# Patient Record
Sex: FEMALE | Race: WHITE | NOT HISPANIC OR LATINO
[De-identification: names, ages, dates, MRNs, and addresses within clinical notes are randomized per-mention and may not be internally consistent; named-entity substitution may affect disease eponyms.]

---

## 2017-02-28 ENCOUNTER — MEDICATION RENEWAL (OUTPATIENT)
Age: 57
End: 2017-02-28

## 2017-03-02 ENCOUNTER — APPOINTMENT (OUTPATIENT)
Dept: PULMONOLOGY | Facility: CLINIC | Age: 57
End: 2017-03-02

## 2017-03-02 VITALS
OXYGEN SATURATION: 99 % | BODY MASS INDEX: 29.49 KG/M2 | WEIGHT: 177 LBS | HEIGHT: 65 IN | DIASTOLIC BLOOD PRESSURE: 80 MMHG | HEART RATE: 70 BPM | SYSTOLIC BLOOD PRESSURE: 130 MMHG | RESPIRATION RATE: 17 BRPM

## 2017-03-02 DIAGNOSIS — M35.00 SICCA SYNDROME, UNSPECIFIED: ICD-10-CM

## 2017-03-02 DIAGNOSIS — R93.8 ABNORMAL FINDINGS ON DIAGNOSTIC IMAGING OF OTHER SPECIFIED BODY STRUCTURES: ICD-10-CM

## 2017-03-02 DIAGNOSIS — Z01.811 ENCOUNTER FOR PREPROCEDURAL RESPIRATORY EXAMINATION: ICD-10-CM

## 2017-03-02 DIAGNOSIS — R06.83 SNORING: ICD-10-CM

## 2017-03-02 DIAGNOSIS — J43.2 CENTRILOBULAR EMPHYSEMA: ICD-10-CM

## 2017-03-10 ENCOUNTER — OTHER (OUTPATIENT)
Age: 57
End: 2017-03-10

## 2017-03-12 ENCOUNTER — FORM ENCOUNTER (OUTPATIENT)
Age: 57
End: 2017-03-12

## 2017-03-13 ENCOUNTER — APPOINTMENT (OUTPATIENT)
Dept: MRI IMAGING | Facility: CLINIC | Age: 57
End: 2017-03-13

## 2017-03-13 ENCOUNTER — APPOINTMENT (OUTPATIENT)
Dept: CT IMAGING | Facility: CLINIC | Age: 57
End: 2017-03-13
Payer: COMMERCIAL

## 2017-03-13 ENCOUNTER — OUTPATIENT (OUTPATIENT)
Dept: OUTPATIENT SERVICES | Facility: HOSPITAL | Age: 57
LOS: 1 days | End: 2017-03-13
Payer: MEDICARE

## 2017-03-13 DIAGNOSIS — R93.8 ABNORMAL FINDINGS ON DIAGNOSTIC IMAGING OF OTHER SPECIFIED BODY STRUCTURES: ICD-10-CM

## 2017-03-13 PROCEDURE — 70551 MRI BRAIN STEM W/O DYE: CPT

## 2017-03-13 PROCEDURE — 70544 MR ANGIOGRAPHY HEAD W/O DYE: CPT | Mod: 26

## 2017-03-13 PROCEDURE — 71250 CT THORAX DX C-: CPT | Mod: 26

## 2017-03-13 PROCEDURE — 71250 CT THORAX DX C-: CPT

## 2017-03-13 PROCEDURE — 70544 MR ANGIOGRAPHY HEAD W/O DYE: CPT

## 2017-03-13 PROCEDURE — 70551 MRI BRAIN STEM W/O DYE: CPT | Mod: 26,59

## 2017-03-29 ENCOUNTER — APPOINTMENT (OUTPATIENT)
Dept: NEPHROLOGY | Facility: CLINIC | Age: 57
End: 2017-03-29

## 2017-03-29 VITALS
HEART RATE: 70 BPM | OXYGEN SATURATION: 99 % | BODY MASS INDEX: 29.94 KG/M2 | HEIGHT: 65 IN | DIASTOLIC BLOOD PRESSURE: 78 MMHG | SYSTOLIC BLOOD PRESSURE: 159 MMHG | WEIGHT: 179.67 LBS

## 2017-03-29 DIAGNOSIS — E83.39 OTHER DISORDERS OF PHOSPHORUS METABOLISM: ICD-10-CM

## 2017-03-30 LAB
ALBUMIN SERPL ELPH-MCNC: 4.4 G/DL
ANION GAP SERPL CALC-SCNC: 19 MMOL/L
BASOPHILS # BLD AUTO: 0.03 K/UL
BASOPHILS NFR BLD AUTO: 0.5 %
BUN SERPL-MCNC: 61 MG/DL
CALCIUM SERPL-MCNC: 8.6 MG/DL
CALCIUM SERPL-MCNC: 8.6 MG/DL
CHLORIDE SERPL-SCNC: 102 MMOL/L
CO2 SERPL-SCNC: 17 MMOL/L
CREAT SERPL-MCNC: 4.18 MG/DL
EOSINOPHIL # BLD AUTO: 0.14 K/UL
EOSINOPHIL NFR BLD AUTO: 2.1 %
GLUCOSE SERPL-MCNC: 93 MG/DL
HCT VFR BLD CALC: 35 %
HGB BLD-MCNC: 11.3 G/DL
IMM GRANULOCYTES NFR BLD AUTO: 0.2 %
LYMPHOCYTES # BLD AUTO: 1.23 K/UL
LYMPHOCYTES NFR BLD AUTO: 18.7 %
MAN DIFF?: NORMAL
MCHC RBC-ENTMCNC: 30.2 PG
MCHC RBC-ENTMCNC: 32.3 GM/DL
MCV RBC AUTO: 93.6 FL
MONOCYTES # BLD AUTO: 0.42 K/UL
MONOCYTES NFR BLD AUTO: 6.4 %
NEUTROPHILS # BLD AUTO: 4.75 K/UL
NEUTROPHILS NFR BLD AUTO: 72.1 %
PARATHYROID HORMONE INTACT: 289 PG/ML
PHOSPHATE SERPL-MCNC: 4.2 MG/DL
PLATELET # BLD AUTO: 187 K/UL
POTASSIUM SERPL-SCNC: 4.2 MMOL/L
RBC # BLD: 3.74 M/UL
RBC # FLD: 13.4 %
SODIUM SERPL-SCNC: 138 MMOL/L
WBC # FLD AUTO: 6.58 K/UL

## 2017-05-03 ENCOUNTER — APPOINTMENT (OUTPATIENT)
Dept: NEPHROLOGY | Facility: CLINIC | Age: 57
End: 2017-05-03

## 2017-05-03 VITALS
WEIGHT: 180.78 LBS | OXYGEN SATURATION: 99 % | HEART RATE: 59 BPM | DIASTOLIC BLOOD PRESSURE: 84 MMHG | BODY MASS INDEX: 30.12 KG/M2 | HEIGHT: 65 IN | SYSTOLIC BLOOD PRESSURE: 140 MMHG

## 2017-05-09 LAB
ALBUMIN SERPL ELPH-MCNC: 4.6 G/DL
ANION GAP SERPL CALC-SCNC: 21 MMOL/L
BASOPHILS # BLD AUTO: 0.04 K/UL
BASOPHILS NFR BLD AUTO: 0.8 %
BUN SERPL-MCNC: 69 MG/DL
CALCIUM SERPL-MCNC: 9.1 MG/DL
CHLORIDE SERPL-SCNC: 100 MMOL/L
CO2 SERPL-SCNC: 17 MMOL/L
CREAT SERPL-MCNC: 4.89 MG/DL
EOSINOPHIL # BLD AUTO: 0.14 K/UL
EOSINOPHIL NFR BLD AUTO: 2.8 %
GLUCOSE SERPL-MCNC: 90 MG/DL
HCT VFR BLD CALC: 33.7 %
HGB BLD-MCNC: 11.2 G/DL
IMM GRANULOCYTES NFR BLD AUTO: 0.2 %
LYMPHOCYTES # BLD AUTO: 1.31 K/UL
LYMPHOCYTES NFR BLD AUTO: 26.4 %
MAN DIFF?: NORMAL
MCHC RBC-ENTMCNC: 31.5 PG
MCHC RBC-ENTMCNC: 33.2 GM/DL
MCV RBC AUTO: 94.7 FL
MONOCYTES # BLD AUTO: 0.35 K/UL
MONOCYTES NFR BLD AUTO: 7 %
NEUTROPHILS # BLD AUTO: 3.12 K/UL
NEUTROPHILS NFR BLD AUTO: 62.8 %
PHOSPHATE SERPL-MCNC: 5.1 MG/DL
PLATELET # BLD AUTO: 166 K/UL
POTASSIUM SERPL-SCNC: 4.5 MMOL/L
RBC # BLD: 3.56 M/UL
RBC # FLD: 12.7 %
SODIUM SERPL-SCNC: 138 MMOL/L
WBC # FLD AUTO: 4.97 K/UL

## 2017-05-21 ENCOUNTER — MEDICATION RENEWAL (OUTPATIENT)
Age: 57
End: 2017-05-21

## 2017-05-30 ENCOUNTER — APPOINTMENT (OUTPATIENT)
Dept: VASCULAR SURGERY | Facility: CLINIC | Age: 57
End: 2017-05-30

## 2017-05-30 VITALS
SYSTOLIC BLOOD PRESSURE: 168 MMHG | WEIGHT: 180 LBS | HEART RATE: 60 BPM | HEIGHT: 65 IN | BODY MASS INDEX: 29.99 KG/M2 | TEMPERATURE: 97.9 F | DIASTOLIC BLOOD PRESSURE: 89 MMHG

## 2017-06-28 ENCOUNTER — APPOINTMENT (OUTPATIENT)
Dept: PULMONOLOGY | Facility: CLINIC | Age: 57
End: 2017-06-28

## 2017-07-19 ENCOUNTER — APPOINTMENT (OUTPATIENT)
Dept: NEPHROLOGY | Facility: CLINIC | Age: 57
End: 2017-07-19

## 2017-07-19 VITALS
DIASTOLIC BLOOD PRESSURE: 78 MMHG | SYSTOLIC BLOOD PRESSURE: 157 MMHG | HEIGHT: 65 IN | HEART RATE: 63 BPM | OXYGEN SATURATION: 100 %

## 2017-07-19 DIAGNOSIS — J01.10 ACUTE FRONTAL SINUSITIS, UNSPECIFIED: ICD-10-CM

## 2017-07-21 LAB
ALBUMIN SERPL ELPH-MCNC: 4.5 G/DL
ALP BLD-CCNC: 64 U/L
ALT SERPL-CCNC: 13 U/L
ANION GAP SERPL CALC-SCNC: 18 MMOL/L
AST SERPL-CCNC: 16 U/L
BASOPHILS # BLD AUTO: 0.04 K/UL
BASOPHILS NFR BLD AUTO: 0.7 %
BILIRUB SERPL-MCNC: 0.4 MG/DL
BUN SERPL-MCNC: 72 MG/DL
CALCIUM SERPL-MCNC: 9 MG/DL
CALCIUM SERPL-MCNC: 9 MG/DL
CHLORIDE SERPL-SCNC: 103 MMOL/L
CO2 SERPL-SCNC: 20 MMOL/L
CREAT SERPL-MCNC: 5.74 MG/DL
EOSINOPHIL # BLD AUTO: 0.41 K/UL
EOSINOPHIL NFR BLD AUTO: 7 %
GLUCOSE SERPL-MCNC: 93 MG/DL
HCT VFR BLD CALC: 34.1 %
HGB BLD-MCNC: 11.2 G/DL
IMM GRANULOCYTES NFR BLD AUTO: 0.2 %
LYMPHOCYTES # BLD AUTO: 1.12 K/UL
LYMPHOCYTES NFR BLD AUTO: 19.2 %
MAN DIFF?: NORMAL
MCHC RBC-ENTMCNC: 30.4 PG
MCHC RBC-ENTMCNC: 32.8 GM/DL
MCV RBC AUTO: 92.7 FL
MONOCYTES # BLD AUTO: 0.34 K/UL
MONOCYTES NFR BLD AUTO: 5.8 %
NEUTROPHILS # BLD AUTO: 3.91 K/UL
NEUTROPHILS NFR BLD AUTO: 67.1 %
PARATHYROID HORMONE INTACT: 311 PG/ML
PHOSPHATE SERPL-MCNC: 4.8 MG/DL
PLATELET # BLD AUTO: 191 K/UL
POTASSIUM SERPL-SCNC: 4.8 MMOL/L
PROT SERPL-MCNC: 7.5 G/DL
RBC # BLD: 3.68 M/UL
RBC # FLD: 13 %
SODIUM SERPL-SCNC: 141 MMOL/L
WBC # FLD AUTO: 5.83 K/UL

## 2017-07-24 PROBLEM — J01.10 ACUTE NON-RECURRENT FRONTAL SINUSITIS: Status: ACTIVE | Noted: 2017-07-24

## 2017-08-16 ENCOUNTER — APPOINTMENT (OUTPATIENT)
Dept: SURGERY | Facility: CLINIC | Age: 57
End: 2017-08-16
Payer: COMMERCIAL

## 2017-08-16 VITALS
DIASTOLIC BLOOD PRESSURE: 89 MMHG | HEIGHT: 65 IN | BODY MASS INDEX: 29.99 KG/M2 | TEMPERATURE: 97.5 F | SYSTOLIC BLOOD PRESSURE: 160 MMHG | WEIGHT: 180 LBS | HEART RATE: 60 BPM

## 2017-08-16 PROCEDURE — 99243 OFF/OP CNSLTJ NEW/EST LOW 30: CPT

## 2017-08-28 ENCOUNTER — RX RENEWAL (OUTPATIENT)
Age: 57
End: 2017-08-28

## 2017-09-18 ENCOUNTER — APPOINTMENT (OUTPATIENT)
Dept: OBGYN | Facility: CLINIC | Age: 57
End: 2017-09-18
Payer: COMMERCIAL

## 2017-09-18 VITALS
WEIGHT: 186 LBS | SYSTOLIC BLOOD PRESSURE: 148 MMHG | BODY MASS INDEX: 30.99 KG/M2 | DIASTOLIC BLOOD PRESSURE: 83 MMHG | HEIGHT: 65 IN

## 2017-09-18 PROCEDURE — 99396 PREV VISIT EST AGE 40-64: CPT

## 2017-09-20 ENCOUNTER — APPOINTMENT (OUTPATIENT)
Dept: NEPHROLOGY | Facility: CLINIC | Age: 57
End: 2017-09-20
Payer: COMMERCIAL

## 2017-09-20 VITALS
BODY MASS INDEX: 31.16 KG/M2 | HEART RATE: 54 BPM | OXYGEN SATURATION: 99 % | HEIGHT: 65 IN | DIASTOLIC BLOOD PRESSURE: 75 MMHG | WEIGHT: 187 LBS | SYSTOLIC BLOOD PRESSURE: 143 MMHG

## 2017-09-20 DIAGNOSIS — E87.2 ACIDOSIS: ICD-10-CM

## 2017-09-20 LAB
CYTOLOGY CVX/VAG DOC THIN PREP: NORMAL
HPV HIGH+LOW RISK DNA PNL CVX: NEGATIVE

## 2017-09-20 PROCEDURE — 99214 OFFICE O/P EST MOD 30 MIN: CPT

## 2017-09-22 LAB
25(OH)D3 SERPL-MCNC: 50.5 NG/ML
ALBUMIN SERPL ELPH-MCNC: 4.5 G/DL
ANION GAP SERPL CALC-SCNC: 19 MMOL/L
APPEARANCE: CLEAR
BACTERIA: NEGATIVE
BASOPHILS # BLD AUTO: 0.05 K/UL
BASOPHILS NFR BLD AUTO: 0.9 %
BILIRUBIN URINE: NEGATIVE
BLOOD URINE: ABNORMAL
BUN SERPL-MCNC: 77 MG/DL
CALCIUM SERPL-MCNC: 9.7 MG/DL
CALCIUM SERPL-MCNC: 9.7 MG/DL
CHLORIDE SERPL-SCNC: 101 MMOL/L
CHOLEST SERPL-MCNC: 162 MG/DL
CHOLEST/HDLC SERPL: 3.3 RATIO
CK SERPL-CCNC: 142 U/L
CO2 SERPL-SCNC: 21 MMOL/L
COLOR: YELLOW
CREAT SERPL-MCNC: 6.12 MG/DL
CREAT SPEC-SCNC: 29 MG/DL
CREAT/PROT UR: 0.8 RATIO
EOSINOPHIL # BLD AUTO: 0.32 K/UL
EOSINOPHIL NFR BLD AUTO: 5.7 %
GLUCOSE QUALITATIVE U: NORMAL MG/DL
GLUCOSE SERPL-MCNC: 93 MG/DL
HCT VFR BLD CALC: 35.9 %
HDLC SERPL-MCNC: 49 MG/DL
HGB BLD-MCNC: 11.6 G/DL
IMM GRANULOCYTES NFR BLD AUTO: 0.2 %
KETONES URINE: NEGATIVE
LDLC SERPL CALC-MCNC: 96 MG/DL
LEUKOCYTE ESTERASE URINE: NEGATIVE
LYMPHOCYTES # BLD AUTO: 1.49 K/UL
LYMPHOCYTES NFR BLD AUTO: 26.7 %
MAN DIFF?: NORMAL
MCHC RBC-ENTMCNC: 30.9 PG
MCHC RBC-ENTMCNC: 32.3 GM/DL
MCV RBC AUTO: 95.7 FL
MICROSCOPIC-UA: NORMAL
MONOCYTES # BLD AUTO: 0.3 K/UL
MONOCYTES NFR BLD AUTO: 5.4 %
NEUTROPHILS # BLD AUTO: 3.41 K/UL
NEUTROPHILS NFR BLD AUTO: 61.1 %
NITRITE URINE: NEGATIVE
PARATHYROID HORMONE INTACT: 227 PG/ML
PH URINE: 7
PHOSPHATE SERPL-MCNC: 4.7 MG/DL
PLATELET # BLD AUTO: 179 K/UL
POTASSIUM SERPL-SCNC: 4.9 MMOL/L
PROT UR-MCNC: 22 MG/DL
PROTEIN URINE: 30 MG/DL
RBC # BLD: 3.75 M/UL
RBC # FLD: 13.1 %
RED BLOOD CELLS URINE: 0 /HPF
SODIUM SERPL-SCNC: 141 MMOL/L
SPECIFIC GRAVITY URINE: 1.01
SQUAMOUS EPITHELIAL CELLS: 0 /HPF
TRIGL SERPL-MCNC: 87 MG/DL
UROBILINOGEN URINE: NORMAL MG/DL
WBC # FLD AUTO: 5.58 K/UL
WHITE BLOOD CELLS URINE: 0 /HPF

## 2017-09-25 ENCOUNTER — FORM ENCOUNTER (OUTPATIENT)
Age: 57
End: 2017-09-25

## 2017-09-26 ENCOUNTER — OUTPATIENT (OUTPATIENT)
Dept: OUTPATIENT SERVICES | Facility: HOSPITAL | Age: 57
LOS: 1 days | End: 2017-09-26
Payer: COMMERCIAL

## 2017-09-26 ENCOUNTER — APPOINTMENT (OUTPATIENT)
Dept: MAMMOGRAPHY | Facility: CLINIC | Age: 57
End: 2017-09-26
Payer: COMMERCIAL

## 2017-09-26 DIAGNOSIS — Z00.8 ENCOUNTER FOR OTHER GENERAL EXAMINATION: ICD-10-CM

## 2017-09-26 PROCEDURE — 77063 BREAST TOMOSYNTHESIS BI: CPT

## 2017-09-26 PROCEDURE — G0202: CPT | Mod: 26

## 2017-09-26 PROCEDURE — 77067 SCR MAMMO BI INCL CAD: CPT

## 2017-09-26 PROCEDURE — 77063 BREAST TOMOSYNTHESIS BI: CPT | Mod: 26

## 2017-10-05 ENCOUNTER — APPOINTMENT (OUTPATIENT)
Dept: TRANSPLANT | Facility: CLINIC | Age: 57
End: 2017-10-05
Payer: COMMERCIAL

## 2017-10-05 DIAGNOSIS — Q61.3 POLYCYSTIC KIDNEY, UNSPECIFIED: ICD-10-CM

## 2017-10-05 PROCEDURE — 99211 OFF/OP EST MAY X REQ PHY/QHP: CPT

## 2017-10-17 ENCOUNTER — APPOINTMENT (OUTPATIENT)
Dept: NEPHROLOGY | Facility: CLINIC | Age: 57
End: 2017-10-17
Payer: COMMERCIAL

## 2017-10-17 VITALS
HEIGHT: 65 IN | OXYGEN SATURATION: 99 % | WEIGHT: 186.95 LBS | DIASTOLIC BLOOD PRESSURE: 76 MMHG | SYSTOLIC BLOOD PRESSURE: 138 MMHG | BODY MASS INDEX: 31.15 KG/M2 | HEART RATE: 56 BPM

## 2017-10-17 PROCEDURE — 99214 OFFICE O/P EST MOD 30 MIN: CPT

## 2017-10-18 LAB
ALBUMIN SERPL ELPH-MCNC: 4.6 G/DL
ANION GAP SERPL CALC-SCNC: 16 MMOL/L
BASOPHILS # BLD AUTO: 0.04 K/UL
BASOPHILS NFR BLD AUTO: 0.7 %
BUN SERPL-MCNC: 80 MG/DL
CALCIUM SERPL-MCNC: 9.8 MG/DL
CALCIUM SERPL-MCNC: 9.8 MG/DL
CHLORIDE SERPL-SCNC: 103 MMOL/L
CO2 SERPL-SCNC: 21 MMOL/L
CREAT SERPL-MCNC: 6.35 MG/DL
EOSINOPHIL # BLD AUTO: 0.23 K/UL
EOSINOPHIL NFR BLD AUTO: 4.2 %
GLUCOSE SERPL-MCNC: 93 MG/DL
HCT VFR BLD CALC: 34.5 %
HGB BLD-MCNC: 11.6 G/DL
IMM GRANULOCYTES NFR BLD AUTO: 0.2 %
LYMPHOCYTES # BLD AUTO: 1.4 K/UL
LYMPHOCYTES NFR BLD AUTO: 25.5 %
MAN DIFF?: NORMAL
MCHC RBC-ENTMCNC: 31.5 PG
MCHC RBC-ENTMCNC: 33.6 GM/DL
MCV RBC AUTO: 93.8 FL
MONOCYTES # BLD AUTO: 0.4 K/UL
MONOCYTES NFR BLD AUTO: 7.3 %
NEUTROPHILS # BLD AUTO: 3.4 K/UL
NEUTROPHILS NFR BLD AUTO: 62.1 %
PARATHYROID HORMONE INTACT: 144 PG/ML
PHOSPHATE SERPL-MCNC: 5.4 MG/DL
PLATELET # BLD AUTO: 186 K/UL
POTASSIUM SERPL-SCNC: 5.2 MMOL/L
RBC # BLD: 3.68 M/UL
RBC # FLD: 12.4 %
SODIUM SERPL-SCNC: 140 MMOL/L
WBC # FLD AUTO: 5.48 K/UL

## 2017-10-23 ENCOUNTER — RX RENEWAL (OUTPATIENT)
Age: 57
End: 2017-10-23

## 2017-10-29 ENCOUNTER — MOBILE ON CALL (OUTPATIENT)
Age: 57
End: 2017-10-29

## 2017-10-31 ENCOUNTER — APPOINTMENT (OUTPATIENT)
Dept: TRANSPLANT | Facility: CLINIC | Age: 57
End: 2017-10-31
Payer: COMMERCIAL

## 2017-10-31 ENCOUNTER — OUTPATIENT (OUTPATIENT)
Dept: OUTPATIENT SERVICES | Facility: HOSPITAL | Age: 57
LOS: 1 days | End: 2017-10-31
Payer: COMMERCIAL

## 2017-10-31 VITALS
BODY MASS INDEX: 31.45 KG/M2 | TEMPERATURE: 97.7 F | WEIGHT: 189 LBS | DIASTOLIC BLOOD PRESSURE: 84 MMHG | OXYGEN SATURATION: 96 % | HEART RATE: 63 BPM | SYSTOLIC BLOOD PRESSURE: 148 MMHG

## 2017-10-31 VITALS
DIASTOLIC BLOOD PRESSURE: 84 MMHG | HEART RATE: 73 BPM | SYSTOLIC BLOOD PRESSURE: 136 MMHG | OXYGEN SATURATION: 98 % | WEIGHT: 186.95 LBS | TEMPERATURE: 98 F | HEIGHT: 65 IN | RESPIRATION RATE: 18 BRPM

## 2017-10-31 DIAGNOSIS — Z98.891 HISTORY OF UTERINE SCAR FROM PREVIOUS SURGERY: Chronic | ICD-10-CM

## 2017-10-31 DIAGNOSIS — N18.6 END STAGE RENAL DISEASE: ICD-10-CM

## 2017-10-31 LAB
BLD GP AB SCN SERPL QL: NEGATIVE — SIGNIFICANT CHANGE UP
RH IG SCN BLD-IMP: POSITIVE — SIGNIFICANT CHANGE UP

## 2017-10-31 PROCEDURE — 86900 BLOOD TYPING SEROLOGIC ABO: CPT

## 2017-10-31 PROCEDURE — G0463: CPT

## 2017-10-31 PROCEDURE — 99215 OFFICE O/P EST HI 40 MIN: CPT

## 2017-10-31 PROCEDURE — 86850 RBC ANTIBODY SCREEN: CPT

## 2017-10-31 PROCEDURE — 86901 BLOOD TYPING SEROLOGIC RH(D): CPT

## 2017-10-31 PROCEDURE — 87086 URINE CULTURE/COLONY COUNT: CPT

## 2017-10-31 RX ORDER — CEFAZOLIN SODIUM 1 G
2000 VIAL (EA) INJECTION ONCE
Qty: 0 | Refills: 0 | Status: COMPLETED | OUTPATIENT
Start: 2017-11-07 | End: 2017-11-07

## 2017-10-31 RX ORDER — SODIUM CHLORIDE 9 MG/ML
3 INJECTION INTRAMUSCULAR; INTRAVENOUS; SUBCUTANEOUS EVERY 8 HOURS
Qty: 0 | Refills: 0 | Status: DISCONTINUED | OUTPATIENT
Start: 2017-11-07 | End: 2017-11-07

## 2017-10-31 NOTE — H&P PST ADULT - PMH
ESRD (end stage renal disease)    HTN (hypertension)    Petechial rash    Phlebitis    Polycystic kidney disease

## 2017-10-31 NOTE — H&P PST ADULT - ATTENDING COMMENTS
agree with above. for living donor kidney transplant part of a paired exchange. riskss  ,benefits ,potential complications discussed. consent signed

## 2017-10-31 NOTE — H&P PST ADULT - HISTORY OF PRESENT ILLNESS
56 y/o F PMH polycystic kidney disease, hypertension, ESRD.  Presents today for living donor right kidney transplant recipient.

## 2017-10-31 NOTE — H&P PST ADULT - NSANTHOSAYNRD_GEN_A_CORE
No. SCAR screening performed.  STOP BANG Legend: 0-2 = LOW Risk; 3-4 = INTERMEDIATE Risk; 5-8 = HIGH Risk

## 2017-11-01 ENCOUNTER — APPOINTMENT (OUTPATIENT)
Dept: NEPHROLOGY | Facility: CLINIC | Age: 57
End: 2017-11-01

## 2017-11-01 LAB
CULTURE RESULTS: SIGNIFICANT CHANGE UP
SPECIMEN SOURCE: SIGNIFICANT CHANGE UP

## 2017-11-07 ENCOUNTER — INPATIENT (INPATIENT)
Facility: HOSPITAL | Age: 57
LOS: 3 days | Discharge: HOME | DRG: 652 | End: 2017-11-11
Attending: TRANSPLANT SURGERY | Admitting: TRANSPLANT SURGERY
Payer: COMMERCIAL

## 2017-11-07 ENCOUNTER — APPOINTMENT (OUTPATIENT)
Dept: TRANSPLANT | Facility: HOSPITAL | Age: 57
End: 2017-11-07
Payer: COMMERCIAL

## 2017-11-07 VITALS
WEIGHT: 186.95 LBS | OXYGEN SATURATION: 99 % | TEMPERATURE: 98 F | SYSTOLIC BLOOD PRESSURE: 148 MMHG | HEIGHT: 65 IN | RESPIRATION RATE: 18 BRPM | HEART RATE: 53 BPM | DIASTOLIC BLOOD PRESSURE: 81 MMHG

## 2017-11-07 DIAGNOSIS — N18.6 END STAGE RENAL DISEASE: ICD-10-CM

## 2017-11-07 DIAGNOSIS — Z98.891 HISTORY OF UTERINE SCAR FROM PREVIOUS SURGERY: Chronic | ICD-10-CM

## 2017-11-07 LAB
ANION GAP SERPL CALC-SCNC: 18 MMOL/L — HIGH (ref 5–17)
BUN SERPL-MCNC: 72 MG/DL — HIGH (ref 7–23)
CALCIUM SERPL-MCNC: 7.4 MG/DL — LOW (ref 8.4–10.5)
CHLORIDE SERPL-SCNC: 111 MMOL/L — HIGH (ref 96–108)
CO2 SERPL-SCNC: 16 MMOL/L — LOW (ref 22–31)
CREAT SERPL-MCNC: 5.87 MG/DL — HIGH (ref 0.5–1.3)
GLUCOSE SERPL-MCNC: 155 MG/DL — HIGH (ref 70–99)
HCT VFR BLD CALC: 28.6 % — LOW (ref 34.5–45)
HCT VFR BLD CALC: 30.7 % — LOW (ref 34.5–45)
HGB BLD-MCNC: 10.6 G/DL — LOW (ref 11.5–15.5)
HGB BLD-MCNC: 9.9 G/DL — LOW (ref 11.5–15.5)
MAGNESIUM SERPL-MCNC: 1.7 MG/DL — SIGNIFICANT CHANGE UP (ref 1.6–2.6)
MCHC RBC-ENTMCNC: 33.5 PG — SIGNIFICANT CHANGE UP (ref 27–34)
MCHC RBC-ENTMCNC: 33.5 PG — SIGNIFICANT CHANGE UP (ref 27–34)
MCHC RBC-ENTMCNC: 34.5 GM/DL — SIGNIFICANT CHANGE UP (ref 32–36)
MCHC RBC-ENTMCNC: 34.6 GM/DL — SIGNIFICANT CHANGE UP (ref 32–36)
MCV RBC AUTO: 96.6 FL — SIGNIFICANT CHANGE UP (ref 80–100)
MCV RBC AUTO: 97 FL — SIGNIFICANT CHANGE UP (ref 80–100)
PHOSPHATE SERPL-MCNC: 5.8 MG/DL — HIGH (ref 2.5–4.5)
PLATELET # BLD AUTO: 139 K/UL — LOW (ref 150–400)
PLATELET # BLD AUTO: 161 K/UL — SIGNIFICANT CHANGE UP (ref 150–400)
POTASSIUM SERPL-MCNC: 4.4 MMOL/L — SIGNIFICANT CHANGE UP (ref 3.5–5.3)
POTASSIUM SERPL-MCNC: 4.7 MMOL/L — SIGNIFICANT CHANGE UP (ref 3.5–5.3)
POTASSIUM SERPL-SCNC: 4.4 MMOL/L — SIGNIFICANT CHANGE UP (ref 3.5–5.3)
POTASSIUM SERPL-SCNC: 4.7 MMOL/L — SIGNIFICANT CHANGE UP (ref 3.5–5.3)
RBC # BLD: 2.96 M/UL — LOW (ref 3.8–5.2)
RBC # BLD: 3.17 M/UL — LOW (ref 3.8–5.2)
RBC # FLD: 11.1 % — SIGNIFICANT CHANGE UP (ref 10.3–14.5)
RBC # FLD: 11.2 % — SIGNIFICANT CHANGE UP (ref 10.3–14.5)
SODIUM SERPL-SCNC: 145 MMOL/L — SIGNIFICANT CHANGE UP (ref 135–145)
WBC # BLD: 10.8 K/UL — HIGH (ref 3.8–10.5)
WBC # BLD: 7.4 K/UL — SIGNIFICANT CHANGE UP (ref 3.8–10.5)
WBC # FLD AUTO: 10.8 K/UL — HIGH (ref 3.8–10.5)
WBC # FLD AUTO: 7.4 K/UL — SIGNIFICANT CHANGE UP (ref 3.8–10.5)

## 2017-11-07 PROCEDURE — 71010: CPT | Mod: 26

## 2017-11-07 PROCEDURE — 99232 SBSQ HOSP IP/OBS MODERATE 35: CPT

## 2017-11-07 PROCEDURE — 76776 US EXAM K TRANSPL W/DOPPLER: CPT | Mod: 26,RT

## 2017-11-07 PROCEDURE — 50325 PREP DONOR RENAL GRAFT: CPT

## 2017-11-07 PROCEDURE — 50360 RNL ALTRNSPLJ W/O RCP NFRCT: CPT

## 2017-11-07 RX ORDER — TACROLIMUS 5 MG/1
4 CAPSULE ORAL DAILY
Qty: 0 | Refills: 0 | Status: DISCONTINUED | OUTPATIENT
Start: 2017-11-07 | End: 2017-11-07

## 2017-11-07 RX ORDER — CALCIUM GLUCONATE 100 MG/ML
1 VIAL (ML) INTRAVENOUS ONCE
Qty: 0 | Refills: 0 | Status: COMPLETED | OUTPATIENT
Start: 2017-11-07 | End: 2017-11-08

## 2017-11-07 RX ORDER — NALOXONE HYDROCHLORIDE 4 MG/.1ML
0.1 SPRAY NASAL
Qty: 0 | Refills: 0 | Status: DISCONTINUED | OUTPATIENT
Start: 2017-11-07 | End: 2017-11-11

## 2017-11-07 RX ORDER — BASILIXIMAB 20 MG/5ML
20 INJECTION, POWDER, FOR SOLUTION INTRAVENOUS ONCE
Qty: 0 | Refills: 0 | Status: DISCONTINUED | OUTPATIENT
Start: 2017-11-07 | End: 2017-11-07

## 2017-11-07 RX ORDER — LIDOCAINE HCL 20 MG/ML
0.2 VIAL (ML) INJECTION ONCE
Qty: 0 | Refills: 0 | Status: DISCONTINUED | OUTPATIENT
Start: 2017-11-07 | End: 2017-11-07

## 2017-11-07 RX ORDER — MYCOPHENOLATE MOFETIL 250 MG/1
1000 CAPSULE ORAL
Qty: 0 | Refills: 0 | Status: DISCONTINUED | OUTPATIENT
Start: 2017-11-07 | End: 2017-11-11

## 2017-11-07 RX ORDER — NYSTATIN 500MM UNIT
500000 POWDER (EA) MISCELLANEOUS
Qty: 0 | Refills: 0 | Status: DISCONTINUED | OUTPATIENT
Start: 2017-11-07 | End: 2017-11-11

## 2017-11-07 RX ORDER — SODIUM CHLORIDE 9 MG/ML
1000 INJECTION INTRAMUSCULAR; INTRAVENOUS; SUBCUTANEOUS
Qty: 0 | Refills: 0 | Status: DISCONTINUED | OUTPATIENT
Start: 2017-11-07 | End: 2017-11-08

## 2017-11-07 RX ORDER — HYDROMORPHONE HYDROCHLORIDE 2 MG/ML
0.25 INJECTION INTRAMUSCULAR; INTRAVENOUS; SUBCUTANEOUS
Qty: 0 | Refills: 0 | Status: DISCONTINUED | OUTPATIENT
Start: 2017-11-07 | End: 2017-11-08

## 2017-11-07 RX ORDER — VALGANCICLOVIR 450 MG/1
450 TABLET, FILM COATED ORAL ONCE
Qty: 0 | Refills: 0 | Status: COMPLETED | OUTPATIENT
Start: 2017-11-08 | End: 2017-11-08

## 2017-11-07 RX ORDER — ONDANSETRON 8 MG/1
4 TABLET, FILM COATED ORAL EVERY 6 HOURS
Qty: 0 | Refills: 0 | Status: DISCONTINUED | OUTPATIENT
Start: 2017-11-07 | End: 2017-11-11

## 2017-11-07 RX ORDER — HYDROMORPHONE HYDROCHLORIDE 2 MG/ML
30 INJECTION INTRAMUSCULAR; INTRAVENOUS; SUBCUTANEOUS
Qty: 0 | Refills: 0 | Status: DISCONTINUED | OUTPATIENT
Start: 2017-11-07 | End: 2017-11-09

## 2017-11-07 RX ORDER — HYDROMORPHONE HYDROCHLORIDE 2 MG/ML
0.5 INJECTION INTRAMUSCULAR; INTRAVENOUS; SUBCUTANEOUS
Qty: 0 | Refills: 0 | Status: DISCONTINUED | OUTPATIENT
Start: 2017-11-07 | End: 2017-11-09

## 2017-11-07 RX ORDER — FAMOTIDINE 10 MG/ML
20 INJECTION INTRAVENOUS
Qty: 0 | Refills: 0 | Status: DISCONTINUED | OUTPATIENT
Start: 2017-11-07 | End: 2017-11-11

## 2017-11-07 RX ORDER — DOCUSATE SODIUM 100 MG
100 CAPSULE ORAL THREE TIMES A DAY
Qty: 0 | Refills: 0 | Status: DISCONTINUED | OUTPATIENT
Start: 2017-11-07 | End: 2017-11-11

## 2017-11-07 RX ORDER — VALGANCICLOVIR 450 MG/1
450 TABLET, FILM COATED ORAL ONCE
Qty: 0 | Refills: 0 | Status: COMPLETED | OUTPATIENT
Start: 2017-11-11 | End: 2017-11-11

## 2017-11-07 RX ORDER — SENNA PLUS 8.6 MG/1
2 TABLET ORAL AT BEDTIME
Qty: 0 | Refills: 0 | Status: DISCONTINUED | OUTPATIENT
Start: 2017-11-07 | End: 2017-11-11

## 2017-11-07 RX ORDER — ATORVASTATIN CALCIUM 80 MG/1
20 TABLET, FILM COATED ORAL AT BEDTIME
Qty: 0 | Refills: 0 | Status: DISCONTINUED | OUTPATIENT
Start: 2017-11-07 | End: 2017-11-08

## 2017-11-07 RX ORDER — HYDROMORPHONE HYDROCHLORIDE 2 MG/ML
0.5 INJECTION INTRAMUSCULAR; INTRAVENOUS; SUBCUTANEOUS
Qty: 0 | Refills: 0 | Status: DISCONTINUED | OUTPATIENT
Start: 2017-11-07 | End: 2017-11-08

## 2017-11-07 RX ORDER — TACROLIMUS 5 MG/1
4 CAPSULE ORAL
Qty: 0 | Refills: 0 | Status: DISCONTINUED | OUTPATIENT
Start: 2017-11-07 | End: 2017-11-08

## 2017-11-07 RX ORDER — ONDANSETRON 8 MG/1
4 TABLET, FILM COATED ORAL ONCE
Qty: 0 | Refills: 0 | Status: COMPLETED | OUTPATIENT
Start: 2017-11-07 | End: 2017-11-07

## 2017-11-07 RX ORDER — SODIUM CHLORIDE 9 MG/ML
500 INJECTION INTRAMUSCULAR; INTRAVENOUS; SUBCUTANEOUS ONCE
Qty: 0 | Refills: 0 | Status: COMPLETED | OUTPATIENT
Start: 2017-11-07 | End: 2017-11-07

## 2017-11-07 RX ADMIN — Medication 100 MILLIGRAM(S): at 22:06

## 2017-11-07 RX ADMIN — SENNA PLUS 2 TABLET(S): 8.6 TABLET ORAL at 22:06

## 2017-11-07 RX ADMIN — SODIUM CHLORIDE 40 MILLILITER(S): 9 INJECTION INTRAMUSCULAR; INTRAVENOUS; SUBCUTANEOUS at 18:31

## 2017-11-07 RX ADMIN — FAMOTIDINE 20 MILLIGRAM(S): 10 INJECTION INTRAVENOUS at 19:41

## 2017-11-07 RX ADMIN — ONDANSETRON 4 MILLIGRAM(S): 8 TABLET, FILM COATED ORAL at 21:48

## 2017-11-07 RX ADMIN — SODIUM CHLORIDE 70 MILLILITER(S): 9 INJECTION INTRAMUSCULAR; INTRAVENOUS; SUBCUTANEOUS at 18:32

## 2017-11-07 RX ADMIN — ONDANSETRON 4 MILLIGRAM(S): 8 TABLET, FILM COATED ORAL at 22:30

## 2017-11-07 RX ADMIN — ATORVASTATIN CALCIUM 20 MILLIGRAM(S): 80 TABLET, FILM COATED ORAL at 22:06

## 2017-11-07 RX ADMIN — MYCOPHENOLATE MOFETIL 1000 MILLIGRAM(S): 250 CAPSULE ORAL at 22:06

## 2017-11-07 RX ADMIN — SODIUM CHLORIDE 2000 MILLILITER(S): 9 INJECTION INTRAMUSCULAR; INTRAVENOUS; SUBCUTANEOUS at 17:37

## 2017-11-07 RX ADMIN — TACROLIMUS 4 MILLIGRAM(S): 5 CAPSULE ORAL at 19:44

## 2017-11-07 RX ADMIN — HYDROMORPHONE HYDROCHLORIDE 0.5 MILLIGRAM(S): 2 INJECTION INTRAMUSCULAR; INTRAVENOUS; SUBCUTANEOUS at 17:48

## 2017-11-07 RX ADMIN — HYDROMORPHONE HYDROCHLORIDE 30 MILLILITER(S): 2 INJECTION INTRAMUSCULAR; INTRAVENOUS; SUBCUTANEOUS at 18:18

## 2017-11-07 RX ADMIN — HYDROMORPHONE HYDROCHLORIDE 30 MILLILITER(S): 2 INJECTION INTRAMUSCULAR; INTRAVENOUS; SUBCUTANEOUS at 19:08

## 2017-11-07 RX ADMIN — HYDROMORPHONE HYDROCHLORIDE 30 MILLILITER(S): 2 INJECTION INTRAMUSCULAR; INTRAVENOUS; SUBCUTANEOUS at 23:29

## 2017-11-07 RX ADMIN — HYDROMORPHONE HYDROCHLORIDE 0.5 MILLIGRAM(S): 2 INJECTION INTRAMUSCULAR; INTRAVENOUS; SUBCUTANEOUS at 17:33

## 2017-11-07 NOTE — BRIEF OPERATIVE NOTE - OPERATION/FINDINGS
Donor 2 arteries reconstructed into one by syndactylization. Left kidney to right side; renal vein to Right external iliac vein, then renal artery to Right external iliac artery, the ureter to bladder; ureteral main cystostomy done over double J stent. Ramu drain left in surgical bed. Fascia closed in 2 layers. Skin closed with staples.

## 2017-11-07 NOTE — BRIEF OPERATIVE NOTE - PROCEDURE
<<-----Click on this checkbox to enter Procedure Live donor renal transplant  11/07/2017    Active  [email or web address]

## 2017-11-07 NOTE — PROVIDER CONTACT NOTE (OTHER) - SITUATION
pts. /63  H/H 9.9/ 28.6, plts 139  BUN 72, creatinine 5.87, calcium 74, magnessium 1.7, phosphorus 5.8

## 2017-11-08 DIAGNOSIS — D89.9 DISORDER INVOLVING THE IMMUNE MECHANISM, UNSPECIFIED: ICD-10-CM

## 2017-11-08 DIAGNOSIS — Z94.0 KIDNEY TRANSPLANT STATUS: ICD-10-CM

## 2017-11-08 DIAGNOSIS — I10 ESSENTIAL (PRIMARY) HYPERTENSION: ICD-10-CM

## 2017-11-08 DIAGNOSIS — Q61.3 POLYCYSTIC KIDNEY, UNSPECIFIED: ICD-10-CM

## 2017-11-08 LAB
ANION GAP SERPL CALC-SCNC: 19 MMOL/L — HIGH (ref 5–17)
APPEARANCE UR: CLEAR — SIGNIFICANT CHANGE UP
BACTERIA # UR AUTO: ABNORMAL /HPF
BILIRUB UR-MCNC: NEGATIVE — SIGNIFICANT CHANGE UP
BUN SERPL-MCNC: 72 MG/DL — HIGH (ref 7–23)
CA-I BLD-SCNC: 1.03 MMOL/L — LOW (ref 1.12–1.3)
CALCIUM SERPL-MCNC: 7.2 MG/DL — LOW (ref 8.4–10.5)
CHLORIDE SERPL-SCNC: 109 MMOL/L — HIGH (ref 96–108)
CO2 SERPL-SCNC: 16 MMOL/L — LOW (ref 22–31)
COLOR SPEC: YELLOW — SIGNIFICANT CHANGE UP
CREAT SERPL-MCNC: 4.89 MG/DL — HIGH (ref 0.5–1.3)
DIFF PNL FLD: ABNORMAL
GLUCOSE BLDC GLUCOMTR-MCNC: 112 MG/DL — HIGH (ref 70–99)
GLUCOSE BLDC GLUCOMTR-MCNC: 113 MG/DL — HIGH (ref 70–99)
GLUCOSE BLDC GLUCOMTR-MCNC: 130 MG/DL — HIGH (ref 70–99)
GLUCOSE BLDC GLUCOMTR-MCNC: 174 MG/DL — HIGH (ref 70–99)
GLUCOSE SERPL-MCNC: 191 MG/DL — HIGH (ref 70–99)
GLUCOSE UR QL: NEGATIVE — SIGNIFICANT CHANGE UP
HCT VFR BLD CALC: 28.4 % — LOW (ref 34.5–45)
HGB BLD-MCNC: 9.5 G/DL — LOW (ref 11.5–15.5)
KETONES UR-MCNC: NEGATIVE — SIGNIFICANT CHANGE UP
LDH SERPL L TO P-CCNC: 239 U/L — SIGNIFICANT CHANGE UP (ref 50–242)
LEUKOCYTE ESTERASE UR-ACNC: NEGATIVE — SIGNIFICANT CHANGE UP
MAGNESIUM SERPL-MCNC: 1.6 MG/DL — SIGNIFICANT CHANGE UP (ref 1.6–2.6)
MCHC RBC-ENTMCNC: 32.5 PG — SIGNIFICANT CHANGE UP (ref 27–34)
MCHC RBC-ENTMCNC: 33.6 GM/DL — SIGNIFICANT CHANGE UP (ref 32–36)
MCV RBC AUTO: 96.7 FL — SIGNIFICANT CHANGE UP (ref 80–100)
NITRITE UR-MCNC: NEGATIVE — SIGNIFICANT CHANGE UP
PH UR: 6 — SIGNIFICANT CHANGE UP (ref 5–8)
PHOSPHATE SERPL-MCNC: 6.8 MG/DL — HIGH (ref 2.5–4.5)
PLATELET # BLD AUTO: 141 K/UL — LOW (ref 150–400)
POTASSIUM SERPL-MCNC: 4.2 MMOL/L — SIGNIFICANT CHANGE UP (ref 3.5–5.3)
POTASSIUM SERPL-SCNC: 4.2 MMOL/L — SIGNIFICANT CHANGE UP (ref 3.5–5.3)
PROT UR-MCNC: SIGNIFICANT CHANGE UP
RBC # BLD: 2.94 M/UL — LOW (ref 3.8–5.2)
RBC # FLD: 11.1 % — SIGNIFICANT CHANGE UP (ref 10.3–14.5)
RBC CASTS # UR COMP ASSIST: >50 /HPF (ref 0–2)
SODIUM SERPL-SCNC: 144 MMOL/L — SIGNIFICANT CHANGE UP (ref 135–145)
SP GR SPEC: 1.01 — LOW (ref 1.01–1.02)
TACROLIMUS SERPL-MCNC: 17.5 NG/ML — SIGNIFICANT CHANGE UP
UROBILINOGEN FLD QL: NEGATIVE — SIGNIFICANT CHANGE UP
WBC # BLD: 7.9 K/UL — SIGNIFICANT CHANGE UP (ref 3.8–10.5)
WBC # FLD AUTO: 7.9 K/UL — SIGNIFICANT CHANGE UP (ref 3.8–10.5)
WBC UR QL: ABNORMAL /HPF (ref 0–5)

## 2017-11-08 PROCEDURE — 99232 SBSQ HOSP IP/OBS MODERATE 35: CPT

## 2017-11-08 RX ORDER — SODIUM CHLORIDE 9 MG/ML
1000 INJECTION, SOLUTION INTRAVENOUS
Qty: 0 | Refills: 0 | Status: DISCONTINUED | OUTPATIENT
Start: 2017-11-08 | End: 2017-11-09

## 2017-11-08 RX ORDER — ACETAMINOPHEN 500 MG
1000 TABLET ORAL ONCE
Qty: 0 | Refills: 0 | Status: COMPLETED | OUTPATIENT
Start: 2017-11-08 | End: 2017-11-08

## 2017-11-08 RX ORDER — TACROLIMUS 5 MG/1
2 CAPSULE ORAL
Qty: 0 | Refills: 0 | Status: DISCONTINUED | OUTPATIENT
Start: 2017-11-09 | End: 2017-11-09

## 2017-11-08 RX ORDER — BASILIXIMAB 20 MG/5ML
20 INJECTION, POWDER, FOR SOLUTION INTRAVENOUS ONCE
Qty: 0 | Refills: 0 | Status: COMPLETED | OUTPATIENT
Start: 2017-11-11 | End: 2017-11-11

## 2017-11-08 RX ORDER — SODIUM CHLORIDE 9 MG/ML
500 INJECTION INTRAMUSCULAR; INTRAVENOUS; SUBCUTANEOUS ONCE
Qty: 0 | Refills: 0 | Status: COMPLETED | OUTPATIENT
Start: 2017-11-08 | End: 2017-11-08

## 2017-11-08 RX ORDER — ASPIRIN/CALCIUM CARB/MAGNESIUM 324 MG
81 TABLET ORAL DAILY
Qty: 0 | Refills: 0 | Status: DISCONTINUED | OUTPATIENT
Start: 2017-11-08 | End: 2017-11-11

## 2017-11-08 RX ORDER — SODIUM CHLORIDE 9 MG/ML
1000 INJECTION INTRAMUSCULAR; INTRAVENOUS; SUBCUTANEOUS
Qty: 0 | Refills: 0 | Status: DISCONTINUED | OUTPATIENT
Start: 2017-11-08 | End: 2017-11-08

## 2017-11-08 RX ORDER — GLYCERIN ADULT
1 SUPPOSITORY, RECTAL RECTAL ONCE
Qty: 0 | Refills: 0 | Status: COMPLETED | OUTPATIENT
Start: 2017-11-08 | End: 2017-11-08

## 2017-11-08 RX ORDER — ATORVASTATIN CALCIUM 80 MG/1
20 TABLET, FILM COATED ORAL
Qty: 0 | Refills: 0 | Status: DISCONTINUED | OUTPATIENT
Start: 2017-11-08 | End: 2017-11-11

## 2017-11-08 RX ADMIN — Medication 100 MILLIGRAM(S): at 06:02

## 2017-11-08 RX ADMIN — Medication 500000 UNIT(S): at 17:33

## 2017-11-08 RX ADMIN — ONDANSETRON 4 MILLIGRAM(S): 8 TABLET, FILM COATED ORAL at 04:18

## 2017-11-08 RX ADMIN — ONDANSETRON 4 MILLIGRAM(S): 8 TABLET, FILM COATED ORAL at 18:16

## 2017-11-08 RX ADMIN — VALGANCICLOVIR 450 MILLIGRAM(S): 450 TABLET, FILM COATED ORAL at 06:02

## 2017-11-08 RX ADMIN — MYCOPHENOLATE MOFETIL 1000 MILLIGRAM(S): 250 CAPSULE ORAL at 17:32

## 2017-11-08 RX ADMIN — FAMOTIDINE 20 MILLIGRAM(S): 10 INJECTION INTRAVENOUS at 17:32

## 2017-11-08 RX ADMIN — FAMOTIDINE 20 MILLIGRAM(S): 10 INJECTION INTRAVENOUS at 06:02

## 2017-11-08 RX ADMIN — SODIUM CHLORIDE 150 MILLILITER(S): 9 INJECTION, SOLUTION INTRAVENOUS at 12:25

## 2017-11-08 RX ADMIN — Medication 125 MILLIGRAM(S): at 17:32

## 2017-11-08 RX ADMIN — Medication 200 GRAM(S): at 01:37

## 2017-11-08 RX ADMIN — TACROLIMUS 4 MILLIGRAM(S): 5 CAPSULE ORAL at 06:02

## 2017-11-08 RX ADMIN — Medication 400 MILLIGRAM(S): at 21:52

## 2017-11-08 RX ADMIN — Medication 1000 MILLIGRAM(S): at 22:22

## 2017-11-08 RX ADMIN — Medication 100 MILLIGRAM(S): at 23:02

## 2017-11-08 RX ADMIN — SODIUM CHLORIDE 500 MILLILITER(S): 9 INJECTION INTRAMUSCULAR; INTRAVENOUS; SUBCUTANEOUS at 17:29

## 2017-11-08 RX ADMIN — HYDROMORPHONE HYDROCHLORIDE 30 MILLILITER(S): 2 INJECTION INTRAMUSCULAR; INTRAVENOUS; SUBCUTANEOUS at 06:47

## 2017-11-08 RX ADMIN — Medication 500000 UNIT(S): at 23:02

## 2017-11-08 RX ADMIN — Medication 1 TABLET(S): at 12:18

## 2017-11-08 RX ADMIN — Medication 400 MILLIGRAM(S): at 14:30

## 2017-11-08 RX ADMIN — ONDANSETRON 4 MILLIGRAM(S): 8 TABLET, FILM COATED ORAL at 23:01

## 2017-11-08 RX ADMIN — Medication 500000 UNIT(S): at 00:50

## 2017-11-08 RX ADMIN — MYCOPHENOLATE MOFETIL 1000 MILLIGRAM(S): 250 CAPSULE ORAL at 06:02

## 2017-11-08 RX ADMIN — Medication 100 MILLIGRAM(S): at 13:32

## 2017-11-08 RX ADMIN — Medication 500000 UNIT(S): at 06:03

## 2017-11-08 RX ADMIN — SENNA PLUS 2 TABLET(S): 8.6 TABLET ORAL at 23:02

## 2017-11-08 RX ADMIN — Medication 1000 MILLIGRAM(S): at 15:00

## 2017-11-08 RX ADMIN — ONDANSETRON 4 MILLIGRAM(S): 8 TABLET, FILM COATED ORAL at 12:32

## 2017-11-08 RX ADMIN — Medication 500000 UNIT(S): at 12:18

## 2017-11-08 RX ADMIN — Medication 81 MILLIGRAM(S): at 12:18

## 2017-11-08 RX ADMIN — Medication 1 SUPPOSITORY(S): at 17:55

## 2017-11-08 NOTE — DIETITIAN INITIAL EVALUATION ADULT. - NS AS NUTRI INTERV MEALS SNACK
Pending improved tolerance to clear liquids; resume Consistent Carbohydrate, Renal diet as medically feasible

## 2017-11-08 NOTE — DIETITIAN INITIAL EVALUATION ADULT. - ADHERENCE
Pt reports she followed a Renal diet loosely, restricting potassium, phosphorus and protein only when necessary. Chart indicates home meds include sodium bicarbonate./good

## 2017-11-08 NOTE — PROVIDER CONTACT NOTE (OTHER) - ACTION/TREATMENT ORDERED:
receive 70cc/hr standing and in that replacement hour will also receive in addition, 450cc/hr for that hour Dr. Richards verified this is the order.

## 2017-11-08 NOTE — PROVIDER CONTACT NOTE (OTHER) - ASSESSMENT
Order states replace fluid 1:1 with baseline of 100cc and another order with a baseline of 70cc/hr  Continued

## 2017-11-08 NOTE — DIETITIAN INITIAL EVALUATION ADULT. - FACTORS AFF FOOD INTAKE
Pt noted with 2 episodes of emesis this morning, diet downgraded to clear liquids. Last BM 11/6 (pre-transplant)/persistent nausea/vomiting

## 2017-11-08 NOTE — DIETITIAN INITIAL EVALUATION ADULT. - ENERGY NEEDS
ht: 5 feet 5 inches, wt: 187 pounds, BMI: 31.3 Kg/m2, IBW: 125 pounds (+/- 10%), 150% IBW. Edema: none noted; Skin: no pressure injuries.    Fluid needs: 3L as medically appropriate.

## 2017-11-08 NOTE — DIETITIAN INITIAL EVALUATION ADULT. - OTHER INFO
Nutrition Assessment warranted for S/P kidney transplant. Pt is a 56 yo female, h/o HTN, polycystic kidney disease, stage 5 CKD; S/P living donor R kidney transplant POD 1.  Pt noted with good urine output (4550ml in 24-hours) and decreasing creatinine.

## 2017-11-08 NOTE — DIETITIAN INITIAL EVALUATION ADULT. - PHYSICAL APPEARANCE
well nourished/obese/BMI 31.3Kg/m2. Pt appears well developed with no signs of muscle or fat depletion.

## 2017-11-08 NOTE — DIETITIAN INITIAL EVALUATION ADULT. - DIET TYPE
clear fluid renal replacement pts:no protein restr,no conc K & phos, low sodium/consistent carbohydrate (no snacks)/clear fluid

## 2017-11-08 NOTE — PROVIDER CONTACT NOTE (OTHER) - RECOMMENDATIONS
MD Richards stated pt is to receive 70cc/hr regardless of output and replacement fluid is to be replacing the amount that the patient puts out per hour 1:1. Verified if patient puts out 450cc/hr pt will

## 2017-11-09 DIAGNOSIS — E83.51 HYPOCALCEMIA: ICD-10-CM

## 2017-11-09 LAB
ALBUMIN SERPL ELPH-MCNC: 3.2 G/DL — LOW (ref 3.3–5)
ALP SERPL-CCNC: 32 U/L — LOW (ref 40–120)
ALT FLD-CCNC: 9 U/L RC — LOW (ref 10–45)
ANION GAP SERPL CALC-SCNC: 17 MMOL/L — SIGNIFICANT CHANGE UP (ref 5–17)
APPEARANCE UR: CLEAR — SIGNIFICANT CHANGE UP
AST SERPL-CCNC: 25 U/L — SIGNIFICANT CHANGE UP (ref 10–40)
BASOPHILS # BLD AUTO: 0 K/UL — SIGNIFICANT CHANGE UP (ref 0–0.2)
BASOPHILS NFR BLD AUTO: 0 % — SIGNIFICANT CHANGE UP (ref 0–2)
BILIRUB SERPL-MCNC: 0.2 MG/DL — SIGNIFICANT CHANGE UP (ref 0.2–1.2)
BILIRUB UR-MCNC: NEGATIVE — SIGNIFICANT CHANGE UP
BUN SERPL-MCNC: 52 MG/DL — HIGH (ref 7–23)
CA-I BLD-SCNC: 0.95 MMOL/L — LOW (ref 1.12–1.3)
CALCIUM SERPL-MCNC: 6.4 MG/DL — CRITICAL LOW (ref 8.4–10.5)
CALCIUM SERPL-MCNC: 6.4 MG/DL — CRITICAL LOW (ref 8.4–10.5)
CHLORIDE SERPL-SCNC: 113 MMOL/L — HIGH (ref 96–108)
CO2 SERPL-SCNC: 17 MMOL/L — LOW (ref 22–31)
COLOR SPEC: SIGNIFICANT CHANGE UP
COMMENT - URINE: SIGNIFICANT CHANGE UP
CREAT SERPL-MCNC: 2.86 MG/DL — HIGH (ref 0.5–1.3)
DIFF PNL FLD: ABNORMAL
EOSINOPHIL # BLD AUTO: 0 K/UL — SIGNIFICANT CHANGE UP (ref 0–0.5)
EOSINOPHIL NFR BLD AUTO: 0 % — SIGNIFICANT CHANGE UP (ref 0–6)
EPI CELLS # UR: SIGNIFICANT CHANGE UP /HPF
GLUCOSE BLDC GLUCOMTR-MCNC: 114 MG/DL — HIGH (ref 70–99)
GLUCOSE BLDC GLUCOMTR-MCNC: 139 MG/DL — HIGH (ref 70–99)
GLUCOSE BLDC GLUCOMTR-MCNC: 149 MG/DL — HIGH (ref 70–99)
GLUCOSE BLDC GLUCOMTR-MCNC: 171 MG/DL — HIGH (ref 70–99)
GLUCOSE SERPL-MCNC: 126 MG/DL — HIGH (ref 70–99)
GLUCOSE UR QL: NEGATIVE — SIGNIFICANT CHANGE UP
HCT VFR BLD CALC: 25 % — LOW (ref 34.5–45)
HGB BLD-MCNC: 8.8 G/DL — LOW (ref 11.5–15.5)
KETONES UR-MCNC: NEGATIVE — SIGNIFICANT CHANGE UP
LDH SERPL L TO P-CCNC: 259 U/L — HIGH (ref 50–242)
LEUKOCYTE ESTERASE UR-ACNC: ABNORMAL
LYMPHOCYTES # BLD AUTO: 0.5 K/UL — LOW (ref 1–3.3)
LYMPHOCYTES # BLD AUTO: 7.3 % — LOW (ref 13–44)
MAGNESIUM SERPL-MCNC: 1.7 MG/DL — SIGNIFICANT CHANGE UP (ref 1.6–2.6)
MCHC RBC-ENTMCNC: 33.9 PG — SIGNIFICANT CHANGE UP (ref 27–34)
MCHC RBC-ENTMCNC: 35.4 GM/DL — SIGNIFICANT CHANGE UP (ref 32–36)
MCV RBC AUTO: 95.8 FL — SIGNIFICANT CHANGE UP (ref 80–100)
MONOCYTES # BLD AUTO: 0.4 K/UL — SIGNIFICANT CHANGE UP (ref 0–0.9)
MONOCYTES NFR BLD AUTO: 5.4 % — SIGNIFICANT CHANGE UP (ref 2–14)
NEUTROPHILS # BLD AUTO: 6.2 K/UL — SIGNIFICANT CHANGE UP (ref 1.8–7.4)
NEUTROPHILS NFR BLD AUTO: 87.3 % — HIGH (ref 43–77)
NITRITE UR-MCNC: NEGATIVE — SIGNIFICANT CHANGE UP
PH UR: 6 — SIGNIFICANT CHANGE UP (ref 5–8)
PHOSPHATE SERPL-MCNC: 3.8 MG/DL — SIGNIFICANT CHANGE UP (ref 2.5–4.5)
PLATELET # BLD AUTO: 133 K/UL — LOW (ref 150–400)
POTASSIUM SERPL-MCNC: 3.7 MMOL/L — SIGNIFICANT CHANGE UP (ref 3.5–5.3)
POTASSIUM SERPL-SCNC: 3.7 MMOL/L — SIGNIFICANT CHANGE UP (ref 3.5–5.3)
PROT SERPL-MCNC: 6.1 G/DL — SIGNIFICANT CHANGE UP (ref 6–8.3)
PROT UR-MCNC: 30 MG/DL
RBC # BLD: 2.61 M/UL — LOW (ref 3.8–5.2)
RBC # FLD: 11.4 % — SIGNIFICANT CHANGE UP (ref 10.3–14.5)
RBC CASTS # UR COMP ASSIST: >50 /HPF (ref 0–2)
SODIUM SERPL-SCNC: 147 MMOL/L — HIGH (ref 135–145)
SP GR SPEC: 1.01 — SIGNIFICANT CHANGE UP (ref 1.01–1.02)
TACROLIMUS SERPL-MCNC: 5.8 NG/ML — SIGNIFICANT CHANGE UP
UROBILINOGEN FLD QL: NEGATIVE — SIGNIFICANT CHANGE UP
WBC # BLD: 7.1 K/UL — SIGNIFICANT CHANGE UP (ref 3.8–10.5)
WBC # FLD AUTO: 7.1 K/UL — SIGNIFICANT CHANGE UP (ref 3.8–10.5)
WBC UR QL: SIGNIFICANT CHANGE UP /HPF (ref 0–5)

## 2017-11-09 PROCEDURE — 99232 SBSQ HOSP IP/OBS MODERATE 35: CPT

## 2017-11-09 RX ORDER — MAGNESIUM SULFATE 500 MG/ML
2 VIAL (ML) INJECTION ONCE
Qty: 0 | Refills: 0 | Status: COMPLETED | OUTPATIENT
Start: 2017-11-09 | End: 2017-11-09

## 2017-11-09 RX ORDER — CALCIUM GLUCONATE 100 MG/ML
1 VIAL (ML) INTRAVENOUS ONCE
Qty: 0 | Refills: 0 | Status: COMPLETED | OUTPATIENT
Start: 2017-11-09 | End: 2017-11-09

## 2017-11-09 RX ORDER — OXYCODONE AND ACETAMINOPHEN 5; 325 MG/1; MG/1
1 TABLET ORAL EVERY 4 HOURS
Qty: 0 | Refills: 0 | Status: DISCONTINUED | OUTPATIENT
Start: 2017-11-09 | End: 2017-11-11

## 2017-11-09 RX ORDER — CALCITRIOL 0.5 UG/1
0.5 CAPSULE ORAL DAILY
Qty: 0 | Refills: 0 | Status: DISCONTINUED | OUTPATIENT
Start: 2017-11-09 | End: 2017-11-11

## 2017-11-09 RX ORDER — SODIUM BICARBONATE 650 MG/1
650 TABLET ORAL
Qty: 120 | Refills: 4 | Status: DISCONTINUED | COMMUNITY
Start: 2017-04-03 | End: 2017-11-09

## 2017-11-09 RX ORDER — CALCIUM CARBONATE 500 MG/1
500 TABLET, CHEWABLE ORAL
Qty: 90 | Refills: 0 | Status: DISCONTINUED | COMMUNITY
Start: 2017-03-29 | End: 2017-11-09

## 2017-11-09 RX ORDER — TACROLIMUS 5 MG/1
3 CAPSULE ORAL
Qty: 0 | Refills: 0 | Status: DISCONTINUED | OUTPATIENT
Start: 2017-11-09 | End: 2017-11-11

## 2017-11-09 RX ORDER — OXYCODONE AND ACETAMINOPHEN 5; 325 MG/1; MG/1
2 TABLET ORAL EVERY 4 HOURS
Qty: 0 | Refills: 0 | Status: DISCONTINUED | OUTPATIENT
Start: 2017-11-09 | End: 2017-11-11

## 2017-11-09 RX ADMIN — Medication 500000 UNIT(S): at 21:34

## 2017-11-09 RX ADMIN — Medication 500000 UNIT(S): at 18:08

## 2017-11-09 RX ADMIN — CALCITRIOL 0.5 MICROGRAM(S): 0.5 CAPSULE ORAL at 12:29

## 2017-11-09 RX ADMIN — Medication 100 MILLIGRAM(S): at 06:17

## 2017-11-09 RX ADMIN — FAMOTIDINE 20 MILLIGRAM(S): 10 INJECTION INTRAVENOUS at 18:08

## 2017-11-09 RX ADMIN — Medication 125 MILLIGRAM(S): at 06:15

## 2017-11-09 RX ADMIN — Medication 200 GRAM(S): at 11:38

## 2017-11-09 RX ADMIN — Medication 1 TABLET(S): at 12:29

## 2017-11-09 RX ADMIN — SENNA PLUS 2 TABLET(S): 8.6 TABLET ORAL at 21:32

## 2017-11-09 RX ADMIN — Medication 500000 UNIT(S): at 12:29

## 2017-11-09 RX ADMIN — OXYCODONE AND ACETAMINOPHEN 1 TABLET(S): 5; 325 TABLET ORAL at 18:08

## 2017-11-09 RX ADMIN — OXYCODONE AND ACETAMINOPHEN 1 TABLET(S): 5; 325 TABLET ORAL at 11:36

## 2017-11-09 RX ADMIN — SODIUM CHLORIDE 150 MILLILITER(S): 9 INJECTION, SOLUTION INTRAVENOUS at 08:14

## 2017-11-09 RX ADMIN — TACROLIMUS 3 MILLIGRAM(S): 5 CAPSULE ORAL at 18:08

## 2017-11-09 RX ADMIN — ONDANSETRON 4 MILLIGRAM(S): 8 TABLET, FILM COATED ORAL at 06:13

## 2017-11-09 RX ADMIN — TACROLIMUS 2 MILLIGRAM(S): 5 CAPSULE ORAL at 06:16

## 2017-11-09 RX ADMIN — Medication 50 GRAM(S): at 14:20

## 2017-11-09 RX ADMIN — Medication 81 MILLIGRAM(S): at 12:29

## 2017-11-09 RX ADMIN — MYCOPHENOLATE MOFETIL 1000 MILLIGRAM(S): 250 CAPSULE ORAL at 18:08

## 2017-11-09 RX ADMIN — OXYCODONE AND ACETAMINOPHEN 1 TABLET(S): 5; 325 TABLET ORAL at 19:08

## 2017-11-09 RX ADMIN — FAMOTIDINE 20 MILLIGRAM(S): 10 INJECTION INTRAVENOUS at 06:17

## 2017-11-09 RX ADMIN — OXYCODONE AND ACETAMINOPHEN 1 TABLET(S): 5; 325 TABLET ORAL at 22:46

## 2017-11-09 RX ADMIN — Medication 100 MILLIGRAM(S): at 14:20

## 2017-11-09 RX ADMIN — Medication 60 MILLIGRAM(S): at 18:04

## 2017-11-09 RX ADMIN — OXYCODONE AND ACETAMINOPHEN 1 TABLET(S): 5; 325 TABLET ORAL at 23:16

## 2017-11-09 RX ADMIN — MYCOPHENOLATE MOFETIL 1000 MILLIGRAM(S): 250 CAPSULE ORAL at 06:17

## 2017-11-09 RX ADMIN — Medication 100 MILLIGRAM(S): at 21:32

## 2017-11-09 RX ADMIN — OXYCODONE AND ACETAMINOPHEN 1 TABLET(S): 5; 325 TABLET ORAL at 12:36

## 2017-11-09 RX ADMIN — Medication 500000 UNIT(S): at 06:17

## 2017-11-10 ENCOUNTER — CHART COPY (OUTPATIENT)
Age: 57
End: 2017-11-10

## 2017-11-10 DIAGNOSIS — D64.9 ANEMIA, UNSPECIFIED: ICD-10-CM

## 2017-11-10 LAB
ANION GAP SERPL CALC-SCNC: 13 MMOL/L — SIGNIFICANT CHANGE UP (ref 5–17)
APPEARANCE UR: CLEAR — SIGNIFICANT CHANGE UP
BACTERIA # UR AUTO: ABNORMAL /HPF
BASOPHILS # BLD AUTO: 0 K/UL — SIGNIFICANT CHANGE UP (ref 0–0.2)
BASOPHILS NFR BLD AUTO: 0 % — SIGNIFICANT CHANGE UP (ref 0–2)
BILIRUB UR-MCNC: NEGATIVE — SIGNIFICANT CHANGE UP
BUN SERPL-MCNC: 40 MG/DL — HIGH (ref 7–23)
CALCIUM SERPL-MCNC: 7.1 MG/DL — LOW (ref 8.4–10.5)
CHLORIDE SERPL-SCNC: 111 MMOL/L — HIGH (ref 96–108)
CO2 SERPL-SCNC: 18 MMOL/L — LOW (ref 22–31)
COLOR SPEC: SIGNIFICANT CHANGE UP
CREAT SERPL-MCNC: 2.03 MG/DL — HIGH (ref 0.5–1.3)
DIFF PNL FLD: ABNORMAL
EOSINOPHIL # BLD AUTO: 0 K/UL — SIGNIFICANT CHANGE UP (ref 0–0.5)
EOSINOPHIL NFR BLD AUTO: 0.1 % — SIGNIFICANT CHANGE UP (ref 0–6)
EPI CELLS # UR: SIGNIFICANT CHANGE UP /HPF
GLUCOSE BLDC GLUCOMTR-MCNC: 109 MG/DL — HIGH (ref 70–99)
GLUCOSE BLDC GLUCOMTR-MCNC: 129 MG/DL — HIGH (ref 70–99)
GLUCOSE BLDC GLUCOMTR-MCNC: 137 MG/DL — HIGH (ref 70–99)
GLUCOSE BLDC GLUCOMTR-MCNC: 141 MG/DL — HIGH (ref 70–99)
GLUCOSE SERPL-MCNC: 121 MG/DL — HIGH (ref 70–99)
GLUCOSE UR QL: NEGATIVE — SIGNIFICANT CHANGE UP
HCT VFR BLD CALC: 22.7 % — LOW (ref 34.5–45)
HGB BLD-MCNC: 8.1 G/DL — LOW (ref 11.5–15.5)
KETONES UR-MCNC: NEGATIVE — SIGNIFICANT CHANGE UP
LDH SERPL L TO P-CCNC: 239 U/L — SIGNIFICANT CHANGE UP (ref 50–242)
LEUKOCYTE ESTERASE UR-ACNC: ABNORMAL
LYMPHOCYTES # BLD AUTO: 0.5 K/UL — LOW (ref 1–3.3)
LYMPHOCYTES # BLD AUTO: 9 % — LOW (ref 13–44)
MAGNESIUM SERPL-MCNC: 2.2 MG/DL — SIGNIFICANT CHANGE UP (ref 1.6–2.6)
MCHC RBC-ENTMCNC: 34.3 PG — HIGH (ref 27–34)
MCHC RBC-ENTMCNC: 35.7 GM/DL — SIGNIFICANT CHANGE UP (ref 32–36)
MCV RBC AUTO: 96 FL — SIGNIFICANT CHANGE UP (ref 80–100)
MONOCYTES # BLD AUTO: 0.4 K/UL — SIGNIFICANT CHANGE UP (ref 0–0.9)
MONOCYTES NFR BLD AUTO: 7 % — SIGNIFICANT CHANGE UP (ref 2–14)
NEUTROPHILS # BLD AUTO: 4.4 K/UL — SIGNIFICANT CHANGE UP (ref 1.8–7.4)
NEUTROPHILS NFR BLD AUTO: 83.9 % — HIGH (ref 43–77)
NITRITE UR-MCNC: NEGATIVE — SIGNIFICANT CHANGE UP
PH UR: 6.5 — SIGNIFICANT CHANGE UP (ref 5–8)
PHOSPHATE SERPL-MCNC: 2.7 MG/DL — SIGNIFICANT CHANGE UP (ref 2.5–4.5)
PLATELET # BLD AUTO: 111 K/UL — LOW (ref 150–400)
POTASSIUM SERPL-MCNC: 3.8 MMOL/L — SIGNIFICANT CHANGE UP (ref 3.5–5.3)
POTASSIUM SERPL-SCNC: 3.8 MMOL/L — SIGNIFICANT CHANGE UP (ref 3.5–5.3)
PROT UR-MCNC: 30 MG/DL
RBC # BLD: 2.37 M/UL — LOW (ref 3.8–5.2)
RBC # FLD: 11.4 % — SIGNIFICANT CHANGE UP (ref 10.3–14.5)
RBC CASTS # UR COMP ASSIST: >50 /HPF (ref 0–2)
SODIUM SERPL-SCNC: 142 MMOL/L — SIGNIFICANT CHANGE UP (ref 135–145)
SP GR SPEC: 1.01 — LOW (ref 1.01–1.02)
TACROLIMUS SERPL-MCNC: 8.1 NG/ML — SIGNIFICANT CHANGE UP
UROBILINOGEN FLD QL: NEGATIVE — SIGNIFICANT CHANGE UP
WBC # BLD: 5.2 K/UL — SIGNIFICANT CHANGE UP (ref 3.8–10.5)
WBC # FLD AUTO: 5.2 K/UL — SIGNIFICANT CHANGE UP (ref 3.8–10.5)
WBC UR QL: ABNORMAL /HPF (ref 0–5)

## 2017-11-10 PROCEDURE — 99232 SBSQ HOSP IP/OBS MODERATE 35: CPT

## 2017-11-10 RX ORDER — POTASSIUM CHLORIDE 20 MEQ
20 PACKET (EA) ORAL ONCE
Qty: 0 | Refills: 0 | Status: COMPLETED | OUTPATIENT
Start: 2017-11-10 | End: 2017-11-10

## 2017-11-10 RX ADMIN — Medication 20 MILLIEQUIVALENT(S): at 11:16

## 2017-11-10 RX ADMIN — Medication 500000 UNIT(S): at 23:44

## 2017-11-10 RX ADMIN — OXYCODONE AND ACETAMINOPHEN 1 TABLET(S): 5; 325 TABLET ORAL at 08:21

## 2017-11-10 RX ADMIN — OXYCODONE AND ACETAMINOPHEN 1 TABLET(S): 5; 325 TABLET ORAL at 13:33

## 2017-11-10 RX ADMIN — FAMOTIDINE 20 MILLIGRAM(S): 10 INJECTION INTRAVENOUS at 06:12

## 2017-11-10 RX ADMIN — ATORVASTATIN CALCIUM 20 MILLIGRAM(S): 80 TABLET, FILM COATED ORAL at 20:40

## 2017-11-10 RX ADMIN — Medication 60 MILLIGRAM(S): at 06:11

## 2017-11-10 RX ADMIN — Medication 30 MILLIGRAM(S): at 17:24

## 2017-11-10 RX ADMIN — OXYCODONE AND ACETAMINOPHEN 1 TABLET(S): 5; 325 TABLET ORAL at 07:51

## 2017-11-10 RX ADMIN — MYCOPHENOLATE MOFETIL 1000 MILLIGRAM(S): 250 CAPSULE ORAL at 06:11

## 2017-11-10 RX ADMIN — Medication 81 MILLIGRAM(S): at 11:17

## 2017-11-10 RX ADMIN — ONDANSETRON 4 MILLIGRAM(S): 8 TABLET, FILM COATED ORAL at 06:51

## 2017-11-10 RX ADMIN — TACROLIMUS 3 MILLIGRAM(S): 5 CAPSULE ORAL at 17:23

## 2017-11-10 RX ADMIN — SENNA PLUS 2 TABLET(S): 8.6 TABLET ORAL at 23:44

## 2017-11-10 RX ADMIN — Medication 100 MILLIGRAM(S): at 06:12

## 2017-11-10 RX ADMIN — Medication 500000 UNIT(S): at 11:16

## 2017-11-10 RX ADMIN — ONDANSETRON 4 MILLIGRAM(S): 8 TABLET, FILM COATED ORAL at 20:40

## 2017-11-10 RX ADMIN — OXYCODONE AND ACETAMINOPHEN 1 TABLET(S): 5; 325 TABLET ORAL at 14:03

## 2017-11-10 RX ADMIN — FAMOTIDINE 20 MILLIGRAM(S): 10 INJECTION INTRAVENOUS at 17:23

## 2017-11-10 RX ADMIN — Medication 1 TABLET(S): at 11:17

## 2017-11-10 RX ADMIN — MYCOPHENOLATE MOFETIL 1000 MILLIGRAM(S): 250 CAPSULE ORAL at 17:23

## 2017-11-10 RX ADMIN — Medication 500000 UNIT(S): at 17:23

## 2017-11-10 RX ADMIN — Medication 100 MILLIGRAM(S): at 21:41

## 2017-11-10 RX ADMIN — TACROLIMUS 3 MILLIGRAM(S): 5 CAPSULE ORAL at 06:11

## 2017-11-10 RX ADMIN — Medication 10 MILLIGRAM(S): at 21:41

## 2017-11-10 RX ADMIN — Medication 100 MILLIGRAM(S): at 13:30

## 2017-11-10 RX ADMIN — CALCITRIOL 0.5 MICROGRAM(S): 0.5 CAPSULE ORAL at 11:17

## 2017-11-10 RX ADMIN — Medication 500000 UNIT(S): at 06:11

## 2017-11-10 NOTE — CHART NOTE - NSCHARTNOTEFT_GEN_A_CORE
Pt seen for nutrition follow up S/P kidney transplant, as per departmental protocol.     Hospital Course: Pt is a 58 y/o F PMH polycystic kidney disease, hypertension, ESRD POD3 S/P a living donor kidney transplant to the right external iliac vessels, recovering well.    Pt noted with good urine output, 4100ml in 24-hours; current UO = 100-150ml/hr. Creatinine downtrending, sodium, potassium, magnesium and phosphorus WDL.    Reviewed food safety guidelines for transplant recipients. Reviewed recommendations to avoid grapefruit and pomegranate while taking immunosuppressant medication. Pt was receptive and expressed understanding. Provided food/nutrient list for future reference. Provided nutrition handouts: USDA Food Safety for Transplant Recipients booklet; Potassium Content of Foods, Phosphorus Content of Foods; Magnesium Content of Foods    Source: Patient [X ]    Family [ ]     other [X ]; medical record    Diet : Renal, Consistent Carbohydrate diet with snack    Patient reports N+V has resolved, however with some morning nausea from medication. Pt is tolerating solid food, however still with reduced appetite. Pt is trying to increase her fluid intake. RD will add PowerAde electrolyte drink to meal trays to help meet increased post-Txp fluid needs of ~3L.      PO intake:  < 50% [X ] 50-75% [ ]   % [ ]  other :     Source for PO intake [ X] Patient [ ] family [ ] chart [ ] staff [ ] other     Enteral /Parenteral Nutrition: n/a      Current Weight: Weight (kg): 87.7Kg (11/10), 84.8Kg (11/07 dosing)  Edema: none noted    Pertinent Medications: MEDICATIONS  (STANDING):  aspirin enteric coated 81 milliGRAM(s) Oral daily  atorvastatin 20 milliGRAM(s) Oral <User Schedule>  calcitriol   Capsule 0.5 MICROGram(s) Oral daily  docusate sodium 100 milliGRAM(s) Oral three times a day  famotidine    Tablet 20 milliGRAM(s) Oral two times a day  methylPREDNISolone sodium succinate Injectable 30 milliGRAM(s) IV Push two times a day  mycophenolate mofetil 1000 milliGRAM(s) Oral two times a day  nystatin    Suspension 262024 Unit(s) Oral four times a day  senna 2 Tablet(s) Oral at bedtime  tacrolimus 3 milliGRAM(s) Oral two times a day  trimethoprim   80 mG/sulfamethoxazole 400 mG 1 Tablet(s) Oral daily    MEDICATIONS  (PRN):  naloxone Injectable 0.1 milliGRAM(s) IV Push every 3 minutes PRN For ANY of the following changes in patient status:  A. RR LESS THAN 10 breaths per minute, B. Oxygen saturation LESS THAN 90%, C. Sedation score of 6  ondansetron Injectable 4 milliGRAM(s) IV Push every 6 hours PRN Nausea  oxyCODONE    5 mG/acetaminophen 325 mG 1 Tablet(s) Oral every 4 hours PRN Moderate Pain (4 - 6)  oxyCODONE    5 mG/acetaminophen 325 mG 2 Tablet(s) Oral every 4 hours PRN Severe Pain (7 - 10)    Pertinent Labs:  11-10 Na142 mmol/L Glu 121 mg/dL<H> K+ 3.8 mmol/L Cr  2.03 mg/dL<H> BUN 40 mg/dL<H> Phos 2.7 mg/dL   Fingersticks x 24 hours: 109-171mg/dL    Skin: no pressure injuries    Estimated Needs:   [X ] no change since previous assessment  [ ] recalculated:       Previous Nutrition Diagnosis:      [X ] Increased Nutrient Needs      Nutrition Diagnosis is [ X] ongoing; being addressed with po diet + PowerAde electrolyte drink    New Nutrition Diagnosis: [X ] not applicable     Interventions:     Recommend    Recommend:  1) liberalize diet to Regular as medically appropriate. RD will add PowerAde electrolyte drink to meal trays to help meet increased post-Txp fluid needs of ~3L.   2) Monitor weight, urine output, lab values, skin, po intake and GI tolerance  3) Reinforce therapeutic diet education as able    Monitoring and Evaluation:   Follow up per protocol  RD to remain available for further nutritional interventions as indicated.   Sheela Adrian, MS RD [facility], #[phone].

## 2017-11-10 NOTE — PROGRESS NOTE ADULT - PROBLEM SELECTOR PLAN 5
Possible lowered ionized fraction from bicarb.  has improved, continue calcitriol.
Possible lowered ionized fraction from bicarb.  Stop IVF, give page gluconate 1 gram and start calcitriol 0.5mgs daily.  Replace Mg to a goal of 2.

## 2017-11-11 ENCOUNTER — TRANSCRIPTION ENCOUNTER (OUTPATIENT)
Age: 57
End: 2017-11-11

## 2017-11-11 VITALS
DIASTOLIC BLOOD PRESSURE: 79 MMHG | OXYGEN SATURATION: 96 % | HEART RATE: 70 BPM | TEMPERATURE: 99 F | SYSTOLIC BLOOD PRESSURE: 145 MMHG | RESPIRATION RATE: 18 BRPM

## 2017-11-11 DIAGNOSIS — I10 ESSENTIAL (PRIMARY) HYPERTENSION: ICD-10-CM

## 2017-11-11 LAB
ALBUMIN SERPL ELPH-MCNC: 3.6 G/DL — SIGNIFICANT CHANGE UP (ref 3.3–5)
ALP SERPL-CCNC: 33 U/L — LOW (ref 40–120)
ALT FLD-CCNC: 10 U/L RC — SIGNIFICANT CHANGE UP (ref 10–45)
ANION GAP SERPL CALC-SCNC: 13 MMOL/L — SIGNIFICANT CHANGE UP (ref 5–17)
APPEARANCE UR: CLEAR — SIGNIFICANT CHANGE UP
AST SERPL-CCNC: 21 U/L — SIGNIFICANT CHANGE UP (ref 10–40)
BACTERIA # UR AUTO: ABNORMAL /HPF
BASOPHILS # BLD AUTO: 0 K/UL — SIGNIFICANT CHANGE UP (ref 0–0.2)
BASOPHILS NFR BLD AUTO: 0.8 % — SIGNIFICANT CHANGE UP (ref 0–2)
BILIRUB SERPL-MCNC: 0.3 MG/DL — SIGNIFICANT CHANGE UP (ref 0.2–1.2)
BILIRUB UR-MCNC: NEGATIVE — SIGNIFICANT CHANGE UP
BUN SERPL-MCNC: 37 MG/DL — HIGH (ref 7–23)
CALCIUM SERPL-MCNC: 7.6 MG/DL — LOW (ref 8.4–10.5)
CHLORIDE SERPL-SCNC: 110 MMOL/L — HIGH (ref 96–108)
CO2 SERPL-SCNC: 20 MMOL/L — LOW (ref 22–31)
COLOR SPEC: YELLOW — SIGNIFICANT CHANGE UP
CREAT SERPL-MCNC: 1.87 MG/DL — HIGH (ref 0.5–1.3)
DIFF PNL FLD: ABNORMAL
EOSINOPHIL # BLD AUTO: 0 K/UL — SIGNIFICANT CHANGE UP (ref 0–0.5)
EOSINOPHIL NFR BLD AUTO: 0.2 % — SIGNIFICANT CHANGE UP (ref 0–6)
EPI CELLS # UR: SIGNIFICANT CHANGE UP /HPF
GLUCOSE BLDC GLUCOMTR-MCNC: 103 MG/DL — HIGH (ref 70–99)
GLUCOSE BLDC GLUCOMTR-MCNC: 157 MG/DL — HIGH (ref 70–99)
GLUCOSE SERPL-MCNC: 94 MG/DL — SIGNIFICANT CHANGE UP (ref 70–99)
GLUCOSE UR QL: NEGATIVE — SIGNIFICANT CHANGE UP
HCT VFR BLD CALC: 24.8 % — LOW (ref 34.5–45)
HGB BLD-MCNC: 8.6 G/DL — LOW (ref 11.5–15.5)
KETONES UR-MCNC: NEGATIVE — SIGNIFICANT CHANGE UP
LDH SERPL L TO P-CCNC: 263 U/L — HIGH (ref 50–242)
LEUKOCYTE ESTERASE UR-ACNC: ABNORMAL
LYMPHOCYTES # BLD AUTO: 1.1 K/UL — SIGNIFICANT CHANGE UP (ref 1–3.3)
LYMPHOCYTES # BLD AUTO: 19.3 % — SIGNIFICANT CHANGE UP (ref 13–44)
MCHC RBC-ENTMCNC: 33.4 PG — SIGNIFICANT CHANGE UP (ref 27–34)
MCHC RBC-ENTMCNC: 34.8 GM/DL — SIGNIFICANT CHANGE UP (ref 32–36)
MCV RBC AUTO: 96.3 FL — SIGNIFICANT CHANGE UP (ref 80–100)
MONOCYTES # BLD AUTO: 0.5 K/UL — SIGNIFICANT CHANGE UP (ref 0–0.9)
MONOCYTES NFR BLD AUTO: 8.4 % — SIGNIFICANT CHANGE UP (ref 2–14)
NEUTROPHILS # BLD AUTO: 4.2 K/UL — SIGNIFICANT CHANGE UP (ref 1.8–7.4)
NEUTROPHILS NFR BLD AUTO: 71.4 % — SIGNIFICANT CHANGE UP (ref 43–77)
NITRITE UR-MCNC: NEGATIVE — SIGNIFICANT CHANGE UP
PH UR: 6.5 — SIGNIFICANT CHANGE UP (ref 5–8)
PLATELET # BLD AUTO: 122 K/UL — LOW (ref 150–400)
POTASSIUM SERPL-MCNC: 3.9 MMOL/L — SIGNIFICANT CHANGE UP (ref 3.5–5.3)
POTASSIUM SERPL-SCNC: 3.9 MMOL/L — SIGNIFICANT CHANGE UP (ref 3.5–5.3)
PROT SERPL-MCNC: 5.8 G/DL — LOW (ref 6–8.3)
PROT UR-MCNC: 30 MG/DL
RBC # BLD: 2.57 M/UL — LOW (ref 3.8–5.2)
RBC # FLD: 11.6 % — SIGNIFICANT CHANGE UP (ref 10.3–14.5)
RBC CASTS # UR COMP ASSIST: >50 /HPF (ref 0–2)
SODIUM SERPL-SCNC: 143 MMOL/L — SIGNIFICANT CHANGE UP (ref 135–145)
SP GR SPEC: 1.01 — SIGNIFICANT CHANGE UP (ref 1.01–1.02)
TACROLIMUS SERPL-MCNC: 7 NG/ML — SIGNIFICANT CHANGE UP
UROBILINOGEN FLD QL: NEGATIVE — SIGNIFICANT CHANGE UP
WBC # BLD: 5.8 K/UL — SIGNIFICANT CHANGE UP (ref 3.8–10.5)
WBC # FLD AUTO: 5.8 K/UL — SIGNIFICANT CHANGE UP (ref 3.8–10.5)
WBC UR QL: ABNORMAL /HPF (ref 0–5)

## 2017-11-11 PROCEDURE — 84100 ASSAY OF PHOSPHORUS: CPT

## 2017-11-11 PROCEDURE — 76776 US EXAM K TRANSPL W/DOPPLER: CPT

## 2017-11-11 PROCEDURE — 84132 ASSAY OF SERUM POTASSIUM: CPT

## 2017-11-11 PROCEDURE — 85027 COMPLETE CBC AUTOMATED: CPT

## 2017-11-11 PROCEDURE — 82330 ASSAY OF CALCIUM: CPT

## 2017-11-11 PROCEDURE — 80048 BASIC METABOLIC PNL TOTAL CA: CPT

## 2017-11-11 PROCEDURE — 82310 ASSAY OF CALCIUM: CPT

## 2017-11-11 PROCEDURE — 71045 X-RAY EXAM CHEST 1 VIEW: CPT

## 2017-11-11 PROCEDURE — 80053 COMPREHEN METABOLIC PANEL: CPT

## 2017-11-11 PROCEDURE — 83735 ASSAY OF MAGNESIUM: CPT

## 2017-11-11 PROCEDURE — 81001 URINALYSIS AUTO W/SCOPE: CPT

## 2017-11-11 PROCEDURE — 82962 GLUCOSE BLOOD TEST: CPT

## 2017-11-11 PROCEDURE — C2617: CPT

## 2017-11-11 PROCEDURE — 83615 LACTATE (LD) (LDH) ENZYME: CPT

## 2017-11-11 PROCEDURE — 80197 ASSAY OF TACROLIMUS: CPT

## 2017-11-11 RX ORDER — ASPIRIN/CALCIUM CARB/MAGNESIUM 324 MG
1 TABLET ORAL
Qty: 0 | Refills: 0 | COMMUNITY
Start: 2017-11-11

## 2017-11-11 RX ORDER — ACETAMINOPHEN 500 MG
2 TABLET ORAL
Qty: 0 | Refills: 0 | COMMUNITY
Start: 2017-11-11

## 2017-11-11 RX ORDER — FAMOTIDINE 10 MG/ML
1 INJECTION INTRAVENOUS
Qty: 0 | Refills: 0 | COMMUNITY
Start: 2017-11-11

## 2017-11-11 RX ORDER — CALCITRIOL 0.5 UG/1
1 CAPSULE ORAL
Qty: 0 | Refills: 0 | COMMUNITY
Start: 2017-11-11

## 2017-11-11 RX ORDER — SENNA PLUS 8.6 MG/1
2 TABLET ORAL
Qty: 0 | Refills: 0 | COMMUNITY
Start: 2017-11-11

## 2017-11-11 RX ORDER — ATENOLOL 25 MG/1
1 TABLET ORAL
Qty: 0 | Refills: 0 | COMMUNITY

## 2017-11-11 RX ORDER — TACROLIMUS 5 MG/1
3 CAPSULE ORAL
Qty: 0 | Refills: 0 | COMMUNITY
Start: 2017-11-11

## 2017-11-11 RX ORDER — AMLODIPINE BESYLATE 2.5 MG/1
1 TABLET ORAL
Qty: 0 | Refills: 0 | COMMUNITY

## 2017-11-11 RX ORDER — SUCRALFATE 1 G
1 TABLET ORAL ONCE
Qty: 0 | Refills: 0 | Status: DISCONTINUED | OUTPATIENT
Start: 2017-11-11 | End: 2017-11-11

## 2017-11-11 RX ORDER — SODIUM BICARBONATE 1 MEQ/ML
2 SYRINGE (ML) INTRAVENOUS
Qty: 0 | Refills: 0 | COMMUNITY

## 2017-11-11 RX ORDER — ATORVASTATIN CALCIUM 80 MG/1
1 TABLET, FILM COATED ORAL
Qty: 0 | Refills: 0 | COMMUNITY
Start: 2017-11-11

## 2017-11-11 RX ORDER — DOCUSATE SODIUM 100 MG
1 CAPSULE ORAL
Qty: 0 | Refills: 0 | COMMUNITY
Start: 2017-11-11

## 2017-11-11 RX ORDER — NYSTATIN 500MM UNIT
5 POWDER (EA) MISCELLANEOUS
Qty: 0 | Refills: 0 | COMMUNITY
Start: 2017-11-11

## 2017-11-11 RX ORDER — ACETAMINOPHEN 500 MG
650 TABLET ORAL EVERY 6 HOURS
Qty: 0 | Refills: 0 | Status: DISCONTINUED | OUTPATIENT
Start: 2017-11-11 | End: 2017-11-11

## 2017-11-11 RX ORDER — MYCOPHENOLATE MOFETIL 250 MG/1
4 CAPSULE ORAL
Qty: 0 | Refills: 0 | COMMUNITY
Start: 2017-11-11

## 2017-11-11 RX ORDER — ATORVASTATIN CALCIUM 80 MG/1
1 TABLET, FILM COATED ORAL
Qty: 0 | Refills: 0 | COMMUNITY

## 2017-11-11 RX ORDER — FUROSEMIDE 40 MG
1 TABLET ORAL
Qty: 0 | Refills: 0 | COMMUNITY

## 2017-11-11 RX ADMIN — Medication 650 MILLIGRAM(S): at 10:54

## 2017-11-11 RX ADMIN — Medication 30 MILLIGRAM(S): at 06:16

## 2017-11-11 RX ADMIN — Medication 500000 UNIT(S): at 06:16

## 2017-11-11 RX ADMIN — OXYCODONE AND ACETAMINOPHEN 1 TABLET(S): 5; 325 TABLET ORAL at 01:26

## 2017-11-11 RX ADMIN — Medication 1 TABLET(S): at 11:45

## 2017-11-11 RX ADMIN — FAMOTIDINE 20 MILLIGRAM(S): 10 INJECTION INTRAVENOUS at 06:39

## 2017-11-11 RX ADMIN — Medication 500000 UNIT(S): at 11:45

## 2017-11-11 RX ADMIN — Medication 100 MILLIGRAM(S): at 06:16

## 2017-11-11 RX ADMIN — Medication 81 MILLIGRAM(S): at 11:45

## 2017-11-11 RX ADMIN — VALGANCICLOVIR 450 MILLIGRAM(S): 450 TABLET, FILM COATED ORAL at 06:39

## 2017-11-11 RX ADMIN — TACROLIMUS 3 MILLIGRAM(S): 5 CAPSULE ORAL at 06:16

## 2017-11-11 RX ADMIN — OXYCODONE AND ACETAMINOPHEN 1 TABLET(S): 5; 325 TABLET ORAL at 00:56

## 2017-11-11 RX ADMIN — MYCOPHENOLATE MOFETIL 1000 MILLIGRAM(S): 250 CAPSULE ORAL at 06:16

## 2017-11-11 RX ADMIN — CALCITRIOL 0.5 MICROGRAM(S): 0.5 CAPSULE ORAL at 11:45

## 2017-11-11 RX ADMIN — ONDANSETRON 4 MILLIGRAM(S): 8 TABLET, FILM COATED ORAL at 05:44

## 2017-11-11 RX ADMIN — BASILIXIMAB 100 MILLIGRAM(S): 20 INJECTION, POWDER, FOR SOLUTION INTRAVENOUS at 09:53

## 2017-11-11 RX ADMIN — Medication 650 MILLIGRAM(S): at 11:48

## 2017-11-11 NOTE — DISCHARGE NOTE ADULT - CARE PROVIDER_API CALL
Juan J Richards ([facility]), Surgery  [address] [place] [address]  Phone: [phone]  Fax: [phone]    Shiva Delgado (), Nephrology  [address] [place] [address]  Phone: [phone]  Fax: [phone]

## 2017-11-11 NOTE — DISCHARGE NOTE ADULT - HOSPITAL COURSE
HPI:  58 y/o F PMH polycystic kidney disease, hypertension, ESRD.  Presents today for living donor right kidney transplant recipient.    Hospital Course:  The patient was admitted on 11/7/17 and underwent a live-donor kidney transplant to her right external iliac vessels on the same day. The operation was uncomplicated and the patient was transferred post-operatively to the PACU in stable condition. The transplanted kidney began making significant urine output in the OR. On POD1 the patient was out of bed, her pain was controlled with a PCA, and she was tolerating clear liquids. She continued to make excellent urine output and her creatinine was trending down. On POD2 and POD3 her diet was advanced to a regular diet with renal restrictions, which she tolerated well. Her pain was well controlled with oral pain meds, and she was out of bed and walking. Her lab work continued to improve. By POD4 the patient was well-recovered and ready for discharge. She was discharged home in stable condition, with instructions to follow-up at transplant clinic.

## 2017-11-11 NOTE — PROGRESS NOTE ADULT - PROBLEM SELECTOR PROBLEM 1
Kidney replaced by transplant

## 2017-11-11 NOTE — DISCHARGE NOTE ADULT - MEDICATION SUMMARY - MEDICATIONS TO STOP TAKING
I will STOP taking the medications listed below when I get home from the hospital:    atenolol 50 mg oral tablet  -- 1 tab(s) by mouth once a day    amLODIPine 5 mg oral tablet  -- 1 tab(s) by mouth once a day    furosemide 40 mg oral tablet  -- 1 tab(s) by mouth once a day    sodium bicarbonate 650 mg oral tablet  -- 2 tab(s) by mouth 2 times a day

## 2017-11-11 NOTE — PROGRESS NOTE ADULT - ASSESSMENT
56 y/o F PMH polycystic kidney disease, hypertension, ESRD POD3 s/p a living donor kidney transplant to the right external iliac vessels, recovering well.    Plan:  - Cont PO pain meds   - D/c serrano; f/u UOP hourly  - immunosuppression regimen: Tacro, MMF, Solu-medrol hal   - Prophylactic anti-infectives  - Oral KCl today   - Cont renal diet   - IVL, f/u I & Os   - DVT ppx- ASA, SCD, encourage ambulation  - Cont calcitrol 0.5mg QD  - Dispo: potential d/c to home tomorrow       Marcin Issa  [facility] [phone]
57 y.o female hx of HTN, ADPKD, stage 5 CKD s/p LURT POD 1.
57 y.o female hx of HTN, ADPKD, stage 5 CKD s/p LURT POD 2.
58 y/o F PMH polycystic kidney disease, hypertension, ESRD POD2 s/p a living donor kidney transplant to the right external iliac vessels, recovering well.    Plan:  - D/c PCA, start PO meds for pain management   - Continue with serrano; f/u UOP hourly  - immunosuppression regimen: Tacro, MMF, Solu-medrol hal   - Prophylactic anti-infectives  - F/u AM labs  - Cont renal diet   - D/c fluids, IVL   - DVT ppx- ASA, SCD, encourage ambulation  - Replete calcium 1g calcium gluconate, start calcitrol 0.5mg QD      Marcin Issa  [facility] [phone]
Assessment:  56 y/o F PMH polycystic kidney disease, hypertension, ESRD POD4 s/p a living donor kidney transplant to the right external iliac vessels, recovering well.    Plan:  - Cont PO pain meds   - D/c serrano; f/u UOP hourly  - immunosuppression regimen: Tacro 3mg BID, MMF 1g BID, Prednisone 20mg BID   - Prophylactic anti-infectives  - Cont renal diet   - IVL, f/u I & Os   - DVT ppx- ASA, SCD, encourage ambulation  - Cont calcitrol 0.5mg QD  - Dispo: potential d/c to home today    Roosevelt Baugh, PGY-1  [facility] [phone]
renal transplant recipient, Post op day 4. She is receiving Simulect infusion 2nd  dose today.  Reviewed immunosuppression, Tac levels, down trending creatinine, Has drain, still in pulse, 25ml/overnight.  noted I/O.  Plan: Discharge planning.   Patient has medications, aware of dosages, reviewed out patient medication list and steroid taper. Valcyte is present in the out patient meds, will add todays dose to inpatient meds.
57 y.o female hx of HTN, ADPKD, stage 5 CKD s/p LURT POD 3.

## 2017-11-11 NOTE — PROGRESS NOTE ADULT - PROBLEM SELECTOR PROBLEM 3
Hypertension, unspecified type
Hypertension, unspecified type
Immunosuppression
Hypertension, unspecified type

## 2017-11-11 NOTE — PROGRESS NOTE ADULT - SUBJECTIVE AND OBJECTIVE BOX
--------------------------------------------------------------------------------  Chief Complaint:  I feel much better    24 hour events/subjective:    Reviewed. Foleys catheter has been removed.    PAST HISTORY  --------------------------------------------------------------------------------  No significant changes to PMH, PSH, FHx, SHx, unless otherwise noted    ALLERGIES & MEDICATIONS  --------------------------------------------------------------------------------  Allergies    No Known Allergies    Intolerances      Standing Inpatient Medications  aspirin enteric coated 81 milliGRAM(s) Oral daily  atorvastatin 20 milliGRAM(s) Oral <User Schedule>  calcitriol   Capsule 0.5 MICROGram(s) Oral daily  docusate sodium 100 milliGRAM(s) Oral three times a day  famotidine    Tablet 20 milliGRAM(s) Oral two times a day  mycophenolate mofetil 1000 milliGRAM(s) Oral two times a day  nystatin    Suspension 621108 Unit(s) Oral four times a day  predniSONE   Tablet 20 milliGRAM(s) Oral two times a day  senna 2 Tablet(s) Oral at bedtime  tacrolimus 3 milliGRAM(s) Oral two times a day  trimethoprim   80 mG/sulfamethoxazole 400 mG 1 Tablet(s) Oral daily    PRN Inpatient Medications  acetaminophen   Tablet. 650 milliGRAM(s) Oral every 6 hours PRN  naloxone Injectable 0.1 milliGRAM(s) IV Push every 3 minutes PRN  ondansetron Injectable 4 milliGRAM(s) IV Push every 6 hours PRN  oxyCODONE    5 mG/acetaminophen 325 mG 1 Tablet(s) Oral every 4 hours PRN  oxyCODONE    5 mG/acetaminophen 325 mG 2 Tablet(s) Oral every 4 hours PRN      REVIEW OF SYSTEMS  --------------------------------------------------------------------------------  Gen: Feels better, ambulatory  Skin: No rashes  Head/Eyes/Ears/Mouth: No headache;No sore throat  Respiratory: No dyspnea, cough,   CV: No chest pain, PND, orthopnea  GI: No abdominal pain, diarrhea, constipation, nausea, vomiting  Transplant: Pain is controlled, drain 25 ml/overnight  : No increased frequency, dysuria, hematuria, nocturia  MSK: No joint pain/swelling; no back pain; no edema  Neuro: No dizziness/lightheadedness, weakness, seizures, numbness, tingling  Psych: No significant nervousness, anxiety, stress, depression    All other systems were reviewed and are negative, except as noted.    VITALS/PHYSICAL EXAM  --------------------------------------------------------------------------------  T(C): 36.9 (11-11-17 @ 09:45), Max: 36.9 (11-11-17 @ 05:00)  HR: 69 (11-11-17 @ 09:45) (68 - 88)  BP: 148/75 (11-11-17 @ 09:45) (138/82 - 150/83)  RR: 18 (11-11-17 @ 09:45) (18 - 18)  SpO2: 97% (11-11-17 @ 09:45) (95% - 99%)          11-10-17 @ 07:01  -  11-11-17 @ 07:00  --------------------------------------------------------  IN: 3240 mL / OUT: 3150 mL / NET: 90 mL    11-11-17 @ 07:01  -  11-11-17 @ 12:00  --------------------------------------------------------  IN: 730 mL / OUT: 785 mL / NET: -55 mL      Physical Exam:  	Gen: NAD, well-appearing  	HEENT: PERRL, supple neck, clear oropharynx  	Pulm: CTA B/L  	CV: RRR, S1S2; no rub  	Back: No spinal or CVA tenderness; no sacral edema  	Abd: +BS, soft, nontender/nondistended                      Transplant: No tenderness, swelling  	: No suprapubic tenderness  	UE: Warm, FROM, intact strength; no edema; no asterixis  	LE: Warm, FROM, intact strength; no edema  	Neuro: No focal deficits, intact gait  	Psych: Normal affect and mood  	Skin: Warm, without rashes      LABS/STUDIES  --------------------------------------------------------------------------------              8.6    5.8   >-----------<  122      [11-11-17 @ 06:17]              24.8     143  |  110  |  37  ----------------------------<  94      [11-11-17 @ 06:16]  3.9   |  20  |  1.87        Ca     7.6     [11-11-17 @ 06:16]      Mg     2.2     [11-10-17 @ 05:49]      Phos  2.7     [11-10-17 @ 05:49]    TPro  5.8  /  Alb  3.6  /  TBili  0.3  /  DBili  x   /  AST  21  /  ALT  10  /  AlkPhos  33  [11-11-17 @ 06:16]              [11-11-17 @ 06:16]    Creatinine Trend:  SCr 1.87 [11-11 @ 06:16]  SCr 2.03 [11-10 @ 05:49]  SCr 2.86 [11-09 @ 05:46]  SCr 4.89 [11-08 @ 05:41]  SCr 5.87 [11-07 @ 18:07]    Tacrolimus (), Serum: 7.0 ng/mL (11-11 @ 08:31)  Tacrolimus (), Serum: 8.1 ng/mL (11-10 @ 07:40)  Tacrolimus (), Serum: 5.8 ng/mL (11-09 @ 07:35)  Tacrolimus (), Serum: 17.5 ng/mL (11-08 @ 07:34)            Urinalysis - [11-11-17 @ 06:17]      Color Yellow / Appearance Clear / SG 1.010 / pH 6.5      Gluc Negative / Ketone Negative  / Bili Negative / Urobili Negative       Blood Large / Protein 30 / Leuk Est Trace / Nitrite Negative      RBC >50 / WBC 25-50 / Hyaline  / Gran  / Sq Epi  / Non Sq Epi Few / Bacteria Few      HbA1c 5.7      [06-14-16 @ 18:07]
57y M/F admitted for a living related, living unrelated,  donor kidney transplant on 17    Outpatient medication reconciliation reviewed and will be re-started appropriately.  Plan discussed with multidisciplinary team.     Continue current immunosuppressive regimen.  Induction:  Simulect 20 mg on post-operative day 0 (intra-operative) and POD4  Methylprednisolone taper (500 mg, 125 mg twice daily, 60 mg twice daily, 30 mg twice daily) then to PO prednisone taper    Maintenance:  Tacrolimus adjusted to trough (8-10 ng/mL)  Mycophenolate mofetil 1000 mg q12h  Prednisone taper starting POD4 (20 mg twice daily, 10 mg twice daily, 5 mg twice daily, 5 mg daily)    Anti-infective prophylaxis:  Bactrim SS daily  Valcyte (adjusted to renal function)  Nystatin swish and swallow four times daily     GI/DVT ppx:  Famotidine 20 mg daily  Aspirin 81 mg daily
[facility] DIVISION OF KIDNEY DISEASES AND HYPERTENSION -- FOLLOW UP NOTE  --------------------------------------------------------------------------------  Chief Complaint:  kidney transplant    24 hour events/subjective:  Pt feels well.  mild incisional pain.  has hypocalcemia today.  BP has been controlled.       PAST HISTORY  --------------------------------------------------------------------------------  No significant changes to PMH, PSH, FHx, SHx, unless otherwise noted    ALLERGIES & MEDICATIONS  --------------------------------------------------------------------------------  Allergies    No Known Allergies    Intolerances      Standing Inpatient Medications  aspirin enteric coated 81 milliGRAM(s) Oral daily  atorvastatin 20 milliGRAM(s) Oral <User Schedule>  calcitriol   Capsule 0.5 MICROGram(s) Oral daily  docusate sodium 100 milliGRAM(s) Oral three times a day  famotidine    Tablet 20 milliGRAM(s) Oral two times a day  magnesium sulfate  IVPB 2 Gram(s) IV Intermittent once  methylPREDNISolone sodium succinate Injectable 60 milliGRAM(s) IV Push two times a day  mycophenolate mofetil 1000 milliGRAM(s) Oral two times a day  nystatin    Suspension 468765 Unit(s) Oral four times a day  senna 2 Tablet(s) Oral at bedtime  tacrolimus 2 milliGRAM(s) Oral two times a day  trimethoprim   80 mG/sulfamethoxazole 400 mG 1 Tablet(s) Oral daily    PRN Inpatient Medications  naloxone Injectable 0.1 milliGRAM(s) IV Push every 3 minutes PRN  ondansetron Injectable 4 milliGRAM(s) IV Push every 6 hours PRN  oxyCODONE    5 mG/acetaminophen 325 mG 1 Tablet(s) Oral every 4 hours PRN  oxyCODONE    5 mG/acetaminophen 325 mG 2 Tablet(s) Oral every 4 hours PRN      REVIEW OF SYSTEMS  --------------------------------------------------------------------------------  Gen: No fatigue, fevers/chills, weakness  Skin: No rashes  Head/Eyes/Ears/Mouth: No headache;No sore throat  Respiratory: No dyspnea, cough,   CV: No chest pain, PND, orthopnea  GI: No abdominal pain, diarrhea, constipation, nausea, vomiting  Transplant: mild pain  : No increased frequency, dysuria, hematuria, nocturia  MSK: No joint pain/swelling; no back pain; no edema  Neuro: No dizziness/lightheadedness, weakness, seizures, numbness, tingling  Psych: No significant nervousness, anxiety, stress, depression    All other systems were reviewed and are negative, except as noted.    VITALS/PHYSICAL EXAM  --------------------------------------------------------------------------------  T(C): 36.7 (11-09-17 @ 12:00), Max: 37.2 (11-09-17 @ 08:05)  HR: 84 (11-09-17 @ 12:00) (72 - 98)  BP: 140/78 (11-09-17 @ 12:00) (130/78 - 140/78)  RR: 18 (11-09-17 @ 12:00) (16 - 18)  SpO2: 97% (11-09-17 @ 12:00) (95% - 97%)  Wt(kg): --        11-08-17 @ 07:01  -  11-09-17 @ 07:00  --------------------------------------------------------  IN: 5580 mL / OUT: 5060 mL / NET: 520 mL    11-09-17 @ 07:01  -  11-09-17 @ 14:06  --------------------------------------------------------  IN: 1540 mL / OUT: 1330 mL / NET: 210 mL      Physical Exam:  	Gen: NAD, well-appearing  	HEENT: PERRL, supple neck, clear oropharynx  	Pulm: CTA B/L  	CV: RRR, S1S2; no rub  	Back: No spinal or CVA tenderness; no sacral edema  	Abd: +BS, soft, nontender/nondistended                      Transplant: incision c/d/i  	: No suprapubic tenderness  	UE: Warm, FROM, intact strength; no edema; no asterixis  	LE: Warm, FROM, intact strength; no edema  	Neuro: No focal deficits  	Psych: Normal affect and mood  	Skin: Warm, without rashes      LABS/STUDIES  --------------------------------------------------------------------------------              8.8    7.1   >-----------<  133      [11-09-17 @ 05:46]              25.0     147  |  113  |  52  ----------------------------<  126      [11-09-17 @ 05:46]  3.7   |  17  |  2.86        Ca     6.4     [11-09-17 @ 05:46]      iCa    0.95     [11-09 @ 05:55]      Mg     1.7     [11-09-17 @ 05:46]      Phos  3.8     [11-09-17 @ 05:46]    TPro  6.1  /  Alb  3.2  /  TBili  0.2  /  DBili  x   /  AST  25  /  ALT  9   /  AlkPhos  32  [11-09-17 @ 05:46]              [11-09-17 @ 05:46]    Creatinine Trend:  SCr 2.86 [11-09 @ 05:46]  SCr 4.89 [11-08 @ 05:41]  SCr 5.87 [11-07 @ 18:07]    Tacrolimus (), Serum: 5.8 ng/mL (11-09 @ 07:35)  Tacrolimus (), Serum: 17.5 ng/mL (11-08 @ 07:34)            Urinalysis - [11-09-17 @ 05:46]      Color PL Yellow / Appearance Clear / SG 1.010 / pH 6.0      Gluc Negative / Ketone Negative  / Bili Negative / Urobili Negative       Blood Large / Protein 30 / Leuk Est Small / Nitrite Negative      RBC >50 / WBC 11-25 / Hyaline  / Gran  / Sq Epi  / Non Sq Epi OCC / Bacteria       HbA1c 5.7      [06-14-16 @ 18:07]
Day 1 of Anesthesia Pain Management Service    SUBJECTIVE: Patient is doing well with IV PCA    Pain Scale Score:	[X] Refer to charted pain scores    THERAPY:    [ ] IV PCA Morphine		[ ] 5 mg/mL	[ ] 1 mg/mL  [X] IV PCA Hydromorphone	[ ] 5 mg/mL	[X] 1 mg/mL  [ ] IV PCA Fentanyl		[ ] 50 micrograms/mL    Demand dose: 0.1 mg     Lockout: 6 minutes   Continuous Rate: 0 mg/hr  4 Hour Limit: 4 mg    MEDICATIONS  (STANDING):  aspirin enteric coated 81 milliGRAM(s) Oral daily  atorvastatin 20 milliGRAM(s) Oral <User Schedule>  docusate sodium 100 milliGRAM(s) Oral three times a day  famotidine    Tablet 20 milliGRAM(s) Oral two times a day  HYDROmorphone PCA (1 mG/mL) 30 milliLiter(s) PCA Continuous PCA Continuous  mycophenolate mofetil 1000 milliGRAM(s) Oral two times a day  nystatin    Suspension [id number] Unit(s) Oral four times a day  senna 2 Tablet(s) Oral at bedtime  sodium chloride 0.9%. 1000 milliLiter(s) (70 mL/Hr) IV Continuous <Continuous>  sodium chloride 0.9%. 1000 milliLiter(s) (40 mL/Hr) IV Continuous <Continuous>  tacrolimus 4 milliGRAM(s) Oral <User Schedule>    MEDICATIONS  (PRN):  HYDROmorphone PCA (1 mG/mL) Rescue Clinician Bolus 0.5 milliGRAM(s) IV Push every 15 minutes PRN for Pain Scale GREATER THAN 6  naloxone Injectable 0.1 milliGRAM(s) IV Push every 3 minutes PRN For ANY of the following changes in patient status:  A. RR LESS THAN 10 breaths per minute, B. Oxygen saturation LESS THAN 90%, C. Sedation score of 6  ondansetron Injectable 4 milliGRAM(s) IV Push every 6 hours PRN Nausea      OBJECTIVE:    Sedation Score:	[ X] Alert	[ ] Drowsy 	[ ] Arousable	[ ] Asleep	[ ] Unresponsive    Side Effects:	[  ] None	[X ] Nausea: antiemetics PRN	[ ] Vomiting	[ ] Pruritus  		[ ] Other:    Vital Signs Last 24 Hrs  T(C): 36.2 (08 Nov 2017 08:00), Max: 36.8 (07 Nov 2017 19:00)  T(F): 97.2 (08 Nov 2017 08:00), Max: 98.2 (07 Nov 2017 19:00)  HR: 79 (08 Nov 2017 08:00) (62 - 95)  BP: 129/78 (08 Nov 2017 08:00) (93/54 - 148/88)  BP(mean): 82 (07 Nov 2017 23:00) (69 - 93)  RR: 16 (08 Nov 2017 08:00) (10 - 20)  SpO2: 98% (08 Nov 2017 08:00) (95% - 100%)    ASSESSMENT/ PLAN    Therapy to  be:               [X] Continued   [ ] Discontinued   [ ] Changed to PRN Analgesics    Documentation and Verification of current medications:   [X] Done	[ ] Not done, not eligible    Comments: Demand dose decreased secondary to nausea. Consider change to PRN analgesics
Day 2 of Anesthesia Pain Management Service    SUBJECTIVE: I feel better today    Pain Scale Score:	[X] Refer to charted pain scores    THERAPY:    [ ] IV PCA Morphine		[ ] 5 mg/mL	[ ] 1 mg/mL  [X] IV PCA Hydromorphone	[ ] 5 mg/mL	[X] 1 mg/mL  [ ] IV PCA Fentanyl		[ ] 50 micrograms/mL    Demand dose: 0.1 mg     Lockout: 6 minutes   Continuous Rate: 0 mg/hr  4 Hour Limit: 4 mg    MEDICATIONS  (STANDING):  aspirin enteric coated 81 milliGRAM(s) Oral daily  atorvastatin 20 milliGRAM(s) Oral <User Schedule>  docusate sodium 100 milliGRAM(s) Oral three times a day  famotidine    Tablet 20 milliGRAM(s) Oral two times a day  HYDROmorphone PCA (1 mG/mL) 30 milliLiter(s) PCA Continuous PCA Continuous  mycophenolate mofetil 1000 milliGRAM(s) Oral two times a day  nystatin    Suspension 924561 Unit(s) Oral four times a day  senna 2 Tablet(s) Oral at bedtime  sodium chloride 0.45% 1000 milliLiter(s) (150 mL/Hr) IV Continuous <Continuous>    MEDICATIONS  (PRN):  HYDROmorphone PCA (1 mG/mL) Rescue Clinician Bolus 0.5 milliGRAM(s) IV Push every 15 minutes PRN for Pain Scale GREATER THAN 6  naloxone Injectable 0.1 milliGRAM(s) IV Push every 3 minutes PRN For ANY of the following changes in patient status:  A. RR LESS THAN 10 breaths per minute, B. Oxygen saturation LESS THAN 90%, C. Sedation score of 6  ondansetron Injectable 4 milliGRAM(s) IV Push every 6 hours PRN Nausea      OBJECTIVE:    Sedation Score:	[ X] Alert	[ ] Drowsy 	[ ] Arousable	[ ] Asleep	[ ] Unresponsive    Side Effects:	[X ] None	[ ] Nausea	[ ] Vomiting	[ ] Pruritus  		[ ] Other:    Vital Signs Last 24 Hrs  T(C): 37.2 (09 Nov 2017 08:05), Max: 37.2 (09 Nov 2017 08:05)  T(F): 98.9 (09 Nov 2017 08:05), Max: 98.9 (09 Nov 2017 08:05)  HR: 72 (09 Nov 2017 08:05) (72 - 98)  BP: 138/84 (09 Nov 2017 08:05) (121/80 - 138/84)  BP(mean): --  RR: 18 (09 Nov 2017 08:05) (16 - 18)  SpO2: 96% (09 Nov 2017 08:05) (95% - 97%)    ASSESSMENT/ PLAN    Therapy to  be:               [ ] Continued   [X ] Discontinued   [X ] Changed to PRN Analgesics    Documentation and Verification of current medications:   [X] Done	[ ] Not done, not eligible    Comments: Plan to D\C PCA and change to oral analgesics PRN
Learner: Patient  Barriers: None    Method used: Verbal discussion and written material    Medication safety was discussed with the patient: allergies, herbals, adherence, interactions, medication precautions, miss dose instructions, purpose, side effects, signs and symptoms to report, and storage & handling    Patient was able to repeat and verbalize key points    Education Summary: Discharge immunosuppressant medications and prophylatic anti-infective agents reviewed with the patient. Outpatient medication schedule was discussed in detail including: medication name, indication, dose, administration times, treatment duration, side effects, drug interactions, and special instructions. Patient questions and concerns were answered and addressed. Patient demonstrated understanding.    Time spent discharge education: 60 minutes    Renal Transplant medications:  Tacrolimus adjusted to trough   Mycophenolate mofetil 1g BID  Prednisone taper to 5mg daily  Sulfamethoxazole/Trimethoprim 1SS daily  Nystatin swish and swallow four times a day  Valganciclovir 450 mg daily  Pepcid 20 mg daily  Docusate and Senna  Other meds: lipitor 20mg (1 tues, 1 saturday), aspirin 81 mg daily
[facility] DIVISION OF KIDNEY DISEASES AND HYPERTENSION -- FOLLOW UP NOTE  --------------------------------------------------------------------------------  Chief Complaint:  Kidney transplant     24 hour events/subjective:  Pt is POD 1 s/p LURT from kidney paired exchange.  Had pain overnight but no now controlled.       PAST HISTORY  --------------------------------------------------------------------------------  No significant changes to PMH, PSH, FHx, SHx, unless otherwise noted    ALLERGIES & MEDICATIONS  --------------------------------------------------------------------------------  Allergies    No Known Allergies    Intolerances      Standing Inpatient Medications  aspirin enteric coated 81 milliGRAM(s) Oral daily  atorvastatin 20 milliGRAM(s) Oral <User Schedule>  docusate sodium 100 milliGRAM(s) Oral three times a day  famotidine    Tablet 20 milliGRAM(s) Oral two times a day  HYDROmorphone PCA (1 mG/mL) 30 milliLiter(s) PCA Continuous PCA Continuous  mycophenolate mofetil 1000 milliGRAM(s) Oral two times a day  nystatin    Suspension 722887 Unit(s) Oral four times a day  senna 2 Tablet(s) Oral at bedtime  sodium chloride 0.45% 1000 milliLiter(s) IV Continuous <Continuous>  sodium chloride 0.9%. 1000 milliLiter(s) IV Continuous <Continuous>  tacrolimus 4 milliGRAM(s) Oral <User Schedule>    PRN Inpatient Medications  HYDROmorphone PCA (1 mG/mL) Rescue Clinician Bolus 0.5 milliGRAM(s) IV Push every 15 minutes PRN  naloxone Injectable 0.1 milliGRAM(s) IV Push every 3 minutes PRN  ondansetron Injectable 4 milliGRAM(s) IV Push every 6 hours PRN      REVIEW OF SYSTEMS  --------------------------------------------------------------------------------  Gen: No fatigue, fevers/chills, weakness  Skin: No rashes  Head/Eyes/Ears/Mouth: No headache;No sore throat  Respiratory: No dyspnea, cough,   CV: No chest pain, PND, orthopnea  GI: No abdominal pain, diarrhea, constipation, nausea, vomiting  Transplant: mild incisional pain.   : No increased frequency, dysuria, hematuria, nocturia  MSK: No joint pain/swelling; no back pain; no edema  Neuro: No dizziness/lightheadedness, weakness, seizures, numbness, tingling  Psych: No significant nervousness, anxiety, stress, depression    All other systems were reviewed and are negative, except as noted.    VITALS/PHYSICAL EXAM  --------------------------------------------------------------------------------  T(C): 36.6 (11-08-17 @ 12:01), Max: 36.8 (11-07-17 @ 19:00)  HR: 84 (11-08-17 @ 12:01) (62 - 95)  BP: 124/79 (11-08-17 @ 12:01) (93/54 - 148/88)  RR: 18 (11-08-17 @ 12:01) (10 - 20)  SpO2: 95% (11-08-17 @ 12:01) (95% - 100%)  Wt(kg): --  Height (cm): 165.1 (11-07-17 @ 06:54)  Weight (kg): 84.8 (11-07-17 @ 06:54)  BMI (kg/m2): 31.1 (11-07-17 @ 06:54)  BSA (m2): 1.92 (11-07-17 @ 06:54)      11-07-17 @ 07:01  -  11-08-17 @ 07:00  --------------------------------------------------------  IN: 6520 mL / OUT: 5010 mL / NET: 1510 mL    11-08-17 @ 07:01  -  11-08-17 @ 12:42  --------------------------------------------------------  IN: 1920 mL / OUT: 1350 mL / NET: 570 mL      Physical Exam:  	Gen: NAD, well-appearing  	HEENT: PERRL, supple neck, clear oropharynx  	Pulm: CTA B/L  	CV: RRR, S1S2; no rub  	Back: No spinal or CVA tenderness; no sacral edema  	Abd: +BS, soft, nontender/nondistended                      Transplant: incision c/d/i  	: No suprapubic tenderness  	UE: Warm, FROM, intact strength; no edema; no asterixis  	LE: Warm, FROM, intact strength; no edema  	Neuro: No focal deficits,   	Psych: Normal affect and mood  	Skin: Warm, without rashes      LABS/STUDIES  --------------------------------------------------------------------------------              9.5    7.9   >-----------<  141      [11-08-17 @ 05:41]              28.4     144  |  109  |  72  ----------------------------<  191      [11-08-17 @ 05:41]  4.2   |  16  |  4.89        Ca     7.2     [11-08-17 @ 05:41]      iCa    1.03     [11-08 @ 05:45]      Mg     1.6     [11-08-17 @ 05:41]      Phos  6.8     [11-08-17 @ 05:41]                [11-08-17 @ 05:41]    Creatinine Trend:  SCr 4.89 [11-08 @ 05:41]  SCr 5.87 [11-07 @ 18:07]    Tacrolimus (), Serum: 17.5 ng/mL (11-08 @ 07:34)            Urinalysis - [11-08-17 @ 05:41]      Color Yellow / Appearance Clear / SG 1.009 / pH 6.0      Gluc Negative / Ketone Negative  / Bili Negative / Urobili Negative       Blood Large / Protein Trace / Leuk Est Negative / Nitrite Negative      RBC >50 / WBC 5-10 / Hyaline  / Gran  / Sq Epi  / Non Sq Epi  / Bacteria Few      HbA1c 5.7      [06-14-16 @ 18:07]
POST-OPERATIVE NOTE    Patient is a 57y old  Female who presents with a chief complaint of I'm getting a kidney transplant. (2017 07:03)    Pt is S/P living donor R kidney transplant.               Vital Signs Last 24 Hrs  T(C): 36.1 (2017 03:02), Max: 36.8 (2017 19:00)  T(F): 96.9 (2017 03:02), Max: 98.2 (2017 19:00)  HR: 73 (2017 03:19) (53 - 95)  BP: 133/85 (2017 03:19) (93/54 - 148/88)  BP(mean): 82 (2017 23:00) (69 - 93)  RR: 12 (2017 03:19) (10 - 20)  SpO2: 98% (2017 03:19) (95% - 100%)  I&O's Detail    2017 07:01  -  2017 03:42  --------------------------------------------------------  IN:    Oral Fluid: 500 mL    sodium chloride 0.9%.: 3080 mL    sodium chloride 0.9%.: 760 mL    Solution: 100 mL  Total IN: 4440 mL    OUT:    Bulb: 235 mL    Indwelling Catheter - Urethral: 3460 mL  Total OUT: 3695 mL    Total NET: 745 mL        nystatin    Suspension [id number]  nystatin    Suspension [id number]    PAST MEDICAL & SURGICAL HISTORY:  Petechial rash  Phlebitis  ESRD (end stage renal disease)  Polycystic kidney disease  HTN (hypertension)  S/P     Physical Exam:  General: NAD, resting comfortably in bed  Pulmonary: Nonlabored breathing, no respiratory distress, lungs CTAB  Cardiovascular: RRR, normal S1/S2, no m/r/g  Abdominal: soft, appropriately tender, non-distended. RLQ incision site c/d/i.  Extremities: WWP. 2+ DP pulses b/l.      LABS:                        10.6   10.8  )-----------( 161      ( 2017 21:09 )             30.7     11-07    145  |  111<H>  |  72<H>  ----------------------------<  155<H>  4.4   |  16<L>  |  5.87<H>    Ca    7.4<L>      2017 18:07  Phos  5.8     11-07  Mg     1.7     11-07        CAPILLARY BLOOD GLUCOSE          Radiology and Additional Studies:    Assessment:57yFemaleHPI:  58 y/o F PMH polycystic kidney disease, hypertension, ESRD.  Presents today for living donor right kidney transplant recipient. (31 Oct 2017 13:50)    The patient is 4 hours s/p a living donor kidney transplant to the right external iliac vessels, recovering well.    Plan:  - Pain control w/ PCA  - Continue with serrano; f/u UOP hourly  - Initiate post-operative immunosuppression regimen  - Prophylactive anti-infectives  - F/u AM labs  - DVT ppx  - OK to transfer to floor
Pain Management Attending Addendum    SUBJECTIVE: Patient comfortable    Therapy:	  [x ] IV PCA	   [ ] Epidural           [ ] s/p Spinal Opoid              [ ] Postpartum infusion	  [ ] Patient controlled regional anesthesia (PCRA)    [ ] prn Analgesics    OBJECTIVE:   [x ] No new signs     [ ] Other:    Side Effects:  [x ] None			[ ] Other:    Assessment of Catheter Site:		[ ] Intact		[ ] Other:    ASSESSMENT/PLAN  [ ] Continue current therapy    [ ] Therapy changed to:    [ ] IV PCA       [ ] Epidural     [x ] prn Analgesics     [ ] post partum infusion    Comments:
Pain Management Attending Addendum    SUBJECTIVE: Patient doing well with IV PCA    Therapy:    [X] IV PCA         [ ] PRN Analgesics    OBJECTIVE:   [X] Pain appropriately controlled    [ ] Other:    Side Effects:  [X] None	             [ ] Nausea              [ ] Pruritis                	[ ] Other:    ASSESSMENT/PLAN:  Therapy changed to PRN analgesics    Comments:
Transplant Surgery Progress Note    S: Patient seen and examined. No acute events overnight. Pain well controlled with current regimen. Denies nausea/vomiting. Endorses passing gas and bowel movements.     O:  Vital Signs Last 24 Hrs  T(C): 36.7 (10 Nov 2017 09:04), Max: 36.8 (09 Nov 2017 16:05)  T(F): 98 (10 Nov 2017 09:04), Max: 98.3 (09 Nov 2017 16:05)  HR: 84 (10 Nov 2017 09:04) (81 - 85)  BP: 135/78 (10 Nov 2017 09:04) (130/64 - 148/79)  BP(mean): --  RR: 18 (10 Nov 2017 09:04) (18 - 18)  SpO2: 94% (10 Nov 2017 09:04) (93% - 99%)    I&O's Detail    09 Nov 2017 07:01  -  10 Nov 2017 07:00  --------------------------------------------------------  IN:    Oral Fluid: 2520 mL    sodium chloride 0.45%: 600 mL    Solution: 100 mL    Solution: 50 mL  Total IN: 3270 mL    OUT:    Bulb: 85 mL    Indwelling Catheter - Urethral: 4100 mL  Total OUT: 4185 mL    Total NET: -915 mL      10 Nov 2017 07:01  -  10 Nov 2017 11:06  --------------------------------------------------------  IN:    Oral Fluid: 720 mL  Total IN: 720 mL    OUT:    Indwelling Catheter - Urethral: 500 mL  Total OUT: 500 mL    Total NET: 220 mL          MEDICATIONS  (STANDING):  aspirin enteric coated 81 milliGRAM(s) Oral daily  atorvastatin 20 milliGRAM(s) Oral <User Schedule>  calcitriol   Capsule 0.5 MICROGram(s) Oral daily  docusate sodium 100 milliGRAM(s) Oral three times a day  famotidine    Tablet 20 milliGRAM(s) Oral two times a day  methylPREDNISolone sodium succinate Injectable 30 milliGRAM(s) IV Push two times a day  mycophenolate mofetil 1000 milliGRAM(s) Oral two times a day  nystatin    Suspension 175536 Unit(s) Oral four times a day  potassium chloride    Tablet ER 20 milliEquivalent(s) Oral once  senna 2 Tablet(s) Oral at bedtime  tacrolimus 3 milliGRAM(s) Oral two times a day  trimethoprim   80 mG/sulfamethoxazole 400 mG 1 Tablet(s) Oral daily    MEDICATIONS  (PRN):  naloxone Injectable 0.1 milliGRAM(s) IV Push every 3 minutes PRN For ANY of the following changes in patient status:  A. RR LESS THAN 10 breaths per minute, B. Oxygen saturation LESS THAN 90%, C. Sedation score of 6  ondansetron Injectable 4 milliGRAM(s) IV Push every 6 hours PRN Nausea  oxyCODONE    5 mG/acetaminophen 325 mG 1 Tablet(s) Oral every 4 hours PRN Moderate Pain (4 - 6)  oxyCODONE    5 mG/acetaminophen 325 mG 2 Tablet(s) Oral every 4 hours PRN Severe Pain (7 - 10)                            8.1    5.2   )-----------( 111      ( 10 Nov 2017 05:49 )             22.7       11-10    142  |  111<H>  |  40<H>  ----------------------------<  121<H>  3.8   |  18<L>  |  2.03<H>    Ca    7.1<L>      10 Nov 2017 05:49  Phos  2.7     11-10  Mg     2.2     11-10    TPro  6.1  /  Alb  3.2<L>  /  TBili  0.2  /  DBili  x   /  AST  25  /  ALT  9<L>  /  AlkPhos  32<L>  11-09    Physical Exam:  Gen: Laying in bed, NAD, alert and oriented.   Resp: Unlabored breathing, lungs CTAB  CV: RRR, normal S1/S2, no m/r/g  Abd: soft, NTND. RLQ oblique incision c/d/i, DENNIS SS   Extremities: 2+ palpable DP pulses b/l
Transplant Surgery Progress Note    S: Patient seen and examined. No acute events overnight. Pain well controlled with current regimen. Denies nausea/vomiting. Endorses passing gas and bowel movements.     O:  Vital Signs Last 24 Hrs  T(C): 36.9 (11 Nov 2017 09:45), Max: 36.9 (11 Nov 2017 05:00)  T(F): 98.4 (11 Nov 2017 09:45), Max: 98.4 (11 Nov 2017 05:00)  HR: 69 (11 Nov 2017 09:45) (68 - 88)  BP: 148/75 (11 Nov 2017 09:45) (138/82 - 150/83)  BP(mean): --  RR: 18 (11 Nov 2017 09:45) (18 - 18)  SpO2: 97% (11 Nov 2017 09:45) (95% - 99%)    I&O's Detail    10 Nov 2017 07:01  -  11 Nov 2017 07:00  --------------------------------------------------------  IN:    Oral Fluid: 3240 mL  Total IN: 3240 mL    OUT:    Bulb: 25 mL    Indwelling Catheter - Urethral: 500 mL    Voided: 2625 mL  Total OUT: 3150 mL    Total NET: 90 mL      11 Nov 2017 07:01  -  11 Nov 2017 11:18  --------------------------------------------------------  IN:    Oral Fluid: 580 mL    Solution: 50 mL  Total IN: 630 mL    OUT:    Voided: 775 mL  Total OUT: 775 mL    Total NET: -145 mL          MEDICATIONS  (STANDING):  aspirin enteric coated 81 milliGRAM(s) Oral daily  atorvastatin 20 milliGRAM(s) Oral <User Schedule>  calcitriol   Capsule 0.5 MICROGram(s) Oral daily  docusate sodium 100 milliGRAM(s) Oral three times a day  famotidine    Tablet 20 milliGRAM(s) Oral two times a day  mycophenolate mofetil 1000 milliGRAM(s) Oral two times a day  nystatin    Suspension 750795 Unit(s) Oral four times a day  predniSONE   Tablet 20 milliGRAM(s) Oral two times a day  senna 2 Tablet(s) Oral at bedtime  tacrolimus 3 milliGRAM(s) Oral two times a day  trimethoprim   80 mG/sulfamethoxazole 400 mG 1 Tablet(s) Oral daily    MEDICATIONS  (PRN):  acetaminophen   Tablet. 650 milliGRAM(s) Oral every 6 hours PRN Mild Pain (1 - 3)  naloxone Injectable 0.1 milliGRAM(s) IV Push every 3 minutes PRN For ANY of the following changes in patient status:  A. RR LESS THAN 10 breaths per minute, B. Oxygen saturation LESS THAN 90%, C. Sedation score of 6  ondansetron Injectable 4 milliGRAM(s) IV Push every 6 hours PRN Nausea  oxyCODONE    5 mG/acetaminophen 325 mG 1 Tablet(s) Oral every 4 hours PRN Moderate Pain (4 - 6)  oxyCODONE    5 mG/acetaminophen 325 mG 2 Tablet(s) Oral every 4 hours PRN Severe Pain (7 - 10)                            8.6    5.8   )-----------( 122      ( 11 Nov 2017 06:17 )             24.8       11-11    143  |  110<H>  |  37<H>  ----------------------------<  94  3.9   |  20<L>  |  1.87<H>    Ca    7.6<L>      11 Nov 2017 06:16  Phos  2.7     11-10  Mg     2.2     11-10    TPro  5.8<L>  /  Alb  3.6  /  TBili  0.3  /  DBili  x   /  AST  21  /  ALT  10  /  AlkPhos  33<L>  11-11      Physical Exam:  Gen: Laying in bed, NAD, alert and oriented.   Resp: Unlabored breathing, lungs CTAB  CV: RRR, normal S1/S2, no m/r/g  Abd: soft, NTND. RLQ oblique incision c/d/i. DENNIS serosanguinous.  Extremities: 2+ palpable DP pulses b/l
Transplant Surgery Progress Note    S: Patient seen and examined. No acute events overnight. Pain well controlled with current regimen. Denies nausea/vomiting. Endorses passing gas and bowel movements.     O:  Vital Signs Last 24 Hrs  T(C): 37.2 (09 Nov 2017 08:05), Max: 37.2 (09 Nov 2017 08:05)  T(F): 98.9 (09 Nov 2017 08:05), Max: 98.9 (09 Nov 2017 08:05)  HR: 72 (09 Nov 2017 08:05) (72 - 98)  BP: 138/84 (09 Nov 2017 08:05) (121/80 - 138/84)  BP(mean): --  RR: 18 (09 Nov 2017 08:05) (16 - 18)  SpO2: 96% (09 Nov 2017 08:05) (95% - 97%)    I&O's Detail    08 Nov 2017 07:01  -  09 Nov 2017 07:00  --------------------------------------------------------  IN:    Oral Fluid: 770 mL    sodium chloride 0.45%: 2850 mL    sodium chloride 0.9%: 210 mL    sodium chloride 0.9%: 1250 mL    Sodium Chloride 0.9% IV Bolus: 500 mL  Total IN: 5580 mL    OUT:    Bulb: 110 mL    Emesis: 250 mL    Indwelling Catheter - Urethral: 4700 mL  Total OUT: 5060 mL    Total NET: 520 mL      09 Nov 2017 07:01  -  09 Nov 2017 11:21  --------------------------------------------------------  IN:    Oral Fluid: 480 mL    sodium chloride 0.45%: 600 mL  Total IN: 1080 mL    OUT:    Bulb: 30 mL    Indwelling Catheter - Urethral: 800 mL  Total OUT: 830 mL    Total NET: 250 mL          MEDICATIONS  (STANDING):  aspirin enteric coated 81 milliGRAM(s) Oral daily  atorvastatin 20 milliGRAM(s) Oral <User Schedule>  calcitriol   Capsule 0.5 MICROGram(s) Oral daily  calcium gluconate IVPB 1 Gram(s) IV Intermittent once  docusate sodium 100 milliGRAM(s) Oral three times a day  famotidine    Tablet 20 milliGRAM(s) Oral two times a day  magnesium sulfate  IVPB 2 Gram(s) IV Intermittent once  methylPREDNISolone sodium succinate Injectable 60 milliGRAM(s) IV Push two times a day  mycophenolate mofetil 1000 milliGRAM(s) Oral two times a day  nystatin    Suspension [id number] Unit(s) Oral four times a day  senna 2 Tablet(s) Oral at bedtime  tacrolimus 2 milliGRAM(s) Oral two times a day  trimethoprim   80 mG/sulfamethoxazole 400 mG 1 Tablet(s) Oral daily    MEDICATIONS  (PRN):  naloxone Injectable 0.1 milliGRAM(s) IV Push every 3 minutes PRN For ANY of the following changes in patient status:  A. RR LESS THAN 10 breaths per minute, B. Oxygen saturation LESS THAN 90%, C. Sedation score of 6  ondansetron Injectable 4 milliGRAM(s) IV Push every 6 hours PRN Nausea  oxyCODONE    5 mG/acetaminophen 325 mG 1 Tablet(s) Oral every 4 hours PRN Moderate Pain (4 - 6)  oxyCODONE    5 mG/acetaminophen 325 mG 2 Tablet(s) Oral every 4 hours PRN Severe Pain (7 - 10)                            8.8    7.1   )-----------( 133      ( 09 Nov 2017 05:46 )             25.0       11-09    147<H>  |  113<H>  |  52<H>  ----------------------------<  126<H>  3.7   |  17<L>  |  2.86<H>    Ca    6.4<LL>      09 Nov 2017 05:46  Phos  3.8     11-09  Mg     1.7     11-09    TPro  6.1  /  Alb  3.2<L>  /  TBili  0.2  /  DBili  x   /  AST  25  /  ALT  9<L>  /  AlkPhos  32<L>  11-09      Physical Exam:  Gen: Laying in bed, NAD, alert and oriented.   Resp: Unlabored breathing, lungs CTAB  CV: RRR, normal S1/S2, no m/r/g  Abd: soft, NTND. RLQ oblique incision c/d/i, DENNIS SS   Extremities: 2+ palpable DP pulses b/l
[facility] DIVISION OF KIDNEY DISEASES AND HYPERTENSION -- FOLLOW UP NOTE  --------------------------------------------------------------------------------  Chief Complaint:  kidney transplant    24 hour events/subjective:  Pain improving.  Still nauseated slightly when she takes pills.  Takes them on an empty stomach.        PAST HISTORY  --------------------------------------------------------------------------------  No significant changes to PMH, PSH, FHx, SHx, unless otherwise noted    ALLERGIES & MEDICATIONS  --------------------------------------------------------------------------------  Allergies    No Known Allergies    Intolerances      Standing Inpatient Medications  aspirin enteric coated 81 milliGRAM(s) Oral daily  atorvastatin 20 milliGRAM(s) Oral <User Schedule>  calcitriol   Capsule 0.5 MICROGram(s) Oral daily  docusate sodium 100 milliGRAM(s) Oral three times a day  famotidine    Tablet 20 milliGRAM(s) Oral two times a day  methylPREDNISolone sodium succinate Injectable 30 milliGRAM(s) IV Push two times a day  mycophenolate mofetil 1000 milliGRAM(s) Oral two times a day  nystatin    Suspension [id number] Unit(s) Oral four times a day  senna 2 Tablet(s) Oral at bedtime  tacrolimus 3 milliGRAM(s) Oral two times a day  trimethoprim   80 mG/sulfamethoxazole 400 mG 1 Tablet(s) Oral daily    PRN Inpatient Medications  naloxone Injectable 0.1 milliGRAM(s) IV Push every 3 minutes PRN  ondansetron Injectable 4 milliGRAM(s) IV Push every 6 hours PRN  oxyCODONE    5 mG/acetaminophen 325 mG 1 Tablet(s) Oral every 4 hours PRN  oxyCODONE    5 mG/acetaminophen 325 mG 2 Tablet(s) Oral every 4 hours PRN      REVIEW OF SYSTEMS  --------------------------------------------------------------------------------  Gen: No fatigue, fevers/chills, weakness  Skin: No rashes  Head/Eyes/Ears/Mouth: No headache;No sore throat  Respiratory: No dyspnea, cough,   CV: No chest pain, PND, orthopnea  GI: mild nausea  Transplant: No pain  : No increased frequency, dysuria, hematuria, nocturia  MSK: No joint pain/swelling; no back pain; no edema  Neuro: No dizziness/lightheadedness, weakness, seizures, numbness, tingling  Psych: No significant nervousness, anxiety, stress, depression    All other systems were reviewed and are negative, except as noted.    VITALS/PHYSICAL EXAM  --------------------------------------------------------------------------------  T(C): 36.7 (11-10-17 @ 09:04), Max: 36.8 (11-09-17 @ 16:05)  HR: 84 (11-10-17 @ 09:04) (81 - 85)  BP: 135/78 (11-10-17 @ 09:04) (130/64 - 148/79)  RR: 18 (11-10-17 @ 09:04) (18 - 18)  SpO2: 94% (11-10-17 @ 09:04) (93% - 99%)  Wt(kg): --        11-09-17 @ 07:01  -  11-10-17 @ 07:00  --------------------------------------------------------  IN: 3270 mL / OUT: 4185 mL / NET: -915 mL    11-10-17 @ 07:01  -  11-10-17 @ 13:07  --------------------------------------------------------  IN: 900 mL / OUT: 900 mL / NET: 0 mL      Physical Exam:  	Gen: NAD, well-appearing  	HEENT: PERRL, supple neck, clear oropharynx  	Pulm: CTA B/L  	CV: RRR, S1S2; no rub  	Back: No spinal or CVA tenderness; no sacral edema  	Abd: +BS, soft, nontender/nondistended                      Transplant: incision c/d/i  	: No suprapubic tenderness  	UE: Warm, FROM, intact strength; no edema; no asterixis  	LE: Warm, FROM, intact strength; no edema  	Neuro: No focal deficits, intact gait  	Psych: Normal affect and mood  	Skin: Warm, without rashes      LABS/STUDIES  --------------------------------------------------------------------------------              8.1    5.2   >-----------<  111      [11-10-17 @ 05:49]              22.7     142  |  111  |  40  ----------------------------<  121      [11-10-17 @ 05:49]  3.8   |  18  |  2.03        Ca     7.1     [11-10-17 @ 05:49]      iCa    0.95     [11-09 @ 05:55]      Mg     2.2     [11-10-17 @ 05:49]      Phos  2.7     [11-10-17 @ 05:49]    TPro  6.1  /  Alb  3.2  /  TBili  0.2  /  DBili  x   /  AST  25  /  ALT  9   /  AlkPhos  32  [11-09-17 @ 05:46]              [11-10-17 @ 05:49]    Creatinine Trend:  SCr 2.03 [11-10 @ 05:49]  SCr 2.86 [11-09 @ 05:46]  SCr 4.89 [11-08 @ 05:41]  SCr 5.87 [11-07 @ 18:07]    Tacrolimus (), Serum: 8.1 ng/mL (11-10 @ 07:40)  Tacrolimus (), Serum: 5.8 ng/mL (11-09 @ 07:35)  Tacrolimus (), Serum: 17.5 ng/mL (11-08 @ 07:34)            Urinalysis - [11-10-17 @ 05:49]      Color PL Yellow / Appearance Clear / SG 1.007 / pH 6.5      Gluc Negative / Ketone Negative  / Bili Negative / Urobili Negative       Blood Large / Protein 30 / Leuk Est Trace / Nitrite Negative      RBC >50 / WBC 5-10 / Hyaline  / Gran  / Sq Epi  / Non Sq Epi Occasional / Bacteria Few      HbA1c 5.7      [06-14-16 @ 18:07]

## 2017-11-11 NOTE — DISCHARGE NOTE ADULT - PATIENT PORTAL LINK FT
“You can access the [facility] Patient Portal, offered by [facility], by registering with the following website: [email or web address]”

## 2017-11-11 NOTE — PROGRESS NOTE ADULT - PROVIDER SPECIALTY LIST ADULT
Anesthesia
Nephrology
Nephrology
Pharmacy
Pharmacy
Transplant Medicine
Transplant Surgery
Nephrology

## 2017-11-11 NOTE — DISCHARGE NOTE ADULT - ADDITIONAL INSTRUCTIONS
1) Please make an appointment to follow up with Dr. Richards in 7-10 on Tuesday, 11/14/17. You may call their office to make an appointment at: [phone].    2) Please make an appointment to follow up with Dr. Delgado on Thursday 11/16/17. You may call their office to make an appointment at: [phone].

## 2017-11-11 NOTE — DISCHARGE NOTE ADULT - CARE PLAN
Principal Discharge DX:	Kidney replaced by transplant  Goal:	To return to daily living activity prior to surgery, postoperative recovery  Instructions for follow-up, activity and diet:	See below

## 2017-11-11 NOTE — PROGRESS NOTE ADULT - PROBLEM SELECTOR PLAN 3
controlled.  Pt used to be on atenolol 50mgs daily.  Will resume if needed.
Reviewed on Tac/ MMf/pred. L:evel 7 today, discussed with team, will follow up as out patient on discharge.
controlled.  Pt used to be on atenolol 50mgs daily.  Will resume if needed.
controlled.  Pt used to be on atenolol 50mgs daily.  Will resume if needed.

## 2017-11-11 NOTE — DISCHARGE NOTE ADULT - CARE PROVIDERS DIRECT ADDRESSES
[email or web address]

## 2017-11-11 NOTE — DISCHARGE NOTE ADULT - MEDICATION SUMMARY - MEDICATIONS TO TAKE
I will START or STAY ON the medications listed below when I get home from the hospital:    predniSONE 20 mg oral tablet  -- 1 tab(s) by mouth 2 times a day  -- Indication: For Immunosuppression    predniSONE 10 mg oral tablet  -- 1 tab(s) by mouth 2 times a day  -- Indication: For Immunosuppression    predniSONE 5 mg oral tablet  -- 1 tab(s) by mouth 2 times a day  -- Indication: For Immunosuppression    predniSONE 5 mg oral tablet  -- 1 tab(s) by mouth once a day  -- Indication: For Immunosuppression    acetaminophen 325 mg oral tablet  -- 2 tab(s) by mouth every 6 hours, As needed, Mild Pain (1 - 3)  -- Indication: For Pain    aspirin 81 mg oral delayed release tablet  -- 1 tab(s) by mouth once a day  -- Indication: For Blood thinner    nystatin 100,000 units/mL oral suspension  -- 5 milliliter(s) by mouth 4 times a day  -- Indication: For Anti-infective    atorvastatin 20 mg oral tablet  -- 1 tab(s) by mouth   -- Indication: For Hyperlipidemia    famotidine 20 mg oral tablet  -- 1 tab(s) by mouth 2 times a day  -- Indication: For GERD    tacrolimus 1 mg oral capsule  -- 3 cap(s) by mouth 2 times a day  -- Indication: For Immunosuppression    mycophenolate mofetil 250 mg oral capsule  -- 4 cap(s) by mouth 2 times a day  -- Indication: For Immunosuppression    senna oral tablet  -- 2 tab(s) by mouth once a day (at bedtime)  -- Indication: For Constipation    docusate sodium 100 mg oral capsule  -- 1 cap(s) by mouth 3 times a day  -- Indication: For Constipation    sulfamethoxazole-trimethoprim 400 mg-80 mg oral tablet  -- 1 tab(s) by mouth once a day  -- Indication: For Antibacterial    calcitriol 0.5 mcg oral capsule  -- 1 cap(s) by mouth once a day  -- Indication: For Hypocalcemia

## 2017-11-11 NOTE — PROGRESS NOTE ADULT - ATTENDING COMMENTS
58yo POD1 sp LURTx. Doing well. Good graft function.  Simulect induction.  Started on tac/MMF. Solumedrol taper.
doing well. drain removed. discharge home today. follow up in office on monday
post op day 2 post ldrt (paired exchange transplant). doing well. good graft function with creatinine trending down. regular diet and ambulate. IS ; fk, mmf and steroids. keep serrano and drain.
post op day 3 post ldrt. doing well. creatinine trending down. eating and ambulating. will dc serrano cath. keep drain. IS : FK/MMF and steroids.  second dose of simulect tomorrow. possible dc home tomorrow
I was present during and reviewed clinical and lab data as well as assessment and plan as documented . Please contact if any additional questions with any change in clinical condition or on availability of any additional information or reports.

## 2017-11-11 NOTE — PROGRESS NOTE ADULT - PROBLEM SELECTOR PLAN 2
Controlled, not on meds.
Tacro target 8-10 - now on 2mgs BID.  Last night dose was held.  MMF 1gm BId and pred 5.  Simulect induction.
Tacro target 8-10 - will adjust.  MMF 1gm BId and pred 5.  Simulect induction.
Tacro target 8-10 - now on 3mgs BID.  MMF 1gm BId and pred 5.  Simulect induction.

## 2017-11-11 NOTE — PROGRESS NOTE ADULT - PROBLEM SELECTOR PLAN 1
LURT POD 1 from paired exchange.  Good UO, creatinine decreasing.  Plan to have pt ambulate, advance diet.  change IVF to 1/2 NS plus 25meq sodium bicarbonate at 150cc/hour.  Sonogram with good perfusion.
LURT POD 2 from paired exchange.  Good UO, creatinine decreasing.  Plan to have pt ambulate, advance diet.  stop IVF.  Sonogram with good perfusion.  Acidosis and creatinine improved.
Recovering well, post op day 4. Has drain in place.  Encouraged PO hydration, she is tolerating.   Case d/w dr. Richards.
LURT POD 3 from paired exchange.  Good UO, creatinine decreasing.  Sonogram with good perfusion.  Acidosis and creatinine improved.  Possible discharge tomorrow.

## 2017-11-13 ENCOUNTER — TRANSCRIPTION ENCOUNTER (OUTPATIENT)
Age: 57
End: 2017-11-13

## 2017-11-13 ENCOUNTER — APPOINTMENT (OUTPATIENT)
Dept: NEPHROLOGY | Facility: CLINIC | Age: 57
End: 2017-11-13

## 2017-11-13 ENCOUNTER — OTHER (OUTPATIENT)
Age: 57
End: 2017-11-13

## 2017-11-13 VITALS
RESPIRATION RATE: 14 BRPM | WEIGHT: 190 LBS | SYSTOLIC BLOOD PRESSURE: 172 MMHG | HEART RATE: 71 BPM | HEIGHT: 65 IN | DIASTOLIC BLOOD PRESSURE: 96 MMHG | TEMPERATURE: 97.7 F | OXYGEN SATURATION: 96 % | BODY MASS INDEX: 31.65 KG/M2

## 2017-11-13 LAB
ALBUMIN SERPL ELPH-MCNC: 3.7 G/DL
ALP BLD-CCNC: 39 U/L
ALT SERPL-CCNC: 10 U/L
ANION GAP SERPL CALC-SCNC: 14 MMOL/L
APPEARANCE: CLEAR
AST SERPL-CCNC: 19 U/L
BACTERIA: NEGATIVE
BILIRUB SERPL-MCNC: 0.5 MG/DL
BILIRUBIN URINE: NEGATIVE
BLOOD URINE: ABNORMAL
BUN SERPL-MCNC: 29 MG/DL
CALCIUM SERPL-MCNC: 9 MG/DL
CHLORIDE SERPL-SCNC: 109 MMOL/L
CO2 SERPL-SCNC: 19 MMOL/L
COLOR: YELLOW
CREAT SERPL-MCNC: 1.63 MG/DL
CREAT SPEC-SCNC: 64 MG/DL
CREAT/PROT UR: 0.5 RATIO
GLUCOSE QUALITATIVE U: NEGATIVE MG/DL
GLUCOSE SERPL-MCNC: 100 MG/DL
HYALINE CASTS: 4 /LPF
KETONES URINE: NEGATIVE
LDH SERPL-CCNC: 347 U/L
LEUKOCYTE ESTERASE URINE: NEGATIVE
MAGNESIUM SERPL-MCNC: 1.9 MG/DL
MICROSCOPIC-UA: NORMAL
NITRITE URINE: NEGATIVE
PH URINE: 6
PHOSPHATE SERPL-MCNC: 1.4 MG/DL
POTASSIUM SERPL-SCNC: 4.7 MMOL/L
PROT SERPL-MCNC: 6.4 G/DL
PROT UR-MCNC: 29 MG/DL
PROTEIN URINE: 30 MG/DL
RED BLOOD CELLS URINE: 31 /HPF
SODIUM SERPL-SCNC: 142 MMOL/L
SPECIFIC GRAVITY URINE: 1.01
SQUAMOUS EPITHELIAL CELLS: 5 /HPF
TACROLIMUS SERPL-MCNC: 4.3 NG/ML
URATE SERPL-MCNC: 6.2 MG/DL
UROBILINOGEN URINE: NEGATIVE MG/DL
WHITE BLOOD CELLS URINE: 7 /HPF

## 2017-11-13 RX ORDER — DOCUSATE SODIUM 100 MG
100 TABLET ORAL 3 TIMES DAILY
Qty: 270 | Refills: 3 | Status: DISCONTINUED | COMMUNITY
Start: 2017-11-09 | End: 2017-11-13

## 2017-11-13 RX ORDER — SENNOSIDES 8.6 MG TABLETS 8.6 MG/1
8.6 TABLET ORAL
Qty: 180 | Refills: 0 | Status: DISCONTINUED | COMMUNITY
Start: 2017-11-09 | End: 2017-11-13

## 2017-11-14 ENCOUNTER — APPOINTMENT (OUTPATIENT)
Dept: NEPHROLOGY | Facility: CLINIC | Age: 57
End: 2017-11-14

## 2017-11-14 LAB
BASOPHILS # BLD AUTO: 0 K/UL
BASOPHILS NFR BLD AUTO: 0 %
CMV DNA SPEC QL NAA+PROBE: NOT DETECTED
EOSINOPHIL # BLD AUTO: 0.03 K/UL
EOSINOPHIL NFR BLD AUTO: 0.6 %
HCT VFR BLD CALC: 28.2 %
HGB BLD-MCNC: 9.2 G/DL
IMM GRANULOCYTES NFR BLD AUTO: 0.2 %
LYMPHOCYTES # BLD AUTO: 1.23 K/UL
LYMPHOCYTES NFR BLD AUTO: 24.6 %
MAN DIFF?: NORMAL
MCHC RBC-ENTMCNC: 31.2 PG
MCHC RBC-ENTMCNC: 32.6 GM/DL
MCV RBC AUTO: 95.6 FL
MONOCYTES # BLD AUTO: 0.38 K/UL
MONOCYTES NFR BLD AUTO: 7.6 %
NEUTROPHILS # BLD AUTO: 3.35 K/UL
NEUTROPHILS NFR BLD AUTO: 67 %
PLATELET # BLD AUTO: 180 K/UL
RBC # BLD: 2.95 M/UL
RBC # FLD: 13.1 %
WBC # FLD AUTO: 5 K/UL

## 2017-11-14 RX ORDER — TACROLIMUS 0.5 MG/1
0.5 CAPSULE ORAL
Qty: 180 | Refills: 3 | Status: DISCONTINUED | COMMUNITY
Start: 2017-11-09 | End: 2017-11-14

## 2017-11-14 RX ORDER — TACROLIMUS 5 MG/1
5 CAPSULE ORAL TWICE DAILY
Qty: 180 | Refills: 3 | Status: DISCONTINUED | COMMUNITY
Start: 2017-11-09 | End: 2017-11-14

## 2017-11-16 ENCOUNTER — APPOINTMENT (OUTPATIENT)
Dept: TRANSPLANT | Facility: CLINIC | Age: 57
End: 2017-11-16

## 2017-11-16 ENCOUNTER — TRANSCRIPTION ENCOUNTER (OUTPATIENT)
Age: 57
End: 2017-11-16

## 2017-11-16 VITALS
OXYGEN SATURATION: 97 % | BODY MASS INDEX: 31.47 KG/M2 | WEIGHT: 188.9 LBS | TEMPERATURE: 98 F | HEIGHT: 65 IN | RESPIRATION RATE: 14 BRPM | DIASTOLIC BLOOD PRESSURE: 88 MMHG | HEART RATE: 75 BPM | SYSTOLIC BLOOD PRESSURE: 166 MMHG

## 2017-11-16 LAB — BKV DNA SPEC QL NAA+PROBE: NORMAL

## 2017-11-17 ENCOUNTER — OTHER (OUTPATIENT)
Age: 57
End: 2017-11-17

## 2017-11-20 ENCOUNTER — APPOINTMENT (OUTPATIENT)
Dept: NEPHROLOGY | Facility: CLINIC | Age: 57
End: 2017-11-20
Payer: COMMERCIAL

## 2017-11-20 VITALS
OXYGEN SATURATION: 97 % | BODY MASS INDEX: 30.56 KG/M2 | HEART RATE: 64 BPM | HEIGHT: 64.25 IN | SYSTOLIC BLOOD PRESSURE: 108 MMHG | DIASTOLIC BLOOD PRESSURE: 57 MMHG | RESPIRATION RATE: 15 BRPM | TEMPERATURE: 98.1 F | WEIGHT: 179 LBS

## 2017-11-20 DIAGNOSIS — Z01.818 ENCOUNTER FOR OTHER PREPROCEDURAL EXAMINATION: ICD-10-CM

## 2017-11-20 PROCEDURE — 99215 OFFICE O/P EST HI 40 MIN: CPT

## 2017-11-22 ENCOUNTER — APPOINTMENT (OUTPATIENT)
Dept: ULTRASOUND IMAGING | Facility: HOSPITAL | Age: 57
End: 2017-11-22
Payer: MEDICARE

## 2017-11-22 ENCOUNTER — OUTPATIENT (OUTPATIENT)
Dept: OUTPATIENT SERVICES | Facility: HOSPITAL | Age: 57
LOS: 1 days | End: 2017-11-22
Payer: COMMERCIAL

## 2017-11-22 ENCOUNTER — APPOINTMENT (OUTPATIENT)
Dept: TRANSPLANT | Facility: CLINIC | Age: 57
End: 2017-11-22

## 2017-11-22 VITALS
WEIGHT: 179.6 LBS | BODY MASS INDEX: 30.66 KG/M2 | RESPIRATION RATE: 14 BRPM | DIASTOLIC BLOOD PRESSURE: 76 MMHG | TEMPERATURE: 97.8 F | SYSTOLIC BLOOD PRESSURE: 129 MMHG | HEIGHT: 64 IN | HEART RATE: 87 BPM | OXYGEN SATURATION: 99 %

## 2017-11-22 DIAGNOSIS — Z94.0 KIDNEY TRANSPLANT STATUS: ICD-10-CM

## 2017-11-22 DIAGNOSIS — Z98.891 HISTORY OF UTERINE SCAR FROM PREVIOUS SURGERY: Chronic | ICD-10-CM

## 2017-11-22 PROCEDURE — 76776 US EXAM K TRANSPL W/DOPPLER: CPT | Mod: 26,RT

## 2017-11-22 PROCEDURE — 76776 US EXAM K TRANSPL W/DOPPLER: CPT

## 2017-11-27 ENCOUNTER — APPOINTMENT (OUTPATIENT)
Dept: NEPHROLOGY | Facility: CLINIC | Age: 57
End: 2017-11-27
Payer: COMMERCIAL

## 2017-11-27 VITALS
HEART RATE: 57 BPM | SYSTOLIC BLOOD PRESSURE: 123 MMHG | HEIGHT: 64 IN | BODY MASS INDEX: 30.39 KG/M2 | TEMPERATURE: 97.4 F | DIASTOLIC BLOOD PRESSURE: 57 MMHG | WEIGHT: 178 LBS | RESPIRATION RATE: 14 BRPM | OXYGEN SATURATION: 98 %

## 2017-11-27 PROCEDURE — 99215 OFFICE O/P EST HI 40 MIN: CPT

## 2017-11-30 ENCOUNTER — APPOINTMENT (OUTPATIENT)
Dept: TRANSPLANT | Facility: CLINIC | Age: 57
End: 2017-11-30

## 2017-12-04 ENCOUNTER — APPOINTMENT (OUTPATIENT)
Dept: TRANSPLANT | Facility: CLINIC | Age: 57
End: 2017-12-04
Payer: COMMERCIAL

## 2017-12-04 ENCOUNTER — APPOINTMENT (OUTPATIENT)
Dept: NEPHROLOGY | Facility: CLINIC | Age: 57
End: 2017-12-04

## 2017-12-04 VITALS
DIASTOLIC BLOOD PRESSURE: 79 MMHG | HEIGHT: 64 IN | TEMPERATURE: 97.5 F | BODY MASS INDEX: 30.73 KG/M2 | OXYGEN SATURATION: 100 % | WEIGHT: 180 LBS | RESPIRATION RATE: 15 BRPM | SYSTOLIC BLOOD PRESSURE: 122 MMHG | HEART RATE: 58 BPM

## 2017-12-04 PROCEDURE — 99024 POSTOP FOLLOW-UP VISIT: CPT

## 2017-12-04 RX ORDER — TACROLIMUS 0.5 MG/1
0.5 CAPSULE ORAL
Qty: 180 | Refills: 3 | Status: DISCONTINUED | COMMUNITY
Start: 2017-11-27 | End: 2017-12-04

## 2017-12-04 RX ORDER — TACROLIMUS 1 MG/1
1 CAPSULE ORAL
Refills: 3 | Status: DISCONTINUED | COMMUNITY
Start: 2017-11-09 | End: 2017-12-04

## 2017-12-06 ENCOUNTER — APPOINTMENT (OUTPATIENT)
Dept: ULTRASOUND IMAGING | Facility: IMAGING CENTER | Age: 57
End: 2017-12-06

## 2017-12-06 ENCOUNTER — RX RENEWAL (OUTPATIENT)
Age: 57
End: 2017-12-06

## 2017-12-11 ENCOUNTER — APPOINTMENT (OUTPATIENT)
Dept: NEPHROLOGY | Facility: CLINIC | Age: 57
End: 2017-12-11
Payer: COMMERCIAL

## 2017-12-11 ENCOUNTER — RX RENEWAL (OUTPATIENT)
Age: 57
End: 2017-12-11

## 2017-12-11 VITALS
HEIGHT: 64 IN | RESPIRATION RATE: 14 BRPM | BODY MASS INDEX: 31.07 KG/M2 | SYSTOLIC BLOOD PRESSURE: 149 MMHG | DIASTOLIC BLOOD PRESSURE: 81 MMHG | WEIGHT: 182 LBS | TEMPERATURE: 98 F | OXYGEN SATURATION: 99 % | HEART RATE: 74 BPM

## 2017-12-11 PROCEDURE — 99215 OFFICE O/P EST HI 40 MIN: CPT

## 2017-12-11 RX ORDER — SODIUM POLYSTYRENE SULFONATE 15 G/60ML
15 SUSPENSION ORAL; RECTAL
Qty: 1 | Refills: 0 | Status: DISCONTINUED | COMMUNITY
Start: 2017-11-25 | End: 2017-12-11

## 2017-12-21 ENCOUNTER — APPOINTMENT (OUTPATIENT)
Dept: NEPHROLOGY | Facility: CLINIC | Age: 57
End: 2017-12-21
Payer: COMMERCIAL

## 2017-12-21 ENCOUNTER — OUTPATIENT (OUTPATIENT)
Dept: OUTPATIENT SERVICES | Facility: HOSPITAL | Age: 57
LOS: 1 days | End: 2017-12-21
Payer: COMMERCIAL

## 2017-12-21 VITALS
HEART RATE: 62 BPM | TEMPERATURE: 98 F | HEIGHT: 64 IN | WEIGHT: 181 LBS | OXYGEN SATURATION: 99 % | DIASTOLIC BLOOD PRESSURE: 81 MMHG | SYSTOLIC BLOOD PRESSURE: 150 MMHG

## 2017-12-21 VITALS
TEMPERATURE: 97.6 F | RESPIRATION RATE: 14 BRPM | WEIGHT: 181 LBS | SYSTOLIC BLOOD PRESSURE: 153 MMHG | HEIGHT: 64 IN | HEART RATE: 66 BPM | DIASTOLIC BLOOD PRESSURE: 85 MMHG | BODY MASS INDEX: 30.9 KG/M2 | OXYGEN SATURATION: 100 %

## 2017-12-21 DIAGNOSIS — Z98.891 HISTORY OF UTERINE SCAR FROM PREVIOUS SURGERY: Chronic | ICD-10-CM

## 2017-12-21 DIAGNOSIS — I10 ESSENTIAL (PRIMARY) HYPERTENSION: ICD-10-CM

## 2017-12-21 DIAGNOSIS — E78.5 HYPERLIPIDEMIA, UNSPECIFIED: ICD-10-CM

## 2017-12-21 DIAGNOSIS — Z94.0 KIDNEY TRANSPLANT STATUS: ICD-10-CM

## 2017-12-21 DIAGNOSIS — Z01.818 ENCOUNTER FOR OTHER PREPROCEDURAL EXAMINATION: ICD-10-CM

## 2017-12-21 DIAGNOSIS — Z94.0 KIDNEY TRANSPLANT STATUS: Chronic | ICD-10-CM

## 2017-12-21 LAB
ANION GAP SERPL CALC-SCNC: 15 MMOL/L — SIGNIFICANT CHANGE UP (ref 5–17)
BUN SERPL-MCNC: 29 MG/DL — HIGH (ref 7–23)
CALCIUM SERPL-MCNC: 10.1 MG/DL — SIGNIFICANT CHANGE UP (ref 8.4–10.5)
CHLORIDE SERPL-SCNC: 105 MMOL/L — SIGNIFICANT CHANGE UP (ref 96–108)
CO2 SERPL-SCNC: 23 MMOL/L — SIGNIFICANT CHANGE UP (ref 22–31)
CREAT SERPL-MCNC: 1.69 MG/DL — HIGH (ref 0.5–1.3)
GLUCOSE SERPL-MCNC: 91 MG/DL — SIGNIFICANT CHANGE UP (ref 70–99)
HCT VFR BLD CALC: 35 % — SIGNIFICANT CHANGE UP (ref 34.5–45)
HGB BLD-MCNC: 10.8 G/DL — LOW (ref 11.5–15.5)
MCHC RBC-ENTMCNC: 30.9 GM/DL — LOW (ref 32–36)
MCHC RBC-ENTMCNC: 31.1 PG — SIGNIFICANT CHANGE UP (ref 27–34)
MCV RBC AUTO: 100.9 FL — HIGH (ref 80–100)
PLATELET # BLD AUTO: 215 K/UL — SIGNIFICANT CHANGE UP (ref 150–400)
POTASSIUM SERPL-MCNC: 5.7 MMOL/L — HIGH (ref 3.5–5.3)
POTASSIUM SERPL-SCNC: 5.7 MMOL/L — HIGH (ref 3.5–5.3)
RBC # BLD: 3.47 M/UL — LOW (ref 3.8–5.2)
RBC # FLD: 15.1 % — HIGH (ref 10.3–14.5)
SODIUM SERPL-SCNC: 143 MMOL/L — SIGNIFICANT CHANGE UP (ref 135–145)
WBC # BLD: 5.25 K/UL — SIGNIFICANT CHANGE UP (ref 3.8–10.5)
WBC # FLD AUTO: 5.25 K/UL — SIGNIFICANT CHANGE UP (ref 3.8–10.5)

## 2017-12-21 PROCEDURE — 87086 URINE CULTURE/COLONY COUNT: CPT

## 2017-12-21 PROCEDURE — G0463: CPT

## 2017-12-21 PROCEDURE — 80048 BASIC METABOLIC PNL TOTAL CA: CPT

## 2017-12-21 PROCEDURE — 99215 OFFICE O/P EST HI 40 MIN: CPT

## 2017-12-21 PROCEDURE — 85027 COMPLETE CBC AUTOMATED: CPT

## 2017-12-21 RX ORDER — CEFAZOLIN SODIUM 1 G
2000 VIAL (EA) INJECTION ONCE
Qty: 0 | Refills: 0 | Status: DISCONTINUED | OUTPATIENT
Start: 2017-12-26 | End: 2018-01-10

## 2017-12-21 NOTE — H&P PST ADULT - HISTORY OF PRESENT ILLNESS
56 y/o F PMH polycystic kidney disease, hypertension, ESRD.  Presents today for living donor right kidney transplant recipient. 58 y/o F PMH polycystic kidney disease, hypertension, ESRD, s/p  living donor right kidney transplant recipient 11/2017 presents to PST for cystoscopy JJ stent removal

## 2017-12-21 NOTE — H&P PST ADULT - PMH
ESRD (end stage renal disease)    HTN (hypertension)    Kidney transplant recipient  living donor  Petechial rash    Phlebitis    Polycystic kidney disease

## 2017-12-21 NOTE — H&P PST ADULT - PROBLEM SELECTOR PLAN 1
cystoscopy removal of JJ stent on transplanted kidney  according to pt JJ stent removed from transplanted kidney spoke to Tari to add site or stent removal

## 2017-12-21 NOTE — H&P PST ADULT - ATTENDING COMMENTS
agree with above for cysto and stent removal post kidney transplant. risks benefits and potential complications discussed.

## 2017-12-22 LAB
CULTURE RESULTS: SIGNIFICANT CHANGE UP
SPECIMEN SOURCE: SIGNIFICANT CHANGE UP

## 2017-12-26 ENCOUNTER — APPOINTMENT (OUTPATIENT)
Dept: TRANSPLANT | Facility: HOSPITAL | Age: 57
End: 2017-12-26
Payer: COMMERCIAL

## 2017-12-26 ENCOUNTER — OTHER (OUTPATIENT)
Age: 57
End: 2017-12-26

## 2017-12-26 ENCOUNTER — OUTPATIENT (OUTPATIENT)
Dept: OUTPATIENT SERVICES | Facility: HOSPITAL | Age: 57
LOS: 1 days | End: 2017-12-26
Payer: COMMERCIAL

## 2017-12-26 VITALS
SYSTOLIC BLOOD PRESSURE: 132 MMHG | OXYGEN SATURATION: 100 % | RESPIRATION RATE: 18 BRPM | DIASTOLIC BLOOD PRESSURE: 73 MMHG | TEMPERATURE: 98 F | HEIGHT: 64 IN | WEIGHT: 181 LBS | HEART RATE: 55 BPM

## 2017-12-26 VITALS
DIASTOLIC BLOOD PRESSURE: 82 MMHG | TEMPERATURE: 98 F | HEART RATE: 58 BPM | RESPIRATION RATE: 20 BRPM | OXYGEN SATURATION: 99 % | SYSTOLIC BLOOD PRESSURE: 136 MMHG

## 2017-12-26 DIAGNOSIS — Z94.0 KIDNEY TRANSPLANT STATUS: Chronic | ICD-10-CM

## 2017-12-26 DIAGNOSIS — Z94.0 KIDNEY TRANSPLANT STATUS: ICD-10-CM

## 2017-12-26 DIAGNOSIS — Z98.891 HISTORY OF UTERINE SCAR FROM PREVIOUS SURGERY: Chronic | ICD-10-CM

## 2017-12-26 LAB
ANION GAP SERPL CALC-SCNC: 13 MMOL/L — SIGNIFICANT CHANGE UP (ref 5–17)
BUN SERPL-MCNC: 40 MG/DL — HIGH (ref 7–23)
CALCIUM SERPL-MCNC: 9.8 MG/DL — SIGNIFICANT CHANGE UP (ref 8.4–10.5)
CHLORIDE SERPL-SCNC: 104 MMOL/L — SIGNIFICANT CHANGE UP (ref 96–108)
CO2 SERPL-SCNC: 22 MMOL/L — SIGNIFICANT CHANGE UP (ref 22–31)
CREAT SERPL-MCNC: 1.72 MG/DL — HIGH (ref 0.5–1.3)
GLUCOSE SERPL-MCNC: 108 MG/DL — HIGH (ref 70–99)
POTASSIUM SERPL-MCNC: 4.7 MMOL/L — SIGNIFICANT CHANGE UP (ref 3.5–5.3)
POTASSIUM SERPL-MCNC: 8.3 MMOL/L — CRITICAL HIGH (ref 3.5–5.3)
POTASSIUM SERPL-SCNC: 4.7 MMOL/L — SIGNIFICANT CHANGE UP (ref 3.5–5.3)
POTASSIUM SERPL-SCNC: 8.3 MMOL/L — CRITICAL HIGH (ref 3.5–5.3)
SODIUM SERPL-SCNC: 139 MMOL/L — SIGNIFICANT CHANGE UP (ref 135–145)

## 2017-12-26 PROCEDURE — 52310 CYSTOSCOPY AND TREATMENT: CPT | Mod: 58

## 2017-12-26 PROCEDURE — 80048 BASIC METABOLIC PNL TOTAL CA: CPT

## 2017-12-26 PROCEDURE — 84132 ASSAY OF SERUM POTASSIUM: CPT

## 2017-12-26 PROCEDURE — 52310 CYSTOSCOPY AND TREATMENT: CPT

## 2017-12-26 RX ORDER — ATENOLOL 25 MG/1
1 TABLET ORAL
Qty: 0 | Refills: 0 | COMMUNITY

## 2017-12-26 RX ORDER — ACETAMINOPHEN 500 MG
1 TABLET ORAL
Qty: 0 | Refills: 0 | COMMUNITY

## 2017-12-26 RX ORDER — ONDANSETRON 8 MG/1
4 TABLET, FILM COATED ORAL ONCE
Qty: 0 | Refills: 0 | Status: DISCONTINUED | OUTPATIENT
Start: 2017-12-26 | End: 2017-12-26

## 2017-12-26 RX ORDER — ATORVASTATIN CALCIUM 80 MG/1
1 TABLET, FILM COATED ORAL
Qty: 0 | Refills: 0 | COMMUNITY

## 2017-12-26 RX ORDER — HYDROMORPHONE HYDROCHLORIDE 2 MG/ML
0.25 INJECTION INTRAMUSCULAR; INTRAVENOUS; SUBCUTANEOUS
Qty: 0 | Refills: 0 | Status: DISCONTINUED | OUTPATIENT
Start: 2017-12-26 | End: 2017-12-26

## 2017-12-26 RX ORDER — SODIUM CHLORIDE 9 MG/ML
3 INJECTION INTRAMUSCULAR; INTRAVENOUS; SUBCUTANEOUS EVERY 8 HOURS
Qty: 0 | Refills: 0 | Status: DISCONTINUED | OUTPATIENT
Start: 2017-12-26 | End: 2017-12-26

## 2017-12-26 RX ORDER — ACETAMINOPHEN 500 MG
1000 TABLET ORAL ONCE
Qty: 0 | Refills: 0 | Status: DISCONTINUED | OUTPATIENT
Start: 2017-12-26 | End: 2017-12-26

## 2017-12-26 RX ORDER — MAGNESIUM OXIDE 400 MG ORAL TABLET 241.3 MG
1 TABLET ORAL
Qty: 0 | Refills: 0 | COMMUNITY

## 2017-12-26 RX ORDER — MYCOPHENOLATE MOFETIL 250 MG/1
2 CAPSULE ORAL
Qty: 0 | Refills: 0 | COMMUNITY

## 2017-12-26 RX ORDER — VALGANCICLOVIR 450 MG/1
2 TABLET, FILM COATED ORAL
Qty: 0 | Refills: 0 | COMMUNITY

## 2017-12-26 NOTE — ASU DISCHARGE PLAN (ADULT/PEDIATRIC). - MEDICATION SUMMARY - MEDICATIONS TO TAKE
I will START or STAY ON the medications listed below when I get home from the hospital:    predniSONE 5 mg oral tablet  -- 1 tab(s) by mouth once a day  -- Indication: For History of kidney transplant    aspirin 81 mg oral delayed release tablet  -- 1 tab(s) by mouth once a day-continue taking  -- Indication: For cardiovascular    acetaminophen 650 mg oral tablet, extended release  -- 1 tab(s) by mouth every 6 hours as needed for pain  -- Indication: For pain    nystatin 100,000 units/mL oral suspension  -- 5 milliliter(s) by mouth 4 times a day  -- Indication: For History of kidney transplant    atorvastatin 20 mg oral tablet  -- 1 tab(s) by mouth   -- Indication: For Hyperlipidemia    Valcyte 450 mg oral tablet  -- 1 tab(s) by mouth once a day  -- Indication: For History of kidney transplant    atenolol 100 mg oral tablet  -- 1 tab(s) by mouth once a day  -- Indication: For Hypertension    famotidine 20 mg oral tablet  -- 1 tab(s) by mouth 1 times a day  -- Indication: For GERD    tacrolimus 1 mg oral capsule  -- 3.5 cap(s) by mouth 2 times a day  -- Indication: For History of kidney transplant    magnesium oxide 400 mg (240 mg elemental magnesium) oral tablet  -- 1 tab(s) by mouth once a day  -- Indication: For Supplement    sulfamethoxazole-trimethoprim 400 mg-80 mg oral tablet  -- 1 tab(s) by mouth once a day  -- Indication: For Supplement    calcitriol 0.5 mcg oral capsule  -- 1 cap(s) by mouth once a day  -- Indication: For Supplement

## 2017-12-26 NOTE — ASU DISCHARGE PLAN (ADULT/PEDIATRIC). - ITEMS TO FOLLOWUP WITH YOUR PHYSICIAN'S
Please follow up with Dr. Richards in his clinic within 2 weeks of discharge, please call the number given to make appointment

## 2017-12-26 NOTE — ASU DISCHARGE PLAN (ADULT/PEDIATRIC). - NOTIFY
Fever greater than 101/Persistent Nausea and Vomiting/Unable to Urinate/Bleeding that does not stop Fever greater than 101/Unable to Urinate/Inability to Tolerate Liquids or Foods/Persistent Nausea and Vomiting/Bleeding that does not stop

## 2017-12-26 NOTE — BRIEF OPERATIVE NOTE - PROCEDURE
<<-----Click on this checkbox to enter Procedure Cystoscopic removal of right ureteral stent  12/26/2017    Active  MARIAHMY

## 2017-12-28 ENCOUNTER — TRANSCRIPTION ENCOUNTER (OUTPATIENT)
Age: 57
End: 2017-12-28

## 2018-01-01 NOTE — H&P PST ADULT - IS PATIENT PREGNANT?
"[facility] Pediatrics  Discharge Note    Baby1 Daina Vanegas MRN# [id number]   Age: 2 day old YOB: 2019     Date of Admission:  2018  8:44 PM  Date of Discharge::  2018  Admitting Physician:  Arabella Gee MD  Discharge Physician:  Apolinar Joyner  Primary care provider: Arabella Gee           History:   The baby was admitted to the normal  nursery on 2018  8:44 PM    BabyKitty Vanegas was born at 2018 8:44 PM by  Vaginal, Vacuum (Extractor)    OBSTETRIC HISTORY:  Information for the patient's mother:  Daina Vanegas [[id number]]   27 year old    EDC:   Information for the patient's mother:  Daina Vanegas [[id number]]   Estimated Date of Delivery: 18    Information for the patient's mother:  Daina Vanegas [[id number]]     Obstetric History       T1      L1     SAB0   TAB0   Ectopic0   Multiple0   Live Births1       # Outcome Date GA Lbr Cole/2nd Weight Sex Delivery Anes PTL Lv   1 Term 18 39w4d 07:30 / 03:14 3.14 kg (6 lb 14.8 oz) F Vag-Vacuum EPI N CHRISTIE      Name: GUILHERME VANEGAS      Complications: Fetal Intolerance      Apgar1:  8                Apgar5: 9          Prenatal Labs: Information for the patient's mother:  Daina Vanegas [[id number]]     Lab Results   Component Value Date    ABO O 2018    RH Pos 2018    AS neg 06/15/2017    HEPBANG neg 06/15/2017    TREPAB Negative 2018    RUBELLAABIGG 5.16 06/15/2017    HGB 11.5 (L) 2018       GBS Status:   Information for the patient's mother:  Daina Vanegas [[id number]]     Lab Results   Component Value Date    GBS Positive (A) 2018        Birth Information  Birth History     Birth     Length: 0.508 m (1' 8\")     Weight: 3.14 kg (6 lb 14.8 oz)     HC 34.3 cm (13.5\")     Apgar     One: 8     Five: 9     Delivery Method: Vaginal, Vacuum (Extractor)     Gestation Age: 39 4/7 wks     Duration of Labor: 1st: 7h 30m / 2nd: 3h 14m       Stable, no new " events  Feeding plan: Breast feeding going well    Hearing Screen Date: 02/10/18  Hearing Screen Method: ABR  Hearing Screen Result, Left: passed    Hearing Screen Result, Right: passed      Oxygen screen:  Patient Vitals for the past 72 hrs:    Pulse Oximetry - Right Arm (%)   02/10/18 2100 97 %     Patient Vitals for the past 72 hrs:   [place] Pulse Oximetry - Foot (%)   02/10/18 2100 96 %         Immunization History   Administered Date(s) Administered     Hep B, Peds or Adolescent 2018             Physical Exam:   Vital Signs:  Patient Vitals for the past 24 hrs:   Temp Temp src Heart Rate Resp Weight   18 0900 98.3  F (36.8  C) Axillary 124 44 -   18 0040 98.3  F (36.8  C) Axillary 138 42 2.974 kg (6 lb 8.9 oz)   02/10/18 1600 98.1  F (36.7  C) Axillary 144 40 -   02/10/18 1030 98.3  F (36.8  C) Axillary - - -     Wt Readings from Last 3 Encounters:   18 2.974 kg (6 lb 8.9 oz) (24 %)*     * Growth percentiles are based on WHO (Girls, 0-2 years) data.     Weight change since birth: -5%    General:  alert and normally responsive  Skin:  no abnormal markings; normal color without significant rash.  Mild jaundice face/chest  Head/Neck  normal anterior and posterior fontanelle, intact scalp; Neck without masses.  Eyes  normal red reflex  Ears/Nose/Mouth:  intact canals, patent nares, mouth normal  Thorax:  normal contour, clavicles intact  Lungs:  clear, no retractions, no increased work of breathing  Heart:  normal rate, rhythm.  No murmurs.  Normal femoral pulses.  Abdomen  soft without mass, tenderness, organomegaly, hernia.  Umbilicus normal.  Genitalia:  normal female external genitalia  Anus:  patent  Trunk/Spine  straight, intact  Musculoskeletal:  Normal Alston and Ortolani maneuvers.  intact without deformity.  Normal digits.  Neurologic:  normal, symmetric tone and strength.  normal reflexes.             Laboratory:     Results for orders placed or performed during the  hospital encounter of 18   Bilirubin by transcutaneous meter POCT   Result Value Ref Range    Bilirubin Transcutaneous 7.8 (A) 0.0 - 5.8 mg/dL   Bilirubin by transcutaneous meter POCT   Result Value Ref Range    Bilirubin Transcutaneous 5.4 0.0 - 5.8 mg/dL       No results for input(s): BILINEONATAL in the last 168 hours.      Recent Labs  Lab 18  0927 02/10/18  2115   TCBIL 7.8* 5.4         bilitool        Assessment:   Baby1 Daina Vanegas is a female    Birth History   Diagnosis     Liveborn infant by vaginal delivery               Plan:   -Discharge to home with parents  -Follow-up with PCP in 48 hrs for jaundice/weight check  -Anticipatory guidance given  -Mildly elevated bilirubin, does not meet phototherapy recommendations.  -Mother + GBS with treatment intrapartum.      Apolinar Joyner        no

## 2018-01-08 ENCOUNTER — APPOINTMENT (OUTPATIENT)
Dept: NEPHROLOGY | Facility: CLINIC | Age: 58
End: 2018-01-08
Payer: MEDICARE

## 2018-01-08 VITALS
OXYGEN SATURATION: 97 % | HEIGHT: 64 IN | SYSTOLIC BLOOD PRESSURE: 129 MMHG | HEART RATE: 63 BPM | BODY MASS INDEX: 31.92 KG/M2 | DIASTOLIC BLOOD PRESSURE: 58 MMHG | TEMPERATURE: 98.2 F | WEIGHT: 187 LBS | RESPIRATION RATE: 14 BRPM

## 2018-01-08 PROCEDURE — 99215 OFFICE O/P EST HI 40 MIN: CPT

## 2018-01-08 RX ORDER — CALCITRIOL 0.5 UG/1
0.5 CAPSULE, LIQUID FILLED ORAL DAILY
Qty: 30 | Refills: 1 | Status: DISCONTINUED | COMMUNITY
Start: 2017-11-10 | End: 2018-01-08

## 2018-01-18 ENCOUNTER — APPOINTMENT (OUTPATIENT)
Dept: NEPHROLOGY | Facility: CLINIC | Age: 58
End: 2018-01-18
Payer: COMMERCIAL

## 2018-01-18 VITALS
SYSTOLIC BLOOD PRESSURE: 147 MMHG | WEIGHT: 192 LBS | TEMPERATURE: 97.5 F | BODY MASS INDEX: 32.78 KG/M2 | HEIGHT: 64 IN | OXYGEN SATURATION: 98 % | RESPIRATION RATE: 14 BRPM | DIASTOLIC BLOOD PRESSURE: 66 MMHG | HEART RATE: 60 BPM

## 2018-01-18 PROCEDURE — 99215 OFFICE O/P EST HI 40 MIN: CPT

## 2018-01-18 RX ORDER — FUROSEMIDE 40 MG/1
40 TABLET ORAL DAILY
Qty: 10 | Refills: 0 | Status: DISCONTINUED | COMMUNITY
Start: 2018-01-11 | End: 2018-01-18

## 2018-01-23 ENCOUNTER — APPOINTMENT (OUTPATIENT)
Dept: TRANSPLANT | Facility: CLINIC | Age: 58
End: 2018-01-23

## 2018-02-01 ENCOUNTER — APPOINTMENT (OUTPATIENT)
Dept: NEPHROLOGY | Facility: CLINIC | Age: 58
End: 2018-02-01
Payer: MEDICARE

## 2018-02-01 VITALS
SYSTOLIC BLOOD PRESSURE: 121 MMHG | WEIGHT: 189 LBS | DIASTOLIC BLOOD PRESSURE: 78 MMHG | HEART RATE: 63 BPM | BODY MASS INDEX: 32.27 KG/M2 | HEIGHT: 64 IN | RESPIRATION RATE: 17 BRPM | TEMPERATURE: 97.4 F

## 2018-02-01 PROCEDURE — 99215 OFFICE O/P EST HI 40 MIN: CPT

## 2018-02-01 RX ORDER — NYSTATIN 100000 [USP'U]/ML
100000 SUSPENSION ORAL 4 TIMES DAILY
Qty: 1800 | Refills: 0 | Status: DISCONTINUED | COMMUNITY
Start: 2017-11-09 | End: 2018-02-01

## 2018-02-15 ENCOUNTER — APPOINTMENT (OUTPATIENT)
Dept: NEPHROLOGY | Facility: CLINIC | Age: 58
End: 2018-02-15
Payer: MEDICARE

## 2018-02-15 VITALS
TEMPERATURE: 97.8 F | RESPIRATION RATE: 17 BRPM | HEART RATE: 68 BPM | BODY MASS INDEX: 32.1 KG/M2 | WEIGHT: 188 LBS | DIASTOLIC BLOOD PRESSURE: 80 MMHG | SYSTOLIC BLOOD PRESSURE: 160 MMHG | HEIGHT: 64 IN

## 2018-02-15 PROCEDURE — 99215 OFFICE O/P EST HI 40 MIN: CPT

## 2018-02-15 RX ORDER — ATOVAQUONE 750 MG/5ML
750 SUSPENSION ORAL
Qty: 300 | Refills: 11 | Status: DISCONTINUED | COMMUNITY
Start: 2018-01-08 | End: 2018-02-15

## 2018-02-15 RX ORDER — CALCIUM CARBONATE/VITAMIN D3 500-10/5ML
400 LIQUID (ML) ORAL
Qty: 90 | Refills: 0 | Status: DISCONTINUED | COMMUNITY
Start: 2017-11-25 | End: 2018-02-15

## 2018-02-15 RX ORDER — TACROLIMUS 0.5 MG/1
0.5 CAPSULE ORAL
Qty: 60 | Refills: 11 | Status: DISCONTINUED | COMMUNITY
Start: 2017-12-21 | End: 2018-02-15

## 2018-03-01 ENCOUNTER — APPOINTMENT (OUTPATIENT)
Dept: NEPHROLOGY | Facility: CLINIC | Age: 58
End: 2018-03-01
Payer: COMMERCIAL

## 2018-03-01 VITALS
HEART RATE: 65 BPM | TEMPERATURE: 98 F | WEIGHT: 190 LBS | SYSTOLIC BLOOD PRESSURE: 120 MMHG | RESPIRATION RATE: 14 BRPM | HEIGHT: 64 IN | OXYGEN SATURATION: 100 % | BODY MASS INDEX: 32.44 KG/M2 | DIASTOLIC BLOOD PRESSURE: 84 MMHG

## 2018-03-01 PROCEDURE — 99215 OFFICE O/P EST HI 40 MIN: CPT

## 2018-03-01 RX ORDER — ASPIRIN 81 MG/1
81 TABLET, CHEWABLE ORAL
Qty: 3 | Refills: 3 | Status: DISCONTINUED | COMMUNITY
Start: 2017-11-09 | End: 2018-03-01

## 2018-03-15 ENCOUNTER — APPOINTMENT (OUTPATIENT)
Dept: NEPHROLOGY | Facility: CLINIC | Age: 58
End: 2018-03-15
Payer: COMMERCIAL

## 2018-03-15 ENCOUNTER — LABORATORY RESULT (OUTPATIENT)
Age: 58
End: 2018-03-15

## 2018-03-15 VITALS
BODY MASS INDEX: 33.8 KG/M2 | RESPIRATION RATE: 14 BRPM | HEART RATE: 73 BPM | TEMPERATURE: 98.1 F | OXYGEN SATURATION: 90 % | DIASTOLIC BLOOD PRESSURE: 91 MMHG | WEIGHT: 198 LBS | SYSTOLIC BLOOD PRESSURE: 180 MMHG | HEIGHT: 64 IN

## 2018-03-15 PROCEDURE — 99215 OFFICE O/P EST HI 40 MIN: CPT

## 2018-03-29 ENCOUNTER — APPOINTMENT (OUTPATIENT)
Dept: TRANSPLANT | Facility: CLINIC | Age: 58
End: 2018-03-29

## 2018-04-19 ENCOUNTER — APPOINTMENT (OUTPATIENT)
Dept: NEPHROLOGY | Facility: CLINIC | Age: 58
End: 2018-04-19
Payer: MEDICARE

## 2018-04-19 VITALS — WEIGHT: 196 LBS | BODY MASS INDEX: 33.64 KG/M2 | RESPIRATION RATE: 18 BRPM

## 2018-04-19 VITALS
RESPIRATION RATE: 18 BRPM | TEMPERATURE: 98.2 F | DIASTOLIC BLOOD PRESSURE: 81 MMHG | SYSTOLIC BLOOD PRESSURE: 158 MMHG | OXYGEN SATURATION: 96 % | HEART RATE: 81 BPM

## 2018-04-19 PROCEDURE — 99215 OFFICE O/P EST HI 40 MIN: CPT

## 2018-04-24 ENCOUNTER — APPOINTMENT (OUTPATIENT)
Dept: TRANSPLANT | Facility: CLINIC | Age: 58
End: 2018-04-24

## 2018-05-08 ENCOUNTER — APPOINTMENT (OUTPATIENT)
Dept: TRANSPLANT | Facility: CLINIC | Age: 58
End: 2018-05-08

## 2018-05-08 ENCOUNTER — CHART COPY (OUTPATIENT)
Age: 58
End: 2018-05-08

## 2018-05-08 RX ORDER — VALGANCICLOVIR HYDROCHLORIDE 450 MG/1
450 TABLET ORAL
Qty: 90 | Refills: 3 | Status: DISCONTINUED | COMMUNITY
Start: 2017-11-09 | End: 2018-05-08

## 2018-05-08 RX ORDER — TACROLIMUS 1 MG/1
1 CAPSULE ORAL
Refills: 0 | Status: DISCONTINUED | COMMUNITY
Start: 2017-12-21 | End: 2018-05-08

## 2018-05-24 ENCOUNTER — APPOINTMENT (OUTPATIENT)
Dept: NEPHROLOGY | Facility: CLINIC | Age: 58
End: 2018-05-24
Payer: MEDICARE

## 2018-05-24 VITALS
HEIGHT: 64 IN | WEIGHT: 198 LBS | OXYGEN SATURATION: 92 % | RESPIRATION RATE: 16 BRPM | HEART RATE: 71 BPM | DIASTOLIC BLOOD PRESSURE: 68 MMHG | TEMPERATURE: 97.8 F | BODY MASS INDEX: 33.8 KG/M2 | SYSTOLIC BLOOD PRESSURE: 144 MMHG

## 2018-05-24 PROCEDURE — 99215 OFFICE O/P EST HI 40 MIN: CPT

## 2018-05-24 RX ORDER — AMOXICILLIN 500 MG/1
500 TABLET, FILM COATED ORAL
Qty: 4 | Refills: 3 | Status: DISCONTINUED | COMMUNITY
Start: 2018-02-01 | End: 2018-05-24

## 2018-05-29 LAB — 25(OH)D3 SERPL-MCNC: 33.4 NG/ML

## 2018-06-05 ENCOUNTER — APPOINTMENT (OUTPATIENT)
Dept: TRANSPLANT | Facility: CLINIC | Age: 58
End: 2018-06-05

## 2018-06-05 ENCOUNTER — APPOINTMENT (OUTPATIENT)
Dept: NEPHROLOGY | Facility: CLINIC | Age: 58
End: 2018-06-05

## 2018-06-05 VITALS
DIASTOLIC BLOOD PRESSURE: 77 MMHG | BODY MASS INDEX: 33.8 KG/M2 | WEIGHT: 198 LBS | OXYGEN SATURATION: 93 % | HEART RATE: 75 BPM | HEIGHT: 64 IN | SYSTOLIC BLOOD PRESSURE: 131 MMHG

## 2018-06-25 ENCOUNTER — APPOINTMENT (OUTPATIENT)
Dept: NEPHROLOGY | Facility: CLINIC | Age: 58
End: 2018-06-25

## 2018-06-28 ENCOUNTER — APPOINTMENT (OUTPATIENT)
Dept: TRANSPLANT | Facility: CLINIC | Age: 58
End: 2018-06-28

## 2018-06-28 LAB
25(OH)D3 SERPL-MCNC: 23.3 NG/ML
ALBUMIN SERPL ELPH-MCNC: 3.8 G/DL
ALP BLD-CCNC: 58 U/L
ALT SERPL-CCNC: 12 U/L
ANION GAP SERPL CALC-SCNC: 16 MMOL/L
APPEARANCE: CLEAR
AST SERPL-CCNC: 20 U/L
BASOPHILS # BLD AUTO: 0.02 K/UL
BASOPHILS NFR BLD AUTO: 0.5 %
BILIRUB SERPL-MCNC: 0.7 MG/DL
BILIRUBIN URINE: NEGATIVE
BLOOD URINE: NEGATIVE
BUN SERPL-MCNC: 25 MG/DL
CALCIUM SERPL-MCNC: 9.1 MG/DL
CHLORIDE SERPL-SCNC: 107 MMOL/L
CHOLEST SERPL-MCNC: 175 MG/DL
CHOLEST/HDLC SERPL: 2.4 RATIO
CO2 SERPL-SCNC: 19 MMOL/L
COLOR: YELLOW
CREAT SERPL-MCNC: 1.22 MG/DL
EOSINOPHIL # BLD AUTO: 0.05 K/UL
EOSINOPHIL NFR BLD AUTO: 1.2 %
GLUCOSE QUALITATIVE U: NEGATIVE MG/DL
GLUCOSE SERPL-MCNC: 116 MG/DL
HCT VFR BLD CALC: 40.2 %
HDLC SERPL-MCNC: 73 MG/DL
HGB BLD-MCNC: 12.1 G/DL
IMM GRANULOCYTES NFR BLD AUTO: 0.2 %
KETONES URINE: NEGATIVE
LDH SERPL-CCNC: 362 U/L
LDLC SERPL CALC-MCNC: 82 MG/DL
LEUKOCYTE ESTERASE URINE: NEGATIVE
LYMPHOCYTES # BLD AUTO: 0.85 K/UL
LYMPHOCYTES NFR BLD AUTO: 21 %
MAGNESIUM SERPL-MCNC: 2 MG/DL
MAN DIFF?: NORMAL
MCHC RBC-ENTMCNC: 30.1 GM/DL
MCHC RBC-ENTMCNC: 30.8 PG
MCV RBC AUTO: 102.3 FL
MONOCYTES # BLD AUTO: 0.35 K/UL
MONOCYTES NFR BLD AUTO: 8.6 %
NEUTROPHILS # BLD AUTO: 2.77 K/UL
NEUTROPHILS NFR BLD AUTO: 68.5 %
NITRITE URINE: NEGATIVE
PH URINE: 6
PHOSPHATE SERPL-MCNC: 3.1 MG/DL
PLATELET # BLD AUTO: 143 K/UL
POTASSIUM SERPL-SCNC: 5.4 MMOL/L
PROT SERPL-MCNC: 6.4 G/DL
PROTEIN URINE: NEGATIVE MG/DL
RBC # BLD: 3.93 M/UL
RBC # FLD: 15.8 %
SODIUM SERPL-SCNC: 142 MMOL/L
SPECIFIC GRAVITY URINE: 1.01
TACROLIMUS SERPL-MCNC: 8.3 NG/ML
TRIGL SERPL-MCNC: 98 MG/DL
URATE SERPL-MCNC: 8.4 MG/DL
UROBILINOGEN URINE: NEGATIVE MG/DL
WBC # FLD AUTO: 4.05 K/UL

## 2018-07-02 LAB
BKV DNA SPEC QL NAA+PROBE: NORMAL
CMV DNA SPEC QL NAA+PROBE: NOT DETECTED IU/ML
CREAT SPEC-SCNC: 53 MG/DL
CREAT/PROT UR: 0.1 RATIO
HBA1C MFR BLD HPLC: 4.1 %
PROT UR-MCNC: 4 MG/DL

## 2018-07-09 ENCOUNTER — APPOINTMENT (OUTPATIENT)
Dept: NEPHROLOGY | Facility: CLINIC | Age: 58
End: 2018-07-09
Payer: MEDICARE

## 2018-07-09 VITALS
SYSTOLIC BLOOD PRESSURE: 114 MMHG | HEART RATE: 71 BPM | WEIGHT: 201 LBS | BODY MASS INDEX: 34.31 KG/M2 | HEIGHT: 64 IN | RESPIRATION RATE: 16 BRPM | DIASTOLIC BLOOD PRESSURE: 55 MMHG | OXYGEN SATURATION: 95 % | TEMPERATURE: 98.2 F

## 2018-07-09 PROCEDURE — 99215 OFFICE O/P EST HI 40 MIN: CPT

## 2018-07-10 LAB
ALBUMIN SERPL ELPH-MCNC: 4.3 G/DL
ALP BLD-CCNC: 59 U/L
ALT SERPL-CCNC: 16 U/L
ANION GAP SERPL CALC-SCNC: 13 MMOL/L
APPEARANCE: CLEAR
AST SERPL-CCNC: 21 U/L
BACTERIA: NEGATIVE
BASOPHILS # BLD AUTO: 0.01 K/UL
BASOPHILS NFR BLD AUTO: 0.2 %
BILIRUB SERPL-MCNC: 0.8 MG/DL
BILIRUBIN URINE: NEGATIVE
BLOOD URINE: NEGATIVE
BUN SERPL-MCNC: 27 MG/DL
CALCIUM SERPL-MCNC: 9.5 MG/DL
CHLORIDE SERPL-SCNC: 102 MMOL/L
CO2 SERPL-SCNC: 25 MMOL/L
COLOR: YELLOW
CREAT SERPL-MCNC: 1.17 MG/DL
CREAT SPEC-SCNC: 48 MG/DL
CREAT/PROT UR: 0.1 RATIO
EOSINOPHIL # BLD AUTO: 0.04 K/UL
EOSINOPHIL NFR BLD AUTO: 1 %
GLUCOSE QUALITATIVE U: NEGATIVE MG/DL
GLUCOSE SERPL-MCNC: 109 MG/DL
HCT VFR BLD CALC: 40.8 %
HGB BLD-MCNC: 12.1 G/DL
IMM GRANULOCYTES NFR BLD AUTO: 0.2 %
KETONES URINE: NEGATIVE
LDH SERPL-CCNC: 370 U/L
LEUKOCYTE ESTERASE URINE: NEGATIVE
LYMPHOCYTES # BLD AUTO: 0.54 K/UL
LYMPHOCYTES NFR BLD AUTO: 13.4 %
MAGNESIUM SERPL-MCNC: 1.8 MG/DL
MAN DIFF?: NORMAL
MCHC RBC-ENTMCNC: 29.7 GM/DL
MCHC RBC-ENTMCNC: 29.9 PG
MCV RBC AUTO: 100.7 FL
MICROSCOPIC-UA: NORMAL
MONOCYTES # BLD AUTO: 0.31 K/UL
MONOCYTES NFR BLD AUTO: 7.7 %
NEUTROPHILS # BLD AUTO: 3.11 K/UL
NEUTROPHILS NFR BLD AUTO: 77.5 %
NITRITE URINE: NEGATIVE
PH URINE: 6
PHOSPHATE SERPL-MCNC: 3.5 MG/DL
PLATELET # BLD AUTO: 153 K/UL
POTASSIUM SERPL-SCNC: 5 MMOL/L
PROT SERPL-MCNC: 6.7 G/DL
PROT UR-MCNC: 7 MG/DL
PROTEIN URINE: NEGATIVE MG/DL
RBC # BLD: 4.05 M/UL
RBC # FLD: 15.3 %
RED BLOOD CELLS URINE: 1 /HPF
SODIUM SERPL-SCNC: 140 MMOL/L
SPECIFIC GRAVITY URINE: 1.01
SQUAMOUS EPITHELIAL CELLS: 1 /HPF
TACROLIMUS SERPL-MCNC: 6.6 NG/ML
URATE SERPL-MCNC: 8.4 MG/DL
UROBILINOGEN URINE: NEGATIVE MG/DL
WBC # FLD AUTO: 4.02 K/UL
WHITE BLOOD CELLS URINE: 0 /HPF

## 2018-07-12 LAB
BKV DNA SPEC QL NAA+PROBE: NORMAL
CMV DNA SPEC QL NAA+PROBE: NOT DETECTED IU/ML

## 2018-07-16 PROBLEM — Z94.0 KIDNEY TRANSPLANT STATUS: Chronic | Status: ACTIVE | Noted: 2017-12-21

## 2018-08-09 ENCOUNTER — APPOINTMENT (OUTPATIENT)
Dept: NEPHROLOGY | Facility: CLINIC | Age: 58
End: 2018-08-09
Payer: MEDICARE

## 2018-08-09 VITALS
OXYGEN SATURATION: 93 % | HEART RATE: 67 BPM | BODY MASS INDEX: 34.83 KG/M2 | HEIGHT: 64 IN | SYSTOLIC BLOOD PRESSURE: 124 MMHG | RESPIRATION RATE: 13 BRPM | TEMPERATURE: 98.2 F | DIASTOLIC BLOOD PRESSURE: 81 MMHG | WEIGHT: 204 LBS

## 2018-08-09 PROBLEM — N18.6 END STAGE RENAL DISEASE: Chronic | Status: ACTIVE | Noted: 2017-10-31

## 2018-08-09 PROBLEM — R23.3 SPONTANEOUS ECCHYMOSES: Chronic | Status: ACTIVE | Noted: 2017-10-31

## 2018-08-09 PROBLEM — Q61.3 POLYCYSTIC KIDNEY, UNSPECIFIED: Chronic | Status: ACTIVE | Noted: 2017-10-31

## 2018-08-09 PROBLEM — I10 ESSENTIAL (PRIMARY) HYPERTENSION: Chronic | Status: ACTIVE | Noted: 2017-10-31

## 2018-08-09 PROBLEM — I80.9 PHLEBITIS AND THROMBOPHLEBITIS OF UNSPECIFIED SITE: Chronic | Status: ACTIVE | Noted: 2017-10-31

## 2018-08-09 PROCEDURE — 99215 OFFICE O/P EST HI 40 MIN: CPT

## 2018-08-10 ENCOUNTER — APPOINTMENT (OUTPATIENT)
Dept: ULTRASOUND IMAGING | Facility: CLINIC | Age: 58
End: 2018-08-10

## 2018-08-10 ENCOUNTER — OUTPATIENT (OUTPATIENT)
Dept: OUTPATIENT SERVICES | Facility: HOSPITAL | Age: 58
LOS: 1 days | End: 2018-08-10
Payer: MEDICARE

## 2018-08-10 ENCOUNTER — EMERGENCY (EMERGENCY)
Facility: HOSPITAL | Age: 58
LOS: 1 days | Discharge: HOME | End: 2018-08-10
Attending: EMERGENCY MEDICINE
Payer: MEDICARE

## 2018-08-10 VITALS
DIASTOLIC BLOOD PRESSURE: 80 MMHG | RESPIRATION RATE: 18 BRPM | HEART RATE: 54 BPM | SYSTOLIC BLOOD PRESSURE: 140 MMHG | OXYGEN SATURATION: 94 %

## 2018-08-10 VITALS
HEART RATE: 77 BPM | OXYGEN SATURATION: 91 % | DIASTOLIC BLOOD PRESSURE: 83 MMHG | RESPIRATION RATE: 20 BRPM | TEMPERATURE: 98 F | SYSTOLIC BLOOD PRESSURE: 157 MMHG

## 2018-08-10 DIAGNOSIS — Z98.891 HISTORY OF UTERINE SCAR FROM PREVIOUS SURGERY: Chronic | ICD-10-CM

## 2018-08-10 DIAGNOSIS — Z94.0 KIDNEY TRANSPLANT STATUS: Chronic | ICD-10-CM

## 2018-08-10 DIAGNOSIS — Z00.8 ENCOUNTER FOR OTHER GENERAL EXAMINATION: ICD-10-CM

## 2018-08-10 LAB
25(OH)D3 SERPL-MCNC: 24.3 NG/ML
ALBUMIN SERPL ELPH-MCNC: 4.3 G/DL
ALBUMIN SERPL ELPH-MCNC: 4.5 G/DL — SIGNIFICANT CHANGE UP (ref 3.3–5)
ALP BLD-CCNC: 61 U/L
ALP SERPL-CCNC: 66 U/L — SIGNIFICANT CHANGE UP (ref 40–120)
ALT FLD-CCNC: 13 U/L — SIGNIFICANT CHANGE UP (ref 10–45)
ALT SERPL-CCNC: 14 U/L
ANION GAP SERPL CALC-SCNC: 15 MMOL/L — SIGNIFICANT CHANGE UP (ref 5–17)
ANION GAP SERPL CALC-SCNC: 17 MMOL/L
APPEARANCE: CLEAR
APTT BLD: 28.9 SEC — SIGNIFICANT CHANGE UP (ref 27.5–37.4)
AST SERPL-CCNC: 18 U/L — SIGNIFICANT CHANGE UP (ref 10–40)
AST SERPL-CCNC: 19 U/L
BACTERIA: NEGATIVE
BASOPHILS # BLD AUTO: 0 K/UL — SIGNIFICANT CHANGE UP (ref 0–0.2)
BASOPHILS # BLD AUTO: 0.03 K/UL
BASOPHILS NFR BLD AUTO: 0.8 %
BASOPHILS NFR BLD AUTO: 1 % — SIGNIFICANT CHANGE UP (ref 0–2)
BILIRUB SERPL-MCNC: 0.8 MG/DL
BILIRUB SERPL-MCNC: 0.8 MG/DL — SIGNIFICANT CHANGE UP (ref 0.2–1.2)
BILIRUBIN URINE: NEGATIVE
BLOOD URINE: NEGATIVE
BUN SERPL-MCNC: 29 MG/DL
BUN SERPL-MCNC: 31 MG/DL — HIGH (ref 7–23)
CALCIUM SERPL-MCNC: 8.4 MG/DL
CALCIUM SERPL-MCNC: 9.1 MG/DL — SIGNIFICANT CHANGE UP (ref 8.4–10.5)
CHLORIDE SERPL-SCNC: 100 MMOL/L
CHLORIDE SERPL-SCNC: 105 MMOL/L — SIGNIFICANT CHANGE UP (ref 96–108)
CHOLEST SERPL-MCNC: 184 MG/DL
CHOLEST/HDLC SERPL: 2.8 RATIO
CO2 SERPL-SCNC: 20 MMOL/L
CO2 SERPL-SCNC: 24 MMOL/L — SIGNIFICANT CHANGE UP (ref 22–31)
COLOR: YELLOW
CREAT SERPL-MCNC: 1.29 MG/DL
CREAT SERPL-MCNC: 1.45 MG/DL — HIGH (ref 0.5–1.3)
CREAT SPEC-SCNC: 25 MG/DL
CREAT/PROT UR: 0.2 RATIO
EOSINOPHIL # BLD AUTO: 0.03 K/UL
EOSINOPHIL # BLD AUTO: 0.1 K/UL — SIGNIFICANT CHANGE UP (ref 0–0.5)
EOSINOPHIL NFR BLD AUTO: 0.8 %
EOSINOPHIL NFR BLD AUTO: 1.7 % — SIGNIFICANT CHANGE UP (ref 0–6)
GAS + CO PNL BLD: NORMAL
GLUCOSE QUALITATIVE U: NEGATIVE MG/DL
GLUCOSE SERPL-MCNC: 105 MG/DL
GLUCOSE SERPL-MCNC: 109 MG/DL — HIGH (ref 70–99)
HBA1C MFR BLD HPLC: 4.1 %
HCT VFR BLD CALC: 41.4 % — SIGNIFICANT CHANGE UP (ref 34.5–45)
HCT VFR BLD CALC: 43.6 %
HDLC SERPL-MCNC: 66 MG/DL
HGB BLD-MCNC: 12.9 G/DL — SIGNIFICANT CHANGE UP (ref 11.5–15.5)
HGB BLD-MCNC: 13.4 G/DL
HGB BLD-MCNC: 13.6 G/DL
HYALINE CASTS: 0 /LPF
IMM GRANULOCYTES NFR BLD AUTO: 0.3 %
INR BLD: 1.08 RATIO — SIGNIFICANT CHANGE UP (ref 0.88–1.16)
KETONES URINE: NEGATIVE
LDH SERPL-CCNC: 386 U/L
LDLC SERPL CALC-MCNC: 101 MG/DL
LEUKOCYTE ESTERASE URINE: NEGATIVE
LYMPHOCYTES # BLD AUTO: 0.8 K/UL
LYMPHOCYTES # BLD AUTO: 1.5 K/UL — SIGNIFICANT CHANGE UP (ref 1–3.3)
LYMPHOCYTES # BLD AUTO: 30.3 % — SIGNIFICANT CHANGE UP (ref 13–44)
LYMPHOCYTES NFR BLD AUTO: 21.9 %
MAGNESIUM SERPL-MCNC: 1.8 MG/DL
MAN DIFF?: NORMAL
MCHC RBC-ENTMCNC: 30.7 GM/DL
MCHC RBC-ENTMCNC: 30.9 PG
MCHC RBC-ENTMCNC: 31 PG — SIGNIFICANT CHANGE UP (ref 27–34)
MCHC RBC-ENTMCNC: 31.2 GM/DL — LOW (ref 32–36)
MCV RBC AUTO: 100.7 FL
MCV RBC AUTO: 99.5 FL — SIGNIFICANT CHANGE UP (ref 80–100)
METHGB MFR BLDV: 5 %
MICROSCOPIC-UA: NORMAL
MONOCYTES # BLD AUTO: 0.37 K/UL
MONOCYTES # BLD AUTO: 0.5 K/UL — SIGNIFICANT CHANGE UP (ref 0–0.9)
MONOCYTES NFR BLD AUTO: 10.1 %
MONOCYTES NFR BLD AUTO: 9.6 % — SIGNIFICANT CHANGE UP (ref 2–14)
NEUTROPHILS # BLD AUTO: 2.42 K/UL
NEUTROPHILS # BLD AUTO: 2.8 K/UL — SIGNIFICANT CHANGE UP (ref 1.8–7.4)
NEUTROPHILS NFR BLD AUTO: 57.4 % — SIGNIFICANT CHANGE UP (ref 43–77)
NEUTROPHILS NFR BLD AUTO: 66.1 %
NITRITE URINE: NEGATIVE
PH URINE: 6.5
PHOSPHATE SERPL-MCNC: 3.1 MG/DL
PLATELET # BLD AUTO: 149 K/UL
PLATELET # BLD AUTO: 162 K/UL — SIGNIFICANT CHANGE UP (ref 150–400)
POTASSIUM SERPL-MCNC: 4.1 MMOL/L — SIGNIFICANT CHANGE UP (ref 3.5–5.3)
POTASSIUM SERPL-SCNC: 4.1 MMOL/L — SIGNIFICANT CHANGE UP (ref 3.5–5.3)
POTASSIUM SERPL-SCNC: 4.2 MMOL/L
PROT SERPL-MCNC: 6.4 G/DL
PROT SERPL-MCNC: 7 G/DL — SIGNIFICANT CHANGE UP (ref 6–8.3)
PROT UR-MCNC: 4 MG/DL
PROTEIN URINE: NEGATIVE MG/DL
PROTHROM AB SERPL-ACNC: 11.8 SEC — SIGNIFICANT CHANGE UP (ref 9.8–12.7)
RBC # BLD: 4.17 M/UL — SIGNIFICANT CHANGE UP (ref 3.8–5.2)
RBC # BLD: 4.33 M/UL
RBC # FLD: 14.4 % — SIGNIFICANT CHANGE UP (ref 10.3–14.5)
RBC # FLD: 15.2 %
RED BLOOD CELLS URINE: 0 /HPF
SODIUM SERPL-SCNC: 137 MMOL/L
SODIUM SERPL-SCNC: 144 MMOL/L — SIGNIFICANT CHANGE UP (ref 135–145)
SPECIFIC GRAVITY URINE: 1.01
SQUAMOUS EPITHELIAL CELLS: 0 /HPF
TACROLIMUS SERPL-MCNC: 9 NG/ML
TRIGL SERPL-MCNC: 85 MG/DL
URATE SERPL-MCNC: 8.1 MG/DL
UROBILINOGEN URINE: NEGATIVE MG/DL
WBC # BLD: 5 K/UL — SIGNIFICANT CHANGE UP (ref 3.8–10.5)
WBC # FLD AUTO: 3.66 K/UL
WBC # FLD AUTO: 5 K/UL — SIGNIFICANT CHANGE UP (ref 3.8–10.5)
WHITE BLOOD CELLS URINE: 0 /HPF

## 2018-08-10 PROCEDURE — 85027 COMPLETE CBC AUTOMATED: CPT

## 2018-08-10 PROCEDURE — 93970 EXTREMITY STUDY: CPT

## 2018-08-10 PROCEDURE — 80053 COMPREHEN METABOLIC PANEL: CPT

## 2018-08-10 PROCEDURE — 99283 EMERGENCY DEPT VISIT LOW MDM: CPT

## 2018-08-10 PROCEDURE — 85610 PROTHROMBIN TIME: CPT

## 2018-08-10 PROCEDURE — 85730 THROMBOPLASTIN TIME PARTIAL: CPT

## 2018-08-10 PROCEDURE — 99284 EMERGENCY DEPT VISIT MOD MDM: CPT

## 2018-08-10 PROCEDURE — 93970 EXTREMITY STUDY: CPT | Mod: 26

## 2018-08-10 RX ORDER — DAPSONE 100 MG/1
100 TABLET ORAL DAILY
Qty: 30 | Refills: 5 | Status: DISCONTINUED | COMMUNITY
Start: 2018-02-15 | End: 2018-08-10

## 2018-08-10 RX ORDER — RIVAROXABAN 15 MG-20MG
15 KIT ORAL ONCE
Qty: 0 | Refills: 0 | Status: COMPLETED | OUTPATIENT
Start: 2018-08-10 | End: 2018-08-10

## 2018-08-10 RX ORDER — RIVAROXABAN 15 MG-20MG
1 KIT ORAL
Qty: 20 | Refills: 0 | OUTPATIENT
Start: 2018-08-10 | End: 2018-08-19

## 2018-08-10 RX ADMIN — RIVAROXABAN 15 MILLIGRAM(S): KIT at 22:06

## 2018-08-10 NOTE — ED PROVIDER NOTE - SKIN, MLM
Skin normal color for race, warm, dry and intact. No evidence of rash. varicose veins are noted in RLE

## 2018-08-10 NOTE — ED PROVIDER NOTE - CARE PLAN
Principal Discharge DX:	Chronic deep vein thrombosis (DVT) of right lower extremity, unspecified vein  Secondary Diagnosis:	Renal transplant recipient

## 2018-08-10 NOTE — ED ADULT NURSE NOTE - OBJECTIVE STATEMENT
Pt presents to ED awake, alert and ambulatory, sent in from imaging center for a blood clot in the RLE. Pt states she had a renal transplant in November 2017 and in April 2018 she began noticing her right leg was swollen. Pt reports the swelling stayed the same or might have increased over the past few months. Pt reports swelling from her thigh to ankle. Pt also reports she noticed her varicose veins in her ankle and calf have gotten more prominent and more have appeared. Pt denies pain in the leg. Pt denies chest pain, sob, or any other symptoms. Pt went for an outpatient ultrasound tonight and was told to come to the ED for an extensive clot extending from her thigh to calf. Respirations even and unlabored. Right lower leg is swollen compared to left starting in thigh down to calf.

## 2018-08-10 NOTE — ED PROVIDER NOTE - OBJECTIVE STATEMENT
56 y/o F PMH polycystic kidney disease, hypertension, ESRD, s/p  living donor right kidney transplant recipient 11/2017 sent in by Radiology for DVT. Patient states she has been having increased rle swelling and varicose veins since april. Patient went for a transplant evaluation yesterday and was sent for US after telling her doctor. Radiologist found DVT today and sent her to the ER. Patient denies cp, sob, cough, fever, ha, tingling, numbness, weakness, abd pain, urinary retention, abd distension, dysuria, and hematuria 58 y/o F PMH polycystic kidney disease, hypertension, ESRD, s/p  living donor right kidney transplant recipient 11/2017 sent in by Radiology for DVT. Patient states she has been having increased rle swelling and varicose veins since april. Patient went for a transplant evaluation yesterday and was sent for US after telling her doctor. Radiologist found DVT today and sent her to the ER. Patient denies cp, sob, cough, fever, ha, tingling, numbness, weakness, abd pain, urinary retention, abd distension, dysuria, and hematuria       Attn- pt seen in [id number] - agree with above.  Pt had renal transplant in Nov. 2017 at [facility] and c/o R leg swelling x months without resp or cardiac sympt.  Pt had US of R lower ext ordered yesterday by her Transplant MD - Dr. Shiva Delgado and found DVT today.  pt sent to ER since Radiology could not contact transplant/renal.

## 2018-08-10 NOTE — ED PROVIDER NOTE - PHYSICAL EXAMINATION
Attn - alert, nad, no pallor or cyanosis, lungs-, cor-, abdo - soft, NT, ND, no CVAT,  ext - no edema or CT, + distal pulses, no cyanosis. Neuro - nonfocal.

## 2018-08-10 NOTE — ED PROVIDER NOTE - PROGRESS NOTE DETAILS
Call Placed to Dr. Delgado - kalyan'd call back from renal fellow Dr. Villareal and suggested Eliquis and f/u with Dr. Delgado. Call Placed to Dr. Delgado - kalyan'd call back from renal fellow Dr. Villareal and suggested any NOAC and f/u with Dr. Delgado.

## 2018-08-10 NOTE — ED ADULT TRIAGE NOTE - CHIEF COMPLAINT QUOTE
Renal transplant 11/2017. Several months ago noticed right leg swelling. Went for doppler outpatient and diagnosed with clot from groin down to mid-calf.

## 2018-08-10 NOTE — ED PROVIDER NOTE - MEDICAL DECISION MAKING DETAILS
58 y/o F PMH polycystic kidney disease, hypertension, ESRD, s/p  living donor right kidney transplant recipient 11/2017 sent in by Radiology for DVT.  DVT in PACs shows RLE DVT. Obtain lab work and start anticoagulation 56 y/o F PMH polycystic kidney disease, hypertension, ESRD, s/p  living donor right kidney transplant recipient 11/2017 sent in by Radiology for DVT.  DVT in PACs shows RLE DVT. Obtain lab work and start anticoagulation       Attn - pt with DVT Right lower leg - Consult Renal Transplant as to their recommended tx.

## 2018-08-10 NOTE — ED PROVIDER NOTE - CARDIAC, MLM
Normal rate, regular rhythm.  Heart sounds S1, S2.  RLE non pitting edema noted. Slight calf tenderness

## 2018-08-11 LAB
CMV DNA SPEC QL NAA+PROBE: NOT DETECTED IU/ML
CMVPCR LOG: NOT DETECTED LOGIU/ML

## 2018-08-14 DIAGNOSIS — I82.491 ACUTE EMBOLISM AND THROMBOSIS OF OTHER SPECIFIED DEEP VEIN OF RIGHT LOWER EXTREMITY: ICD-10-CM

## 2018-09-06 LAB — BKV DNA SPEC QL NAA+PROBE: NORMAL

## 2018-09-13 ENCOUNTER — LABORATORY RESULT (OUTPATIENT)
Age: 58
End: 2018-09-13

## 2018-09-13 ENCOUNTER — APPOINTMENT (OUTPATIENT)
Age: 58
End: 2018-09-13
Payer: MEDICARE

## 2018-09-13 VITALS
RESPIRATION RATE: 13 BRPM | DIASTOLIC BLOOD PRESSURE: 75 MMHG | OXYGEN SATURATION: 98 % | TEMPERATURE: 98.3 F | SYSTOLIC BLOOD PRESSURE: 160 MMHG | HEART RATE: 69 BPM | WEIGHT: 208 LBS | BODY MASS INDEX: 35.7 KG/M2

## 2018-09-13 VITALS — DIASTOLIC BLOOD PRESSURE: 80 MMHG | SYSTOLIC BLOOD PRESSURE: 140 MMHG

## 2018-09-13 PROCEDURE — 99215 OFFICE O/P EST HI 40 MIN: CPT

## 2018-09-13 RX ORDER — RIVAROXABAN 15 MG/1
15 TABLET, FILM COATED ORAL TWICE DAILY
Qty: 22 | Refills: 0 | Status: DISCONTINUED | COMMUNITY
Start: 2018-08-13 | End: 2018-09-13

## 2018-09-14 LAB
ALBUMIN SERPL ELPH-MCNC: 4.2 G/DL
ALP BLD-CCNC: 59 U/L
ALT SERPL-CCNC: 18 U/L
ANION GAP SERPL CALC-SCNC: 15 MMOL/L
APPEARANCE: CLEAR
AST SERPL-CCNC: 28 U/L
BACTERIA: NEGATIVE
BASOPHILS # BLD AUTO: 0.02 K/UL
BASOPHILS NFR BLD AUTO: 1.2 %
BILIRUB SERPL-MCNC: 0.3 MG/DL
BILIRUBIN URINE: NEGATIVE
BLOOD URINE: NEGATIVE
BUN SERPL-MCNC: 27 MG/DL
CALCIUM SERPL-MCNC: 9.5 MG/DL
CHLORIDE SERPL-SCNC: 101 MMOL/L
CMV DNA SPEC QL NAA+PROBE: NOT DETECTED IU/ML
CO2 SERPL-SCNC: 24 MMOL/L
COLOR: YELLOW
CREAT SERPL-MCNC: 1.44 MG/DL
CREAT SPEC-SCNC: 31 MG/DL
CREAT/PROT UR: 0.1 RATIO
EOSINOPHIL # BLD AUTO: 0.01 K/UL
EOSINOPHIL NFR BLD AUTO: 0.6 %
GLUCOSE QUALITATIVE U: NEGATIVE MG/DL
GLUCOSE SERPL-MCNC: 97 MG/DL
HCT VFR BLD CALC: 47.6 %
HGB BLD-MCNC: 14.8 G/DL
HYALINE CASTS: 2 /LPF
IMM GRANULOCYTES NFR BLD AUTO: 0 %
KETONES URINE: NEGATIVE
LDH SERPL-CCNC: 273 U/L
LEUKOCYTE ESTERASE URINE: NEGATIVE
LYMPHOCYTES # BLD AUTO: 0.61 K/UL
LYMPHOCYTES NFR BLD AUTO: 35.9 %
MAGNESIUM SERPL-MCNC: 2 MG/DL
MAN DIFF?: NORMAL
MCHC RBC-ENTMCNC: 29.5 PG
MCHC RBC-ENTMCNC: 31.1 GM/DL
MCV RBC AUTO: 94.8 FL
MICROSCOPIC-UA: NORMAL
MONOCYTES # BLD AUTO: 0.44 K/UL
MONOCYTES NFR BLD AUTO: 25.9 %
NEUTROPHILS # BLD AUTO: 0.62 K/UL
NEUTROPHILS NFR BLD AUTO: 36.4 %
NITRITE URINE: NEGATIVE
PH URINE: 7
PHOSPHATE SERPL-MCNC: 3.2 MG/DL
PLATELET # BLD AUTO: 138 K/UL
POTASSIUM SERPL-SCNC: 4.5 MMOL/L
PROT SERPL-MCNC: 6.7 G/DL
PROT UR-MCNC: 4 MG/DL
PROTEIN URINE: NEGATIVE MG/DL
RBC # BLD: 5.02 M/UL
RBC # FLD: 13.8 %
RED BLOOD CELLS URINE: 3 /HPF
SODIUM SERPL-SCNC: 140 MMOL/L
SPECIFIC GRAVITY URINE: 1.01
SQUAMOUS EPITHELIAL CELLS: 1 /HPF
TACROLIMUS SERPL-MCNC: 5.7 NG/ML
URINE COMMENTS: NORMAL
UROBILINOGEN URINE: NEGATIVE MG/DL
WBC # FLD AUTO: 1.7 K/UL
WHITE BLOOD CELLS URINE: 3 /HPF

## 2018-09-20 ENCOUNTER — LABORATORY RESULT (OUTPATIENT)
Age: 58
End: 2018-09-20

## 2018-09-20 ENCOUNTER — APPOINTMENT (OUTPATIENT)
Dept: TRANSPLANT | Facility: CLINIC | Age: 58
End: 2018-09-20

## 2018-09-21 RX ORDER — SULFAMETHOXAZOLE AND TRIMETHOPRIM 400; 80 MG/1; MG/1
400-80 TABLET ORAL DAILY
Qty: 30 | Refills: 11 | Status: DISCONTINUED | COMMUNITY
Start: 2018-08-10 | End: 2018-09-21

## 2018-09-21 RX ORDER — MYCOPHENOLATE MOFETIL 500 MG/1
500 TABLET ORAL TWICE DAILY
Qty: 180 | Refills: 3 | Status: DISCONTINUED | COMMUNITY
Start: 2017-11-09 | End: 2018-09-21

## 2018-09-21 RX ORDER — MYCOPHENOLATE MOFETIL 250 MG/1
250 CAPSULE ORAL TWICE DAILY
Qty: 180 | Refills: 3 | Status: DISCONTINUED | COMMUNITY
Start: 2018-09-13 | End: 2018-09-21

## 2018-09-27 ENCOUNTER — APPOINTMENT (OUTPATIENT)
Dept: TRANSPLANT | Facility: CLINIC | Age: 58
End: 2018-09-27

## 2018-09-28 LAB
ALBUMIN SERPL ELPH-MCNC: 4.2 G/DL
ALP BLD-CCNC: 66 U/L
ALT SERPL-CCNC: 17 U/L
ANION GAP SERPL CALC-SCNC: 16 MMOL/L
APPEARANCE: CLEAR
AST SERPL-CCNC: 21 U/L
BACTERIA: NEGATIVE
BASOPHILS # BLD AUTO: 0.03 K/UL
BASOPHILS NFR BLD AUTO: 1.9 %
BILIRUB SERPL-MCNC: 0.3 MG/DL
BILIRUBIN URINE: NEGATIVE
BKV DNA SPEC QL NAA+PROBE: NORMAL
BKV DNA SPEC QL NAA+PROBE: NORMAL
BLOOD URINE: NEGATIVE
BUN SERPL-MCNC: 24 MG/DL
CALCIUM SERPL-MCNC: 9.5 MG/DL
CHLORIDE SERPL-SCNC: 103 MMOL/L
CMV DNA SPEC QL NAA+PROBE: NOT DETECTED IU/ML
CO2 SERPL-SCNC: 28 MMOL/L
COLOR: YELLOW
CREAT SERPL-MCNC: 1.29 MG/DL
CREAT SPEC-SCNC: 19 MG/DL
CREAT/PROT UR: NORMAL
EOSINOPHIL # BLD AUTO: 0.04 K/UL
EOSINOPHIL NFR BLD AUTO: 2.5 %
GLUCOSE QUALITATIVE U: NEGATIVE MG/DL
GLUCOSE SERPL-MCNC: 91 MG/DL
HCT VFR BLD CALC: 48.4 %
HGB BLD-MCNC: 14.8 G/DL
IMM GRANULOCYTES NFR BLD AUTO: 0 %
KETONES URINE: NEGATIVE
LDH SERPL-CCNC: 286 U/L
LEUKOCYTE ESTERASE URINE: NEGATIVE
LYMPHOCYTES # BLD AUTO: 0.98 K/UL
LYMPHOCYTES NFR BLD AUTO: 60.5 %
MAGNESIUM SERPL-MCNC: 1.9 MG/DL
MAN DIFF?: NORMAL
MCHC RBC-ENTMCNC: 29.5 PG
MCHC RBC-ENTMCNC: 30.6 GM/DL
MCV RBC AUTO: 96.4 FL
MICROSCOPIC-UA: NORMAL
MONOCYTES # BLD AUTO: 0.38 K/UL
MONOCYTES NFR BLD AUTO: 23.5 %
NEUTROPHILS # BLD AUTO: 0.19 K/UL
NEUTROPHILS NFR BLD AUTO: 11.6 %
NITRITE URINE: NEGATIVE
PH URINE: 7
PHOSPHATE SERPL-MCNC: 3.3 MG/DL
PLATELET # BLD AUTO: 151 K/UL
POTASSIUM SERPL-SCNC: 5 MMOL/L
PROT SERPL-MCNC: 6.8 G/DL
PROT UR-MCNC: <4 MG/DL
PROTEIN URINE: NEGATIVE MG/DL
RBC # BLD: 5.02 M/UL
RBC # FLD: 13.2 %
RED BLOOD CELLS URINE: 0 /HPF
SODIUM SERPL-SCNC: 147 MMOL/L
SPECIFIC GRAVITY URINE: 1
SQUAMOUS EPITHELIAL CELLS: 0 /HPF
TACROLIMUS SERPL-MCNC: 5.3 NG/ML
UROBILINOGEN URINE: NEGATIVE MG/DL
WBC # FLD AUTO: 1.62 K/UL
WHITE BLOOD CELLS URINE: 0 /HPF

## 2018-10-01 LAB
25(OH)D3 SERPL-MCNC: 38.7 NG/ML
ALBUMIN SERPL ELPH-MCNC: 4.2 G/DL
ALP BLD-CCNC: 62 U/L
ALT SERPL-CCNC: 15 U/L
ANION GAP SERPL CALC-SCNC: 25 MMOL/L
APPEARANCE: CLEAR
AST SERPL-CCNC: 26 U/L
BASOPHILS # BLD AUTO: 0.06 K/UL
BASOPHILS NFR BLD AUTO: 1.5 %
BILIRUB SERPL-MCNC: 0.3 MG/DL
BILIRUBIN URINE: NEGATIVE
BLOOD URINE: NEGATIVE
BUN SERPL-MCNC: 27 MG/DL
CALCIUM SERPL-MCNC: 9.5 MG/DL
CHLORIDE SERPL-SCNC: 105 MMOL/L
CHOLEST SERPL-MCNC: 193 MG/DL
CHOLEST/HDLC SERPL: 2.5 RATIO
CMV DNA SPEC QL NAA+PROBE: NOT DETECTED IU/ML
CMVPCR LOG: NOT DETECTED LOGIU/ML
CO2 SERPL-SCNC: 17 MMOL/L
COLOR: YELLOW
CREAT SERPL-MCNC: 1.34 MG/DL
CREAT SPEC-SCNC: 25 MG/DL
CREAT/PROT UR: 0.2 RATIO
EOSINOPHIL # BLD AUTO: 0.13 K/UL
EOSINOPHIL NFR BLD AUTO: 3.2 %
GLUCOSE QUALITATIVE U: NEGATIVE MG/DL
GLUCOSE SERPL-MCNC: 90 MG/DL
HBA1C MFR BLD HPLC: 5.3 %
HCT VFR BLD CALC: 48.3 %
HDLC SERPL-MCNC: 76 MG/DL
HGB BLD-MCNC: 14.7 G/DL
IMM GRANULOCYTES NFR BLD AUTO: 0.2 %
KETONES URINE: NEGATIVE
LDH SERPL-CCNC: 361 U/L
LDLC SERPL CALC-MCNC: 101 MG/DL
LEUKOCYTE ESTERASE URINE: NEGATIVE
LYMPHOCYTES # BLD AUTO: 1.27 K/UL
LYMPHOCYTES NFR BLD AUTO: 31.2 %
MAGNESIUM SERPL-MCNC: 2 MG/DL
MAN DIFF?: NORMAL
MCHC RBC-ENTMCNC: 28.3 PG
MCHC RBC-ENTMCNC: 30.4 GM/DL
MCV RBC AUTO: 93.1 FL
MONOCYTES # BLD AUTO: 0.48 K/UL
MONOCYTES NFR BLD AUTO: 11.8 %
NEUTROPHILS # BLD AUTO: 2.12 K/UL
NEUTROPHILS NFR BLD AUTO: 52.1 %
NITRITE URINE: NEGATIVE
PH URINE: 7
PHOSPHATE SERPL-MCNC: 4.1 MG/DL
PLATELET # BLD AUTO: 167 K/UL
POTASSIUM SERPL-SCNC: 5.5 MMOL/L
PROT SERPL-MCNC: 6.9 G/DL
PROT UR-MCNC: 5 MG/DL
PROTEIN URINE: NEGATIVE MG/DL
RBC # BLD: 5.19 M/UL
RBC # FLD: 13.3 %
SODIUM SERPL-SCNC: 147 MMOL/L
SPECIFIC GRAVITY URINE: 1.01
TACROLIMUS SERPL-MCNC: 5.4 NG/ML
TRIGL SERPL-MCNC: 82 MG/DL
URATE SERPL-MCNC: 8.2 MG/DL
UROBILINOGEN URINE: NEGATIVE MG/DL
WBC # FLD AUTO: 4.07 K/UL

## 2018-10-02 LAB — BKV DNA SPEC QL NAA+PROBE: NORMAL

## 2018-10-04 ENCOUNTER — APPOINTMENT (OUTPATIENT)
Dept: TRANSPLANT | Facility: CLINIC | Age: 58
End: 2018-10-04

## 2018-10-05 LAB
ALBUMIN SERPL ELPH-MCNC: 4.2 G/DL
ALP BLD-CCNC: 67 U/L
ALT SERPL-CCNC: 15 U/L
ANION GAP SERPL CALC-SCNC: 12 MMOL/L
APPEARANCE: CLEAR
AST SERPL-CCNC: 22 U/L
BACTERIA: NEGATIVE
BASOPHILS # BLD AUTO: 0.03 K/UL
BASOPHILS NFR BLD AUTO: 0.7 %
BILIRUB SERPL-MCNC: 0.4 MG/DL
BILIRUBIN URINE: NEGATIVE
BLOOD URINE: NEGATIVE
BUN SERPL-MCNC: 27 MG/DL
CALCIUM SERPL-MCNC: 9.5 MG/DL
CHLORIDE SERPL-SCNC: 104 MMOL/L
CO2 SERPL-SCNC: 28 MMOL/L
COLOR: YELLOW
CREAT SERPL-MCNC: 1.32 MG/DL
CREAT SPEC-SCNC: 38 MG/DL
CREAT/PROT UR: 0.1 RATIO
EOSINOPHIL # BLD AUTO: 0.1 K/UL
EOSINOPHIL NFR BLD AUTO: 2.2 %
GLUCOSE QUALITATIVE U: NEGATIVE MG/DL
GLUCOSE SERPL-MCNC: 87 MG/DL
HCT VFR BLD CALC: 47.7 %
HGB BLD-MCNC: 15.1 G/DL
IMM GRANULOCYTES NFR BLD AUTO: 0 %
KETONES URINE: NEGATIVE
LDH SERPL-CCNC: 265 U/L
LEUKOCYTE ESTERASE URINE: NEGATIVE
LYMPHOCYTES # BLD AUTO: 1.24 K/UL
LYMPHOCYTES NFR BLD AUTO: 27.5 %
MAGNESIUM SERPL-MCNC: 1.9 MG/DL
MAN DIFF?: NORMAL
MCHC RBC-ENTMCNC: 29.2 PG
MCHC RBC-ENTMCNC: 31.7 GM/DL
MCV RBC AUTO: 92.1 FL
MICROSCOPIC-UA: NORMAL
MONOCYTES # BLD AUTO: 0.39 K/UL
MONOCYTES NFR BLD AUTO: 8.6 %
NEUTROPHILS # BLD AUTO: 2.75 K/UL
NEUTROPHILS NFR BLD AUTO: 61 %
NITRITE URINE: NEGATIVE
PH URINE: 7
PHOSPHATE SERPL-MCNC: 3.6 MG/DL
PLATELET # BLD AUTO: 158 K/UL
POTASSIUM SERPL-SCNC: 4.8 MMOL/L
PROT SERPL-MCNC: 6.8 G/DL
PROT UR-MCNC: 5 MG/DL
PROTEIN URINE: NEGATIVE MG/DL
RBC # BLD: 5.18 M/UL
RBC # FLD: 13.7 %
RED BLOOD CELLS URINE: 0 /HPF
SODIUM SERPL-SCNC: 144 MMOL/L
SPECIFIC GRAVITY URINE: 1.01
SQUAMOUS EPITHELIAL CELLS: 0 /HPF
TACROLIMUS SERPL-MCNC: 5.8 NG/ML
URATE SERPL-MCNC: 8.4 MG/DL
UROBILINOGEN URINE: NEGATIVE MG/DL
WBC # FLD AUTO: 4.51 K/UL
WHITE BLOOD CELLS URINE: 0 /HPF

## 2018-10-08 LAB
CMV DNA SPEC QL NAA+PROBE: NOT DETECTED IU/ML
CMVPCR LOG: NOT DETECTED LOGIU/ML

## 2018-10-09 LAB — BKV DNA SPEC QL NAA+PROBE: NORMAL

## 2018-10-18 ENCOUNTER — APPOINTMENT (OUTPATIENT)
Age: 58
End: 2018-10-18
Payer: MEDICARE

## 2018-10-18 VITALS
DIASTOLIC BLOOD PRESSURE: 63 MMHG | SYSTOLIC BLOOD PRESSURE: 145 MMHG | TEMPERATURE: 97.3 F | OXYGEN SATURATION: 97 % | HEIGHT: 64 IN | RESPIRATION RATE: 13 BRPM | WEIGHT: 214 LBS | BODY MASS INDEX: 36.54 KG/M2 | HEART RATE: 77 BPM

## 2018-10-18 PROCEDURE — 99215 OFFICE O/P EST HI 40 MIN: CPT

## 2018-10-19 LAB
25(OH)D3 SERPL-MCNC: 30.7 NG/ML
ALBUMIN SERPL ELPH-MCNC: 4.1 G/DL
ALP BLD-CCNC: 66 U/L
ALT SERPL-CCNC: 12 U/L
ANION GAP SERPL CALC-SCNC: 16 MMOL/L
APPEARANCE: CLEAR
AST SERPL-CCNC: 20 U/L
BASOPHILS # BLD AUTO: 0.03 K/UL
BASOPHILS NFR BLD AUTO: 0.7 %
BILIRUB SERPL-MCNC: 0.4 MG/DL
BILIRUBIN URINE: NEGATIVE
BLOOD URINE: NEGATIVE
BUN SERPL-MCNC: 22 MG/DL
CALCIUM SERPL-MCNC: 9.6 MG/DL
CHLORIDE SERPL-SCNC: 102 MMOL/L
CHOLEST SERPL-MCNC: 184 MG/DL
CHOLEST/HDLC SERPL: 2.5 RATIO
CMV DNA SPEC QL NAA+PROBE: NOT DETECTED IU/ML
CMVPCR LOG: NOT DETECTED LOGIU/ML
CO2 SERPL-SCNC: 25 MMOL/L
COLOR: YELLOW
CREAT SERPL-MCNC: 1.26 MG/DL
CREAT SPEC-SCNC: 23 MG/DL
CREAT/PROT UR: NORMAL
EOSINOPHIL # BLD AUTO: 0.12 K/UL
EOSINOPHIL NFR BLD AUTO: 2.7 %
GLUCOSE QUALITATIVE U: NEGATIVE MG/DL
GLUCOSE SERPL-MCNC: 91 MG/DL
HBA1C MFR BLD HPLC: 5.7 %
HCT VFR BLD CALC: 49.8 %
HDLC SERPL-MCNC: 73 MG/DL
HGB BLD-MCNC: 14.8 G/DL
IMM GRANULOCYTES NFR BLD AUTO: 0 %
KETONES URINE: NEGATIVE
LDH SERPL-CCNC: 326 U/L
LDLC SERPL CALC-MCNC: 89 MG/DL
LEUKOCYTE ESTERASE URINE: NEGATIVE
LYMPHOCYTES # BLD AUTO: 1.38 K/UL
LYMPHOCYTES NFR BLD AUTO: 30.9 %
MAGNESIUM SERPL-MCNC: 2.1 MG/DL
MAN DIFF?: NORMAL
MCHC RBC-ENTMCNC: 27.3 PG
MCHC RBC-ENTMCNC: 29.7 GM/DL
MCV RBC AUTO: 91.7 FL
MONOCYTES # BLD AUTO: 0.48 K/UL
MONOCYTES NFR BLD AUTO: 10.7 %
NEUTROPHILS # BLD AUTO: 2.46 K/UL
NEUTROPHILS NFR BLD AUTO: 55 %
NITRITE URINE: NEGATIVE
PH URINE: 6.5
PHOSPHATE SERPL-MCNC: 3.2 MG/DL
PLATELET # BLD AUTO: 193 K/UL
POTASSIUM SERPL-SCNC: 4.6 MMOL/L
PROT SERPL-MCNC: 6.7 G/DL
PROT UR-MCNC: <4 MG/DL
PROTEIN URINE: NEGATIVE MG/DL
RBC # BLD: 5.43 M/UL
RBC # FLD: 14.3 %
SODIUM SERPL-SCNC: 143 MMOL/L
SPECIFIC GRAVITY URINE: 1.01
TACROLIMUS SERPL-MCNC: 4.7 NG/ML
TRIGL SERPL-MCNC: 111 MG/DL
URATE SERPL-MCNC: 7.6 MG/DL
UROBILINOGEN URINE: NEGATIVE MG/DL
WBC # FLD AUTO: 4.47 K/UL

## 2018-10-26 LAB — BKV DNA SPEC QL NAA+PROBE: NORMAL

## 2018-11-15 ENCOUNTER — APPOINTMENT (OUTPATIENT)
Dept: TRANSPLANT | Facility: CLINIC | Age: 58
End: 2018-11-15

## 2018-11-15 ENCOUNTER — APPOINTMENT (OUTPATIENT)
Dept: NEPHROLOGY | Facility: CLINIC | Age: 58
End: 2018-11-15
Payer: MEDICARE

## 2018-11-15 VITALS
HEART RATE: 100 BPM | BODY MASS INDEX: 35.85 KG/M2 | OXYGEN SATURATION: 97 % | TEMPERATURE: 97.5 F | DIASTOLIC BLOOD PRESSURE: 100 MMHG | WEIGHT: 210 LBS | SYSTOLIC BLOOD PRESSURE: 160 MMHG | HEIGHT: 64 IN | RESPIRATION RATE: 14 BRPM

## 2018-11-15 DIAGNOSIS — J32.9 CHRONIC SINUSITIS, UNSPECIFIED: ICD-10-CM

## 2018-11-15 DIAGNOSIS — B96.89 CHRONIC SINUSITIS, UNSPECIFIED: ICD-10-CM

## 2018-11-15 PROCEDURE — 99215 OFFICE O/P EST HI 40 MIN: CPT

## 2018-11-15 RX ORDER — ATENOLOL 50 MG/1
50 TABLET ORAL DAILY
Refills: 0 | Status: DISCONTINUED | COMMUNITY
Start: 2017-11-15 | End: 2018-11-15

## 2018-11-16 LAB
ALBUMIN SERPL ELPH-MCNC: 4.2 G/DL
ALP BLD-CCNC: 69 U/L
ALT SERPL-CCNC: 10 U/L
ANION GAP SERPL CALC-SCNC: 15 MMOL/L
APPEARANCE: CLEAR
AST SERPL-CCNC: 18 U/L
BACTERIA: NEGATIVE
BASOPHILS # BLD AUTO: 0.02 K/UL
BASOPHILS NFR BLD AUTO: 0.6 %
BILIRUB SERPL-MCNC: 0.5 MG/DL
BILIRUBIN URINE: NEGATIVE
BLOOD URINE: NEGATIVE
BUN SERPL-MCNC: 26 MG/DL
CALCIUM SERPL-MCNC: 9.3 MG/DL
CHLORIDE SERPL-SCNC: 103 MMOL/L
CHOLEST SERPL-MCNC: 185 MG/DL
CHOLEST/HDLC SERPL: 2.6 RATIO
CO2 SERPL-SCNC: 26 MMOL/L
COLOR: YELLOW
CREAT SERPL-MCNC: 1.31 MG/DL
CREAT SPEC-SCNC: 32 MG/DL
CREAT/PROT UR: NORMAL
EOSINOPHIL # BLD AUTO: 0.15 K/UL
EOSINOPHIL NFR BLD AUTO: 4.5 %
GLUCOSE QUALITATIVE U: NEGATIVE MG/DL
GLUCOSE SERPL-MCNC: 105 MG/DL
HBA1C MFR BLD HPLC: 5.8 %
HCT VFR BLD CALC: 48.3 %
HDLC SERPL-MCNC: 72 MG/DL
HGB BLD-MCNC: 14.9 G/DL
IMM GRANULOCYTES NFR BLD AUTO: 0.3 %
KETONES URINE: NEGATIVE
LDH SERPL-CCNC: 267 U/L
LDLC SERPL CALC-MCNC: 99 MG/DL
LEUKOCYTE ESTERASE URINE: NEGATIVE
LYMPHOCYTES # BLD AUTO: 0.86 K/UL
LYMPHOCYTES NFR BLD AUTO: 26 %
MAGNESIUM SERPL-MCNC: 2 MG/DL
MAN DIFF?: NORMAL
MCHC RBC-ENTMCNC: 27.5 PG
MCHC RBC-ENTMCNC: 30.8 GM/DL
MCV RBC AUTO: 89.3 FL
MICROSCOPIC-UA: NORMAL
MONOCYTES # BLD AUTO: 0.28 K/UL
MONOCYTES NFR BLD AUTO: 8.5 %
NEUTROPHILS # BLD AUTO: 1.99 K/UL
NEUTROPHILS NFR BLD AUTO: 60.1 %
NITRITE URINE: NEGATIVE
PH URINE: 6
PHOSPHATE SERPL-MCNC: 3.5 MG/DL
PLATELET # BLD AUTO: 163 K/UL
POTASSIUM SERPL-SCNC: 4.7 MMOL/L
PROT SERPL-MCNC: 6.7 G/DL
PROT UR-MCNC: <4 MG/DL
PROTEIN URINE: NEGATIVE MG/DL
RBC # BLD: 5.41 M/UL
RBC # FLD: 15 %
RED BLOOD CELLS URINE: 1 /HPF
SODIUM SERPL-SCNC: 144 MMOL/L
SPECIFIC GRAVITY URINE: 1.01
SQUAMOUS EPITHELIAL CELLS: 1 /HPF
TACROLIMUS SERPL-MCNC: 3.5 NG/ML
TRIGL SERPL-MCNC: 69 MG/DL
UROBILINOGEN URINE: NEGATIVE MG/DL
WBC # FLD AUTO: 3.31 K/UL
WHITE BLOOD CELLS URINE: 1 /HPF

## 2018-11-16 RX ORDER — TACROLIMUS 1 MG/1
1 TABLET, EXTENDED RELEASE ORAL
Qty: 30 | Refills: 6 | Status: DISCONTINUED | COMMUNITY
Start: 2018-04-19 | End: 2018-11-16

## 2018-11-20 ENCOUNTER — APPOINTMENT (OUTPATIENT)
Dept: ULTRASOUND IMAGING | Facility: CLINIC | Age: 58
End: 2018-11-20

## 2018-11-22 NOTE — ED ADULT NURSE NOTE - NSFALLRSKHARMRISK_ED_ALL_ED
Reason For Visit  CRISTINA FELIPE is here today for a nurse visit for immunizations flu vaccine,pt tolerated well.      Current Meds   1. Prenatal Vitamins TABS;   Therapy: (Recorded:01Mar2016) to Recorded    Allergies  Penicillins    Assessment   1. Encounter for preventive health examination (Z00.00)    Plan   1. Fluzone Quadrivalent 0.5 ML Intramuscular Suspension; INJECT 0.5  ML   Intramuscular; To Be Done: 08Nov2017    Signatures   Electronically signed by : AARON Arthur; Nov 8 2017 11:31AM CST    
no

## 2018-11-23 RX ORDER — MYCOPHENOLATE MOFETIL 250 MG/1
250 CAPSULE ORAL
Qty: 60 | Refills: 11 | Status: DISCONTINUED | COMMUNITY
Start: 2018-09-27 | End: 2018-11-23

## 2018-11-25 ENCOUNTER — FORM ENCOUNTER (OUTPATIENT)
Age: 58
End: 2018-11-25

## 2018-11-26 ENCOUNTER — APPOINTMENT (OUTPATIENT)
Dept: ULTRASOUND IMAGING | Facility: CLINIC | Age: 58
End: 2018-11-26
Payer: MEDICARE

## 2018-11-26 ENCOUNTER — RX RENEWAL (OUTPATIENT)
Age: 58
End: 2018-11-26

## 2018-11-26 ENCOUNTER — OUTPATIENT (OUTPATIENT)
Dept: OUTPATIENT SERVICES | Facility: HOSPITAL | Age: 58
LOS: 1 days | End: 2018-11-26
Payer: MEDICARE

## 2018-11-26 DIAGNOSIS — Z94.0 KIDNEY TRANSPLANT STATUS: Chronic | ICD-10-CM

## 2018-11-26 DIAGNOSIS — Z94.0 KIDNEY TRANSPLANT STATUS: ICD-10-CM

## 2018-11-26 DIAGNOSIS — Z98.891 HISTORY OF UTERINE SCAR FROM PREVIOUS SURGERY: Chronic | ICD-10-CM

## 2018-11-26 DIAGNOSIS — Z00.8 ENCOUNTER FOR OTHER GENERAL EXAMINATION: ICD-10-CM

## 2018-11-26 PROCEDURE — 93970 EXTREMITY STUDY: CPT | Mod: 26

## 2018-11-26 PROCEDURE — 93970 EXTREMITY STUDY: CPT

## 2018-11-28 LAB
BKV DNA SPEC QL NAA+PROBE: NOT DETECTED COPIES/ML
CMV DNA SPEC QL NAA+PROBE: NOT DETECTED IU/ML
CMVPCR LOG: NOT DETECTED LOGIU/ML

## 2018-11-30 ENCOUNTER — APPOINTMENT (OUTPATIENT)
Dept: TRANSPLANT | Facility: CLINIC | Age: 58
End: 2018-11-30

## 2018-11-30 LAB
ALBUMIN SERPL ELPH-MCNC: 4 G/DL
ALP BLD-CCNC: 64 U/L
ALT SERPL-CCNC: 13 U/L
ANION GAP SERPL CALC-SCNC: 14 MMOL/L
APPEARANCE: CLEAR
AST SERPL-CCNC: 19 U/L
BASOPHILS # BLD AUTO: 0.03 K/UL
BASOPHILS NFR BLD AUTO: 0.8 %
BILIRUB SERPL-MCNC: 0.6 MG/DL
BILIRUBIN URINE: NEGATIVE
BLOOD URINE: NEGATIVE
BUN SERPL-MCNC: 31 MG/DL
CALCIUM SERPL-MCNC: 9.2 MG/DL
CHLORIDE SERPL-SCNC: 103 MMOL/L
CHOLEST SERPL-MCNC: 225 MG/DL
CHOLEST/HDLC SERPL: 3 RATIO
CO2 SERPL-SCNC: 23 MMOL/L
COLOR: YELLOW
CREAT SERPL-MCNC: 1.32 MG/DL
CREAT SPEC-SCNC: 28 MG/DL
CREAT/PROT UR: NORMAL
EOSINOPHIL # BLD AUTO: 0.12 K/UL
EOSINOPHIL NFR BLD AUTO: 3 %
GLUCOSE QUALITATIVE U: NEGATIVE MG/DL
GLUCOSE SERPL-MCNC: 91 MG/DL
HBA1C MFR BLD HPLC: 5.8 %
HCT VFR BLD CALC: 47.9 %
HDLC SERPL-MCNC: 75 MG/DL
HGB BLD-MCNC: 15.4 G/DL
IMM GRANULOCYTES NFR BLD AUTO: 0.3 %
KETONES URINE: NEGATIVE
LDH SERPL-CCNC: 293 U/L
LDLC SERPL CALC-MCNC: 131 MG/DL
LEUKOCYTE ESTERASE URINE: NEGATIVE
LYMPHOCYTES # BLD AUTO: 1.02 K/UL
LYMPHOCYTES NFR BLD AUTO: 25.9 %
MAGNESIUM SERPL-MCNC: 1.8 MG/DL
MAN DIFF?: NORMAL
MCHC RBC-ENTMCNC: 28.9 PG
MCHC RBC-ENTMCNC: 32.2 GM/DL
MCV RBC AUTO: 90 FL
MONOCYTES # BLD AUTO: 0.33 K/UL
MONOCYTES NFR BLD AUTO: 8.4 %
NEUTROPHILS # BLD AUTO: 2.43 K/UL
NEUTROPHILS NFR BLD AUTO: 61.6 %
NITRITE URINE: NEGATIVE
PH URINE: 6.5
PHOSPHATE SERPL-MCNC: 3.3 MG/DL
PLATELET # BLD AUTO: 171 K/UL
POTASSIUM SERPL-SCNC: 5.2 MMOL/L
PROT SERPL-MCNC: 6.7 G/DL
PROT UR-MCNC: <4 MG/DL
PROTEIN URINE: NEGATIVE MG/DL
RBC # BLD: 5.32 M/UL
RBC # FLD: 16.1 %
SODIUM SERPL-SCNC: 140 MMOL/L
SPECIFIC GRAVITY URINE: 1.01
TACROLIMUS SERPL-MCNC: 6.4 NG/ML
TRIGL SERPL-MCNC: 95 MG/DL
URATE SERPL-MCNC: 6.9 MG/DL
UROBILINOGEN URINE: NEGATIVE MG/DL
WBC # FLD AUTO: 3.94 K/UL

## 2018-12-03 LAB
25(OH)D3 SERPL-MCNC: 23.7 NG/ML
BKV DNA SPEC QL NAA+PROBE: NOT DETECTED COPIES/ML

## 2018-12-04 LAB
CMV DNA SPEC QL NAA+PROBE: NOT DETECTED IU/ML
CMVPCR LOG: NOT DETECTED LOGIU/ML

## 2019-01-02 ENCOUNTER — OTHER (OUTPATIENT)
Age: 59
End: 2019-01-02

## 2019-01-09 ENCOUNTER — APPOINTMENT (OUTPATIENT)
Dept: NEPHROLOGY | Facility: CLINIC | Age: 59
End: 2019-01-09
Payer: MEDICARE

## 2019-01-09 VITALS
BODY MASS INDEX: 37.56 KG/M2 | OXYGEN SATURATION: 97 % | WEIGHT: 220 LBS | SYSTOLIC BLOOD PRESSURE: 138 MMHG | HEART RATE: 91 BPM | DIASTOLIC BLOOD PRESSURE: 71 MMHG | HEIGHT: 64 IN

## 2019-01-09 DIAGNOSIS — F32.9 MAJOR DEPRESSIVE DISORDER, SINGLE EPISODE, UNSPECIFIED: ICD-10-CM

## 2019-01-09 LAB
25(OH)D3 SERPL-MCNC: 31.8 NG/ML
ALBUMIN SERPL ELPH-MCNC: 4.4 G/DL
ALP BLD-CCNC: 64 U/L
ALT SERPL-CCNC: 12 U/L
ANION GAP SERPL CALC-SCNC: 14 MMOL/L
APPEARANCE: CLEAR
AST SERPL-CCNC: 21 U/L
BACTERIA: NEGATIVE
BASOPHILS # BLD AUTO: 0.04 K/UL
BASOPHILS NFR BLD AUTO: 0.9 %
BILIRUB SERPL-MCNC: 0.6 MG/DL
BILIRUBIN URINE: NEGATIVE
BLOOD URINE: NEGATIVE
BUN SERPL-MCNC: 27 MG/DL
CALCIUM SERPL-MCNC: 9.5 MG/DL
CALCIUM SERPL-MCNC: 9.5 MG/DL
CHLORIDE SERPL-SCNC: 105 MMOL/L
CO2 SERPL-SCNC: 26 MMOL/L
COLOR: YELLOW
CREAT SERPL-MCNC: 1.22 MG/DL
EOSINOPHIL # BLD AUTO: 0.12 K/UL
EOSINOPHIL NFR BLD AUTO: 2.8 %
GLUCOSE QUALITATIVE U: NEGATIVE MG/DL
GLUCOSE SERPL-MCNC: 95 MG/DL
HCT VFR BLD CALC: 50.7 %
HGB BLD-MCNC: 15.4 G/DL
IMM GRANULOCYTES NFR BLD AUTO: 0 %
KETONES URINE: NEGATIVE
LEUKOCYTE ESTERASE URINE: NEGATIVE
LYMPHOCYTES # BLD AUTO: 0.98 K/UL
LYMPHOCYTES NFR BLD AUTO: 23.1 %
MAGNESIUM SERPL-MCNC: 2 MG/DL
MAN DIFF?: NORMAL
MCHC RBC-ENTMCNC: 28.3 PG
MCHC RBC-ENTMCNC: 30.4 GM/DL
MCV RBC AUTO: 93.2 FL
MICROSCOPIC-UA: NORMAL
MONOCYTES # BLD AUTO: 0.39 K/UL
MONOCYTES NFR BLD AUTO: 9.2 %
NEUTROPHILS # BLD AUTO: 2.71 K/UL
NEUTROPHILS NFR BLD AUTO: 64 %
NITRITE URINE: NEGATIVE
PARATHYROID HORMONE INTACT: 85 PG/ML
PH URINE: 6
PHOSPHATE SERPL-MCNC: 3.8 MG/DL
PLATELET # BLD AUTO: 183 K/UL
POTASSIUM SERPL-SCNC: 5 MMOL/L
PROT SERPL-MCNC: 6.8 G/DL
PROTEIN URINE: NEGATIVE MG/DL
RBC # BLD: 5.44 M/UL
RBC # FLD: 16.6 %
RED BLOOD CELLS URINE: 0 /HPF
SODIUM SERPL-SCNC: 145 MMOL/L
SPECIFIC GRAVITY URINE: 1.01
SQUAMOUS EPITHELIAL CELLS: 0 /HPF
TACROLIMUS SERPL-MCNC: 5.5 NG/ML
UROBILINOGEN URINE: NEGATIVE MG/DL
WBC # FLD AUTO: 4.24 K/UL
WHITE BLOOD CELLS URINE: 0 /HPF

## 2019-01-09 PROCEDURE — 99214 OFFICE O/P EST MOD 30 MIN: CPT

## 2019-01-21 NOTE — ASSESSMENT
[FreeTextEntry1] : 58 y.o female with PCKD s/p LURT  11/7/18\par 1. Renal transplant - DSA drawn in December was negative. Pt with PRA of 49%. Renal sonograms have been normal. Check labs as ordered.  Have suggested she transition from FK to CSA to see if shaking and hair loss improve.  She will consider it.  We discussed that there may be a slight increase risk of rejection.\par 2. Immunosuppression - now on envarsus 1.5 mg daily. Target tacro 5-7, MMF 500mgs BID and pred 5. She wants to decrese prednisone but I have advised against it and instead recommended more exercise and limiting simple carbohydrates.\par 3. HTN - controlled\par 4. Prophylaxis - developed high methemoglobin levels on dapsone so stopped. she is back on bactrim and WBC and potassium are ok. \par 5. Wt gain - Discussed improved diet and exercise plan with patient.\par 6. Tachycardia - resolved.\par 7. Post transplant erythrocytosis - Will not tolerate ACE due to hyperkalemia.  Recommended increase hydration but no other treatment is necessary with hgb 15\par 8. DVT - had follow up scan  with some clot.  Was diagnosed 8/10.  complains of right sided venous congestion.  Will send to Dr. Maza for opinion on length of anticoagulation specially in setting of erythrocytosis\par 9. Hair loss - have recommended change to CSA. on biotene\par 10. Anxiety  - have recommended psychiatry consult.\par F/u in 2 months. \par

## 2019-01-21 NOTE — PHYSICAL EXAM
[General Appearance - Alert] : alert [General Appearance - In No Acute Distress] : in no acute distress [Sclera] : the sclera and conjunctiva were normal [PERRL With Normal Accommodation] : pupils were equal in size, round, and reactive to light [Oropharynx] : the oropharynx was normal [Neck Appearance] : the appearance of the neck was normal [Neck Cervical Mass (___cm)] : no neck mass was observed [Thyroid Diffuse Enlargement] : the thyroid was not enlarged [] : no respiratory distress [Respiration, Rhythm And Depth] : normal respiratory rhythm and effort [Exaggerated Use Of Accessory Muscles For Inspiration] : no accessory muscle use [Auscultation Breath Sounds / Voice Sounds] : lungs were clear to auscultation bilaterally [Heart Rate And Rhythm] : heart rate was normal and rhythm regular [Heart Sounds] : normal S1 and S2 [Murmurs] : no murmurs [Heart Sounds Pericardial Friction Rub] : no pericardial rub [Arterial Pulses Carotid] : carotid pulses were normal with no bruits [Abdomen Soft] : soft [Abdomen Tenderness] : non-tender [FreeTextEntry1] : no pain over graft.  Well healed scar [Cervical Lymph Nodes Enlarged Posterior Bilaterally] : posterior cervical [Cervical Lymph Nodes Enlarged Anterior Bilaterally] : anterior cervical [Supraclavicular Lymph Nodes Enlarged Bilaterally] : supraclavicular [Nail Clubbing] : no clubbing  or cyanosis of the fingernails [Affect] : the affect was normal [Mood] : the mood was normal

## 2019-01-21 NOTE — HISTORY OF PRESENT ILLNESS
[FreeTextEntry1] : Pt with CKD stage 5 from PCKD now s/p LURT via kidney paired exchange on 11/7/17. \par \par Patient presents with a list of issues:\par 1) Tacrolimus makes her shake and may be responsible for her hair loss.\par 2) She has post transplant erythrocytosis\par 3) She remains very anxious due to her illness and her 's\par 4) Hair loss is persistent\par 5) Swelling in right leg and vascularity since the DVT\par 6) Weight gain since transplant

## 2019-01-21 NOTE — ADDENDUM
[FreeTextEntry1] : Discussed the results with the patient and Dr. Delgado.  Patient considering change to CSA.  If agrees will start 100 2x/day and check level 3-4 days after change.

## 2019-02-11 ENCOUNTER — APPOINTMENT (OUTPATIENT)
Dept: NEPHROLOGY | Facility: CLINIC | Age: 59
End: 2019-02-11
Payer: MEDICARE

## 2019-02-11 VITALS
BODY MASS INDEX: 37.39 KG/M2 | HEIGHT: 64 IN | DIASTOLIC BLOOD PRESSURE: 78 MMHG | RESPIRATION RATE: 16 BRPM | WEIGHT: 219 LBS | SYSTOLIC BLOOD PRESSURE: 139 MMHG | TEMPERATURE: 98.2 F | HEART RATE: 79 BPM | OXYGEN SATURATION: 97 %

## 2019-02-11 LAB
25(OH)D3 SERPL-MCNC: 34.6 NG/ML
ALBUMIN SERPL ELPH-MCNC: 4.2 G/DL
ALP BLD-CCNC: 63 U/L
ALT SERPL-CCNC: 11 U/L
ANION GAP SERPL CALC-SCNC: 14 MMOL/L
APPEARANCE: CLEAR
AST SERPL-CCNC: 17 U/L
BASOPHILS # BLD AUTO: 0.03 K/UL
BASOPHILS NFR BLD AUTO: 0.6 %
BILIRUB SERPL-MCNC: 0.4 MG/DL
BILIRUBIN URINE: NEGATIVE
BLOOD URINE: NEGATIVE
BUN SERPL-MCNC: 28 MG/DL
CALCIUM SERPL-MCNC: 9.5 MG/DL
CHLORIDE SERPL-SCNC: 105 MMOL/L
CHOLEST SERPL-MCNC: 163 MG/DL
CHOLEST/HDLC SERPL: 2.3 RATIO
CO2 SERPL-SCNC: 24 MMOL/L
COLOR: YELLOW
CREAT SERPL-MCNC: 1.34 MG/DL
CREAT SPEC-SCNC: 33 MG/DL
CREAT/PROT UR: 0.1 RATIO
EOSINOPHIL # BLD AUTO: 0.13 K/UL
EOSINOPHIL NFR BLD AUTO: 2.8 %
GLUCOSE QUALITATIVE U: NEGATIVE MG/DL
GLUCOSE SERPL-MCNC: 89 MG/DL
HBA1C MFR BLD HPLC: 5.8 %
HCT VFR BLD CALC: 52.8 %
HDLC SERPL-MCNC: 72 MG/DL
HGB BLD-MCNC: 15.9 G/DL
IMM GRANULOCYTES NFR BLD AUTO: 0.2 %
KETONES URINE: NEGATIVE
LDH SERPL-CCNC: 302 U/L
LDLC SERPL CALC-MCNC: 73 MG/DL
LEUKOCYTE ESTERASE URINE: NEGATIVE
LYMPHOCYTES # BLD AUTO: 1.3 K/UL
LYMPHOCYTES NFR BLD AUTO: 27.7 %
MAGNESIUM SERPL-MCNC: 1.9 MG/DL
MAN DIFF?: NORMAL
MCHC RBC-ENTMCNC: 28.6 PG
MCHC RBC-ENTMCNC: 30.1 GM/DL
MCV RBC AUTO: 95.1 FL
MONOCYTES # BLD AUTO: 0.27 K/UL
MONOCYTES NFR BLD AUTO: 5.7 %
NEUTROPHILS # BLD AUTO: 2.96 K/UL
NEUTROPHILS NFR BLD AUTO: 63 %
NITRITE URINE: NEGATIVE
PH URINE: 6.5
PHOSPHATE SERPL-MCNC: 3.1 MG/DL
PLATELET # BLD AUTO: 179 K/UL
POTASSIUM SERPL-SCNC: 4.7 MMOL/L
PROT SERPL-MCNC: 6.8 G/DL
PROT UR-MCNC: 4 MG/DL
PROTEIN URINE: NEGATIVE MG/DL
RBC # BLD: 5.55 M/UL
RBC # FLD: 14.2 %
SODIUM SERPL-SCNC: 143 MMOL/L
SPECIFIC GRAVITY URINE: 1.01
TACROLIMUS SERPL-MCNC: 5.8 NG/ML
TRIGL SERPL-MCNC: 92 MG/DL
URATE SERPL-MCNC: 8.3 MG/DL
UROBILINOGEN URINE: NEGATIVE MG/DL
WBC # FLD AUTO: 4.7 K/UL

## 2019-02-11 PROCEDURE — 99214 OFFICE O/P EST MOD 30 MIN: CPT

## 2019-02-11 RX ORDER — ERGOCALCIFEROL 1.25 MG/1
1.25 MG CAPSULE, LIQUID FILLED ORAL
Qty: 8 | Refills: 1 | Status: DISCONTINUED | COMMUNITY
Start: 2018-08-10 | End: 2019-02-11

## 2019-02-11 RX ORDER — SODIUM BICARBONATE 650 MG/1
650 TABLET ORAL
Qty: 120 | Refills: 5 | Status: DISCONTINUED | COMMUNITY
Start: 2018-06-29 | End: 2019-02-11

## 2019-02-11 NOTE — ASSESSMENT
[FreeTextEntry1] : 58 y.o female with PCKD s/p LURT \par 1.  Renal transplant - Baseline creatinine about 1.3.  DSA drawn in December was negative.  Pt with PRA of 49%.  Renal sonograms have been normal.   \par 2.  Immunosuppression - now on envarsus 1.5mgs daily.  Target tacro 4-6, MMF 500mgs BID and pred 5.  GI system still not normal and has leukopenia therefore will continue reduced MMF for now.  Should have bone density checked as she is on chronic prednisone. \par 3.  HTN - controlled to low at home.  only taking lasix 20mgs daily. \par 4.  Prophylaxis - developed high methemoglobin levels on dapsone so stopped.  she is back on bactrim and WBC and potassium are ok.  \par 5.  Wt gain - likely from prednisone.  Discussed improved diet and exercise plan with patient.  Pt has not tried exercise.  \par 6.  Post transplant erythrocytosis - ok for now but eventually may need to see heme.  Will not tolerate ACE due to hyperkalemia and BP\par 7.  DVT - Pt will see Dr. Maza for vascular evaluation.  Pt still on xarelto\par 8.  Anxiety - pt again advised to see psychiatrist.  She is contemplating it.  \par 9.  tremor/ exhausion/ trouble concentration/ hair loss - few of these symptoms may be psychosomatic but may also be related to tacrolimus.  Discussed changing to cyclosporine but she does not want to change it yet.  \par \par F/u in  6 months.  \par she will continue to f/u with Dr. Hoff.  \par Pt to schedule appointment with Dr. Hoff.

## 2019-02-11 NOTE — PHYSICAL EXAM
[General Appearance - Alert] : alert [General Appearance - In No Acute Distress] : in no acute distress [General Appearance - Well Nourished] : well nourished [General Appearance - Well-Appearing] : healthy appearing [Sclera] : the sclera and conjunctiva were normal [Extraocular Movements] : extraocular movements were intact [Outer Ear] : the ears and nose were normal in appearance [Neck Appearance] : the appearance of the neck was normal [Neck Cervical Mass (___cm)] : no neck mass was observed [Jugular Venous Distention Increased] : there was no jugular-venous distention [Respiration, Rhythm And Depth] : normal respiratory rhythm and effort [Auscultation Breath Sounds / Voice Sounds] : lungs were clear to auscultation bilaterally [Heart Rate And Rhythm] : heart rate was normal and rhythm regular [Heart Sounds] : normal S1 and S2 [Heart Sounds Gallop] : no gallops [Full Pulse] : the pedal pulses are present [Bowel Sounds] : normal bowel sounds [Abdomen Soft] : soft [Abdomen Tenderness] : non-tender [] : no hepato-splenomegaly [Cervical Lymph Nodes Enlarged Posterior Bilaterally] : posterior cervical [Cervical Lymph Nodes Enlarged Anterior Bilaterally] : anterior cervical [Supraclavicular Lymph Nodes Enlarged Bilaterally] : supraclavicular [Abnormal Walk] : normal gait [Nail Clubbing] : no clubbing  or cyanosis of the fingernails [Musculoskeletal - Swelling] : no joint swelling seen [Skin Color & Pigmentation] : normal skin color and pigmentation [Skin Turgor] : normal skin turgor [Cranial Nerves] : cranial nerves 2-12 were intact [Oriented To Time, Place, And Person] : oriented to person, place, and time [Impaired Insight] : insight and judgment were intact [Affect] : the affect was normal [FreeTextEntry1] : + tremor

## 2019-02-11 NOTE — HISTORY OF PRESENT ILLNESS
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  \par \par Interval events:\par Pt still with trouble concentration, feeling of exhaustion, occasional bone pain.\par Hair loss has stabilized.\par Also with occasional dyspnea.\par Has a slight URI which is improving.

## 2019-02-12 LAB — BKV DNA SPEC QL NAA+PROBE: NOT DETECTED COPIES/ML

## 2019-02-13 LAB
CMV DNA SPEC QL NAA+PROBE: NOT DETECTED IU/ML
CMVPCR LOG: NOT DETECTED LOGIU/ML

## 2019-02-15 ENCOUNTER — RX RENEWAL (OUTPATIENT)
Age: 59
End: 2019-02-15

## 2019-02-16 ENCOUNTER — OTHER (OUTPATIENT)
Age: 59
End: 2019-02-16

## 2019-02-26 ENCOUNTER — APPOINTMENT (OUTPATIENT)
Dept: OBGYN | Facility: CLINIC | Age: 59
End: 2019-02-26
Payer: MEDICARE

## 2019-02-26 VITALS
HEIGHT: 64 IN | WEIGHT: 220 LBS | BODY MASS INDEX: 37.56 KG/M2 | SYSTOLIC BLOOD PRESSURE: 134 MMHG | DIASTOLIC BLOOD PRESSURE: 84 MMHG

## 2019-02-26 PROCEDURE — G0101: CPT

## 2019-02-28 ENCOUNTER — TRANSCRIPTION ENCOUNTER (OUTPATIENT)
Age: 59
End: 2019-02-28

## 2019-02-28 LAB — HPV HIGH+LOW RISK DNA PNL CVX: NOT DETECTED

## 2019-03-04 LAB — CYTOLOGY CVX/VAG DOC THIN PREP: NORMAL

## 2019-03-07 ENCOUNTER — FORM ENCOUNTER (OUTPATIENT)
Age: 59
End: 2019-03-07

## 2019-03-07 ENCOUNTER — OTHER (OUTPATIENT)
Age: 59
End: 2019-03-07

## 2019-03-08 ENCOUNTER — APPOINTMENT (OUTPATIENT)
Dept: RADIOLOGY | Facility: CLINIC | Age: 59
End: 2019-03-08
Payer: MEDICARE

## 2019-03-08 ENCOUNTER — OUTPATIENT (OUTPATIENT)
Dept: OUTPATIENT SERVICES | Facility: HOSPITAL | Age: 59
LOS: 1 days | End: 2019-03-08
Payer: MEDICARE

## 2019-03-08 DIAGNOSIS — Z98.891 HISTORY OF UTERINE SCAR FROM PREVIOUS SURGERY: Chronic | ICD-10-CM

## 2019-03-08 DIAGNOSIS — Z94.0 KIDNEY TRANSPLANT STATUS: Chronic | ICD-10-CM

## 2019-03-08 DIAGNOSIS — Z01.419 ENCOUNTER FOR GYNECOLOGICAL EXAMINATION (GENERAL) (ROUTINE) WITHOUT ABNORMAL FINDINGS: ICD-10-CM

## 2019-03-08 PROCEDURE — 77080 DXA BONE DENSITY AXIAL: CPT

## 2019-03-08 PROCEDURE — 77080 DXA BONE DENSITY AXIAL: CPT | Mod: 26

## 2019-03-11 ENCOUNTER — TRANSCRIPTION ENCOUNTER (OUTPATIENT)
Age: 59
End: 2019-03-11

## 2019-03-12 ENCOUNTER — APPOINTMENT (OUTPATIENT)
Dept: CARDIOLOGY | Facility: CLINIC | Age: 59
End: 2019-03-12
Payer: MEDICARE

## 2019-03-12 VITALS
DIASTOLIC BLOOD PRESSURE: 85 MMHG | BODY MASS INDEX: 37.76 KG/M2 | OXYGEN SATURATION: 96 % | WEIGHT: 220 LBS | HEART RATE: 98 BPM | SYSTOLIC BLOOD PRESSURE: 164 MMHG

## 2019-03-12 PROCEDURE — 99204 OFFICE O/P NEW MOD 45 MIN: CPT

## 2019-03-13 ENCOUNTER — APPOINTMENT (OUTPATIENT)
Dept: NEPHROLOGY | Facility: CLINIC | Age: 59
End: 2019-03-13
Payer: MEDICARE

## 2019-03-13 VITALS
WEIGHT: 217.19 LBS | HEIGHT: 64 IN | OXYGEN SATURATION: 98 % | BODY MASS INDEX: 37.08 KG/M2 | HEART RATE: 82 BPM | DIASTOLIC BLOOD PRESSURE: 85 MMHG | SYSTOLIC BLOOD PRESSURE: 128 MMHG

## 2019-03-13 DIAGNOSIS — E78.5 HYPERLIPIDEMIA, UNSPECIFIED: ICD-10-CM

## 2019-03-13 DIAGNOSIS — F17.200 NICOTINE DEPENDENCE, UNSPECIFIED, UNCOMPLICATED: ICD-10-CM

## 2019-03-13 DIAGNOSIS — Z56.0 UNEMPLOYMENT, UNSPECIFIED: ICD-10-CM

## 2019-03-13 LAB
AT III PPP CHRO-ACNC: 176 %
BASOPHILS # BLD AUTO: 0.04 K/UL
BASOPHILS NFR BLD AUTO: 0.6 %
CONFIRM: 68.7 SEC
DEPRECATED D DIMER PPP IA-ACNC: 150 NG/ML DDU
DRVVT 1H NP PPP: 72 SEC
DRVVT IMM 1:2 NP PPP: NORMAL
DRVVT SCREEN TO CONFIRM RATIO: 1.03 RATIO
EOSINOPHIL # BLD AUTO: 0.06 K/UL
EOSINOPHIL NFR BLD AUTO: 0.9 %
FACT VIII ACT/NOR PPP: 112 %
FIBRINOGEN PPP COAG.DERIVED-MCNC: 412 MG/DL
HCT VFR BLD CALC: 53.5 %
HCYS SERPL-MCNC: 14.7 UMOL/L
HGB BLD-MCNC: 16.4 G/DL
IMM GRANULOCYTES NFR BLD AUTO: 0.3 %
INR PPP: 2.74 RATIO
LYMPHOCYTES # BLD AUTO: 0.65 K/UL
LYMPHOCYTES NFR BLD AUTO: 9.3 %
MAN DIFF?: NORMAL
MCHC RBC-ENTMCNC: 29 PG
MCHC RBC-ENTMCNC: 30.7 GM/DL
MCV RBC AUTO: 94.7 FL
MONOCYTES # BLD AUTO: 0.44 K/UL
MONOCYTES NFR BLD AUTO: 6.3 %
NEUTROPHILS # BLD AUTO: 5.8 K/UL
NEUTROPHILS NFR BLD AUTO: 82.6 %
PLATELET # BLD AUTO: 181 K/UL
PROT C PPP CHRO-ACNC: 101 %
PROT S AG ACT/NOR PPP IA: 97 %
PT BLD: 32.5 SEC
RBC # BLD: 5.65 M/UL
RBC # FLD: 13.3 %
SCREEN DRVVT: 85.5 SEC
SILICA CLOTTING TIME INTERPRETATION: NORMAL
SILICA CLOTTING TIME S/C: 0.73 RATIO
WBC # FLD AUTO: 7.01 K/UL

## 2019-03-13 PROCEDURE — 99214 OFFICE O/P EST MOD 30 MIN: CPT

## 2019-03-13 SDOH — ECONOMIC STABILITY - INCOME SECURITY: UNEMPLOYMENT, UNSPECIFIED: Z56.0

## 2019-03-13 NOTE — ASSESSMENT
[FreeTextEntry1] : Assessment:\par 1.  R LE DVT\par - unclear if provoked, however symptoms started post operatively\par - diagnosed several months afterwards\par - on Xaerlto\par 2.  renal Transplant\par 3.  PCKD\par 4.  Venous insufficiency / Varicose veins\par \par \par Plan\par 1.  Continue Xarelto for now\par 2.  Need to risk stratify patient for risk of recurrence:\par - venous duplex\par - thrombophilia panel\par - d-dimer and factor VIII\par 3.  Depending on testing, will decide on extended therapy with Xarelto 10mg daily ([facility] EXT trial) vs. aspirin 81mg daily alone\par 4.  Return in 1 month to discuss results and decide on a/c.

## 2019-03-13 NOTE — REASON FOR VISIT
[Initial Evaluation] : an initial evaluation of [FreeTextEntry2] : DVT [FreeTextEntry1] : 58 renal transplant Nov 2017 for poly cystic kidney disease, never needed HD.  Her nephew donated.  Her native kidneys remain in.  In April after kidney transplant noted that her right leg was more swollen than the left leg.  Worsening discoloration. \par \par  In August 2018 went for a doppler was found to have a DVT.  August 2018:  Acute DVT R CFV, Femora, Pop and calf veins  Went to ER.  Was started on Xarelto.  She remains on Xarelto 20 mg daily.  Repeat ultrasound shows residual DVT.   November 2018:  Chronic DVT of r popliteal veins. \par \par Has not had thrombophilia testing.  \par No prior VTE\par Father had history of phlebitis.\par She does not wear compression stocking\par She is not scheduled for another ultrasound of her legs. \par \par August 2018:  Acute DVT R CFV, Femora, Pop and calf veins\par November 2018:  Chronic DVT of r popliteal veins. \par \par Complains of some noticeable hyperpigmentation of the ankles, reticular veins.  \par \par Medications:\par Tacrlolimus\par Prograf\par Cellcept\par valcyte\par Nystatin\par Bactrim\par Biotin\par Pepcid\par Mrednisone 5mg daily\par Lasix 20mg daily\par Lipitor 20m\par Xarelto 20\par Vit D\par \par \par \par \par

## 2019-03-13 NOTE — PHYSICAL EXAM
[General Appearance - Well Developed] : well developed [Normal Appearance] : normal appearance [Well Groomed] : well groomed [General Appearance - Well Nourished] : well nourished [No Deformities] : no deformities [General Appearance - In No Acute Distress] : no acute distress [Normal Conjunctiva] : the conjunctiva exhibited no abnormalities [Eyelids - No Xanthelasma] : the eyelids demonstrated no xanthelasmas [Normal Oral Mucosa] : normal oral mucosa [No Oral Pallor] : no oral pallor [No Oral Cyanosis] : no oral cyanosis [Normal Jugular Venous A Waves Present] : normal jugular venous A waves present [Normal Jugular Venous V Waves Present] : normal jugular venous V waves present [No Jugular Venous Pierce A Waves] : no jugular venous pierce A waves [Heart Rate And Rhythm] : heart rate and rhythm were normal [Heart Sounds] : normal S1 and S2 [Murmurs] : no murmurs present [Respiration, Rhythm And Depth] : normal respiratory rhythm and effort [Exaggerated Use Of Accessory Muscles For Inspiration] : no accessory muscle use [Auscultation Breath Sounds / Voice Sounds] : lungs were clear to auscultation bilaterally [Abdomen Soft] : soft [Abdomen Tenderness] : non-tender [Abdomen Mass (___ Cm)] : no abdominal mass palpated [Abnormal Walk] : normal gait [Gait - Sufficient For Exercise Testing] : the gait was sufficient for exercise testing [Nail Clubbing] : no clubbing of the fingernails [Cyanosis, Localized] : no localized cyanosis [Petechial Hemorrhages (___cm)] : no petechial hemorrhages [Skin Color & Pigmentation] : normal skin color and pigmentation [] : no rash [No Venous Stasis] : no venous stasis [Skin Lesions] : no skin lesions [No Skin Ulcers] : no skin ulcer [No Xanthoma] : no  xanthoma was observed [FreeTextEntry1] : hyperpigemnation.  VV.  Reticular veins.

## 2019-03-15 LAB
APO LP(A) SERPL-MCNC: 72 NMOL/L
B2 GLYCOPROT1 IGA SERPL IA-ACNC: <5 SAU
B2 GLYCOPROT1 IGG SER-ACNC: <5 SGU
B2 GLYCOPROT1 IGM SER-ACNC: <5 SMU
CARDIOLIPIN AB SER IA-ACNC: NEGATIVE
CARDIOLIPIN IGM SER-MCNC: 9.2 MPL
CARDIOLIPIN IGM SER-MCNC: <5 GPL
PLASM INHIB ACT/NOR PPP CHRO: 125 %
TPA AG PPP IA-MCNC: 14 IU/ML

## 2019-03-17 PROBLEM — Z56.0 NOT CURRENTLY WORKING DUE TO DISABLED STATUS: Status: ACTIVE | Noted: 2019-03-17

## 2019-03-17 LAB
25(OH)D3 SERPL-MCNC: 29.8 NG/ML
ALBUMIN SERPL ELPH-MCNC: 4.4 G/DL
ALP BLD-CCNC: 68 U/L
ALT SERPL-CCNC: 12 U/L
ANION GAP SERPL CALC-SCNC: 13 MMOL/L
APPEARANCE: CLEAR
AST SERPL-CCNC: 18 U/L
BACTERIA: NEGATIVE
BASOPHILS # BLD AUTO: 0.03 K/UL
BASOPHILS NFR BLD AUTO: 0.7 %
BILIRUB SERPL-MCNC: 0.5 MG/DL
BILIRUBIN URINE: NEGATIVE
BLOOD URINE: NEGATIVE
BUN SERPL-MCNC: 21 MG/DL
CALCIUM SERPL-MCNC: 9.6 MG/DL
CALCIUM SERPL-MCNC: 9.6 MG/DL
CHLORIDE SERPL-SCNC: 104 MMOL/L
CO2 SERPL-SCNC: 25 MMOL/L
COLOR: NORMAL
CREAT SERPL-MCNC: 1.3 MG/DL
EOSINOPHIL # BLD AUTO: 0.06 K/UL
EOSINOPHIL NFR BLD AUTO: 1.5 %
GLUCOSE QUALITATIVE U: NEGATIVE
GLUCOSE SERPL-MCNC: 97 MG/DL
HCT VFR BLD CALC: 50.6 %
HGB BLD-MCNC: 15.8 G/DL
HYALINE CASTS: 0 /LPF
IMM GRANULOCYTES NFR BLD AUTO: 0.2 %
KETONES URINE: NEGATIVE
LEUKOCYTE ESTERASE URINE: NEGATIVE
LYMPHOCYTES # BLD AUTO: 0.89 K/UL
LYMPHOCYTES NFR BLD AUTO: 21.7 %
MAGNESIUM SERPL-MCNC: 1.7 MG/DL
MAN DIFF?: NORMAL
MCHC RBC-ENTMCNC: 29.4 PG
MCHC RBC-ENTMCNC: 31.2 GM/DL
MCV RBC AUTO: 94.2 FL
MICROSCOPIC-UA: NORMAL
MONOCYTES # BLD AUTO: 0.36 K/UL
MONOCYTES NFR BLD AUTO: 8.8 %
NEUTROPHILS # BLD AUTO: 2.76 K/UL
NEUTROPHILS NFR BLD AUTO: 67.1 %
NITRITE URINE: NEGATIVE
PARATHYROID HORMONE INTACT: 74 PG/ML
PH URINE: 6
PHOSPHATE SERPL-MCNC: 3.7 MG/DL
PLATELET # BLD AUTO: 171 K/UL
POTASSIUM SERPL-SCNC: 5.1 MMOL/L
PROT SERPL-MCNC: 6.4 G/DL
PROTEIN URINE: NEGATIVE
RBC # BLD: 5.37 M/UL
RBC # FLD: 13.4 %
RED BLOOD CELLS URINE: 0 /HPF
SODIUM SERPL-SCNC: 142 MMOL/L
SPECIFIC GRAVITY URINE: 1.01
SQUAMOUS EPITHELIAL CELLS: 4 /HPF
TACROLIMUS SERPL-MCNC: 7.1 NG/ML
UROBILINOGEN URINE: NORMAL
WBC # FLD AUTO: 4.11 K/UL
WHITE BLOOD CELLS URINE: 2 /HPF

## 2019-03-18 ENCOUNTER — FORM ENCOUNTER (OUTPATIENT)
Age: 59
End: 2019-03-18

## 2019-03-18 LAB
APCR PPP: 3.66 RATIO
DNA PLOIDY SPEC FC-IMP: NORMAL
FVL BLANK: 46.8
FVL TEST: 171.1
JAK2 GENE MUT ANL BLD/T: NORMAL
PROT S FREE PPP-ACNC: 164 % NORMAL
PTR INTERP: NORMAL

## 2019-03-19 ENCOUNTER — APPOINTMENT (OUTPATIENT)
Dept: MAMMOGRAPHY | Facility: CLINIC | Age: 59
End: 2019-03-19
Payer: MEDICARE

## 2019-03-19 ENCOUNTER — OUTPATIENT (OUTPATIENT)
Dept: OUTPATIENT SERVICES | Facility: HOSPITAL | Age: 59
LOS: 1 days | End: 2019-03-19
Payer: MEDICARE

## 2019-03-19 DIAGNOSIS — Z00.8 ENCOUNTER FOR OTHER GENERAL EXAMINATION: ICD-10-CM

## 2019-03-19 DIAGNOSIS — Z98.891 HISTORY OF UTERINE SCAR FROM PREVIOUS SURGERY: Chronic | ICD-10-CM

## 2019-03-19 DIAGNOSIS — Z94.0 KIDNEY TRANSPLANT STATUS: Chronic | ICD-10-CM

## 2019-03-19 PROCEDURE — 77063 BREAST TOMOSYNTHESIS BI: CPT

## 2019-03-19 PROCEDURE — 77067 SCR MAMMO BI INCL CAD: CPT | Mod: 26

## 2019-03-19 PROCEDURE — 77063 BREAST TOMOSYNTHESIS BI: CPT | Mod: 26

## 2019-03-19 PROCEDURE — 77067 SCR MAMMO BI INCL CAD: CPT

## 2019-03-20 ENCOUNTER — TRANSCRIPTION ENCOUNTER (OUTPATIENT)
Age: 59
End: 2019-03-20

## 2019-03-24 ENCOUNTER — FORM ENCOUNTER (OUTPATIENT)
Age: 59
End: 2019-03-24

## 2019-03-25 ENCOUNTER — APPOINTMENT (OUTPATIENT)
Dept: RADIOLOGY | Facility: CLINIC | Age: 59
End: 2019-03-25
Payer: MEDICARE

## 2019-03-25 ENCOUNTER — OUTPATIENT (OUTPATIENT)
Dept: OUTPATIENT SERVICES | Facility: HOSPITAL | Age: 59
LOS: 1 days | End: 2019-03-25
Payer: MEDICARE

## 2019-03-25 ENCOUNTER — APPOINTMENT (OUTPATIENT)
Dept: ULTRASOUND IMAGING | Facility: CLINIC | Age: 59
End: 2019-03-25
Payer: MEDICARE

## 2019-03-25 DIAGNOSIS — Z00.8 ENCOUNTER FOR OTHER GENERAL EXAMINATION: ICD-10-CM

## 2019-03-25 DIAGNOSIS — Z98.891 HISTORY OF UTERINE SCAR FROM PREVIOUS SURGERY: Chronic | ICD-10-CM

## 2019-03-25 DIAGNOSIS — Z94.0 KIDNEY TRANSPLANT STATUS: Chronic | ICD-10-CM

## 2019-03-25 DIAGNOSIS — I82.411 ACUTE EMBOLISM AND THROMBOSIS OF RIGHT FEMORAL VEIN: ICD-10-CM

## 2019-03-25 PROCEDURE — 73522 X-RAY EXAM HIPS BI 3-4 VIEWS: CPT | Mod: 26

## 2019-03-25 PROCEDURE — 73560 X-RAY EXAM OF KNEE 1 OR 2: CPT | Mod: 26,50

## 2019-03-25 PROCEDURE — 73522 X-RAY EXAM HIPS BI 3-4 VIEWS: CPT

## 2019-03-25 PROCEDURE — 93970 EXTREMITY STUDY: CPT | Mod: 26

## 2019-03-25 PROCEDURE — 93970 EXTREMITY STUDY: CPT

## 2019-03-25 PROCEDURE — 73560 X-RAY EXAM OF KNEE 1 OR 2: CPT

## 2019-03-28 LAB — PAI1 AG PPP IA-ACNC: 58.5 NG/ML

## 2019-04-16 ENCOUNTER — APPOINTMENT (OUTPATIENT)
Dept: CARDIOLOGY | Facility: CLINIC | Age: 59
End: 2019-04-16
Payer: MEDICARE

## 2019-04-16 VITALS
WEIGHT: 217 LBS | BODY MASS INDEX: 37.25 KG/M2 | HEART RATE: 74 BPM | DIASTOLIC BLOOD PRESSURE: 82 MMHG | SYSTOLIC BLOOD PRESSURE: 140 MMHG | OXYGEN SATURATION: 97 %

## 2019-04-16 PROCEDURE — 99215 OFFICE O/P EST HI 40 MIN: CPT

## 2019-04-16 NOTE — PHYSICAL EXAM
[General Appearance - Well Developed] : well developed [Normal Appearance] : normal appearance [Well Groomed] : well groomed [General Appearance - Well Nourished] : well nourished [No Deformities] : no deformities [General Appearance - In No Acute Distress] : no acute distress [Eyelids - No Xanthelasma] : the eyelids demonstrated no xanthelasmas [Normal Conjunctiva] : the conjunctiva exhibited no abnormalities [Normal Oral Mucosa] : normal oral mucosa [No Oral Pallor] : no oral pallor [No Oral Cyanosis] : no oral cyanosis [Normal Jugular Venous A Waves Present] : normal jugular venous A waves present [Normal Jugular Venous V Waves Present] : normal jugular venous V waves present [No Jugular Venous Pierce A Waves] : no jugular venous pierce A waves [Respiration, Rhythm And Depth] : normal respiratory rhythm and effort [Exaggerated Use Of Accessory Muscles For Inspiration] : no accessory muscle use [Auscultation Breath Sounds / Voice Sounds] : lungs were clear to auscultation bilaterally [Heart Rate And Rhythm] : heart rate and rhythm were normal [Heart Sounds] : normal S1 and S2 [Murmurs] : no murmurs present [Abdomen Soft] : soft [Abdomen Tenderness] : non-tender [Abdomen Mass (___ Cm)] : no abdominal mass palpated [Abnormal Walk] : normal gait [Gait - Sufficient For Exercise Testing] : the gait was sufficient for exercise testing [Nail Clubbing] : no clubbing of the fingernails [Cyanosis, Localized] : no localized cyanosis [Petechial Hemorrhages (___cm)] : no petechial hemorrhages [Skin Color & Pigmentation] : normal skin color and pigmentation [] : no rash [No Venous Stasis] : no venous stasis [Skin Lesions] : no skin lesions [No Skin Ulcers] : no skin ulcer [No Xanthoma] : no  xanthoma was observed [FreeTextEntry1] : hyperpigemnation.  VV.  Reticular veins.

## 2019-04-16 NOTE — REASON FOR VISIT
[Follow-Up - Clinic] : a clinic follow-up of [FreeTextEntry2] : DVT [FreeTextEntry1] : 58 renal transplant Nov 2017 for poly cystic kidney disease, never needed HD.  Her nephew donated.  Her native kidneys remain in.  In April after kidney transplant noted that her right leg was more swollen than the left leg.  Worsening discoloration. \par \par  In August 2018 went for a doppler was found to have a DVT.  August 2018:  Acute DVT R CFV, Femora, Pop and calf veins  Went to ER.  Was started on Xarelto.  She remains on Xarelto 20 mg daily.  Repeat ultrasound shows residual DVT.   November 2018:  Chronic DVT of r popliteal veins. \par \par Has not had thrombophilia testing.  \par No prior VTE\par Father had history of phlebitis.\par She does not wear compression stocking\par She is not scheduled for another ultrasound of her legs. \par \par August 2018:  Acute DVT R CFV, Femora, Pop and calf veins\par November 2018:  Chronic DVT of r popliteal veins. \par \par Complains of some noticeable hyperpigmentation of the ankles, reticular veins.  \par \par Medications:\par Tacrlolimus\par Prograf\par Cellcept\par valcyte\par Nystatin\par Bactrim\par Biotin\par Pepcid\par Mrednisone 5mg daily\par Lasix 20mg daily\par Lipitor 20m\par Xarelto 20\par Vit D\par \par \par \par \par

## 2019-04-16 NOTE — HISTORY OF PRESENT ILLNESS
[FreeTextEntry1] : 4/16/2019\par US with persistent pop DVt\par No issues\par no cp or sob\par mild bruising \par VALERIA 1 abnormal\par otherwise thrombophilia panel normal.  \par Not wearing compression stockings.

## 2019-04-16 NOTE — ASSESSMENT
[FreeTextEntry1] : Assessment:\par 1.  R LE DVT\par - unclear if provoked, however symptoms started post operatively\par - diagnosed several months afterwards\par - on Xaerlto\par 2.  renal Transplant\par 3.  PCKD\par 4.  Venous insufficiency / Varicose veins\par \par \par Plan\par 1.  Stop Xarelto 20mg daily \par 2.  Residual DVT, VALERIA 1 +, overweight put her at risk of recurrence of VTE.  Favor extended therapy with half dose DOAC.  Start Xarelto 10mg daily for extended therapy - [facility] CHOICE trial\par 3.  Return in 3 months.\par \par Thanks\par \par Adalid Maza

## 2019-05-06 ENCOUNTER — MEDICATION RENEWAL (OUTPATIENT)
Age: 59
End: 2019-05-06

## 2019-05-06 NOTE — HISTORY OF PRESENT ILLNESS
[FreeTextEntry1] : Pt with CKD stage 5 from PCKD now s/p LURT via kidney paired exchange on 11/7/17. \par Current issues are overall improving:\par 1) Tacrolimus makes her shake and may be responsible for her hair loss. - Shaking has improved a lot on once a day preparation.  Hair loss has stabilized.\par 2) She has post transplant erythrocytosis - which we will check today.\par 3) She remains anxious due to her 's illness but is less anxious about her health.\par 4) Swelling in right leg and vascularity since the DVT - saw sandip and will decide on how long to rx with xaralto.  And will have a f/u doppler.\par 6) Weight gain since transplant - better with eating.  Not yet exercising.\par \par new issues include.\par 7) Wants xrays of hips and knees because they hurt but at this point she doesn't want PT\par 8) consider starting ACE. - speak with Dr. smith\par 9) She is no longer smoking or working and want the EMR adjusted. \par \par \par

## 2019-05-06 NOTE — REVIEW OF SYSTEMS
[Feeling Tired] : feeling tired [As Noted in HPI] : as noted in HPI [Difficulty Walking] : difficulty walking [Easy Bleeding] : a tendency for easy bleeding [Negative] : Eyes [Chills] : no chills [Fever] : no fever [Sore Throat] : no sore throat [Nosebleeds] : no nosebleeds [Hoarseness] : no hoarseness [Chest Pain] : no chest pain [Palpitations] : no palpitations [Shortness Of Breath] : no shortness of breath [Cough] : no cough [Abdominal Pain] : no abdominal pain [Constipation] : no constipation [Diarrhea] : no diarrhea [Dysuria] : no dysuria [de-identified] : due to hip and knee pain

## 2019-05-06 NOTE — PHYSICAL EXAM
[General Appearance - Alert] : alert [General Appearance - In No Acute Distress] : in no acute distress [Sclera] : the sclera and conjunctiva were normal [PERRL With Normal Accommodation] : pupils were equal in size, round, and reactive to light [Oropharynx] : the oropharynx was normal [Neck Appearance] : the appearance of the neck was normal [Neck Cervical Mass (___cm)] : no neck mass was observed [Thyroid Diffuse Enlargement] : the thyroid was not enlarged [] : no respiratory distress [Respiration, Rhythm And Depth] : normal respiratory rhythm and effort [Exaggerated Use Of Accessory Muscles For Inspiration] : no accessory muscle use [Auscultation Breath Sounds / Voice Sounds] : lungs were clear to auscultation bilaterally [Heart Rate And Rhythm] : heart rate was normal and rhythm regular [Heart Sounds] : normal S1 and S2 [Murmurs] : no murmurs [Heart Sounds Pericardial Friction Rub] : no pericardial rub [Arterial Pulses Carotid] : carotid pulses were normal with no bruits [Abdomen Soft] : soft [Abdomen Tenderness] : non-tender [Cervical Lymph Nodes Enlarged Posterior Bilaterally] : posterior cervical [Cervical Lymph Nodes Enlarged Anterior Bilaterally] : anterior cervical [Supraclavicular Lymph Nodes Enlarged Bilaterally] : supraclavicular [Nail Clubbing] : no clubbing  or cyanosis of the fingernails [Affect] : the affect was normal [Mood] : the mood was normal [FreeTextEntry1] : no pain over graft.  Well healed scar RLQ

## 2019-05-06 NOTE — ASSESSMENT
[FreeTextEntry1] : 58 y.o female with PCKD s/p LURT 11/7/17\par 1. Renal transplant - DSA drawn in December was negative. Pt with PRA of 49%. Renal sonograms have been normal. Have previously suggested she transition from FK to CSA to see if shaking and hair loss improve but it seems as if they have improved with time.  Labs from last week stable.\par 2. Immunosuppression - now on envarsus 1.5 mg daily. Target tacro 5-7, MMF 500mgs BID and pred 5. FK level optimal.  She wants to decrease prednisone but I have advised against it and instead recommended more exercise and limiting simple carbohydrates.\par 3. HTN - controlled usually but has been a bit high on recent readings.  Would like to start an ACE to help erythrocytosis if K can be controlled on lasix.\par 4. Prophylaxis - developed high methemoglobin levels on dapsone so stopped. she is back on bactrim and WBC and potassium are ok. \par 5. Wt gain - Discussed improved diet and exercise plan with patient.\par 6. Tachycardia - resolved.\par 7. Post transplant erythrocytosis - Did not tolerate ACE due to hyperkalemia but will consider trying again as a bit further out from transplant.  Phlebotomy not necessary with hgb 15 unless it is felt to be contributing to clot.\par 8. DVT - had follow up scan with some clot. Was diagnosed 8/10. complains of right sided venous congestion being seen by Dr. Maza for opinion on length of anticoagulation specially in setting of erythrocytosis\par 9. Hair loss - Improving on biotene\par 10. Anxiety - have recommended psychiatry consult but she has not yet felt it to be necessary.\par F/u in 2 months.

## 2019-05-06 NOTE — ADDENDUM
[FreeTextEntry1] : 5/6/19\par Spoke to patient.\par Will start enalapril 2.5mg/day one week prior to next apt.\par Discussed risks of hyperkalemia

## 2019-05-23 ENCOUNTER — OTHER (OUTPATIENT)
Age: 59
End: 2019-05-23

## 2019-05-28 ENCOUNTER — APPOINTMENT (OUTPATIENT)
Dept: NEPHROLOGY | Facility: CLINIC | Age: 59
End: 2019-05-28
Payer: MEDICARE

## 2019-05-28 VITALS
SYSTOLIC BLOOD PRESSURE: 122 MMHG | WEIGHT: 213 LBS | HEIGHT: 64 IN | HEART RATE: 82 BPM | DIASTOLIC BLOOD PRESSURE: 78 MMHG | BODY MASS INDEX: 36.37 KG/M2 | OXYGEN SATURATION: 98 %

## 2019-05-28 LAB
25(OH)D3 SERPL-MCNC: 24 NG/ML
ALBUMIN SERPL ELPH-MCNC: 4.2 G/DL
ALP BLD-CCNC: 64 U/L
ALT SERPL-CCNC: 13 U/L
ANION GAP SERPL CALC-SCNC: 13 MMOL/L
APPEARANCE: CLEAR
AST SERPL-CCNC: 15 U/L
BACTERIA: NEGATIVE
BASOPHILS # BLD AUTO: 0.03 K/UL
BASOPHILS NFR BLD AUTO: 0.7 %
BILIRUB SERPL-MCNC: 0.7 MG/DL
BILIRUBIN URINE: NEGATIVE
BKV DNA SPEC QL NAA+PROBE: NOT DETECTED COPIES/ML
BLOOD URINE: NEGATIVE
BUN SERPL-MCNC: 26 MG/DL
CALCIUM SERPL-MCNC: 9.8 MG/DL
CALCIUM SERPL-MCNC: 9.8 MG/DL
CHLORIDE SERPL-SCNC: 105 MMOL/L
CMV DNA SPEC QL NAA+PROBE: NOT DETECTED
CO2 SERPL-SCNC: 25 MMOL/L
COLOR: NORMAL
CREAT SERPL-MCNC: 1.41 MG/DL
EOSINOPHIL # BLD AUTO: 0.13 K/UL
EOSINOPHIL NFR BLD AUTO: 2.9 %
ESTIMATED AVERAGE GLUCOSE: 114 MG/DL
GLUCOSE QUALITATIVE U: NEGATIVE
GLUCOSE SERPL-MCNC: 97 MG/DL
HBA1C MFR BLD HPLC: 5.6 %
HCT VFR BLD CALC: 50.5 %
HGB BLD-MCNC: 15.5 G/DL
HYALINE CASTS: 0 /LPF
IMM GRANULOCYTES NFR BLD AUTO: 0 %
KETONES URINE: NEGATIVE
LDH SERPL-CCNC: 223 U/L
LEUKOCYTE ESTERASE URINE: NEGATIVE
LYMPHOCYTES # BLD AUTO: 1.1 K/UL
LYMPHOCYTES NFR BLD AUTO: 24.2 %
MAGNESIUM SERPL-MCNC: 2 MG/DL
MAN DIFF?: NORMAL
MCHC RBC-ENTMCNC: 29.4 PG
MCHC RBC-ENTMCNC: 30.7 GM/DL
MCV RBC AUTO: 95.8 FL
MICROSCOPIC-UA: NORMAL
MONOCYTES # BLD AUTO: 0.44 K/UL
MONOCYTES NFR BLD AUTO: 9.7 %
NEUTROPHILS # BLD AUTO: 2.84 K/UL
NEUTROPHILS NFR BLD AUTO: 62.5 %
NITRITE URINE: NEGATIVE
PARATHYROID HORMONE INTACT: 103 PG/ML
PH URINE: 6
PHOSPHATE SERPL-MCNC: 4 MG/DL
PLATELET # BLD AUTO: 188 K/UL
POTASSIUM SERPL-SCNC: 5.5 MMOL/L
PROT SERPL-MCNC: 6.5 G/DL
PROTEIN URINE: NEGATIVE
RBC # BLD: 5.27 M/UL
RBC # FLD: 13.4 %
RED BLOOD CELLS URINE: 0 /HPF
SODIUM SERPL-SCNC: 143 MMOL/L
SPECIFIC GRAVITY URINE: 1.01
SQUAMOUS EPITHELIAL CELLS: 1 /HPF
TACROLIMUS SERPL-MCNC: 5.7 NG/ML
URATE SERPL-MCNC: 7.6 MG/DL
UROBILINOGEN URINE: NORMAL
WBC # FLD AUTO: 4.54 K/UL
WHITE BLOOD CELLS URINE: 1 /HPF

## 2019-05-28 PROCEDURE — 99214 OFFICE O/P EST MOD 30 MIN: CPT

## 2019-06-02 NOTE — ASSESSMENT
[FreeTextEntry1] : 58 y.o female with PCKD s/p LURT 11/7/17\par 1. Renal transplant - DSA drawn in December was negative. Pt with PRA of 49%. Renal sonograms have been normal. Have previously suggested she transition from FK to CSA to see if shaking and hair loss improve but it seems as if they have improved with time. Labs from last week stable.\par 2. Immunosuppression - now on envarsus 1.5 mg daily. Target tacro 5-7, MMF 500mgs BID and pred 5. FK level optimal. \par 3. HTN - controlled.\par 4. Prophylaxis - developed high methemoglobin levels on dapsone so stopped she is now back on bactrim.\par 5. Wt gain - Discussed improved diet and exercise plan with patient.  Can't exercise due to leg/hip pain.  Have recommended she be evaluated by Dr. Stevens.\par 6. Tachycardia - resolved.\par 7. Post transplant erythrocytosis - On ACE and hgb improved.  K high but eating a lot of avocado, bananas potatoes which she will stop.\par 8. DVT - had follow up scan with some clot. Was diagnosed 8/10. complains of right sided venous congestion being seen by Dr. Maza who recommends continued anticoagulation.\par 9. Hair loss - Improving on biotene\par 10. Anxiety - have recommended psychiatry consult which she agrees to.\par 11.  Rtc - F/u with repeat labs in 3 weeks and to see Dr. Delgado in 6 weeks.

## 2019-06-02 NOTE — PHYSICAL EXAM
[General Appearance - Alert] : alert [General Appearance - In No Acute Distress] : in no acute distress [Sclera] : the sclera and conjunctiva were normal [PERRL With Normal Accommodation] : pupils were equal in size, round, and reactive to light [Oropharynx] : the oropharynx was normal [Neck Appearance] : the appearance of the neck was normal [Neck Cervical Mass (___cm)] : no neck mass was observed [Thyroid Diffuse Enlargement] : the thyroid was not enlarged [Respiration, Rhythm And Depth] : normal respiratory rhythm and effort [] : no respiratory distress [Exaggerated Use Of Accessory Muscles For Inspiration] : no accessory muscle use [Heart Rate And Rhythm] : heart rate was normal and rhythm regular [Auscultation Breath Sounds / Voice Sounds] : lungs were clear to auscultation bilaterally [Heart Sounds] : normal S1 and S2 [Murmurs] : no murmurs [Heart Sounds Pericardial Friction Rub] : no pericardial rub [Arterial Pulses Carotid] : carotid pulses were normal with no bruits [Abdomen Tenderness] : non-tender [Abdomen Soft] : soft [Cervical Lymph Nodes Enlarged Posterior Bilaterally] : posterior cervical [Cervical Lymph Nodes Enlarged Anterior Bilaterally] : anterior cervical [Supraclavicular Lymph Nodes Enlarged Bilaterally] : supraclavicular [Nail Clubbing] : no clubbing  or cyanosis of the fingernails [Affect] : the affect was normal [Mood] : the mood was normal [FreeTextEntry1] : vascularity right lower extremity

## 2019-06-02 NOTE — HISTORY OF PRESENT ILLNESS
[FreeTextEntry1] : Pt with PCKD now s/p LURT via kidney paired exchange on 11/7/17. \par Current issues are overall improving:\par 1) Tacrolimus - Shaking has improved a lot on once a day preparation so she does not want to change.  Hair is growing back.\par 2) She has post transplant erythrocytosis - which is better on ACE.\par 3) She remains anxious due to her 's illness but is less anxious about her health.\par 4) Swelling in right leg and vascularity since the DVT - saw Dr. Maza who recommends she remain on xarelto for chronic DVT.\par 6) Weight gain since transplant - better with eating.  Not yet exercising.\par \par new issues include.\par right [facility] persists [facility] last [facility]par Reflux - recommended eat less chocolate, coffee, peppermint\par memory not great - sometimes grasps for words.\par \par

## 2019-06-02 NOTE — REVIEW OF SYSTEMS
[Feeling Tired] : feeling tired [As Noted in HPI] : as noted in HPI [Difficulty Walking] : difficulty walking [Negative] : Eyes [Easy Bleeding] : a tendency for easy bleeding [Heartburn] : heartburn [Arthralgias] : arthralgias [Fever] : no fever [Chills] : no chills [Nosebleeds] : no nosebleeds [Sore Throat] : no sore throat [Hoarseness] : no hoarseness [Chest Pain] : no chest pain [Palpitations] : no palpitations [Shortness Of Breath] : no shortness of breath [Cough] : no cough [Constipation] : no constipation [Abdominal Pain] : no abdominal pain [Diarrhea] : no diarrhea [Dysuria] : no dysuria [de-identified] : due to hip and knee pain

## 2019-06-09 PROBLEM — Q61.3 POLYCYSTIC KIDNEY DISEASE: Status: ACTIVE | Noted: 2017-03-02

## 2019-06-14 ENCOUNTER — OTHER (OUTPATIENT)
Age: 59
End: 2019-06-14

## 2019-06-19 LAB
25(OH)D3 SERPL-MCNC: 19.9 NG/ML
ALBUMIN SERPL ELPH-MCNC: 4.2 G/DL
ALP BLD-CCNC: 62 U/L
ALT SERPL-CCNC: 10 U/L
ANION GAP SERPL CALC-SCNC: 13 MMOL/L
APPEARANCE: CLEAR
AST SERPL-CCNC: 14 U/L
BACTERIA: NEGATIVE
BASOPHILS # BLD AUTO: 0.03 K/UL
BASOPHILS NFR BLD AUTO: 0.6 %
BILIRUB SERPL-MCNC: 0.6 MG/DL
BILIRUBIN URINE: NEGATIVE
BLOOD URINE: NEGATIVE
BUN SERPL-MCNC: 23 MG/DL
CALCIUM SERPL-MCNC: 9.4 MG/DL
CALCIUM SERPL-MCNC: 9.4 MG/DL
CHLORIDE SERPL-SCNC: 105 MMOL/L
CO2 SERPL-SCNC: 25 MMOL/L
COLOR: COLORLESS
CREAT SERPL-MCNC: 1.35 MG/DL
EOSINOPHIL # BLD AUTO: 0.13 K/UL
EOSINOPHIL NFR BLD AUTO: 2.8 %
GLUCOSE QUALITATIVE U: NEGATIVE
GLUCOSE SERPL-MCNC: 102 MG/DL
HCT VFR BLD CALC: 48.7 %
HGB BLD-MCNC: 14.9 G/DL
HYALINE CASTS: 0 /LPF
IMM GRANULOCYTES NFR BLD AUTO: 0 %
KETONES URINE: NEGATIVE
LEUKOCYTE ESTERASE URINE: NEGATIVE
LYMPHOCYTES # BLD AUTO: 1.14 K/UL
LYMPHOCYTES NFR BLD AUTO: 24.5 %
MAGNESIUM SERPL-MCNC: 2 MG/DL
MAN DIFF?: NORMAL
MCHC RBC-ENTMCNC: 29.6 PG
MCHC RBC-ENTMCNC: 30.6 GM/DL
MCV RBC AUTO: 96.8 FL
MICROSCOPIC-UA: NORMAL
MONOCYTES # BLD AUTO: 0.36 K/UL
MONOCYTES NFR BLD AUTO: 7.7 %
NEUTROPHILS # BLD AUTO: 2.99 K/UL
NEUTROPHILS NFR BLD AUTO: 64.4 %
NITRITE URINE: NEGATIVE
PARATHYROID HORMONE INTACT: 87 PG/ML
PH URINE: 6.5
PHOSPHATE SERPL-MCNC: 3.4 MG/DL
PLATELET # BLD AUTO: 179 K/UL
POTASSIUM SERPL-SCNC: 4.4 MMOL/L
PROT SERPL-MCNC: 6.2 G/DL
PROTEIN URINE: NEGATIVE
RBC # BLD: 5.03 M/UL
RBC # FLD: 13.2 %
RED BLOOD CELLS URINE: 0 /HPF
SODIUM SERPL-SCNC: 143 MMOL/L
SPECIFIC GRAVITY URINE: 1
SQUAMOUS EPITHELIAL CELLS: 2 /HPF
TACROLIMUS SERPL-MCNC: 6.2 NG/ML
UROBILINOGEN URINE: NORMAL
WBC # FLD AUTO: 4.65 K/UL
WHITE BLOOD CELLS URINE: 2 /HPF

## 2019-07-16 ENCOUNTER — APPOINTMENT (OUTPATIENT)
Dept: CARDIOLOGY | Facility: CLINIC | Age: 59
End: 2019-07-16
Payer: MEDICARE

## 2019-07-16 ENCOUNTER — APPOINTMENT (OUTPATIENT)
Dept: TRANSPLANT | Facility: CLINIC | Age: 59
End: 2019-07-16

## 2019-07-16 VITALS — HEART RATE: 82 BPM | DIASTOLIC BLOOD PRESSURE: 81 MMHG | OXYGEN SATURATION: 99 % | SYSTOLIC BLOOD PRESSURE: 139 MMHG

## 2019-07-16 PROCEDURE — 99214 OFFICE O/P EST MOD 30 MIN: CPT

## 2019-07-16 NOTE — HISTORY OF PRESENT ILLNESS
[FreeTextEntry1] : 7/14/2019\par Doing well\par No new issues.  No CP or SOB\par Was started on enalapril 2.5mg daily \par Remains on Xarelto 10mg daily \par Takes Vitamin D \par She reports that her first cousin had a knee sx and had a blood clot.  \par Father had a history of phlebitis and IVC filter.  \par No blood in urine or stool.  Tolerating Xarelto 10mg just fine.  \par \par 4/16/2019\par US with persistent pop DVt\par No issues\par no cp or sob\par mild bruising \par VALERIA 1 abnormal\par otherwise thrombophilia panel normal.  \par Not wearing compression stockings.

## 2019-07-16 NOTE — PHYSICAL EXAM
[General Appearance - Well Developed] : well developed [Normal Appearance] : normal appearance [Well Groomed] : well groomed [General Appearance - Well Nourished] : well nourished [No Deformities] : no deformities [General Appearance - In No Acute Distress] : no acute distress [Normal Conjunctiva] : the conjunctiva exhibited no abnormalities [Eyelids - No Xanthelasma] : the eyelids demonstrated no xanthelasmas [Normal Oral Mucosa] : normal oral mucosa [No Oral Pallor] : no oral pallor [No Oral Cyanosis] : no oral cyanosis [Normal Jugular Venous A Waves Present] : normal jugular venous A waves present [Normal Jugular Venous V Waves Present] : normal jugular venous V waves present [No Jugular Venous Pierce A Waves] : no jugular venous pierce A waves [Respiration, Rhythm And Depth] : normal respiratory rhythm and effort [Exaggerated Use Of Accessory Muscles For Inspiration] : no accessory muscle use [Auscultation Breath Sounds / Voice Sounds] : lungs were clear to auscultation bilaterally [Heart Rate And Rhythm] : heart rate and rhythm were normal [Heart Sounds] : normal S1 and S2 [Murmurs] : no murmurs present [Abdomen Soft] : soft [Abdomen Tenderness] : non-tender [Abdomen Mass (___ Cm)] : no abdominal mass palpated [Abnormal Walk] : normal gait [Gait - Sufficient For Exercise Testing] : the gait was sufficient for exercise testing [Nail Clubbing] : no clubbing of the fingernails [Cyanosis, Localized] : no localized cyanosis [Petechial Hemorrhages (___cm)] : no petechial hemorrhages [Skin Color & Pigmentation] : normal skin color and pigmentation [] : no rash [No Venous Stasis] : no venous stasis [Skin Lesions] : no skin lesions [No Skin Ulcers] : no skin ulcer [No Xanthoma] : no  xanthoma was observed [FreeTextEntry1] : hyperpigemnation.  VV.  Reticular veins.

## 2019-07-16 NOTE — ASSESSMENT
[FreeTextEntry1] : Assessment:\par 1.  R LE DVT\par - unclear if provoked, however symptoms started post operatively\par - diagnosed several months afterwards\par - on Xaerlto\par 2.  renal Transplant\par 3.  PCKD\par 4.  Venous insufficiency / Varicose veins\par \par \par Plan\par 1.  Continue with Xarelto 10 for extended therapy - [facility] CHOICE trial\par 2.  Residual DVT, VALERIA 1 +, family history of VTE, overweight put her at risk of recurrence of VTE.  Favor extended therapy with half dose DOAC.   \par 3. Return in 6 months - repeat duplex at that time.  \par \par Thanks\par \par Adalid Maza

## 2019-07-16 NOTE — REASON FOR VISIT
[Follow-Up - Clinic] : a clinic follow-up of [FreeTextEntry2] : DVT [FreeTextEntry1] :  \par \par \par

## 2019-07-17 LAB
ALBUMIN SERPL ELPH-MCNC: 4.4 G/DL
ALP BLD-CCNC: 59 U/L
ALT SERPL-CCNC: 10 U/L
ANION GAP SERPL CALC-SCNC: 15 MMOL/L
APPEARANCE: CLEAR
AST SERPL-CCNC: 15 U/L
BACTERIA: NEGATIVE
BASOPHILS # BLD AUTO: 0.04 K/UL
BASOPHILS NFR BLD AUTO: 1 %
BILIRUB SERPL-MCNC: 0.5 MG/DL
BILIRUBIN URINE: NEGATIVE
BKV DNA SPEC QL NAA+PROBE: NOT DETECTED COPIES/ML
BLOOD URINE: NEGATIVE
BUN SERPL-MCNC: 25 MG/DL
CALCIUM SERPL-MCNC: 9.4 MG/DL
CALCIUM SERPL-MCNC: 9.4 MG/DL
CHLORIDE SERPL-SCNC: 103 MMOL/L
CO2 SERPL-SCNC: 22 MMOL/L
COLOR: COLORLESS
CREAT SERPL-MCNC: 1.26 MG/DL
CREAT SPEC-SCNC: 33 MG/DL
CREAT/PROT UR: NORMAL RATIO
EOSINOPHIL # BLD AUTO: 0.1 K/UL
EOSINOPHIL NFR BLD AUTO: 2.5 %
GLUCOSE QUALITATIVE U: NEGATIVE
GLUCOSE SERPL-MCNC: 98 MG/DL
HCT VFR BLD CALC: 50.1 %
HGB BLD-MCNC: 15.5 G/DL
HYALINE CASTS: 0 /LPF
IMM GRANULOCYTES NFR BLD AUTO: 0.3 %
KETONES URINE: NEGATIVE
LDH SERPL-CCNC: 280 U/L
LEUKOCYTE ESTERASE URINE: NEGATIVE
LYMPHOCYTES # BLD AUTO: 1.16 K/UL
LYMPHOCYTES NFR BLD AUTO: 29.3 %
MAGNESIUM SERPL-MCNC: 2 MG/DL
MAN DIFF?: NORMAL
MCHC RBC-ENTMCNC: 29.6 PG
MCHC RBC-ENTMCNC: 30.9 GM/DL
MCV RBC AUTO: 95.6 FL
MICROSCOPIC-UA: NORMAL
MONOCYTES # BLD AUTO: 0.4 K/UL
MONOCYTES NFR BLD AUTO: 10.1 %
NEUTROPHILS # BLD AUTO: 2.25 K/UL
NEUTROPHILS NFR BLD AUTO: 56.8 %
NITRITE URINE: NEGATIVE
PARATHYROID HORMONE INTACT: 76 PG/ML
PH URINE: 6.5
PHOSPHATE SERPL-MCNC: 3.7 MG/DL
PLATELET # BLD AUTO: 168 K/UL
POTASSIUM SERPL-SCNC: 5.3 MMOL/L
PROT SERPL-MCNC: 6.6 G/DL
PROT UR-MCNC: <4 MG/DL
PROTEIN URINE: NEGATIVE
RBC # BLD: 5.24 M/UL
RBC # FLD: 13.4 %
RED BLOOD CELLS URINE: 1 /HPF
SODIUM SERPL-SCNC: 140 MMOL/L
SPECIFIC GRAVITY URINE: 1.01
SQUAMOUS EPITHELIAL CELLS: 1 /HPF
TACROLIMUS SERPL-MCNC: 5.6 NG/ML
URATE SERPL-MCNC: 8.1 MG/DL
UROBILINOGEN URINE: NORMAL
WBC # FLD AUTO: 3.96 K/UL
WHITE BLOOD CELLS URINE: 1 /HPF

## 2019-07-18 ENCOUNTER — APPOINTMENT (OUTPATIENT)
Dept: NEPHROLOGY | Facility: CLINIC | Age: 59
End: 2019-07-18
Payer: MEDICARE

## 2019-07-18 VITALS
DIASTOLIC BLOOD PRESSURE: 75 MMHG | BODY MASS INDEX: 37.73 KG/M2 | RESPIRATION RATE: 16 BRPM | WEIGHT: 221 LBS | HEART RATE: 85 BPM | TEMPERATURE: 98.6 F | OXYGEN SATURATION: 99 % | SYSTOLIC BLOOD PRESSURE: 136 MMHG | HEIGHT: 64 IN

## 2019-07-18 DIAGNOSIS — R25.1 TREMOR, UNSPECIFIED: ICD-10-CM

## 2019-07-18 DIAGNOSIS — G44.89 OTHER HEADACHE SYNDROME: ICD-10-CM

## 2019-07-18 DIAGNOSIS — J02.0 STREPTOCOCCAL PHARYNGITIS: ICD-10-CM

## 2019-07-18 DIAGNOSIS — R32 UNSPECIFIED URINARY INCONTINENCE: ICD-10-CM

## 2019-07-18 DIAGNOSIS — G62.9 POLYNEUROPATHY, UNSPECIFIED: ICD-10-CM

## 2019-07-18 DIAGNOSIS — R12 HEARTBURN: ICD-10-CM

## 2019-07-18 LAB
CMV DNA SPEC QL NAA+PROBE: NOT DETECTED
CMVPCR LOG: NOT DETECTED LOGIU/ML

## 2019-07-18 PROCEDURE — 99214 OFFICE O/P EST MOD 30 MIN: CPT

## 2019-07-18 NOTE — ASSESSMENT
[FreeTextEntry1] : 58 y.o female with PCKD s/p LURT \par 1.  Renal transplant - Baseline creatinine about 1.3.  DSA drawn in December was negative.  Pt with PRA of 49%.  Renal sonograms have been normal.   \par 2.  Immunosuppression - now on envarsus for target tacro trough of 4-6, MMF 500mgs BID and pred 5.  GI system still not normal therefore will continue reduced MMF for now.  \par 3.  HTN - controlled to low at home.  only taking lasix 20mgs daily. \par 4.  Prophylaxis - developed high methemoglobin levels on dapsone so stopped.  she is back on bactrim and WBC and potassium are ok.  \par 5.  Wt gain - likely from prednisone.  Discussed improved diet and exercise plan with patient. 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  DVT - Pt now following with Dr. Maza.  Pt still on xarelto\par 8.  Anxiety - pt again advised to see psychiatrist.  She is contemplating it.  \par 9.  tremor/ exhaustion/ trouble concentration/ hair loss/ tremor - few of these symptoms may be psychosomatic but majority likely related to tacrolimus.  Discussed changing to cyclosporine but she does not want to change it yet.  Significantly disabled due to these symptoms.  \par \par F/u in  6 months.  \par she will continue to f/u with Dr. Hoff.  \par Reduce prednisone 2.5mgs daily to see if it improves her symptoms.

## 2019-07-18 NOTE — HISTORY OF PRESENT ILLNESS
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  \par \par Interval events:\par Has back pain, knee and hip pain.  Told she has arthritis.  Has occasional headache/ pressure in the morning.  Weakness in the morning cannot crouch or walk much.  Feels fatigued.  also has neuropathy of the feet and occasionally in face and hands.  Unclear etiology.  Has excessive fatigue especially in the morning.  She also has occasional incontinence. Also has a lot of tremor.  These symptoms have been present at varying degree's since the transplant.

## 2019-08-20 ENCOUNTER — APPOINTMENT (OUTPATIENT)
Dept: TRANSPLANT | Facility: CLINIC | Age: 59
End: 2019-08-20

## 2019-08-21 LAB
25(OH)D3 SERPL-MCNC: 26.3 NG/ML
ALBUMIN SERPL ELPH-MCNC: 4.2 G/DL
ALP BLD-CCNC: 58 U/L
ALT SERPL-CCNC: 10 U/L
ANION GAP SERPL CALC-SCNC: 14 MMOL/L
APPEARANCE: CLEAR
AST SERPL-CCNC: 16 U/L
BASOPHILS # BLD AUTO: 0.02 K/UL
BASOPHILS NFR BLD AUTO: 0.5 %
BILIRUB SERPL-MCNC: 0.6 MG/DL
BILIRUBIN URINE: NEGATIVE
BLOOD URINE: NEGATIVE
BUN SERPL-MCNC: 25 MG/DL
CALCIUM SERPL-MCNC: 9.6 MG/DL
CALCIUM SERPL-MCNC: 9.6 MG/DL
CHLORIDE SERPL-SCNC: 106 MMOL/L
CHOLEST SERPL-MCNC: 179 MG/DL
CHOLEST/HDLC SERPL: 2.6 RATIO
CO2 SERPL-SCNC: 23 MMOL/L
COLOR: COLORLESS
CREAT SERPL-MCNC: 1.35 MG/DL
CREAT SPEC-SCNC: 30 MG/DL
CREAT/PROT UR: NORMAL RATIO
EOSINOPHIL # BLD AUTO: 0.1 K/UL
EOSINOPHIL NFR BLD AUTO: 2.4 %
ESTIMATED AVERAGE GLUCOSE: 117 MG/DL
GLUCOSE QUALITATIVE U: NEGATIVE
GLUCOSE SERPL-MCNC: 97 MG/DL
HBA1C MFR BLD HPLC: 5.7 %
HCT VFR BLD CALC: 47.7 %
HDLC SERPL-MCNC: 68 MG/DL
HGB BLD-MCNC: 14.6 G/DL
IMM GRANULOCYTES NFR BLD AUTO: 0.2 %
KETONES URINE: NEGATIVE
LDH SERPL-CCNC: 239 U/L
LDLC SERPL CALC-MCNC: 89 MG/DL
LEUKOCYTE ESTERASE URINE: NEGATIVE
LYMPHOCYTES # BLD AUTO: 1.15 K/UL
LYMPHOCYTES NFR BLD AUTO: 27.9 %
MAGNESIUM SERPL-MCNC: 1.9 MG/DL
MAN DIFF?: NORMAL
MCHC RBC-ENTMCNC: 29.1 PG
MCHC RBC-ENTMCNC: 30.6 GM/DL
MCV RBC AUTO: 95.2 FL
MONOCYTES # BLD AUTO: 0.37 K/UL
MONOCYTES NFR BLD AUTO: 9 %
NEUTROPHILS # BLD AUTO: 2.47 K/UL
NEUTROPHILS NFR BLD AUTO: 60 %
NITRITE URINE: NEGATIVE
PARATHYROID HORMONE INTACT: 96 PG/ML
PH URINE: 6.5
PHOSPHATE SERPL-MCNC: 3.7 MG/DL
PLATELET # BLD AUTO: 163 K/UL
POTASSIUM SERPL-SCNC: 5 MMOL/L
PROT SERPL-MCNC: 6.6 G/DL
PROT UR-MCNC: <4 MG/DL
PROTEIN URINE: NEGATIVE
RBC # BLD: 5.01 M/UL
RBC # FLD: 13.3 %
SODIUM SERPL-SCNC: 143 MMOL/L
SPECIFIC GRAVITY URINE: 1.01
TACROLIMUS SERPL-MCNC: 5.9 NG/ML
TRIGL SERPL-MCNC: 109 MG/DL
URATE SERPL-MCNC: 8.4 MG/DL
UROBILINOGEN URINE: NORMAL
WBC # FLD AUTO: 4.12 K/UL

## 2019-08-23 LAB
BKV DNA SPEC QL NAA+PROBE: NOT DETECTED COPIES/ML
CMV DNA SPEC QL NAA+PROBE: NOT DETECTED
CMVPCR LOG: NOT DETECTED LOGIU/ML

## 2019-08-27 ENCOUNTER — MEDICATION RENEWAL (OUTPATIENT)
Age: 59
End: 2019-08-27

## 2019-09-16 ENCOUNTER — OTHER (OUTPATIENT)
Age: 59
End: 2019-09-16

## 2019-09-25 ENCOUNTER — APPOINTMENT (OUTPATIENT)
Dept: NEPHROLOGY | Facility: CLINIC | Age: 59
End: 2019-09-25
Payer: MEDICARE

## 2019-09-25 VITALS
HEIGHT: 64 IN | SYSTOLIC BLOOD PRESSURE: 145 MMHG | BODY MASS INDEX: 37.39 KG/M2 | DIASTOLIC BLOOD PRESSURE: 69 MMHG | WEIGHT: 219 LBS | HEART RATE: 89 BPM | OXYGEN SATURATION: 98 %

## 2019-09-25 LAB
ALBUMIN SERPL ELPH-MCNC: 4.5 G/DL
ALP BLD-CCNC: 59 U/L
ALT SERPL-CCNC: 14 U/L
ANION GAP SERPL CALC-SCNC: 12 MMOL/L
APPEARANCE: CLEAR
AST SERPL-CCNC: 16 U/L
BACTERIA: NEGATIVE
BASOPHILS # BLD AUTO: 0.03 K/UL
BASOPHILS NFR BLD AUTO: 0.7 %
BILIRUB SERPL-MCNC: 0.5 MG/DL
BILIRUBIN URINE: NEGATIVE
BLOOD URINE: NEGATIVE
BUN SERPL-MCNC: 24 MG/DL
CALCIUM SERPL-MCNC: 9.3 MG/DL
CHLORIDE SERPL-SCNC: 107 MMOL/L
CO2 SERPL-SCNC: 24 MMOL/L
COLOR: COLORLESS
CREAT SERPL-MCNC: 1.37 MG/DL
EOSINOPHIL # BLD AUTO: 0.2 K/UL
EOSINOPHIL NFR BLD AUTO: 5 %
GLUCOSE QUALITATIVE U: NEGATIVE
GLUCOSE SERPL-MCNC: 96 MG/DL
HCT VFR BLD CALC: 46.9 %
HGB BLD-MCNC: 14.7 G/DL
HYALINE CASTS: 0 /LPF
IMM GRANULOCYTES NFR BLD AUTO: 0 %
KETONES URINE: NEGATIVE
LEUKOCYTE ESTERASE URINE: NEGATIVE
LYMPHOCYTES # BLD AUTO: 0.89 K/UL
LYMPHOCYTES NFR BLD AUTO: 22.1 %
MAGNESIUM SERPL-MCNC: 1.9 MG/DL
MAN DIFF?: NORMAL
MCHC RBC-ENTMCNC: 30.1 PG
MCHC RBC-ENTMCNC: 31.3 GM/DL
MCV RBC AUTO: 96.1 FL
MICROSCOPIC-UA: NORMAL
MONOCYTES # BLD AUTO: 0.48 K/UL
MONOCYTES NFR BLD AUTO: 11.9 %
NEUTROPHILS # BLD AUTO: 2.42 K/UL
NEUTROPHILS NFR BLD AUTO: 60.3 %
NITRITE URINE: NEGATIVE
PH URINE: 6.5
PHOSPHATE SERPL-MCNC: 3.6 MG/DL
PLATELET # BLD AUTO: 164 K/UL
POTASSIUM SERPL-SCNC: 5.2 MMOL/L
PROT SERPL-MCNC: 6.6 G/DL
PROTEIN URINE: NEGATIVE
RBC # BLD: 4.88 M/UL
RBC # FLD: 13.4 %
RED BLOOD CELLS URINE: 0 /HPF
SODIUM SERPL-SCNC: 143 MMOL/L
SPECIFIC GRAVITY URINE: 1.01
SQUAMOUS EPITHELIAL CELLS: 1 /HPF
TACROLIMUS SERPL-MCNC: 6.3 NG/ML
UROBILINOGEN URINE: NORMAL
WBC # FLD AUTO: 4.02 K/UL
WHITE BLOOD CELLS URINE: 0 /HPF

## 2019-09-25 PROCEDURE — 99214 OFFICE O/P EST MOD 30 MIN: CPT | Mod: 25

## 2019-09-25 PROCEDURE — 90686 IIV4 VACC NO PRSV 0.5 ML IM: CPT

## 2019-09-25 PROCEDURE — G0008: CPT

## 2019-09-28 NOTE — ASSESSMENT
[FreeTextEntry1] : 59 y.o female with PCKD s/p LURT 11/7/17\par 1. Renal transplant - DSA drawn in December was negative. Pt with PRA of 49%. Renal sonograms have been normal. Have previously suggested she transition from FK to CSA to see if shaking and hair loss improve but it seems as if they have improved with time. Labs from last week stable.\par 2. Immunosuppression - now on envarsus 1.5 mg daily. Target tacro 5-7, MMF 500mgs BID and pred 5. FK level optimal. \par 3. HTN - a bit high today but usually controlled.  Will not change meds at this time.  If remains high increase enalapril\par 4. Prophylaxis - developed high methemoglobin levels on dapsone so stopped she is now back on bactrim.\par 5. Wt gain - Discussed improved diet and exercise plan with patient. Can't exercise due to leg/hip pain. Have recommended she be evaluated by Dr. Stevens.\par 6. Tachycardia - resolved.\par 7. Post transplant erythrocytosis - On ACE and hgb improved. Potassium controlled.\par 8. DVT - had follow up scan with some clot. Was diagnosed 8/10. complains of right sided venous congestion being seen by Dr. Maza who recommends continued anticoagulation. - to talk to [facility] because expensive.\par 9. Hair loss - Improvied on biotene\par 10. Anxiety - have recommended psychiatry consult in the past.  Today she is doing well.\par 11. Rtc - 2 months. \par

## 2019-09-28 NOTE — PHYSICAL EXAM
[General Appearance - Alert] : alert [General Appearance - In No Acute Distress] : in no acute distress [Sclera] : the sclera and conjunctiva were normal [Oropharynx] : the oropharynx was normal [Neck Appearance] : the appearance of the neck was normal [Neck Cervical Mass (___cm)] : no neck mass was observed [Thyroid Diffuse Enlargement] : the thyroid was not enlarged [Respiration, Rhythm And Depth] : normal respiratory rhythm and effort [] : no respiratory distress [Exaggerated Use Of Accessory Muscles For Inspiration] : no accessory muscle use [Auscultation Breath Sounds / Voice Sounds] : lungs were clear to auscultation bilaterally [Heart Sounds] : normal S1 and S2 [Heart Rate And Rhythm] : heart rate was normal and rhythm regular [Murmurs] : no murmurs [Arterial Pulses Carotid] : carotid pulses were normal with no bruits [Heart Sounds Pericardial Friction Rub] : no pericardial rub [Abdomen Soft] : soft [Abdomen Tenderness] : non-tender [Supraclavicular Lymph Nodes Enlarged Bilaterally] : supraclavicular [Cervical Lymph Nodes Enlarged Anterior Bilaterally] : anterior cervical [Cervical Lymph Nodes Enlarged Posterior Bilaterally] : posterior cervical [Nail Clubbing] : no clubbing  or cyanosis of the fingernails [Affect] : the affect was normal [Mood] : the mood was normal [FreeTextEntry1] : vascularity right lower extremity

## 2019-09-28 NOTE — HISTORY OF PRESENT ILLNESS
[FreeTextEntry1] : Pt with PCKD now s/p LURT via kidney paired exchange on 11/7/17. \par Current issues are overall improving:\par 1) Tacrolimus - Shaking has improved a lot on once a day preparation so she does not want to change.  Hair is grown back.\par 2) She has post transplant erythrocytosis - which is better on ACE.\par 3) She remains anxious due to her 's illness but is less anxious about her health.\par 4) Swelling in right leg and vascularity since the DVT - saw Dr. Maza who recommends she remain on xarelto for chronic DVT.\par 6) Weight gain since transplant - better with eating.  Not yet exercising despite encouragement.\par 7) Her right hip pain persists - will send to [facility] for possible steroid injection (xray last visit).  She was given number in the past but was not able to make the appointment.\par Overall doing well.  Enjoying caring for her grandchildren.\par Reviewed her labs from earlier this week with her which are stable.  HCT continues to normalize.  Creatinine at baseline\par \par \par

## 2019-09-28 NOTE — REVIEW OF SYSTEMS
[Feeling Tired] : feeling tired [Heartburn] : heartburn [Arthralgias] : arthralgias [As Noted in HPI] : as noted in HPI [Difficulty Walking] : difficulty walking [Easy Bleeding] : a tendency for easy bleeding [Negative] : Eyes [Fever] : no fever [Chills] : no chills [Nosebleeds] : no nosebleeds [Hoarseness] : no hoarseness [Sore Throat] : no sore throat [Palpitations] : no palpitations [Chest Pain] : no chest pain [Cough] : no cough [Shortness Of Breath] : no shortness of breath [Abdominal Pain] : no abdominal pain [Constipation] : no constipation [Diarrhea] : no diarrhea [FreeTextEntry7] : bowel movements inconsistent [de-identified] : due to hip and knee pain [Dysuria] : no dysuria

## 2019-10-03 ENCOUNTER — TRANSCRIPTION ENCOUNTER (OUTPATIENT)
Age: 59
End: 2019-10-03

## 2019-10-04 ENCOUNTER — TRANSCRIPTION ENCOUNTER (OUTPATIENT)
Age: 59
End: 2019-10-04

## 2019-10-08 ENCOUNTER — TRANSCRIPTION ENCOUNTER (OUTPATIENT)
Age: 59
End: 2019-10-08

## 2019-11-09 ENCOUNTER — RX RENEWAL (OUTPATIENT)
Age: 59
End: 2019-11-09

## 2019-11-20 ENCOUNTER — APPOINTMENT (OUTPATIENT)
Dept: NEPHROLOGY | Facility: CLINIC | Age: 59
End: 2019-11-20
Payer: MEDICARE

## 2019-11-20 VITALS
HEIGHT: 64 IN | BODY MASS INDEX: 37.9 KG/M2 | OXYGEN SATURATION: 96 % | WEIGHT: 222 LBS | DIASTOLIC BLOOD PRESSURE: 72 MMHG | HEART RATE: 83 BPM | SYSTOLIC BLOOD PRESSURE: 120 MMHG

## 2019-11-20 DIAGNOSIS — E66.3 OVERWEIGHT: ICD-10-CM

## 2019-11-20 PROCEDURE — 99214 OFFICE O/P EST MOD 30 MIN: CPT

## 2019-11-20 RX ORDER — AZITHROMYCIN 250 MG/1
250 TABLET, FILM COATED ORAL
Qty: 6 | Refills: 0 | Status: DISCONTINUED | COMMUNITY
Start: 2017-07-24 | End: 2019-11-20

## 2019-11-21 LAB
25(OH)D3 SERPL-MCNC: 37.8 NG/ML
ALBUMIN SERPL ELPH-MCNC: 4.3 G/DL
ALP BLD-CCNC: 61 U/L
ALT SERPL-CCNC: 11 U/L
ANION GAP SERPL CALC-SCNC: 12 MMOL/L
APPEARANCE: CLEAR
AST SERPL-CCNC: 15 U/L
BACTERIA: NEGATIVE
BASOPHILS # BLD AUTO: 0.06 K/UL
BASOPHILS NFR BLD AUTO: 1.3 %
BILIRUB SERPL-MCNC: 0.5 MG/DL
BILIRUBIN URINE: NEGATIVE
BLOOD URINE: NEGATIVE
BUN SERPL-MCNC: 30 MG/DL
CALCIUM SERPL-MCNC: 9.6 MG/DL
CALCIUM SERPL-MCNC: 9.6 MG/DL
CHLORIDE SERPL-SCNC: 105 MMOL/L
CO2 SERPL-SCNC: 24 MMOL/L
COLOR: COLORLESS
CREAT SERPL-MCNC: 1.35 MG/DL
EOSINOPHIL # BLD AUTO: 0.23 K/UL
EOSINOPHIL NFR BLD AUTO: 5.1 %
ESTIMATED AVERAGE GLUCOSE: 114 MG/DL
GLUCOSE QUALITATIVE U: NEGATIVE
GLUCOSE SERPL-MCNC: 92 MG/DL
HBA1C MFR BLD HPLC: 5.6 %
HCT VFR BLD CALC: 45.9 %
HGB BLD-MCNC: 14.1 G/DL
HYALINE CASTS: 0 /LPF
IMM GRANULOCYTES NFR BLD AUTO: 0.2 %
KETONES URINE: NEGATIVE
LEUKOCYTE ESTERASE URINE: NEGATIVE
LYMPHOCYTES # BLD AUTO: 1.12 K/UL
LYMPHOCYTES NFR BLD AUTO: 24.7 %
MAGNESIUM SERPL-MCNC: 2 MG/DL
MAN DIFF?: NORMAL
MCHC RBC-ENTMCNC: 29.8 PG
MCHC RBC-ENTMCNC: 30.7 GM/DL
MCV RBC AUTO: 97 FL
MICROSCOPIC-UA: NORMAL
MONOCYTES # BLD AUTO: 0.51 K/UL
MONOCYTES NFR BLD AUTO: 11.2 %
NEUTROPHILS # BLD AUTO: 2.61 K/UL
NEUTROPHILS NFR BLD AUTO: 57.5 %
NITRITE URINE: NEGATIVE
PARATHYROID HORMONE INTACT: 65 PG/ML
PH URINE: 6
PHOSPHATE SERPL-MCNC: 3.7 MG/DL
PLATELET # BLD AUTO: 180 K/UL
POTASSIUM SERPL-SCNC: 5.1 MMOL/L
PROT SERPL-MCNC: 6.5 G/DL
PROTEIN URINE: NEGATIVE
RBC # BLD: 4.73 M/UL
RBC # FLD: 12.7 %
RED BLOOD CELLS URINE: 0 /HPF
SODIUM SERPL-SCNC: 141 MMOL/L
SPECIFIC GRAVITY URINE: 1.01
SQUAMOUS EPITHELIAL CELLS: 0 /HPF
TACROLIMUS SERPL-MCNC: 5.2 NG/ML
UROBILINOGEN URINE: NORMAL
WBC # FLD AUTO: 4.54 K/UL
WHITE BLOOD CELLS URINE: 0 /HPF

## 2019-11-22 PROBLEM — E66.3 OVERWEIGHT: Status: ACTIVE | Noted: 2017-03-02

## 2019-11-26 NOTE — HISTORY OF PRESENT ILLNESS
[FreeTextEntry1] : Pt with PCKD now s/p LURT via kidney paired exchange on 11/7/17. \par \par 1) Received 15 months of disability.  During her evaluation it was recommended she see a psychiatrist.\par 2) has appointment with orthopedic surgeon due to multiple issues\par 3) BP low in the am sometimes. She will bring her machine in so I can see if it is accurate as BP always high here. \par 4) Feels over medicaided\par 5) Wants to take Envarsus at night which she hopes will help the shaking.  I have said no because we would not be able to follow accurate troughs\par 6) She has a small AECOM aneurysm and wants to know when she needs to be rescanned.\par

## 2019-11-26 NOTE — ASSESSMENT
[FreeTextEntry1] : 59 y.o female with PCKD s/p LURT 11/7/17\par 1. Renal transplant - DSA drawn in December was negative. Pt with PRA of 49%. Renal sonograms have been normal. Shaking has improved with envarsus.   Labs from last week stable and reviewed with her.\par 2. Immunosuppression - now on envarsus 1.5 mg daily. Target tacro 5-7, MMF 500mgs BID and pred 5. FK level optimal. \par 3. HTN - controlled on enalapril/lasix.\par 4. Prophylaxis - developed high methemoglobin levels on dapsone so stopped she is now back on bactrim.\par 5. Wt gain - Discussed improved diet and exercise plan with patient. Can't exercise due to leg/hip pain. Have recommended she be evaluated by orthopedics\par 6. Tachycardia - resolved.\par 7. Post transplant erythrocytosis - On ACE and hgb improved. Potassium controlled.\par 8. DVT - had follow up scan with some clot. Was diagnosed 8/10. complains of right sided venous congestion being seen by Dr. Maza who recommends continued anticoagulation. - to talk to [facility] because expensive.\par 9. Hair loss - Improved on biotene\par 10. Anxiety - have recommended psychiatry consult in the past. Today she willling to follow that advice\par 11. ACOM aneurysm - Repeat MRI/MRA\par 12. Pulmonary nodule - repeat CT\par Rtc - 2 months Dr. Delgado, 4 months here.

## 2019-11-26 NOTE — ADDENDUM
[FreeTextEntry1] : \par  11/26/19\par Spoke to Patient and Dr. Stacy.\par Will repeat MRI/MRA and then to see Dr. Stacy\yojana Also reviewed CT chest.  Most recent study without nodule but Dr. Youngblood recommended 1 year follow up after 3/15/17 study.  Patient at this time not interested in repeating study.  Will discuss it with her at the next visit.

## 2019-11-26 NOTE — REVIEW OF SYSTEMS
[Feeling Tired] : feeling tired [Heartburn] : heartburn [Arthralgias] : arthralgias [Easy Bleeding] : a tendency for easy bleeding [Difficulty Walking] : difficulty walking [Negative] : Endocrine [Fever] : no fever [Chills] : no chills [Sore Throat] : no sore throat [Nosebleeds] : no nosebleeds [Hoarseness] : no hoarseness [Palpitations] : no palpitations [Chest Pain] : no chest pain [Shortness Of Breath] : no shortness of breath [Abdominal Pain] : no abdominal pain [Cough] : no cough [Dysuria] : no dysuria [Constipation] : no constipation [Diarrhea] : no diarrhea [Anxiety] : no anxiety [Depression] : no depression [de-identified] : due to hip and knee pain [FreeTextEntry7] : bowel movements inconsistent

## 2019-11-26 NOTE — PHYSICAL EXAM
[General Appearance - In No Acute Distress] : in no acute distress [Sclera] : the sclera and conjunctiva were normal [General Appearance - Alert] : alert [Neck Appearance] : the appearance of the neck was normal [Oropharynx] : the oropharynx was normal [Thyroid Diffuse Enlargement] : the thyroid was not enlarged [Neck Cervical Mass (___cm)] : no neck mass was observed [Respiration, Rhythm And Depth] : normal respiratory rhythm and effort [] : no respiratory distress [Exaggerated Use Of Accessory Muscles For Inspiration] : no accessory muscle use [Auscultation Breath Sounds / Voice Sounds] : lungs were clear to auscultation bilaterally [Heart Sounds] : normal S1 and S2 [Heart Rate And Rhythm] : heart rate was normal and rhythm regular [Murmurs] : no murmurs [Heart Sounds Pericardial Friction Rub] : no pericardial rub [Arterial Pulses Carotid] : carotid pulses were normal with no bruits [Abdomen Soft] : soft [Abdomen Tenderness] : non-tender [Cervical Lymph Nodes Enlarged Anterior Bilaterally] : anterior cervical [Cervical Lymph Nodes Enlarged Posterior Bilaterally] : posterior cervical [Nail Clubbing] : no clubbing  or cyanosis of the fingernails [Supraclavicular Lymph Nodes Enlarged Bilaterally] : supraclavicular [Affect] : the affect was normal [Mood] : the mood was normal [FreeTextEntry1] : no pain over graft.  Well healed scar RLQ

## 2019-12-10 ENCOUNTER — APPOINTMENT (OUTPATIENT)
Dept: ORTHOPEDIC SURGERY | Facility: CLINIC | Age: 59
End: 2019-12-10
Payer: MEDICARE

## 2019-12-10 VITALS
HEIGHT: 64 IN | DIASTOLIC BLOOD PRESSURE: 84 MMHG | SYSTOLIC BLOOD PRESSURE: 147 MMHG | BODY MASS INDEX: 37.9 KG/M2 | WEIGHT: 222 LBS

## 2019-12-10 DIAGNOSIS — M22.2X2 PATELLOFEMORAL DISORDERS, RIGHT KNEE: ICD-10-CM

## 2019-12-10 DIAGNOSIS — M22.2X1 PATELLOFEMORAL DISORDERS, RIGHT KNEE: ICD-10-CM

## 2019-12-10 PROCEDURE — 72100 X-RAY EXAM L-S SPINE 2/3 VWS: CPT

## 2019-12-10 PROCEDURE — 99205 OFFICE O/P NEW HI 60 MIN: CPT

## 2019-12-10 PROCEDURE — 72040 X-RAY EXAM NECK SPINE 2-3 VW: CPT

## 2019-12-10 NOTE — CONSULT LETTER
[Dear  ___] : Dear  [unfilled], [Please see my note below.] : Please see my note below. [Consult Letter:] : I had the pleasure of evaluating your patient, [unfilled]. [Sincerely,] : Sincerely, [FreeTextEntry2] : Nephrologist [FreeTextEntry3] : Jose Finnegan DO, ATC\par Primary Care Sports Medicine\par [facility] [place]

## 2019-12-10 NOTE — HISTORY OF PRESENT ILLNESS
[de-identified] : Patient is here for pain. She states that she was put on disability through social security and through her company for a year due to her renal transplant. She was dropped by her company's disability plan as time had lapsed but she claims that she has other issues that make it difficulty for her to work full time. She has not sought medical treatment for these issues prior to being dropped from disability. She explained to her Nephrologist that she has multiple aches and pains and was sent for imaging of multiple body parts. She had an AXEL through her company's insurance with a physiatrist who diagnosed her with back spasm, knee, hip arthritis. She has not had any other follow up with this provider. It was explained to her on the phone that she can only be seen for 2 body parts at the time of appointment and that if she wishes to have other evaluations, she would have to make other appointments. Upon asking her to determine which body parts she would like to be seen for today, she was unable to identify just 2 body parts and continued to talk about multiple issues that she has. \par \par The patient's past medical history, past surgical history, medications and allergies were reviewed by me today and documented accordingly. In addition, the patient's family and social history, which were noncontributory to this visit, were reviewed also. The patient has no family history of arthritis.

## 2019-12-10 NOTE — DISCUSSION/SUMMARY
Right arm;
[de-identified] : Patient was seen today for evaluation and management of chronic neck, low back and bilateral knee pain. She states has been going on for at least the last 2+ years. Patient has moderate osteoarthritis of the cervical spine, moderate osteoarthritis the lumbar spine, and very mild osteoarthritis of bilateral knees, along with bilateral patellofemoral syndrome.  Patient will be started on a course of physical therapy to restore normal range of motion and strength as tolerated. Patient cannot take oral NSAIDs secondary to history of kidney transplant.\par Patient states she has been out of work and on disability since 2017, she had end-stage renal disease and subsequently underwent kidney transplant in 2017. She was on disability secondary to her kidney transplant for one year, and has not returned to work since fall of 2018 which was the one-year anniversary of her kidney transplant. Patient was recently evaluated by an independent medical examiner, this provider was a physiatry trained physician. She was advised to seek orthopedic evaluation for her multiple musculoskeletal issues as she has not had any workup or treatment for them in the past.  Patient has multiple body elements secondary to deconditioning from all the surgery and medical treatment she has undergone in the last 2 years, I am hopeful that with an appropriate course of treatment she will be able to rebuild strength and function and be able to become more functional with ADLs and eventually with work duties as well.  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  Follow up as needed.  Patient appreciates and agrees with current plan.\par \par This note was generated using dragon medical dictation software.  A reasonable effort has been made for proofreading its contents, but typos may still remain.  If there are any questions or points of clarification needed please notify my office.

## 2019-12-10 NOTE — PHYSICAL EXAM
[de-identified] : Constitutional: Well-nourished, well-developed, No acute distress, morbidly obese\par Respiratory:  Good respiratory effort, no SOB\par Lymphatic: No regional lymphadenopathy, no lymphedema\par Psychiatric: Pleasant and normal affect, alert and oriented x3\par Skin: Clean dry and intact B/L UE/LE\par Musculoskeletal: normal except where as noted in regional exam\par \par Cervical Spine Exam\par APPEARANCE: no marked deformities or malalignment, normal curvature, good posture\par POSITIVE TENDERNESS: + Bilateral upper trapezius, levator scapula, rhomboid major, and rhomboid minor, + spasm noted in the same muscles.\par NONTENDER: no bony midline tenderness.\par ROM: limited in all planes, most notably in flexion and sidebending due to pain\par RESISTIVE TESTING: painful 4/5 resisted ext, bilateral sidebending, rotation and shoulder shrug , 5/5 flexion \par SPECIAL TESTS: neg Spurling's b/l\par Vasc: 2+ radial pulse b/l\par Neuro: C5 - T1 intact to motor, DTRs 2+/4 biceps, triceps, brachioradialis\par Sensation: Intact to light touch throughout b/l UE\par Thoracic spine:  normal curvature and normal alignment. good posture. no midline bony tenderness, no marked spasm. no marked tenderness in paraspinal muscles.  ROM full & painless all planes\par B/L Shoulders:  No asymmetry, malalignment, or swelling, Full ROM, 5/5 strength in flexion/ext, Abd/Add, IR/ER, Joints stable\par B/L Elbows:  No asymmetry, malalignment, or swelling, Full ROM, 5/5 strength in flexion/ext, pronation/supination, Joints stable\par B/L Wrist and Hand:  No asymmetry, malalignment, or swelling, Full ROM, 5/5 strength in wrist and long finger flexion/ext, radial/ulnar deviation, Joints stable\par \par Lumbar Spine Exam\par \par APPEARANCE: no marked deformities or malalignment, + loss of lordotic curvature of the lumbosacral spine. no marked deformities. + poor posture\par POSITIVE TENDERNESS: + IL/SI/ST ligs, + TTP of b/l SI joints, + b/l lower lumbar tenderness and spasm noted in erector spinae and quadratus lumborum\par NONTENDER: no bony midline tenderness.\par ROM: Mildly limited in all directions, mildly painful at end range of flexion and extension\par RESISTIVE TESTING: painless 5/5 resisted flex/ext, sidebending b/l, and rotation\par SPECIAL TESTS: neg SLR b/l, neg MARGE b/l, neg Trendelenburg b/l \par \par PULSES: 2+ DP/PT pulses\par NEURO:  L1 - S2 intact to sensation and motor, DTRs 2+/4 patella and achilles\par \par B/L Hips: No asymmetry, malalignment, or swelling, Full ROM, 5/5 strength in flexion/ext, IR/ER, Abd/Add, Joints stable\par \par B/L Ankles: No asymmetry, malalignment, or swelling, Full ROM, 5/5 strength in DF/PF/Inv/Ev, Joints stable\par \par Bilateral Knees:\par APPEARANCE: no marked deformities, no swelling or malalignment\par POSITIVE TENDERNESS:  + crepitus of the anterior knee, and tenderness of patellar retinaculum\par NONTENDER: jt lines b/l, patellar & quadriceps tendons, MCL/LCL, ITB at the lateral femoral condyle & Gerdy's tubercle, pes bursa. \par ROM: full & painless, although some discomfort in deep knee flexion\par RESISTIVE TESTING: + discomfort with knee ext from deep knee flexion (stretched position), painless knee flexion. \par SPECIAL TESTS: stable v/v stress. painless grind. neg Lachman's. neg ant/post drawer. neg Feli's. neg Thessaly test. neg Mag's & Malacrae's\par NEURO: Normal sensation of LE, DTRs 2+/4 patella and achilles\par PULSES: 2+ DP/PT pulses\par \par  [de-identified] : The following radiographs were ordered and read by me during this patient's visit. I reviewed each radiograph in detail with the patient and discussed the findings as highlighted below. \par \par 3 views of the lumbar spine were obtained today that show degenerative changes and evidence of mild to moderate multilevel osteoarthritis .  No fracture, or dislocation seen at this time. There is slight levoscoliosis. No other obvious osseous abnormality. Otherwise unremarkable.\par \par 2 views of the cervical spine were obtained today that show degenerative changes and evidence of moderate osteoarthritis in the C4-C7 levels.  No fracture, or dislocation seen at this time. There is straightening of normal lordosis. No other obvious osseous abnormality. Otherwise unremarkable.\par ____________________\par I reviewed and clinically correlated the following outside imaging studies,\par EXAM: XR KNEE AP LAT 1-2V BI \par \par \par PROCEDURE DATE: 03/25/2019 \par \par \par \par INTERPRETATION: EXAMINATION: 2 views of each knee \par \par CLINICAL INFORMATION: Pain while walking \par \par IMPRESSION: \par \par No fracture or dislocation. Mild bilateral patellofemoral compartment \par arthrosis. Joint spaces are otherwise preserved. Superior patellar \par enthesophytes bilaterally.

## 2019-12-30 ENCOUNTER — OTHER (OUTPATIENT)
Age: 59
End: 2019-12-30

## 2019-12-31 ENCOUNTER — RX RENEWAL (OUTPATIENT)
Age: 59
End: 2019-12-31

## 2020-01-07 ENCOUNTER — APPOINTMENT (OUTPATIENT)
Dept: ULTRASOUND IMAGING | Facility: CLINIC | Age: 60
End: 2020-01-07
Payer: MEDICARE

## 2020-01-07 ENCOUNTER — OUTPATIENT (OUTPATIENT)
Dept: OUTPATIENT SERVICES | Facility: HOSPITAL | Age: 60
LOS: 1 days | End: 2020-01-07
Payer: MEDICARE

## 2020-01-07 DIAGNOSIS — Z98.891 HISTORY OF UTERINE SCAR FROM PREVIOUS SURGERY: Chronic | ICD-10-CM

## 2020-01-07 DIAGNOSIS — Z94.0 KIDNEY TRANSPLANT STATUS: Chronic | ICD-10-CM

## 2020-01-07 DIAGNOSIS — Z00.8 ENCOUNTER FOR OTHER GENERAL EXAMINATION: ICD-10-CM

## 2020-01-07 PROCEDURE — 93970 EXTREMITY STUDY: CPT | Mod: 26

## 2020-01-07 PROCEDURE — 93970 EXTREMITY STUDY: CPT

## 2020-01-21 ENCOUNTER — APPOINTMENT (OUTPATIENT)
Dept: TRANSPLANT | Facility: CLINIC | Age: 60
End: 2020-01-21

## 2020-01-21 ENCOUNTER — APPOINTMENT (OUTPATIENT)
Dept: CARDIOLOGY | Facility: CLINIC | Age: 60
End: 2020-01-21
Payer: MEDICARE

## 2020-01-21 VITALS
HEART RATE: 88 BPM | DIASTOLIC BLOOD PRESSURE: 79 MMHG | OXYGEN SATURATION: 99 % | WEIGHT: 220 LBS | SYSTOLIC BLOOD PRESSURE: 138 MMHG | HEIGHT: 64 IN | BODY MASS INDEX: 37.56 KG/M2

## 2020-01-21 DIAGNOSIS — R00.2 PALPITATIONS: ICD-10-CM

## 2020-01-21 PROCEDURE — 99214 OFFICE O/P EST MOD 30 MIN: CPT

## 2020-01-21 NOTE — HISTORY OF PRESENT ILLNESS
[FreeTextEntry1] : 1/21/2020\par Seen by psychiatrist.  Started on Zoloft, had to stop it due vomiting.  Has anxiety which is active.  \par No new issues.  No CP or SOB\par Remains on enalapril 2.5mg daily \par Remains on Xarelto 10mg daily \par Takes Vitamin D \par No blood in the urine or stool. \par mild bruising. \par Brother with DVT post kidney transplant. \par Has palptiations at times.  \par

## 2020-01-21 NOTE — ASSESSMENT
[FreeTextEntry1] : Assessment:\par 1.  R LE DVT\par - unclear if provoked, however symptoms started post operatively\par - diagnosed several months afterwards\par - on Xaerlto\par 2.  renal Transplant\par 3.  PCKD\par 4.  Venous insufficiency / Varicose veins\par 5.  Palpitations.  \par \par \par Plan\par 1.  Continue with Xarelto 10 for extended therapy - [facility] CHOICE trial\par 2.  Residual DVT, VALERIA 1 +, family history of VTE, overweight put her at risk of recurrence of VTE.  Favor extended therapy with half dose DOAC.   \par 3. Return in  1 year.  At that point will perform a repeat venous duplex.  \par 4. 24 hour holter to eval for palpitations (although this may just be anxiety, she was tearful in the  office)\par 5.  Psychiatry followup for anxiety.\par 6.  Dr. Hoff followup\par \par \par Adalid Maza

## 2020-01-23 ENCOUNTER — APPOINTMENT (OUTPATIENT)
Dept: NEPHROLOGY | Facility: CLINIC | Age: 60
End: 2020-01-23
Payer: MEDICARE

## 2020-01-23 ENCOUNTER — NON-APPOINTMENT (OUTPATIENT)
Age: 60
End: 2020-01-23

## 2020-01-23 VITALS
OXYGEN SATURATION: 98 % | RESPIRATION RATE: 16 BRPM | SYSTOLIC BLOOD PRESSURE: 124 MMHG | HEIGHT: 64 IN | BODY MASS INDEX: 37.56 KG/M2 | TEMPERATURE: 97.9 F | HEART RATE: 87 BPM | DIASTOLIC BLOOD PRESSURE: 73 MMHG | WEIGHT: 220 LBS

## 2020-01-23 PROCEDURE — 99214 OFFICE O/P EST MOD 30 MIN: CPT

## 2020-01-23 RX ORDER — TACROLIMUS 0.75 MG/1
0.75 TABLET, EXTENDED RELEASE ORAL DAILY
Qty: 60 | Refills: 5 | Status: DISCONTINUED | COMMUNITY
Start: 2018-11-16 | End: 2020-01-23

## 2020-01-23 NOTE — ASSESSMENT
[FreeTextEntry1] : 59 y.o female with PCKD s/p LURT \par 1.  Renal transplant - Baseline creatinine about 1.3, last 1.4.  DSA drawn in December was negative.  Pt with PRA of 49%.  Renal sonograms have been normal.   \par 2.  Immunosuppression - now on envarsus for target tacro trough of 4-6, MMF 500mgs BID and pred 5.  GI system still not normal therefore will continue reduced MMF for now.  change envarsus to cyclosporine 75mgs BID as many of her symptoms could be related to neurotoxicity.  \par 3.  HTN - controlled to low at home.  on enalapril for polycythemia \par 4.  Prophylaxis - developed high methemoglobin levels on dapsone so stopped.  she is back on bactrim and WBC and potassium are ok.  \par 5.  Wt gain - likely from prednisone.  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  DVT - Pt now following with Dr. Maza.  Pt still on xarelto\par 8.  Anxiety - pt has seen psychiatry and will be following with therapist.  Does not tolerate zoloft.  May improve with immunosuppression switch.   \par 9.  tremor/ exhaustion/ trouble concentration/ hair loss/ tremor - few of these symptoms may be psychosomatic but majority likely related to tacrolimus. Will change to cyclosporine 75mgs BID.  \par \par Start cyclosporine 75mgs BID and repeat labs next Wednesday.  Pt will f/u in 2 weeks to assess symptoms on cyclosporine.  \par she will continue to f/u with Dr. Hoff.  \par

## 2020-01-23 NOTE — PHYSICAL EXAM
[General Appearance - Alert] : alert [General Appearance - In No Acute Distress] : in no acute distress [General Appearance - Well Nourished] : well nourished [General Appearance - Well-Appearing] : healthy appearing [Sclera] : the sclera and conjunctiva were normal [Extraocular Movements] : extraocular movements were intact [Outer Ear] : the ears and nose were normal in appearance [Neck Appearance] : the appearance of the neck was normal [Jugular Venous Distention Increased] : there was no jugular-venous distention [Neck Cervical Mass (___cm)] : no neck mass was observed [Respiration, Rhythm And Depth] : normal respiratory rhythm and effort [Heart Rate And Rhythm] : heart rate was normal and rhythm regular [Heart Sounds] : normal S1 and S2 [Auscultation Breath Sounds / Voice Sounds] : lungs were clear to auscultation bilaterally [Full Pulse] : the pedal pulses are present [Heart Sounds Gallop] : no gallops [Bowel Sounds] : normal bowel sounds [Abdomen Tenderness] : non-tender [Abdomen Soft] : soft [] : no hepato-splenomegaly [Cervical Lymph Nodes Enlarged Posterior Bilaterally] : posterior cervical [Cervical Lymph Nodes Enlarged Anterior Bilaterally] : anterior cervical [Supraclavicular Lymph Nodes Enlarged Bilaterally] : supraclavicular [Abnormal Walk] : normal gait [Musculoskeletal - Swelling] : no joint swelling seen [Nail Clubbing] : no clubbing  or cyanosis of the fingernails [Skin Color & Pigmentation] : normal skin color and pigmentation [Skin Turgor] : normal skin turgor [Cranial Nerves] : cranial nerves 2-12 were intact [Oriented To Time, Place, And Person] : oriented to person, place, and time [Affect] : the affect was normal [Impaired Insight] : insight and judgment were intact [FreeTextEntry1] : chronic stasis changes of feet especially right foot - also with dilated blood vessels.

## 2020-01-23 NOTE — HISTORY OF PRESENT ILLNESS
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  \par \par Interval events:\par Pt recently had an episode of palpitations and also once had vertigo.  Becoming uncomfortable with driving.  Pt has seen orthopedics and physiatry for insurance related disability.  Found to have age related arthritis.  Pt also started zoloft for general anxiety disorder and going to see a therapist.  Unfortunately she is not tolerating it.  Also with occasional leg weakness, head aches, fatigue.  All occuring since transplant.

## 2020-01-24 LAB
ALBUMIN SERPL ELPH-MCNC: 4.2 G/DL
ALP BLD-CCNC: 68 U/L
ALT SERPL-CCNC: 10 U/L
ANION GAP SERPL CALC-SCNC: 15 MMOL/L
APPEARANCE: CLEAR
AST SERPL-CCNC: 13 U/L
BACTERIA: NEGATIVE
BASOPHILS # BLD AUTO: 0.03 K/UL
BASOPHILS NFR BLD AUTO: 0.6 %
BILIRUB SERPL-MCNC: 0.8 MG/DL
BILIRUBIN URINE: NEGATIVE
BKV DNA SPEC QL NAA+PROBE: NOT DETECTED COPIES/ML
BLOOD URINE: NEGATIVE
BUN SERPL-MCNC: 18 MG/DL
CALCIUM SERPL-MCNC: 9.1 MG/DL
CALCIUM SERPL-MCNC: 9.1 MG/DL
CHLORIDE SERPL-SCNC: 102 MMOL/L
CMV DNA SPEC QL NAA+PROBE: NOT DETECTED
CMVPCR LOG: NOT DETECTED LOG10IU/ML
CO2 SERPL-SCNC: 22 MMOL/L
COLOR: NORMAL
CREAT SERPL-MCNC: 1.43 MG/DL
CREAT SPEC-SCNC: 54 MG/DL
CREAT/PROT UR: NORMAL RATIO
EOSINOPHIL # BLD AUTO: 0.06 K/UL
EOSINOPHIL NFR BLD AUTO: 1.2 %
GLUCOSE QUALITATIVE U: NEGATIVE
GLUCOSE SERPL-MCNC: 103 MG/DL
HCT VFR BLD CALC: 44.4 %
HGB BLD-MCNC: 13.7 G/DL
HYALINE CASTS: 1 /LPF
IMM GRANULOCYTES NFR BLD AUTO: 0.2 %
KETONES URINE: NEGATIVE
LDH SERPL-CCNC: 171 U/L
LEUKOCYTE ESTERASE URINE: NEGATIVE
LYMPHOCYTES # BLD AUTO: 1.06 K/UL
LYMPHOCYTES NFR BLD AUTO: 20.8 %
MAGNESIUM SERPL-MCNC: 1.8 MG/DL
MAN DIFF?: NORMAL
MCHC RBC-ENTMCNC: 29.5 PG
MCHC RBC-ENTMCNC: 30.9 GM/DL
MCV RBC AUTO: 95.7 FL
MICROSCOPIC-UA: NORMAL
MONOCYTES # BLD AUTO: 0.5 K/UL
MONOCYTES NFR BLD AUTO: 9.8 %
NEUTROPHILS # BLD AUTO: 3.43 K/UL
NEUTROPHILS NFR BLD AUTO: 67.4 %
NITRITE URINE: NEGATIVE
PARATHYROID HORMONE INTACT: 102 PG/ML
PH URINE: 6.5
PHOSPHATE SERPL-MCNC: 3 MG/DL
PLATELET # BLD AUTO: 198 K/UL
POTASSIUM SERPL-SCNC: 5.1 MMOL/L
PROT SERPL-MCNC: 6.4 G/DL
PROT UR-MCNC: <4 MG/DL
PROTEIN URINE: NEGATIVE
RBC # BLD: 4.64 M/UL
RBC # FLD: 12.4 %
RED BLOOD CELLS URINE: 0 /HPF
SODIUM SERPL-SCNC: 140 MMOL/L
SPECIFIC GRAVITY URINE: 1.01
SQUAMOUS EPITHELIAL CELLS: 1 /HPF
TACROLIMUS SERPL-MCNC: 6.6 NG/ML
URATE SERPL-MCNC: 7.9 MG/DL
UROBILINOGEN URINE: NORMAL
WBC # FLD AUTO: 5.09 K/UL
WHITE BLOOD CELLS URINE: 0 /HPF

## 2020-01-29 ENCOUNTER — APPOINTMENT (OUTPATIENT)
Dept: TRANSPLANT | Facility: CLINIC | Age: 60
End: 2020-01-29

## 2020-01-30 ENCOUNTER — NON-APPOINTMENT (OUTPATIENT)
Age: 60
End: 2020-01-30

## 2020-01-30 LAB
ALBUMIN SERPL ELPH-MCNC: 4.2 G/DL
ALP BLD-CCNC: 70 U/L
ALT SERPL-CCNC: 9 U/L
ANION GAP SERPL CALC-SCNC: 14 MMOL/L
APPEARANCE: CLEAR
AST SERPL-CCNC: 14 U/L
BACTERIA: NEGATIVE
BASOPHILS # BLD AUTO: 0.03 K/UL
BASOPHILS NFR BLD AUTO: 0.6 %
BILIRUB SERPL-MCNC: 0.4 MG/DL
BILIRUBIN URINE: NEGATIVE
BLOOD URINE: NEGATIVE
BUN SERPL-MCNC: 27 MG/DL
CALCIUM SERPL-MCNC: 9.5 MG/DL
CHLORIDE SERPL-SCNC: 103 MMOL/L
CMV DNA SPEC QL NAA+PROBE: NOT DETECTED
CMVPCR LOG: NOT DETECTED LOG10IU/ML
CO2 SERPL-SCNC: 24 MMOL/L
COLOR: COLORLESS
CREAT SERPL-MCNC: 1.78 MG/DL
CREAT SPEC-SCNC: 36 MG/DL
CREAT/PROT UR: NORMAL RATIO
CYCLOSPORINE SER-MCNC: 71 NG/ML
EOSINOPHIL # BLD AUTO: 0.07 K/UL
EOSINOPHIL NFR BLD AUTO: 1.4 %
GLUCOSE QUALITATIVE U: NEGATIVE
GLUCOSE SERPL-MCNC: 104 MG/DL
HCT VFR BLD CALC: 45 %
HGB BLD-MCNC: 13.9 G/DL
HYALINE CASTS: 1 /LPF
IMM GRANULOCYTES NFR BLD AUTO: 0 %
KETONES URINE: NEGATIVE
LDH SERPL-CCNC: 194 U/L
LEUKOCYTE ESTERASE URINE: NEGATIVE
LYMPHOCYTES # BLD AUTO: 1.46 K/UL
LYMPHOCYTES NFR BLD AUTO: 29.3 %
MAGNESIUM SERPL-MCNC: 1.9 MG/DL
MAN DIFF?: NORMAL
MCHC RBC-ENTMCNC: 29.8 PG
MCHC RBC-ENTMCNC: 30.9 GM/DL
MCV RBC AUTO: 96.4 FL
MICROSCOPIC-UA: NORMAL
MONOCYTES # BLD AUTO: 0.39 K/UL
MONOCYTES NFR BLD AUTO: 7.8 %
NEUTROPHILS # BLD AUTO: 3.03 K/UL
NEUTROPHILS NFR BLD AUTO: 60.9 %
NITRITE URINE: NEGATIVE
PH URINE: 6.5
PHOSPHATE SERPL-MCNC: 3.8 MG/DL
PLATELET # BLD AUTO: 216 K/UL
POTASSIUM SERPL-SCNC: 5.3 MMOL/L
PROT SERPL-MCNC: 6.3 G/DL
PROT UR-MCNC: <4 MG/DL
PROTEIN URINE: NEGATIVE
RBC # BLD: 4.67 M/UL
RBC # FLD: 12.5 %
RED BLOOD CELLS URINE: 1 /HPF
SODIUM SERPL-SCNC: 141 MMOL/L
SPECIFIC GRAVITY URINE: 1.01
SQUAMOUS EPITHELIAL CELLS: 1 /HPF
URATE SERPL-MCNC: 9.2 MG/DL
UROBILINOGEN URINE: NORMAL
WBC # FLD AUTO: 4.98 K/UL
WHITE BLOOD CELLS URINE: 1 /HPF

## 2020-01-31 LAB — BKV DNA SPEC QL NAA+PROBE: NOT DETECTED COPIES/ML

## 2020-02-01 ENCOUNTER — EMERGENCY (EMERGENCY)
Facility: HOSPITAL | Age: 60
LOS: 1 days | Discharge: HOME | End: 2020-02-01
Attending: EMERGENCY MEDICINE
Payer: MEDICARE

## 2020-02-01 VITALS
WEIGHT: 220.02 LBS | OXYGEN SATURATION: 95 % | HEART RATE: 105 BPM | DIASTOLIC BLOOD PRESSURE: 80 MMHG | SYSTOLIC BLOOD PRESSURE: 159 MMHG | TEMPERATURE: 98 F | HEIGHT: 64 IN | RESPIRATION RATE: 18 BRPM

## 2020-02-01 VITALS
TEMPERATURE: 99 F | RESPIRATION RATE: 18 BRPM | SYSTOLIC BLOOD PRESSURE: 118 MMHG | HEART RATE: 74 BPM | DIASTOLIC BLOOD PRESSURE: 76 MMHG | OXYGEN SATURATION: 99 %

## 2020-02-01 DIAGNOSIS — Z94.0 KIDNEY TRANSPLANT STATUS: Chronic | ICD-10-CM

## 2020-02-01 DIAGNOSIS — Z98.891 HISTORY OF UTERINE SCAR FROM PREVIOUS SURGERY: Chronic | ICD-10-CM

## 2020-02-01 LAB
ALBUMIN SERPL ELPH-MCNC: 4.1 G/DL — SIGNIFICANT CHANGE UP (ref 3.3–5)
ALP SERPL-CCNC: 76 U/L — SIGNIFICANT CHANGE UP (ref 40–120)
ALT FLD-CCNC: 8 U/L — LOW (ref 10–45)
ANION GAP SERPL CALC-SCNC: 14 MMOL/L — SIGNIFICANT CHANGE UP (ref 5–17)
APPEARANCE UR: ABNORMAL
AST SERPL-CCNC: 14 U/L — SIGNIFICANT CHANGE UP (ref 10–40)
BACTERIA # UR AUTO: NEGATIVE — SIGNIFICANT CHANGE UP
BASOPHILS # BLD AUTO: 0.04 K/UL — SIGNIFICANT CHANGE UP (ref 0–0.2)
BASOPHILS NFR BLD AUTO: 0.5 % — SIGNIFICANT CHANGE UP (ref 0–2)
BILIRUB SERPL-MCNC: 0.3 MG/DL — SIGNIFICANT CHANGE UP (ref 0.2–1.2)
BILIRUB UR-MCNC: NEGATIVE — SIGNIFICANT CHANGE UP
BUN SERPL-MCNC: 27 MG/DL — HIGH (ref 7–23)
CALCIUM SERPL-MCNC: 9.4 MG/DL — SIGNIFICANT CHANGE UP (ref 8.4–10.5)
CHLORIDE SERPL-SCNC: 100 MMOL/L — SIGNIFICANT CHANGE UP (ref 96–108)
CO2 SERPL-SCNC: 26 MMOL/L — SIGNIFICANT CHANGE UP (ref 22–31)
COLOR SPEC: SIGNIFICANT CHANGE UP
CREAT SERPL-MCNC: 1.55 MG/DL — HIGH (ref 0.5–1.3)
CYCLOSPORINE SER-MCNC: 602 NG/ML — CRITICAL HIGH (ref 150–400)
DIFF PNL FLD: NEGATIVE — SIGNIFICANT CHANGE UP
EOSINOPHIL # BLD AUTO: 0.03 K/UL — SIGNIFICANT CHANGE UP (ref 0–0.5)
EOSINOPHIL NFR BLD AUTO: 0.4 % — SIGNIFICANT CHANGE UP (ref 0–6)
EPI CELLS # UR: 2 /HPF — SIGNIFICANT CHANGE UP
GLUCOSE SERPL-MCNC: 127 MG/DL — HIGH (ref 70–99)
GLUCOSE UR QL: NEGATIVE — SIGNIFICANT CHANGE UP
HCT VFR BLD CALC: 46.2 % — HIGH (ref 34.5–45)
HGB BLD-MCNC: 14.1 G/DL — SIGNIFICANT CHANGE UP (ref 11.5–15.5)
HYALINE CASTS # UR AUTO: 0 /LPF — SIGNIFICANT CHANGE UP (ref 0–2)
IMM GRANULOCYTES NFR BLD AUTO: 0.4 % — SIGNIFICANT CHANGE UP (ref 0–1.5)
KETONES UR-MCNC: NEGATIVE — SIGNIFICANT CHANGE UP
LEUKOCYTE ESTERASE UR-ACNC: NEGATIVE — SIGNIFICANT CHANGE UP
LIDOCAIN IGE QN: 58 U/L — SIGNIFICANT CHANGE UP (ref 7–60)
LYMPHOCYTES # BLD AUTO: 1.16 K/UL — SIGNIFICANT CHANGE UP (ref 1–3.3)
LYMPHOCYTES # BLD AUTO: 15.2 % — SIGNIFICANT CHANGE UP (ref 13–44)
MCHC RBC-ENTMCNC: 28.7 PG — SIGNIFICANT CHANGE UP (ref 27–34)
MCHC RBC-ENTMCNC: 30.5 GM/DL — LOW (ref 32–36)
MCV RBC AUTO: 94.1 FL — SIGNIFICANT CHANGE UP (ref 80–100)
MONOCYTES # BLD AUTO: 0.4 K/UL — SIGNIFICANT CHANGE UP (ref 0–0.9)
MONOCYTES NFR BLD AUTO: 5.2 % — SIGNIFICANT CHANGE UP (ref 2–14)
NEUTROPHILS # BLD AUTO: 5.98 K/UL — SIGNIFICANT CHANGE UP (ref 1.8–7.4)
NEUTROPHILS NFR BLD AUTO: 78.3 % — HIGH (ref 43–77)
NITRITE UR-MCNC: NEGATIVE — SIGNIFICANT CHANGE UP
NRBC # BLD: 0 /100 WBCS — SIGNIFICANT CHANGE UP (ref 0–0)
PH UR: 6.5 — SIGNIFICANT CHANGE UP (ref 5–8)
PLATELET # BLD AUTO: 227 K/UL — SIGNIFICANT CHANGE UP (ref 150–400)
POTASSIUM SERPL-MCNC: 4.1 MMOL/L — SIGNIFICANT CHANGE UP (ref 3.5–5.3)
POTASSIUM SERPL-SCNC: 4.1 MMOL/L — SIGNIFICANT CHANGE UP (ref 3.5–5.3)
PROT SERPL-MCNC: 7.5 G/DL — SIGNIFICANT CHANGE UP (ref 6–8.3)
PROT UR-MCNC: ABNORMAL
RBC # BLD: 4.91 M/UL — SIGNIFICANT CHANGE UP (ref 3.8–5.2)
RBC # FLD: 12.4 % — SIGNIFICANT CHANGE UP (ref 10.3–14.5)
RBC CASTS # UR COMP ASSIST: 7 /HPF — HIGH (ref 0–4)
SODIUM SERPL-SCNC: 140 MMOL/L — SIGNIFICANT CHANGE UP (ref 135–145)
SP GR SPEC: 1.02 — SIGNIFICANT CHANGE UP (ref 1.01–1.02)
UROBILINOGEN FLD QL: NEGATIVE — SIGNIFICANT CHANGE UP
WBC # BLD: 7.64 K/UL — SIGNIFICANT CHANGE UP (ref 3.8–10.5)
WBC # FLD AUTO: 7.64 K/UL — SIGNIFICANT CHANGE UP (ref 3.8–10.5)
WBC UR QL: 2 /HPF — SIGNIFICANT CHANGE UP (ref 0–5)

## 2020-02-01 PROCEDURE — 83690 ASSAY OF LIPASE: CPT

## 2020-02-01 PROCEDURE — 80158 DRUG ASSAY CYCLOSPORINE: CPT

## 2020-02-01 PROCEDURE — 99282 EMERGENCY DEPT VISIT SF MDM: CPT

## 2020-02-01 PROCEDURE — 96374 THER/PROPH/DIAG INJ IV PUSH: CPT

## 2020-02-01 PROCEDURE — 99284 EMERGENCY DEPT VISIT MOD MDM: CPT | Mod: 25

## 2020-02-01 PROCEDURE — 96361 HYDRATE IV INFUSION ADD-ON: CPT

## 2020-02-01 PROCEDURE — 80053 COMPREHEN METABOLIC PANEL: CPT

## 2020-02-01 PROCEDURE — 81001 URINALYSIS AUTO W/SCOPE: CPT

## 2020-02-01 PROCEDURE — 87086 URINE CULTURE/COLONY COUNT: CPT

## 2020-02-01 PROCEDURE — 99284 EMERGENCY DEPT VISIT MOD MDM: CPT | Mod: GC

## 2020-02-01 PROCEDURE — 85027 COMPLETE CBC AUTOMATED: CPT

## 2020-02-01 RX ORDER — ONDANSETRON 8 MG/1
4 TABLET, FILM COATED ORAL ONCE
Refills: 0 | Status: COMPLETED | OUTPATIENT
Start: 2020-02-01 | End: 2020-02-01

## 2020-02-01 RX ORDER — ACETAMINOPHEN 500 MG
650 TABLET ORAL ONCE
Refills: 0 | Status: COMPLETED | OUTPATIENT
Start: 2020-02-01 | End: 2020-02-01

## 2020-02-01 RX ORDER — ONDANSETRON 8 MG/1
1 TABLET, FILM COATED ORAL
Qty: 15 | Refills: 0
Start: 2020-02-01 | End: 2020-02-05

## 2020-02-01 RX ORDER — SODIUM CHLORIDE 9 MG/ML
1000 INJECTION INTRAMUSCULAR; INTRAVENOUS; SUBCUTANEOUS ONCE
Refills: 0 | Status: COMPLETED | OUTPATIENT
Start: 2020-02-01 | End: 2020-02-01

## 2020-02-01 RX ADMIN — Medication 650 MILLIGRAM(S): at 08:37

## 2020-02-01 RX ADMIN — ONDANSETRON 4 MILLIGRAM(S): 8 TABLET, FILM COATED ORAL at 05:50

## 2020-02-01 RX ADMIN — SODIUM CHLORIDE 1000 MILLILITER(S): 9 INJECTION INTRAMUSCULAR; INTRAVENOUS; SUBCUTANEOUS at 07:39

## 2020-02-01 RX ADMIN — SODIUM CHLORIDE 1000 MILLILITER(S): 9 INJECTION INTRAMUSCULAR; INTRAVENOUS; SUBCUTANEOUS at 05:50

## 2020-02-01 RX ADMIN — Medication 650 MILLIGRAM(S): at 09:08

## 2020-02-01 NOTE — ED PROVIDER NOTE - PATIENT PORTAL LINK FT
You can access the FollowMyHealth Patient Portal offered by [facility] by registering at the following website: [email or web address]. By joining [employer]’s FollowMyHealth portal, you will also be able to view your health information using other applications (apps) compatible with our system.

## 2020-02-01 NOTE — ED PROVIDER NOTE - PROGRESS NOTE DETAILS
Micha Barrios M.D. Resident: Discussed pt with renal transplant team, will call back with formal recommendations, pt denies N/V, will PO challenge

## 2020-02-01 NOTE — PROGRESS NOTE ADULT - SUBJECTIVE AND OBJECTIVE BOX
[facility] DIVISION OF KIDNEY DISEASES AND HYPERTENSION -- INITIAL CONSULT NOTE  --------------------------------------------------------------------------------  HPI:  Pt is a 58 yo F with PMH PCKD, kidney transplant 2017 (on cyclosporine, prednisone, cellcept, bactrim), DVT on xarelto, HLD p/w 4 episodes of NBNB emesis since last night.  1 week ago changed Envarsus to cyclosporine as an outpatient.  3 days ago dose increased to 100mgs BID.  Last night pt developed vomiting.  No sick contacts, no diarrhea.  Thinks may be related to CsA.      PAST HISTORY  --------------------------------------------------------------------------------  PAST MEDICAL & SURGICAL HISTORY:  Kidney transplant recipient: living donor  Petechial rash  Phlebitis  ESRD (end stage renal disease)  Polycystic kidney disease  HTN (hypertension)  History of living-donor kidney transplantation: 2017  S/P     FAMILY HISTORY:    PAST SOCIAL HISTORY:    ALLERGIES & MEDICATIONS  --------------------------------------------------------------------------------  Allergies    No Known Allergies    Intolerances      Standing Inpatient Medications    PRN Inpatient Medications      REVIEW OF SYSTEMS  --------------------------------------------------------------------------------  Gen: No weight changes, fatigue, fevers/chills, weakness  Skin: No rashes  Head/Eyes/Ears/Mouth: No headache; Normal hearing; Normal vision w/o blurriness; No sinus pain/discomfort, sore throat  Respiratory: No dyspnea, cough, wheezing, hemoptysis  CV: No chest pain, PND, orthopnea  GI: per HPI  : No increased frequency, dysuria, hematuria, nocturia  MSK: No joint pain/swelling; no back pain; no edema  Neuro: No dizziness/lightheadedness, weakness, seizures, numbness, tingling  Heme: No easy bruising or bleeding  Endo: No heat/cold intolerance  Psych: anxiety    All other systems were reviewed and are negative, except as noted.    VITALS/PHYSICAL EXAM  --------------------------------------------------------------------------------  T(C): 36.8 (20 @ 10:07), Max: 36.8 (20 @ 10:07)  HR: 81 (20 @ 10:07) (81 - 105)  BP: 133/71 (20 @ 10:07) (133/71 - 159/80)  RR: 18 (20 @ 10:07) (18 - 18)  SpO2: 96% (20 @ 10:07) (95% - 96%)  Wt(kg): --  Height (cm): 162.56 (20 @ 05:17)  Weight (kg): 99.8 (20 @ 05:17)  BMI (kg/m2): 37.8 (20 @ 05:17)  BSA (m2): 2.04 (20 @ 05:17)      Physical Exam:  	Gen: NAD, slightly anxious  	HEENT: PERRL, supple neck, clear oropharynx  	Pulm: CTA B/L  	CV: RRR, S1S2; no rub  	Back: No spinal or CVA tenderness; no sacral edema  	Abd: +BS, soft, nontender/nondistended                      Transplant: non tender  	: No suprapubic tenderness  	UE: Warm, FROM, no clubbing, intact strength; no edema; no asterixis  	LE: Warm, FROM, no clubbing, intact strength; no edema  	Neuro: No focal deficits, intact gait  	Psych: Normal affect and mood  	Skin: Warm, without rashes      LABS/STUDIES  --------------------------------------------------------------------------------              14.1   7.64  >-----------<  227      [20 @ 05:50]              46.2     140  |  100  |  27  ----------------------------<  127      [20 @ 05:50]  4.1   |  26  |  1.55        Ca     9.4     [20 @ 05:50]    TPro  7.5  /  Alb  4.1  /  TBili  0.3  /  DBili  x   /  AST  14  /  ALT  8   /  AlkPhos  76  [20 @ 05:50]          Creatinine Trend:  SCr 1.55 [ 05:50]    Urinalysis - [20 @ 07:43]      Color Light Yellow / Appearance Slightly Turbid / SG 1.017 / pH 6.5      Gluc Negative / Ketone Negative  / Bili Negative / Urobili Negative       Blood Negative / Protein Trace / Leuk Est Negative / Nitrite Negative      RBC 7 / WBC 2 / Hyaline 0 / Gran  / Sq Epi  / Non Sq Epi 2 / Bacteria Negative      HbA1c 5.7      [16 @ 18:07]    HBsAb <3.0      [16 @ 18:07]  HBsAb Nonreact      [16 @ 18:07]  HBsAg Nonreact      [16 @ 18:07]  HBcAb Nonreact      [16 @ 18:07]  HCV 0.27, Nonreact      [16 @ 18:07]  HIV Nonreact      [16 @ 18:07]      Tacrolimus  Cyclosporine  Sirolimus  Mycophenolate  BK PCR  CMV PCR  Parvo PCR  EBV PCR

## 2020-02-01 NOTE — CONSULT NOTE ADULT - SUBJECTIVE AND OBJECTIVE BOX
Consult Note    HPI: 58 y/o F PMH polycystic kidney disease, hypertension, ESRD s/p LDRT to the right external iliac vessels 2017, hx of DVT on xarelto, HLD, seen in ED c/o 4 episodes of NBNB emesis that began last night. She was seen at the outpatient transplant office on 20, during which she was switched from tacrolimus to Cyclosporine (CSA was changed from 75 to 100 on 20). She has no other complaints, denies SOB, CP, abdominal pain, dyspnea, HA, dizziness, fevers.     PAST MEDICAL & SURGICAL HISTORY:  Kidney transplant recipient: living donor  Petechial rash  Phlebitis  ESRD (end stage renal disease)  Polycystic kidney disease  HTN (hypertension)  History of living-donor kidney transplantation: 2017  S/P       Vital Signs Last 24 Hrs  T(C): 37.1 (2020 12:09), Max: 37.1 (2020 12:09)  T(F): 98.7 (2020 12:09), Max: 98.7 (2020 12:09)  HR: 74 (2020 12:09) (74 - 105)  BP: 118/76 (2020 12:09) (118/76 - 159/80)  BP(mean): --  RR: 18 (2020 12:09) (18 - 18)  SpO2: 99% (2020 12:09) (95% - 99%)    I&O's Summary                            14.1   7.64  )-----------( 227      ( 2020 05:50 )             46.2     02-    140  |  100  |  27<H>  ----------------------------<  127<H>  4.1   |  26  |  1.55<H>    Ca    9.4      2020 05:50    TPro  7.5  /  Alb  4.1  /  TBili  0.3  /  DBili  x   /  AST  14  /  ALT  8<L>  /  AlkPhos  76  02-01        Review of systems  Gen: No weight changes, fatigue, fevers/chills, weakness. +nausea +vomiting (4 episodes since last night).   Skin: No rashes  Head/Eyes/Ears/Mouth: No headache; Normal hearing; Normal vision w/o blurriness; No sinus pain/discomfort, sore throat  Respiratory: No dyspnea, cough, wheezing, hemoptysis  CV: No chest pain, PND, orthopnea  GI: No abdominal pain, diarrhea, constipation, melena, hematochezia. +N/V.  : No increased frequency, dysuria, hematuria, nocturia  MSK: No joint pain/swelling; no back pain; no edema  Neuro: No dizziness/lightheadedness, weakness, seizures, numbness, tingling  Heme: No easy bruising or bleeding  Endo: No heat/cold intolerance  Psych: No significant nervousness, anxiety, stress, depression  All other systems were reviewed and are negative, except as noted.      PHYSICAL EXAM:  Constitutional: Well developed / well nourished  Eyes: Anicteric, PERRLA  ENMT: nc/at  Respiratory: CTA B/L  Cardiovascular: RRR  Gastrointestinal: Soft abdomen, mild tender to touch at surgical site, ND  Genitourinary: Voiding spontaneously  Extremities: No LE edema.   Vascular: Palpable dp pulses bilaterally  Neurological: A&O x3  Musculoskeletal: Moving all extremities  Psychiatric: Responsive

## 2020-02-01 NOTE — ED PROVIDER NOTE - CLINICAL SUMMARY MEDICAL DECISION MAKING FREE TEXT BOX
Xin Brown MD PGY-2 nausea and vomiting without abd pain, +recent med changes. ddx includes but not limited to pancreatitis vs gastroenteritis vs medication side effect. NO TTP over graft site. will get labs, IVF, antiemetics. reeval Xin Brown MD PGY-2 nausea and vomiting without abd pain, +recent med changes. ddx includes but not limited to pancreatitis vs gastroenteritis vs medication side effect. No TTP over graft site. will get labs, IVF, antiemetics. reeval

## 2020-02-01 NOTE — ED POST DISCHARGE NOTE - REASON FOR FOLLOW-UP
Other Lab results with elevated cyclosporine level, discussed with renal transplant team, will f/u with patient

## 2020-02-01 NOTE — ED PROVIDER NOTE - OBJECTIVE STATEMENT
Anti-rejection meds changed from Invars to Cyclosporin 1 week ago and dose increased 2 days ago  Nephrology: Tam Hoff  Transplant: Sandy Xin Brown MD PGY-2 60 yo F with PMH kidney transplant 2017 (on cyclosporine, prednisone, cellcept, bactrim), DVT on xarelto, HLD p/w 4 episodes of NBNB emesis since last night. No abdominal pain. Anti-rejection meds changed from Invars to Cyclosporin 1 week ago and dose increased 2 days ago. States she felt face tingling after med changes. Cr 1.7 on Thursday    Nephrology: Tam Hoff  Transplant: Sandy

## 2020-02-01 NOTE — ED POST DISCHARGE NOTE - DETAILS
discussed results with patient, urged follow up with her transplant team to recheck to ensure no infection. - Janeen Moreno PA-C

## 2020-02-01 NOTE — ED ADULT NURSE NOTE - NSIMPLEMENTINTERV_GEN_ALL_ED
Implemented All Fall with Harm Risk Interventions:  [place] to call system. Call bell, personal items and telephone within reach. Instruct patient to call for assistance. Room bathroom lighting operational. Non-slip footwear when patient is off stretcher. Physically safe environment: no spills, clutter or unnecessary equipment. Stretcher in lowest position, wheels locked, appropriate side rails in place. Provide visual cue, wrist band, yellow gown, etc. Monitor gait and stability. Monitor for mental status changes and reorient to person, place, and time. Review medications for side effects contributing to fall risk. Reinforce activity limits and safety measures with patient and family. Provide visual clues: red socks.

## 2020-02-01 NOTE — ED PROVIDER NOTE - NSFOLLOWUPINSTRUCTIONS_ED_ALL_ED_FT
You were seen in the Emergency Department for nausea and vomiting.   1) Advance activity as tolerated.    2) STOP TAKING YOUR LASIX.  your prescription for Zofran and take as directed for nausea.  3) Follow up with the transplant team next week - take copies of your results.    4) Return to the Emergency Department for worsening or persistent symptoms, and/or ANY NEW OR CONCERNING SYMPTOMS. If you have issues obtaining follow up, please call: [phone] to obtain a doctor or specialist who takes your insurance in your area.

## 2020-02-01 NOTE — PROGRESS NOTE ADULT - ASSESSMENT
60 yo F with PMH PCKD, kidney transplant 2017 (on cyclosporine, prednisone, cellcept, bactrim), DVT on xarelto, HLD p/w 4 episodes of NBNB emesis.  1.  Renal transplant - creatinine 1.5 which is close to baseline for her.    2.  Immunosuppression - continue cyclosporine 100mgs BID but please check level today (from blood before dose of cyclosporine.)  Cont MMF and pred at home doses.  3.  N/V - unclear if due to immunosuppression.  Pt now eating and tolerating meds.  Can d/c home on PO zofran.  Pt has f/u appointment this week - will reassess then.  Stop lasix.

## 2020-02-01 NOTE — CONSULT NOTE ADULT - ASSESSMENT
60 y/o F PMH polycystic kidney disease, hypertension, ESRD s/p LDRT to the right external iliac vessels 11/7/2017, hx of DVT on xarelto, HLD, seen in ED c/o 4 episodes of NBNB emesis that began last night. She was seen at the outpatient transplant office on 1/23/20, during which she was switched from tacrolimus to Cyclosporine (CSA was changed from 75 to 100 on 1/29/20). She has no other complaints, denies SOB, CP, abdominal pain, dyspnea, HA, dizziness, fevers.     Patient seen in ED, n 58 y/o F PMH polycystic kidney disease, hypertension, ESRD s/p LDRT to the right external iliac vessels 11/7/2017, hx of DVT on xarelto, HLD, seen in ED c/o 4 episodes of NBNB emesis that began last night. She was seen at the outpatient transplant office on 1/23/20, during which she was switched from tacrolimus to Cyclosporine (CSA was changed from 75 to 100 BID on 1/29/20). She has no other complaints, denies SOB, CP, abdominal pain, dyspnea, HA, dizziness, fevers.     Patient seen in ED, states that the nausea has improved w/ zofran. Now tolerating a diet/meds. Labs WNL, Cr 1.5 (near baseline), UA neg.   -stable to discharge home w/ outpatient transplant f/u next week.  -please send home w/ zofran.  -discontinue lasix  -Resume  BID, MMF, pred and all other home meds except lasix; CSA level pending; will f/u level. Symptoms can be as a result of CSA/elevated level as it was started 1 week ago. Patient asked to call outpatient office should her symptoms return or worsen. Will consider switching her back to tacro if symptoms reoccur or worsen.   -please page [phone] w/ questions.

## 2020-02-01 NOTE — ED ADULT NURSE NOTE - OBJECTIVE STATEMENT
59 YOF A&OX3 with pmh of right sided kidney transplant, left arm tremor right leg dvt (s/p kidney transplant on Xarelto), & HTN presents to ED for nausea/vomiting of 1 day. Pt states recently had her anti-rejection medications changed to cyclosporin and believes that is the cause of her nausea/vomiting. pt currently not actively vomiting. pt states n/v began after dinner. pt denies sob, chest pain, diarrhea, headaches, dizziness, blurry vision. pt appears to be in no acute distress, family at bedside, safety maintained.

## 2020-02-01 NOTE — ED PROVIDER NOTE - ATTENDING CONTRIBUTION TO CARE
I performed the initial face to face bedside interview with this patient regarding history of present illness, review of symptoms and past medical, social and surgical history.  I completed an independent physical examination.  I was the initial provider who evaluated this patient. I have discussed the patient’s plan of care and disposition with the resident. I have reviewed the residents documentation. Afebrile. Awake and Alert. Lungs CTA. Heart RRR. Abdomen soft NTND. CN II-XII grossly intact. Moves all extremities without lateralization.    I performed the initial face to face bedside interview with this patient regarding history of present illness, review of symptoms and past medical, social and surgical history.  I completed an independent physical examination.  I was the initial provider who evaluated this patient. I have discussed the patient’s plan of care and disposition with the resident. I have reviewed the residents documentation.

## 2020-02-01 NOTE — ED PROVIDER NOTE - PHYSICAL EXAMINATION
PHYSICAL EXAM:   General: appears stated age, in no acute distress but does appears anxious  HEENT: NC/AT, airway patent  Cardiovascular: tachycardic rate and regular rhythm, + S1/S2, no murmurs, rubs, gallops appreciated  Respiratory: clear to auscultation bilaterally, good aeration bilaterally, nonlabored respirations  Abdominal: soft, nontender, nondistended, no rebound, guarding or rigidity  Psychiatric: anxious appearing  -Xin Brown PGY-2

## 2020-02-02 LAB
CULTURE RESULTS: SIGNIFICANT CHANGE UP
SPECIMEN SOURCE: SIGNIFICANT CHANGE UP

## 2020-02-05 ENCOUNTER — APPOINTMENT (OUTPATIENT)
Dept: TRANSPLANT | Facility: CLINIC | Age: 60
End: 2020-02-05

## 2020-02-06 ENCOUNTER — APPOINTMENT (OUTPATIENT)
Dept: NEPHROLOGY | Facility: CLINIC | Age: 60
End: 2020-02-06
Payer: MEDICARE

## 2020-02-06 VITALS
BODY MASS INDEX: 37.56 KG/M2 | WEIGHT: 220 LBS | DIASTOLIC BLOOD PRESSURE: 73 MMHG | SYSTOLIC BLOOD PRESSURE: 150 MMHG | RESPIRATION RATE: 16 BRPM | OXYGEN SATURATION: 98 % | HEART RATE: 92 BPM | TEMPERATURE: 97.6 F | HEIGHT: 64 IN

## 2020-02-06 LAB
ALBUMIN SERPL ELPH-MCNC: 4.2 G/DL
ALP BLD-CCNC: 70 U/L
ALT SERPL-CCNC: 10 U/L
ANION GAP SERPL CALC-SCNC: 14 MMOL/L
APPEARANCE: CLEAR
AST SERPL-CCNC: 13 U/L
BACTERIA: NEGATIVE
BASOPHILS # BLD AUTO: 0.04 K/UL
BASOPHILS NFR BLD AUTO: 0.8 %
BILIRUB SERPL-MCNC: 0.6 MG/DL
BILIRUBIN URINE: NEGATIVE
BKV DNA SPEC QL NAA+PROBE: NOT DETECTED COPIES/ML
BLOOD URINE: NEGATIVE
BUN SERPL-MCNC: 25 MG/DL
CALCIUM SERPL-MCNC: 9.2 MG/DL
CHLORIDE SERPL-SCNC: 105 MMOL/L
CMV DNA SPEC QL NAA+PROBE: NOT DETECTED
CMVPCR LOG: NOT DETECTED LOG10IU/ML
CO2 SERPL-SCNC: 24 MMOL/L
COLOR: COLORLESS
CREAT SERPL-MCNC: 1.53 MG/DL
CREAT SPEC-SCNC: 38 MG/DL
CREAT/PROT UR: NORMAL RATIO
CYCLOSPORINE SER-MCNC: 135 NG/ML
EOSINOPHIL # BLD AUTO: 0.09 K/UL
EOSINOPHIL NFR BLD AUTO: 1.7 %
GLUCOSE QUALITATIVE U: NEGATIVE
GLUCOSE SERPL-MCNC: 106 MG/DL
HCT VFR BLD CALC: 43.9 %
HGB BLD-MCNC: 13.5 G/DL
HYALINE CASTS: 0 /LPF
IMM GRANULOCYTES NFR BLD AUTO: 0.2 %
KETONES URINE: NEGATIVE
LDH SERPL-CCNC: 220 U/L
LEUKOCYTE ESTERASE URINE: NEGATIVE
LYMPHOCYTES # BLD AUTO: 1.33 K/UL
LYMPHOCYTES NFR BLD AUTO: 25.8 %
MAGNESIUM SERPL-MCNC: 1.9 MG/DL
MAN DIFF?: NORMAL
MCHC RBC-ENTMCNC: 29.7 PG
MCHC RBC-ENTMCNC: 30.8 GM/DL
MCV RBC AUTO: 96.7 FL
MICROSCOPIC-UA: NORMAL
MONOCYTES # BLD AUTO: 0.4 K/UL
MONOCYTES NFR BLD AUTO: 7.8 %
NEUTROPHILS # BLD AUTO: 3.28 K/UL
NEUTROPHILS NFR BLD AUTO: 63.7 %
NITRITE URINE: NEGATIVE
PH URINE: 6.5
PHOSPHATE SERPL-MCNC: 3.9 MG/DL
PLATELET # BLD AUTO: 215 K/UL
POTASSIUM SERPL-SCNC: 5.1 MMOL/L
PROT SERPL-MCNC: 6.3 G/DL
PROT UR-MCNC: <4 MG/DL
PROTEIN URINE: NEGATIVE
RBC # BLD: 4.54 M/UL
RBC # FLD: 12.9 %
RED BLOOD CELLS URINE: 0 /HPF
SODIUM SERPL-SCNC: 142 MMOL/L
SPECIFIC GRAVITY URINE: 1.01
SQUAMOUS EPITHELIAL CELLS: 1 /HPF
URATE SERPL-MCNC: 8.8 MG/DL
UROBILINOGEN URINE: NORMAL
WBC # FLD AUTO: 5.15 K/UL
WHITE BLOOD CELLS URINE: 0 /HPF

## 2020-02-06 PROCEDURE — 99214 OFFICE O/P EST MOD 30 MIN: CPT

## 2020-02-06 RX ORDER — FUROSEMIDE 20 MG/1
20 TABLET ORAL DAILY
Qty: 90 | Refills: 3 | Status: DISCONTINUED | COMMUNITY
Start: 2018-04-25 | End: 2020-02-06

## 2020-02-06 RX ORDER — ENALAPRIL MALEATE 2.5 MG/1
2.5 TABLET ORAL DAILY
Qty: 30 | Refills: 2 | Status: DISCONTINUED | COMMUNITY
Start: 2019-05-06 | End: 2020-02-06

## 2020-02-06 NOTE — HISTORY OF PRESENT ILLNESS
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  \par \par Interval events:\par Pt admitted to hospital over weekend s/p headache and vomiting.  Recieved zofran and discharged home.  Creatinine about 1.5.  Lasix was also stopped.  Feeling less dizzy and weak but still occasionally nauseous.

## 2020-02-06 NOTE — ASSESSMENT
[FreeTextEntry1] : 59 y.o female with PCKD s/p LURT \par 1.  Renal transplant - Baseline creatinine about 1.3, last 1.4 - increased to 1.5 after switch to cyclosporine.  DSA drawn in December was negative.  Pt with PRA of 49%.  Renal sonograms have been normal.   \par 2.  Immunosuppression - now on cyclosporine target trough of 100-150.  Cont MMF 500mgs BID and pred 5.  Cannot increase MMF as she is nauseous.  Will give 2 more weeks to see if side effect profile is better with cyclosporine vs tacro.  \par 3.  HTN - controlled to low at home.  Hg now stable so will stop enalapril.  May held her weakness. \par 4.  Prophylaxis - developed high methemoglobin levels on dapsone so stopped.  she is back on bactrim and WBC and potassium are ok.  \par 5.  Wt gain - likely from prednisone.  \par 6.  Post transplant erythrocytosis - resolved.  Stop enalapril and monitor. \par 7.  DVT - Pt now following with Dr. Maza.  Pt still on xarelto\par 8.  Anxiety - pt has seen psychiatry and will be following with therapist.  Does not tolerate zoloft.  May improve with immunosuppression switch and time.   \par 9.  tremor/ exhaustion/ trouble concentration/ hair loss/ tremor - changed tacro to cyclosporine.  Will reassess symptoms in 2 weeks. \par \par \par \par

## 2020-02-06 NOTE — PHYSICAL EXAM
[General Appearance - Well Nourished] : well nourished [General Appearance - In No Acute Distress] : in no acute distress [General Appearance - Alert] : alert [General Appearance - Well-Appearing] : healthy appearing [Sclera] : the sclera and conjunctiva were normal [Outer Ear] : the ears and nose were normal in appearance [Extraocular Movements] : extraocular movements were intact [Neck Appearance] : the appearance of the neck was normal [Neck Cervical Mass (___cm)] : no neck mass was observed [Jugular Venous Distention Increased] : there was no jugular-venous distention [Heart Rate And Rhythm] : heart rate was normal and rhythm regular [Auscultation Breath Sounds / Voice Sounds] : lungs were clear to auscultation bilaterally [Respiration, Rhythm And Depth] : normal respiratory rhythm and effort [Heart Sounds Gallop] : no gallops [Heart Sounds] : normal S1 and S2 [Full Pulse] : the pedal pulses are present [Abdomen Soft] : soft [Bowel Sounds] : normal bowel sounds [] : no hepato-splenomegaly [Abdomen Tenderness] : non-tender [Supraclavicular Lymph Nodes Enlarged Bilaterally] : supraclavicular [Cervical Lymph Nodes Enlarged Anterior Bilaterally] : anterior cervical [Cervical Lymph Nodes Enlarged Posterior Bilaterally] : posterior cervical [Abnormal Walk] : normal gait [Nail Clubbing] : no clubbing  or cyanosis of the fingernails [Musculoskeletal - Swelling] : no joint swelling seen [Skin Color & Pigmentation] : normal skin color and pigmentation [Skin Turgor] : normal skin turgor [Cranial Nerves] : cranial nerves 2-12 were intact [Oriented To Time, Place, And Person] : oriented to person, place, and time [Impaired Insight] : insight and judgment were intact [Affect] : the affect was normal [FreeTextEntry1] : Cannot crouch

## 2020-02-19 ENCOUNTER — APPOINTMENT (OUTPATIENT)
Dept: TRANSPLANT | Facility: CLINIC | Age: 60
End: 2020-02-19

## 2020-02-19 ENCOUNTER — APPOINTMENT (OUTPATIENT)
Dept: NEPHROLOGY | Facility: CLINIC | Age: 60
End: 2020-02-19

## 2020-02-20 ENCOUNTER — APPOINTMENT (OUTPATIENT)
Dept: NEPHROLOGY | Facility: CLINIC | Age: 60
End: 2020-02-20
Payer: MEDICARE

## 2020-02-20 VITALS
WEIGHT: 220 LBS | BODY MASS INDEX: 37.56 KG/M2 | SYSTOLIC BLOOD PRESSURE: 169 MMHG | HEIGHT: 64 IN | HEART RATE: 88 BPM | TEMPERATURE: 97.8 F | RESPIRATION RATE: 16 BRPM | DIASTOLIC BLOOD PRESSURE: 82 MMHG | OXYGEN SATURATION: 97 %

## 2020-02-20 DIAGNOSIS — R53.82 CHRONIC FATIGUE, UNSPECIFIED: ICD-10-CM

## 2020-02-20 LAB
ALBUMIN SERPL ELPH-MCNC: 4.3 G/DL
ALP BLD-CCNC: 66 U/L
ALT SERPL-CCNC: 12 U/L
ANION GAP SERPL CALC-SCNC: 15 MMOL/L
APPEARANCE: CLEAR
AST SERPL-CCNC: 16 U/L
BACTERIA: NEGATIVE
BASOPHILS # BLD AUTO: 0.03 K/UL
BASOPHILS NFR BLD AUTO: 0.6 %
BILIRUB SERPL-MCNC: 0.7 MG/DL
BILIRUBIN URINE: NEGATIVE
BLOOD URINE: NEGATIVE
BUN SERPL-MCNC: 26 MG/DL
CALCIUM SERPL-MCNC: 9.8 MG/DL
CALCIUM SERPL-MCNC: 9.8 MG/DL
CHLORIDE SERPL-SCNC: 109 MMOL/L
CO2 SERPL-SCNC: 23 MMOL/L
COLOR: COLORLESS
CREAT SERPL-MCNC: 1.56 MG/DL
CREAT SPEC-SCNC: 22 MG/DL
CREAT/PROT UR: NORMAL RATIO
CYCLOSPORINE SER-MCNC: 186 NG/ML
EOSINOPHIL # BLD AUTO: 0.09 K/UL
EOSINOPHIL NFR BLD AUTO: 1.9 %
GLUCOSE QUALITATIVE U: NEGATIVE
GLUCOSE SERPL-MCNC: 123 MG/DL
HCT VFR BLD CALC: 46.5 %
HGB BLD-MCNC: 14.2 G/DL
HYALINE CASTS: 0 /LPF
IMM GRANULOCYTES NFR BLD AUTO: 0 %
KETONES URINE: NEGATIVE
LDH SERPL-CCNC: 253 U/L
LEUKOCYTE ESTERASE URINE: NEGATIVE
LYMPHOCYTES # BLD AUTO: 1.1 K/UL
LYMPHOCYTES NFR BLD AUTO: 23.8 %
MAGNESIUM SERPL-MCNC: 1.9 MG/DL
MAN DIFF?: NORMAL
MCHC RBC-ENTMCNC: 29.4 PG
MCHC RBC-ENTMCNC: 30.5 GM/DL
MCV RBC AUTO: 96.3 FL
MICROSCOPIC-UA: NORMAL
MONOCYTES # BLD AUTO: 0.41 K/UL
MONOCYTES NFR BLD AUTO: 8.9 %
NEUTROPHILS # BLD AUTO: 2.99 K/UL
NEUTROPHILS NFR BLD AUTO: 64.8 %
NITRITE URINE: NEGATIVE
PARATHYROID HORMONE INTACT: 46 PG/ML
PH URINE: 6
PHOSPHATE SERPL-MCNC: 4.5 MG/DL
PLATELET # BLD AUTO: 192 K/UL
POTASSIUM SERPL-SCNC: 5.9 MMOL/L
PROT SERPL-MCNC: 6.5 G/DL
PROT UR-MCNC: <4 MG/DL
PROTEIN URINE: NEGATIVE
RBC # BLD: 4.83 M/UL
RBC # FLD: 13.2 %
RED BLOOD CELLS URINE: 0 /HPF
SODIUM SERPL-SCNC: 147 MMOL/L
SPECIFIC GRAVITY URINE: 1.01
SQUAMOUS EPITHELIAL CELLS: 1 /HPF
URATE SERPL-MCNC: 8.5 MG/DL
UROBILINOGEN URINE: NORMAL
WBC # FLD AUTO: 4.62 K/UL
WHITE BLOOD CELLS URINE: 0 /HPF

## 2020-02-20 PROCEDURE — 99214 OFFICE O/P EST MOD 30 MIN: CPT

## 2020-02-20 NOTE — PHYSICAL EXAM
[General Appearance - Alert] : alert [General Appearance - Well Nourished] : well nourished [General Appearance - In No Acute Distress] : in no acute distress [Sclera] : the sclera and conjunctiva were normal [General Appearance - Well-Appearing] : healthy appearing [Extraocular Movements] : extraocular movements were intact [Outer Ear] : the ears and nose were normal in appearance [Neck Appearance] : the appearance of the neck was normal [Jugular Venous Distention Increased] : there was no jugular-venous distention [Neck Cervical Mass (___cm)] : no neck mass was observed [Respiration, Rhythm And Depth] : normal respiratory rhythm and effort [Heart Rate And Rhythm] : heart rate was normal and rhythm regular [Auscultation Breath Sounds / Voice Sounds] : lungs were clear to auscultation bilaterally [Heart Sounds] : normal S1 and S2 [Heart Sounds Gallop] : no gallops [Bowel Sounds] : normal bowel sounds [Full Pulse] : the pedal pulses are present [Abdomen Soft] : soft [Abdomen Tenderness] : non-tender [] : no hepato-splenomegaly [Cervical Lymph Nodes Enlarged Anterior Bilaterally] : anterior cervical [Cervical Lymph Nodes Enlarged Posterior Bilaterally] : posterior cervical [Supraclavicular Lymph Nodes Enlarged Bilaterally] : supraclavicular [Abnormal Walk] : normal gait [Nail Clubbing] : no clubbing  or cyanosis of the fingernails [Musculoskeletal - Swelling] : no joint swelling seen [Skin Color & Pigmentation] : normal skin color and pigmentation [Skin Turgor] : normal skin turgor [Cranial Nerves] : cranial nerves 2-12 were intact [Oriented To Time, Place, And Person] : oriented to person, place, and time [Impaired Insight] : insight and judgment were intact [Affect] : the affect was normal [Edema] : there was no peripheral edema [FreeTextEntry1] : + tremor

## 2020-02-20 NOTE — ASSESSMENT
[FreeTextEntry1] : 59 y.o female with PCKD s/p LURT \par 1.  Renal transplant - Baseline creatinine about 1.3, last 1.4 - increased to 1.5 after switch to cyclosporine.  DSA drawn in December was negative.  Pt with PRA of 49%.  Renal sonograms have been normal.   \par 2.  Immunosuppression - now on cyclosporine target trough of 100-150.  Lower dose to 75mgs BID as level is 180.  Cont MMF 500mgs BID and pred 2.5.  Cannot increase MMF as she is nauseous.  So far pt seems to feel better on cyclosporine over tacro - less fatigue.  \par 3.  HTN - controlled at home.   \par 4.  Prophylaxis - developed high methemoglobin levels on dapsone so stopped.  she is back on bactrim, monitor potassium\par 5.  Wt gain - likely from prednisone.  Now only on 2.5mgs. \par 6.  Post transplant erythrocytosis - resolved.  Monitor\par 7.  DVT - Pt now following with Dr. Maza.  Pt still on xarelto\par 8.  Anxiety - pt has seen psychiatry and will be following with therapist.  Does not tolerate zoloft.  May improve with immunosuppression switch and time.   \par 9.  tremor/ exhaustion/ trouble concentration/ hair loss/ tremor - changed tacro to cyclosporine.  Symptoms seem to be improving.  \par \par Pt will return for f/u in 2 weeks. \par \par

## 2020-02-20 NOTE — HISTORY OF PRESENT ILLNESS
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  \par \par Interval events:\par Pt feeling ok.  Last potassium elevated to 5.9 and sodium 147.  Thinks may be related to diet.  Was given lasix last night and today.  Overall still anxious but dizziness and groggy feeling has improved.  Creatinine has been about 1.5.

## 2020-02-21 LAB
ANION GAP SERPL CALC-SCNC: 15 MMOL/L
BUN SERPL-MCNC: 26 MG/DL
CALCIUM SERPL-MCNC: 9.7 MG/DL
CHLORIDE SERPL-SCNC: 104 MMOL/L
CO2 SERPL-SCNC: 24 MMOL/L
CREAT SERPL-MCNC: 1.56 MG/DL
GLUCOSE SERPL-MCNC: 116 MG/DL
POTASSIUM SERPL-SCNC: 5 MMOL/L
SODIUM SERPL-SCNC: 144 MMOL/L

## 2020-03-05 ENCOUNTER — APPOINTMENT (OUTPATIENT)
Dept: TRANSPLANT | Facility: CLINIC | Age: 60
End: 2020-03-05

## 2020-03-05 LAB
25(OH)D3 SERPL-MCNC: 42.7 NG/ML
ALBUMIN SERPL ELPH-MCNC: 4.3 G/DL
ALP BLD-CCNC: 69 U/L
ALT SERPL-CCNC: 8 U/L
ANION GAP SERPL CALC-SCNC: 15 MMOL/L
APPEARANCE: CLEAR
AST SERPL-CCNC: 14 U/L
BACTERIA: NEGATIVE
BASOPHILS # BLD AUTO: 0.03 K/UL
BASOPHILS NFR BLD AUTO: 0.8 %
BILIRUB SERPL-MCNC: 0.8 MG/DL
BILIRUBIN URINE: NEGATIVE
BLOOD URINE: NEGATIVE
BUN SERPL-MCNC: 26 MG/DL
CALCIUM SERPL-MCNC: 10 MG/DL
CALCIUM SERPL-MCNC: 10 MG/DL
CHLORIDE SERPL-SCNC: 109 MMOL/L
CHOLEST SERPL-MCNC: 179 MG/DL
CHOLEST/HDLC SERPL: 2.7 RATIO
CO2 SERPL-SCNC: 22 MMOL/L
COLOR: COLORLESS
CREAT SERPL-MCNC: 1.43 MG/DL
CREAT SPEC-SCNC: 36 MG/DL
CREAT/PROT UR: NORMAL RATIO
CYCLOSPORINE SER-MCNC: 149 NG/ML
EOSINOPHIL # BLD AUTO: 0.06 K/UL
EOSINOPHIL NFR BLD AUTO: 1.6 %
ESTIMATED AVERAGE GLUCOSE: 114 MG/DL
GLUCOSE QUALITATIVE U: NEGATIVE
GLUCOSE SERPL-MCNC: 106 MG/DL
HBA1C MFR BLD HPLC: 5.6 %
HCT VFR BLD CALC: 45.6 %
HDLC SERPL-MCNC: 66 MG/DL
HGB BLD-MCNC: 14.2 G/DL
HYALINE CASTS: 0 /LPF
IMM GRANULOCYTES NFR BLD AUTO: 0 %
KETONES URINE: NEGATIVE
LDH SERPL-CCNC: 197 U/L
LDLC SERPL CALC-MCNC: 93 MG/DL
LEUKOCYTE ESTERASE URINE: NEGATIVE
LYMPHOCYTES # BLD AUTO: 1.01 K/UL
LYMPHOCYTES NFR BLD AUTO: 27.4 %
MAGNESIUM SERPL-MCNC: 1.8 MG/DL
MAN DIFF?: NORMAL
MCHC RBC-ENTMCNC: 29.8 PG
MCHC RBC-ENTMCNC: 31.1 GM/DL
MCV RBC AUTO: 95.6 FL
MICROSCOPIC-UA: NORMAL
MONOCYTES # BLD AUTO: 0.32 K/UL
MONOCYTES NFR BLD AUTO: 8.7 %
NEUTROPHILS # BLD AUTO: 2.26 K/UL
NEUTROPHILS NFR BLD AUTO: 61.5 %
NITRITE URINE: NEGATIVE
PARATHYROID HORMONE INTACT: 53 PG/ML
PH URINE: 6
PHOSPHATE SERPL-MCNC: 4.4 MG/DL
PLATELET # BLD AUTO: 181 K/UL
POTASSIUM SERPL-SCNC: 5.4 MMOL/L
PROT SERPL-MCNC: 6.5 G/DL
PROT UR-MCNC: <4 MG/DL
PROTEIN URINE: NEGATIVE
RBC # BLD: 4.77 M/UL
RBC # FLD: 13.3 %
RED BLOOD CELLS URINE: 0 /HPF
SODIUM SERPL-SCNC: 146 MMOL/L
SPECIFIC GRAVITY URINE: 1.01
SQUAMOUS EPITHELIAL CELLS: 1 /HPF
TRIGL SERPL-MCNC: 102 MG/DL
URATE SERPL-MCNC: 8.2 MG/DL
UROBILINOGEN URINE: NORMAL
WBC # FLD AUTO: 3.68 K/UL
WHITE BLOOD CELLS URINE: 1 /HPF

## 2020-03-06 LAB — BKV DNA SPEC QL NAA+PROBE: NOT DETECTED COPIES/ML

## 2020-03-09 ENCOUNTER — APPOINTMENT (OUTPATIENT)
Dept: NEPHROLOGY | Facility: CLINIC | Age: 60
End: 2020-03-09
Payer: MEDICARE

## 2020-03-09 VITALS
BODY MASS INDEX: 38.41 KG/M2 | OXYGEN SATURATION: 97 % | RESPIRATION RATE: 16 BRPM | HEIGHT: 64 IN | DIASTOLIC BLOOD PRESSURE: 88 MMHG | WEIGHT: 225 LBS | HEART RATE: 87 BPM | SYSTOLIC BLOOD PRESSURE: 145 MMHG | TEMPERATURE: 97.6 F

## 2020-03-09 LAB — CMV DNA SPEC QL NAA+PROBE: NOT DETECTED

## 2020-03-09 PROCEDURE — 99214 OFFICE O/P EST MOD 30 MIN: CPT

## 2020-03-09 NOTE — PHYSICAL EXAM
[General Appearance - Alert] : alert [General Appearance - In No Acute Distress] : in no acute distress [General Appearance - Well Nourished] : well nourished [General Appearance - Well-Appearing] : healthy appearing [Sclera] : the sclera and conjunctiva were normal [Extraocular Movements] : extraocular movements were intact [Outer Ear] : the ears and nose were normal in appearance [Neck Appearance] : the appearance of the neck was normal [Neck Cervical Mass (___cm)] : no neck mass was observed [Jugular Venous Distention Increased] : there was no jugular-venous distention [Respiration, Rhythm And Depth] : normal respiratory rhythm and effort [Auscultation Breath Sounds / Voice Sounds] : lungs were clear to auscultation bilaterally [Heart Rate And Rhythm] : heart rate was normal and rhythm regular [Heart Sounds] : normal S1 and S2 [Heart Sounds Gallop] : no gallops [Full Pulse] : the pedal pulses are present [Edema] : there was no peripheral edema [Bowel Sounds] : normal bowel sounds [Abdomen Soft] : soft [Abdomen Tenderness] : non-tender [] : no hepato-splenomegaly [Cervical Lymph Nodes Enlarged Posterior Bilaterally] : posterior cervical [Cervical Lymph Nodes Enlarged Anterior Bilaterally] : anterior cervical [Supraclavicular Lymph Nodes Enlarged Bilaterally] : supraclavicular [Abnormal Walk] : normal gait [Nail Clubbing] : no clubbing  or cyanosis of the fingernails [Musculoskeletal - Swelling] : no joint swelling seen [Skin Color & Pigmentation] : normal skin color and pigmentation [Skin Turgor] : normal skin turgor [Cranial Nerves] : cranial nerves 2-12 were intact [Oriented To Time, Place, And Person] : oriented to person, place, and time [Impaired Insight] : insight and judgment were intact [Affect] : the affect was normal [FreeTextEntry1] : + tremor

## 2020-03-09 NOTE — HISTORY OF PRESENT ILLNESS
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  \par \par Interval events:\par Last labs creatinine decreased to 1.4. Potassium has been slightly elevated as has sodium although pts urine sp gravity is low.  In addition pt is drinking a lot of fluid.  Overall feeling better, blood pressure controlled at home and she is getting more done.

## 2020-03-09 NOTE — ASSESSMENT
[FreeTextEntry1] : 59 y.o female with PCKD s/p LURT \par 1.  Renal transplant - Baseline creatinine about 1.3, last 1.4 after switch to cyclosporine.  DSA drawn in December was negative.  Pt with PRA of 49%.  Renal sonograms have been normal.   \par 2.  Immunosuppression - now on cyclosporine target trough of 100-150.  Cont MMF 500mgs BID and pred 2.5.  Cannot increase MMF as she gets nauseous.  So far pt seems to feel better on cyclosporine over tacro - less fatigue and accomplishing more.  \par 3.  HTN - controlled at home.   \par 4.  hyperkalemia/ Prophylaxis - developed high methemoglobin levels on dapsone so stopped.  she is back on bactrim.  K has been high so will resume lasix 20mgs daily.  \par 5.  Wt gain - likely from prednisone.  Now only on 2.5mgs. \par 6.  Post transplant erythrocytosis - resolved.  Monitor\par 7.  DVT - Pt now following with Dr. Maza.  Pt still on xarelto\par 8.  Anxiety - pt has seen psychiatry and will be following with therapist.  Does not tolerate zoloft.  Seems to be improving.\par 9.  tremor/ exhaustion/ trouble concentration/ hair loss/ tremor - changed tacro to cyclosporine.  Symptoms seem to be improving.  \par \par Pt will return for f/u in 1 month. \par \par

## 2020-03-18 ENCOUNTER — APPOINTMENT (OUTPATIENT)
Dept: NEPHROLOGY | Facility: CLINIC | Age: 60
End: 2020-03-18

## 2020-03-23 ENCOUNTER — RX RENEWAL (OUTPATIENT)
Age: 60
End: 2020-03-23

## 2020-03-23 DIAGNOSIS — I82.623 ACUTE EMBOLISM AND THROMBOSIS OF DEEP VEINS OF UPPER EXTREMITY, BILATERAL: ICD-10-CM

## 2020-04-08 ENCOUNTER — APPOINTMENT (OUTPATIENT)
Dept: NEPHROLOGY | Facility: CLINIC | Age: 60
End: 2020-04-08

## 2020-04-08 ENCOUNTER — APPOINTMENT (OUTPATIENT)
Dept: TRANSPLANT | Facility: CLINIC | Age: 60
End: 2020-04-08

## 2020-04-08 LAB
ALBUMIN SERPL ELPH-MCNC: 4.4 G/DL
ALP BLD-CCNC: 70 U/L
ALT SERPL-CCNC: 13 U/L
ANION GAP SERPL CALC-SCNC: 14 MMOL/L
APPEARANCE: CLEAR
AST SERPL-CCNC: 19 U/L
BACTERIA: NEGATIVE
BASOPHILS # BLD AUTO: 0.03 K/UL
BASOPHILS NFR BLD AUTO: 0.7 %
BILIRUB SERPL-MCNC: 0.7 MG/DL
BILIRUBIN URINE: NEGATIVE
BLOOD URINE: NEGATIVE
BUN SERPL-MCNC: 25 MG/DL
CALCIUM SERPL-MCNC: 9.7 MG/DL
CHLORIDE SERPL-SCNC: 107 MMOL/L
CO2 SERPL-SCNC: 23 MMOL/L
COLOR: NORMAL
CREAT SERPL-MCNC: 1.45 MG/DL
CREAT SPEC-SCNC: 32 MG/DL
CREAT/PROT UR: NORMAL RATIO
CYCLOSPORINE SER-MCNC: 105 NG/ML
EOSINOPHIL # BLD AUTO: 0.08 K/UL
EOSINOPHIL NFR BLD AUTO: 1.8 %
GLUCOSE QUALITATIVE U: NEGATIVE
GLUCOSE SERPL-MCNC: 103 MG/DL
HCT VFR BLD CALC: 45.9 %
HGB BLD-MCNC: 14.2 G/DL
HYALINE CASTS: 0 /LPF
IMM GRANULOCYTES NFR BLD AUTO: 0.2 %
KETONES URINE: NEGATIVE
LDH SERPL-CCNC: 225 U/L
LEUKOCYTE ESTERASE URINE: NEGATIVE
LYMPHOCYTES # BLD AUTO: 1.13 K/UL
LYMPHOCYTES NFR BLD AUTO: 25.3 %
MAGNESIUM SERPL-MCNC: 2 MG/DL
MAN DIFF?: NORMAL
MCHC RBC-ENTMCNC: 29.9 PG
MCHC RBC-ENTMCNC: 30.9 GM/DL
MCV RBC AUTO: 96.6 FL
MICROSCOPIC-UA: NORMAL
MONOCYTES # BLD AUTO: 0.39 K/UL
MONOCYTES NFR BLD AUTO: 8.7 %
NEUTROPHILS # BLD AUTO: 2.82 K/UL
NEUTROPHILS NFR BLD AUTO: 63.3 %
NITRITE URINE: NEGATIVE
PH URINE: 6.5
PHOSPHATE SERPL-MCNC: 4.2 MG/DL
PLATELET # BLD AUTO: 183 K/UL
POTASSIUM SERPL-SCNC: 5.2 MMOL/L
PROT SERPL-MCNC: 6.4 G/DL
PROT UR-MCNC: <4 MG/DL
PROTEIN URINE: NEGATIVE
RBC # BLD: 4.75 M/UL
RBC # FLD: 13.2 %
RED BLOOD CELLS URINE: 0 /HPF
SODIUM SERPL-SCNC: 145 MMOL/L
SPECIFIC GRAVITY URINE: 1.01
SQUAMOUS EPITHELIAL CELLS: 2 /HPF
TACROLIMUS SERPL-MCNC: <2 NG/ML
URATE SERPL-MCNC: 9.4 MG/DL
UROBILINOGEN URINE: NORMAL
WBC # FLD AUTO: 4.46 K/UL
WHITE BLOOD CELLS URINE: 1 /HPF

## 2020-04-09 LAB — CMV DNA SPEC QL NAA+PROBE: NOT DETECTED

## 2020-04-10 LAB — BKV DNA SPEC QL NAA+PROBE: NOT DETECTED COPIES/ML

## 2020-05-04 ENCOUNTER — TRANSCRIPTION ENCOUNTER (OUTPATIENT)
Age: 60
End: 2020-05-04

## 2020-05-05 ENCOUNTER — TRANSCRIPTION ENCOUNTER (OUTPATIENT)
Age: 60
End: 2020-05-05

## 2020-05-19 ENCOUNTER — APPOINTMENT (OUTPATIENT)
Dept: TRANSPLANT | Facility: CLINIC | Age: 60
End: 2020-05-19

## 2020-05-20 LAB
ALBUMIN SERPL ELPH-MCNC: 4.2 G/DL
ALP BLD-CCNC: 66 U/L
ALT SERPL-CCNC: 10 U/L
ANION GAP SERPL CALC-SCNC: 16 MMOL/L
APPEARANCE: CLEAR
AST SERPL-CCNC: 15 U/L
BACTERIA: NEGATIVE
BASOPHILS # BLD AUTO: 0.05 K/UL
BASOPHILS NFR BLD AUTO: 1.2 %
BILIRUB SERPL-MCNC: 0.5 MG/DL
BILIRUBIN URINE: NEGATIVE
BLOOD URINE: NEGATIVE
BUN SERPL-MCNC: 33 MG/DL
CALCIUM SERPL-MCNC: 9.4 MG/DL
CHLORIDE SERPL-SCNC: 104 MMOL/L
CO2 SERPL-SCNC: 22 MMOL/L
COLOR: COLORLESS
CREAT SERPL-MCNC: 1.43 MG/DL
CREAT SPEC-SCNC: 31 MG/DL
CREAT/PROT UR: NORMAL RATIO
CYCLOSPORINE SER-MCNC: 131 NG/ML
EOSINOPHIL # BLD AUTO: 0.07 K/UL
EOSINOPHIL NFR BLD AUTO: 1.7 %
GLUCOSE QUALITATIVE U: NEGATIVE
GLUCOSE SERPL-MCNC: 107 MG/DL
HCT VFR BLD CALC: 45.1 %
HGB BLD-MCNC: 14 G/DL
HYALINE CASTS: 0 /LPF
IMM GRANULOCYTES NFR BLD AUTO: 0.2 %
KETONES URINE: NEGATIVE
LDH SERPL-CCNC: 220 U/L
LEUKOCYTE ESTERASE URINE: NEGATIVE
LYMPHOCYTES # BLD AUTO: 1.06 K/UL
LYMPHOCYTES NFR BLD AUTO: 25.4 %
MAGNESIUM SERPL-MCNC: 1.9 MG/DL
MAN DIFF?: NORMAL
MCHC RBC-ENTMCNC: 29.3 PG
MCHC RBC-ENTMCNC: 31 GM/DL
MCV RBC AUTO: 94.4 FL
MICROSCOPIC-UA: NORMAL
MONOCYTES # BLD AUTO: 0.28 K/UL
MONOCYTES NFR BLD AUTO: 6.7 %
NEUTROPHILS # BLD AUTO: 2.7 K/UL
NEUTROPHILS NFR BLD AUTO: 64.8 %
NITRITE URINE: NEGATIVE
PH URINE: 6
PHOSPHATE SERPL-MCNC: 4.4 MG/DL
PLATELET # BLD AUTO: 195 K/UL
POTASSIUM SERPL-SCNC: 4.9 MMOL/L
PROT SERPL-MCNC: 6.4 G/DL
PROT UR-MCNC: <4 MG/DL
PROTEIN URINE: NEGATIVE
RBC # BLD: 4.78 M/UL
RBC # FLD: 12.6 %
RED BLOOD CELLS URINE: 0 /HPF
SODIUM SERPL-SCNC: 142 MMOL/L
SPECIFIC GRAVITY URINE: 1.01
SQUAMOUS EPITHELIAL CELLS: 1 /HPF
URATE SERPL-MCNC: 8.8 MG/DL
UROBILINOGEN URINE: NORMAL
WBC # FLD AUTO: 4.17 K/UL
WHITE BLOOD CELLS URINE: 0 /HPF

## 2020-05-21 ENCOUNTER — APPOINTMENT (OUTPATIENT)
Dept: NEPHROLOGY | Facility: CLINIC | Age: 60
End: 2020-05-21
Payer: MEDICARE

## 2020-05-21 LAB
BKV DNA SPEC QL NAA+PROBE: NOT DETECTED COPIES/ML
CMV DNA SPEC QL NAA+PROBE: NOT DETECTED
CMVPCR LOG: NOT DETECTED LOG10IU/ML

## 2020-05-21 PROCEDURE — 99213 OFFICE O/P EST LOW 20 MIN: CPT | Mod: 95

## 2020-05-21 RX ORDER — CHOLECALCIFEROL (VITAMIN D3) 50 MCG
5000 TABLET ORAL
Refills: 0 | Status: DISCONTINUED | COMMUNITY
Start: 2018-05-24 | End: 2020-05-21

## 2020-05-21 NOTE — PHYSICAL EXAM
[General Appearance - Alert] : alert [General Appearance - In No Acute Distress] : in no acute distress [General Appearance - Well Nourished] : well nourished [Sclera] : the sclera and conjunctiva were normal [Extraocular Movements] : extraocular movements were intact [Outer Ear] : the ears and nose were normal in appearance [] : no respiratory distress [Oriented To Time, Place, And Person] : oriented to person, place, and time [Impaired Insight] : insight and judgment were intact [Affect] : the affect was normal

## 2020-05-21 NOTE — ASSESSMENT
[FreeTextEntry1] : 59 y.o female with PCKD s/p LURT \par 1.  Renal transplant - Baseline creatinine about 1.3, last 1.4 after switch to cyclosporine.  DSA drawn in December was negative.  Pt with PRA of 49%.  Renal sonograms have been normal.   \par 2.  Immunosuppression - now on cyclosporine target trough of 100-150, last level 130 which is ok.  Cont MMF 500mgs BID and pred 2.5.  Cannot increase MMF as she gets nauseous.  So far pt seems to feel better on cyclosporine over tacro - less fatigue and accomplishing more.  \par 3.  HTN - controlled at home.   \par 4.  hyperkalemia/ Prophylaxis - developed high methemoglobin levels on dapsone so stopped.  she is back on bactrim.  K now controlled with lasix.  \par 5.  Wt gain - likely from prednisone.  Now only on 2.5mgs. \par 6.  Post transplant erythrocytosis - resolved.  Monitor\par 7.  DVT - Pt now following with Dr. Maza.  Pt still on xarelto\par 8.  Anxiety - pt has seen psychiatry and will be following with therapist.  Does not tolerate zoloft.  Seems to be improving but still present. \par 9.  tremor/ exhaustion/ trouble concentration/ hair loss/ tremor - changed tacro to cyclosporine.  Hair loss improved.  Other symptoms improved but still present to some degree.    \par \par Pt will return to f/u with Dr. Hoff.  She will f/u here in 6 months.  \par Stop biotin\par

## 2020-05-21 NOTE — HISTORY OF PRESENT ILLNESS
[Home] : at home, [unfilled] , at the time of the visit. [Medical Office: ([facility], [place])___] : at the medical office located in  [Verbal consent obtained from patient] : the patient, [unfilled] [FreeTextEntry1] : Pt with hx CKD stage 5 from PCKD now s/p LURT via kidney paired exchange on 11/7/17.  \par \par Interval events:\par Reviewed labs. Cr: 1.43 (1.45,1.43), K: 4.9, WBC: 4.1, Cyclosporine: 131.  Blood pressure this morning was controlled.  Still tired in the morning and has significant anxiety.  Energy starts to improve in the afternoon.  Trying to walk on treadmill for 10 minutes per day.  Has been isolating due to COVID 19.  Has fatigue 2 weeks ago but resolved in 1 day.

## 2020-07-29 ENCOUNTER — NON-APPOINTMENT (OUTPATIENT)
Age: 60
End: 2020-07-29

## 2020-07-30 LAB
25(OH)D3 SERPL-MCNC: 44.7 NG/ML
ALBUMIN SERPL ELPH-MCNC: 4.5 G/DL
ALP BLD-CCNC: 68 U/L
ALT SERPL-CCNC: 11 U/L
ANION GAP SERPL CALC-SCNC: 14 MMOL/L
APPEARANCE: CLEAR
AST SERPL-CCNC: 15 U/L
BACTERIA: NEGATIVE
BASOPHILS # BLD AUTO: 0.03 K/UL
BASOPHILS NFR BLD AUTO: 0.7 %
BILIRUB SERPL-MCNC: 0.8 MG/DL
BILIRUBIN URINE: NEGATIVE
BLOOD URINE: NEGATIVE
BUN SERPL-MCNC: 30 MG/DL
CALCIUM SERPL-MCNC: 9.5 MG/DL
CALCIUM SERPL-MCNC: 9.5 MG/DL
CHLORIDE SERPL-SCNC: 102 MMOL/L
CHOLEST SERPL-MCNC: 189 MG/DL
CHOLEST/HDLC SERPL: 2.9 RATIO
CO2 SERPL-SCNC: 26 MMOL/L
COLOR: NORMAL
CREAT SERPL-MCNC: 1.61 MG/DL
CYCLOSPORINE SER-MCNC: 120 NG/ML
EOSINOPHIL # BLD AUTO: 0.05 K/UL
EOSINOPHIL NFR BLD AUTO: 1.2 %
ESTIMATED AVERAGE GLUCOSE: 120 MG/DL
GLUCOSE QUALITATIVE U: NEGATIVE
GLUCOSE SERPL-MCNC: 101 MG/DL
HBA1C MFR BLD HPLC: 5.8 %
HCT VFR BLD CALC: 47.4 %
HDLC SERPL-MCNC: 66 MG/DL
HGB BLD-MCNC: 14.6 G/DL
HYALINE CASTS: 0 /LPF
IMM GRANULOCYTES NFR BLD AUTO: 0 %
KETONES URINE: NEGATIVE
LDLC SERPL CALC-MCNC: 99 MG/DL
LEUKOCYTE ESTERASE URINE: NEGATIVE
LYMPHOCYTES # BLD AUTO: 1.13 K/UL
LYMPHOCYTES NFR BLD AUTO: 26.3 %
MAGNESIUM SERPL-MCNC: 2 MG/DL
MAN DIFF?: NORMAL
MCHC RBC-ENTMCNC: 29 PG
MCHC RBC-ENTMCNC: 30.8 GM/DL
MCV RBC AUTO: 94.2 FL
MICROSCOPIC-UA: NORMAL
MONOCYTES # BLD AUTO: 0.36 K/UL
MONOCYTES NFR BLD AUTO: 8.4 %
NEUTROPHILS # BLD AUTO: 2.73 K/UL
NEUTROPHILS NFR BLD AUTO: 63.4 %
NITRITE URINE: NEGATIVE
PARATHYROID HORMONE INTACT: 80 PG/ML
PH URINE: 6
PHOSPHATE SERPL-MCNC: 3.8 MG/DL
PLATELET # BLD AUTO: 188 K/UL
POTASSIUM SERPL-SCNC: 4.7 MMOL/L
PROT SERPL-MCNC: 6.7 G/DL
PROTEIN URINE: NEGATIVE
RBC # BLD: 5.03 M/UL
RBC # FLD: 13.6 %
RED BLOOD CELLS URINE: 3 /HPF
SODIUM SERPL-SCNC: 142 MMOL/L
SPECIFIC GRAVITY URINE: 1
SQUAMOUS EPITHELIAL CELLS: 1 /HPF
TRIGL SERPL-MCNC: 122 MG/DL
URATE SERPL-MCNC: 9.8 MG/DL
UROBILINOGEN URINE: NORMAL
WBC # FLD AUTO: 4.3 K/UL
WHITE BLOOD CELLS URINE: 1 /HPF

## 2020-08-04 ENCOUNTER — APPOINTMENT (OUTPATIENT)
Dept: NEPHROLOGY | Facility: CLINIC | Age: 60
End: 2020-08-04
Payer: MEDICARE

## 2020-08-04 DIAGNOSIS — I82.411 ACUTE EMBOLISM AND THROMBOSIS OF RIGHT FEMORAL VEIN: ICD-10-CM

## 2020-08-04 PROCEDURE — 99214 OFFICE O/P EST MOD 30 MIN: CPT | Mod: 95

## 2020-08-11 NOTE — ASSESSMENT
[FreeTextEntry1] : 59 y.o female with PCKD s/p LURT \par 1.  Renal transplant - Baseline creatinine about 1.3, last 1.6 after switch to cyclosporine.  DSA drawn in December was negative.  Pt with PRA of 49%.  Renal sonograms have been normal.   Asked to repeat labs next week.\par 2.  Immunosuppression - now on cyclosporine target trough of 100-150, last level 120.  Cont MMF 500mgs BID and pred 2.5.  Cannot increase MMF as she gets nauseous.  Would not taper prednisone further\par 3.  HTN - controlled at home.   \par 4.  hyperkalemia/ Prophylaxis - developed high methemoglobin levels on dapsone so stopped.  she is back on bactrim.  K now controlled with lasix.  \par 5.  Wt gain - likely from prednisone and inactivity.  Now only on 2.5mgs. \par 6.  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  HCT now up to 47.5.  Will follow and determine if needs ACE or phlebotomy in the future.\par 7.  DVT - Pt now following with Dr. Maza.  Pt still on xarelto\par 8.  Anxiety - pt has seen psychiatry and will be following with therapist.  Does not tolerate zoloft.  \par 9.  tremor/ exhaustion/ trouble concentration/ hair loss/ tremor - changed tacro to cyclosporine.  Hair loss improved.  Other symptoms improved but still present to some degree.  Told to stop biotin\par 10. Seen by orthopedics and found to have OA and will require PT.\par 11. Cerebral aneurysm - Spoke to Patient and Dr. Stacy prior to pandemic.  Reminded to repeat MRI/MRA and then to see Dr. Stacy.\par 12.  Pulmonary nodule - Reviewed CT chest. Most recent study without nodule but Dr. Youngblood recommended 1 year follow up after 3/15/17 study. Patient at this time not interested in repeating study. \par RTC 2 months

## 2020-08-11 NOTE — HISTORY OF PRESENT ILLNESS
[Home] : at home, [unfilled] , at the time of the visit. [Other Location: e.g. Home (Enter Location, City,State)___] : at [unfilled] [Verbal consent obtained from patient] : the patient, [unfilled] [FreeTextEntry1] : Pt with hx CKD stage 5 from PCKD now s/p LURT via kidney paired exchange on 11/7/17.  \par \par Interval events:\par Returns to me after being switched from FK to CSA.\par Tremor better than before but still present.\par Hair more full but some facial hair as well\par Prefers it over FK and is tolerating it for the most part.\par BP at home 118/84 74, 126/83 68, 138/78 72, 122/79 107\par Occasional palpitations was seen by Dr. Maza and had w/u done and was told it was benign.\par Had a panic attack in January, saw a psychiatrist and being treated for general anxiety.  Zoloft causes nausea.\par Not as tried in the morning and has significant anxiety.  Has been isolating due to COVID 19.  Has fatigue 2 weeks ago but resolved in 1 day.

## 2020-08-11 NOTE — REVIEW OF SYSTEMS
[Feeling Tired] : feeling tired [Anxiety] : anxiety [Negative] : Eyes [Fever] : no fever [Chills] : no chills [Sore Throat] : no sore throat [Nosebleeds] : no nosebleeds [Hoarseness] : no hoarseness [Chest Pain] : no chest pain [Palpitations] : no palpitations [Shortness Of Breath] : no shortness of breath [Abdominal Pain] : no abdominal pain [Cough] : no cough [Constipation] : no constipation [Diarrhea] : no diarrhea [Dizziness] : no dizziness [Fainting] : no fainting [FreeTextEntry9] : pain walking

## 2020-08-21 LAB
ALBUMIN SERPL ELPH-MCNC: 4.4 G/DL
ANION GAP SERPL CALC-SCNC: 13 MMOL/L
BUN SERPL-MCNC: 26 MG/DL
CALCIUM SERPL-MCNC: 9.9 MG/DL
CHLORIDE SERPL-SCNC: 104 MMOL/L
CO2 SERPL-SCNC: 25 MMOL/L
CREAT SERPL-MCNC: 1.55 MG/DL
CYCLOSPORINE SER-MCNC: 145 NG/ML
GLUCOSE SERPL-MCNC: 101 MG/DL
PHOSPHATE SERPL-MCNC: 3.5 MG/DL
POTASSIUM SERPL-SCNC: 5.2 MMOL/L
SARS-COV-2 IGG SERPL IA-ACNC: <0.1 INDEX
SARS-COV-2 IGG SERPL QL IA: NEGATIVE
SODIUM SERPL-SCNC: 142 MMOL/L

## 2020-10-21 ENCOUNTER — TRANSCRIPTION ENCOUNTER (OUTPATIENT)
Age: 60
End: 2020-10-21

## 2020-10-21 NOTE — DIETITIAN INITIAL EVALUATION ADULT. - NS AS NUTRI INTERV ED CONTENT
What Type Of Note Output Would You Prefer (Optional)?: Standard Output How Severe Are Your Spot(S)?: mild Have Your Spot(S) Been Treated In The Past?: has not been treated Hpi Title: Evaluation of Skin Lesions Other (specify)/Pt too uncomfortable for diet education at this time. RD will return for Food Safety and Post-transplant diet education.

## 2020-10-28 ENCOUNTER — APPOINTMENT (OUTPATIENT)
Dept: NEPHROLOGY | Facility: CLINIC | Age: 60
End: 2020-10-28
Payer: MEDICARE

## 2020-10-28 VITALS
SYSTOLIC BLOOD PRESSURE: 151 MMHG | HEIGHT: 64 IN | HEART RATE: 97 BPM | OXYGEN SATURATION: 98 % | BODY MASS INDEX: 40.12 KG/M2 | WEIGHT: 235 LBS | DIASTOLIC BLOOD PRESSURE: 85 MMHG

## 2020-10-28 DIAGNOSIS — I82.5Z9 CHRONIC EMBOLISM AND THROMBOSIS OF UNSPECIFIED DEEP VEINS OF UNSPECIFIED DISTAL LOWER EXTREMITY: ICD-10-CM

## 2020-10-28 PROCEDURE — 99214 OFFICE O/P EST MOD 30 MIN: CPT

## 2020-10-28 RX ORDER — MULTIVIT-MIN/FOLIC/VIT K/LYCOP 400-300MCG
50 MCG TABLET ORAL
Refills: 0 | Status: ACTIVE | COMMUNITY

## 2020-10-30 LAB
25(OH)D3 SERPL-MCNC: 46.8 NG/ML
ALBUMIN SERPL ELPH-MCNC: 4.4 G/DL
ALBUMIN SERPL ELPH-MCNC: 4.4 G/DL
ALP BLD-CCNC: 64 U/L
ALT SERPL-CCNC: 8 U/L
ANION GAP SERPL CALC-SCNC: 13 MMOL/L
ANION GAP SERPL CALC-SCNC: 16 MMOL/L
APPEARANCE: CLEAR
AST SERPL-CCNC: 15 U/L
BACTERIA UR CULT: NORMAL
BACTERIA: NEGATIVE
BASOPHILS # BLD AUTO: 0.03 K/UL
BASOPHILS NFR BLD AUTO: 0.7 %
BILIRUB SERPL-MCNC: 0.7 MG/DL
BILIRUBIN URINE: NEGATIVE
BLOOD URINE: NEGATIVE
BUN SERPL-MCNC: 23 MG/DL
BUN SERPL-MCNC: 36 MG/DL
CALCIUM SERPL-MCNC: 10 MG/DL
CALCIUM SERPL-MCNC: 10 MG/DL
CALCIUM SERPL-MCNC: 9.5 MG/DL
CHLORIDE SERPL-SCNC: 102 MMOL/L
CHLORIDE SERPL-SCNC: 103 MMOL/L
CO2 SERPL-SCNC: 20 MMOL/L
CO2 SERPL-SCNC: 26 MMOL/L
COLOR: COLORLESS
CREAT SERPL-MCNC: 1.37 MG/DL
CREAT SERPL-MCNC: 1.82 MG/DL
CYCLOSPORINE SER-MCNC: 118 NG/ML
EOSINOPHIL # BLD AUTO: 0.09 K/UL
EOSINOPHIL NFR BLD AUTO: 2 %
ESTIMATED AVERAGE GLUCOSE: 120 MG/DL
GLUCOSE QUALITATIVE U: NEGATIVE
GLUCOSE SERPL-MCNC: 104 MG/DL
GLUCOSE SERPL-MCNC: 106 MG/DL
HBA1C MFR BLD HPLC: 5.8 %
HCT VFR BLD CALC: 45.8 %
HGB BLD-MCNC: 14.2 G/DL
HYALINE CASTS: 0 /LPF
IMM GRANULOCYTES NFR BLD AUTO: 0.2 %
KETONES URINE: NEGATIVE
LEUKOCYTE ESTERASE URINE: NEGATIVE
LYMPHOCYTES # BLD AUTO: 1.25 K/UL
LYMPHOCYTES NFR BLD AUTO: 27.5 %
MAGNESIUM SERPL-MCNC: 2 MG/DL
MAN DIFF?: NORMAL
MCHC RBC-ENTMCNC: 29.9 PG
MCHC RBC-ENTMCNC: 31 GM/DL
MCV RBC AUTO: 96.4 FL
MICROSCOPIC-UA: NORMAL
MONOCYTES # BLD AUTO: 0.45 K/UL
MONOCYTES NFR BLD AUTO: 9.9 %
NEUTROPHILS # BLD AUTO: 2.71 K/UL
NEUTROPHILS NFR BLD AUTO: 59.7 %
NITRITE URINE: NEGATIVE
PARATHYROID HORMONE INTACT: 75 PG/ML
PH URINE: 6
PHOSPHATE SERPL-MCNC: 3 MG/DL
PHOSPHATE SERPL-MCNC: 4 MG/DL
PLATELET # BLD AUTO: 184 K/UL
POTASSIUM SERPL-SCNC: 4.2 MMOL/L
POTASSIUM SERPL-SCNC: 5 MMOL/L
PROT SERPL-MCNC: 6.4 G/DL
PROTEIN URINE: NEGATIVE
RBC # BLD: 4.75 M/UL
RBC # FLD: 13.1 %
RED BLOOD CELLS URINE: 0 /HPF
SODIUM SERPL-SCNC: 139 MMOL/L
SODIUM SERPL-SCNC: 141 MMOL/L
SPECIFIC GRAVITY URINE: 1.01
SQUAMOUS EPITHELIAL CELLS: 0 /HPF
URATE SERPL-MCNC: 10.8 MG/DL
UROBILINOGEN URINE: NORMAL
WBC # FLD AUTO: 4.54 K/UL
WHITE BLOOD CELLS URINE: 0 /HPF

## 2020-11-01 PROBLEM — I82.5Z9 CHRONIC DEEP VEIN THROMBOSIS (DVT) OF DISTAL VEIN OF LOWER EXTREMITY, UNSPECIFIED LATERALITY: Status: ACTIVE | Noted: 2020-03-23

## 2020-11-01 NOTE — ASSESSMENT
[FreeTextEntry1] : 59 y.o female with PCKD s/p LURT \par 1.  Renal transplant - Baseline creatinine about 1.3, last 1.8 will repeat today.  DSA drawn in December was negative.  Pt with PRA of 49%.  Renal sonograms have been normal.   \par 2.  Immunosuppression - now on cyclosporine target trough of 100-150, last level 118.  Cont MMF 500mgs BID and pred 2.5.  Cannot increase MMF as she gets nauseous.  Would not taper prednisone further\par 3.  HTN - controlled at home.   \par 4.  hyperkalemia/ Prophylaxis - developed high methemoglobin levels on dapsone so stopped.  she is back on bactrim.  K now controlled with lasix.  \par 5.  Wt gain - likely from prednisone and inactivity.  Now only on 2.5mgs. \par 6.  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  HCT improved to 45.8.  Will follow and determine if needs ACE or phlebotomy in the future.\par 7.  DVT - Pt now following with Dr. Maza.  Pt still on xarelto\par 8.  Anxiety - pt has seen psychiatry and will be following with therapist.  Did not tolerate zoloft.  \par 9.  tremor/ exhaustion/ trouble concentration/ hair loss/ tremor - changed tacro to cyclosporine.  Hair loss improved.  Other symptoms improved but still present to some degree.\par 10. Seen by orthopedics and found to have OA and will require PT.\par 11. Cerebral aneurysm - Spoke to Patient and Dr. Stacy prior to pandemic.  Reminded to repeat MRI/MRA and then to see Dr. Stacy.\par 12.  Pulmonary nodule - Reviewed CT chest. Most recent study without nodule but Dr. Youngblood recommended 1 year follow up after 3/15/17 study. Patient at this time not interested in repeating study. Will contact Dr. Youngblood.\par 13.  HM has already received flu shot.\par RTC 2 months

## 2020-11-01 NOTE — PHYSICAL EXAM
[General Appearance - Alert] : alert [General Appearance - In No Acute Distress] : in no acute distress [Sclera] : the sclera and conjunctiva were normal [PERRL With Normal Accommodation] : pupils were equal in size, round, and reactive to light [Neck Appearance] : the appearance of the neck was normal [Neck Cervical Mass (___cm)] : no neck mass was observed [Jugular Venous Distention Increased] : there was no jugular-venous distention [Thyroid Diffuse Enlargement] : the thyroid was not enlarged [] : no respiratory distress [Respiration, Rhythm And Depth] : normal respiratory rhythm and effort [Exaggerated Use Of Accessory Muscles For Inspiration] : no accessory muscle use [Auscultation Breath Sounds / Voice Sounds] : lungs were clear to auscultation bilaterally [Heart Rate And Rhythm] : heart rate was normal and rhythm regular [Heart Sounds] : normal S1 and S2 [Murmurs] : no murmurs [Arterial Pulses Carotid] : carotid pulses were normal with no bruits [Edema] : there was no peripheral edema [Bowel Sounds] : normal bowel sounds [Abdomen Soft] : soft [Abdomen Tenderness] : non-tender [Cervical Lymph Nodes Enlarged Posterior Bilaterally] : posterior cervical [Supraclavicular Lymph Nodes Enlarged Bilaterally] : supraclavicular [Nail Clubbing] : no clubbing  or cyanosis of the fingernails [FreeTextEntry1] : anxious and tearful

## 2020-11-01 NOTE — HISTORY OF PRESENT ILLNESS
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  \par \par Today is tearful and not feeling well.\par Anxious about 's worsening health.\par Bent to the side because wakes with lower back pain has not gone to PT \par Intermittent headaches which feel worse\par Fever yesterday to 100.6 without localizing symptoms.\par Upset that creatinine was 1.8\par BP at home 120s/70s\par \par \par Chronic issues:\par No longer complaining of termor which is present but better or hair loss.  Both have improved with CSA transition.\par Had a panic attack in January, saw a psychiatrist and being treated for general anxiety.  Zoloft causes nausea so stopped it.\par

## 2020-11-01 NOTE — ADDENDUM
[FreeTextEntry1] : 11/1/20\par Called patient on 10/30 to review that creatinine normalized to 1.37.\par Patient is releaved.

## 2020-11-01 NOTE — REVIEW OF SYSTEMS
[Feeling Tired] : feeling tired [Anxiety] : anxiety [Negative] : Eyes [Palpitations] : palpitations [SOB on Exertion] : shortness of breath during exertion [Fever] : no fever [Chills] : no chills [Nosebleeds] : no nosebleeds [Sore Throat] : no sore throat [Hoarseness] : no hoarseness [Chest Pain] : no chest pain [Shortness Of Breath] : no shortness of breath [Cough] : no cough [Abdominal Pain] : no abdominal pain [Constipation] : no constipation [Diarrhea] : no diarrhea [Itching] : no itching [Dizziness] : no dizziness [Fainting] : no fainting [FreeTextEntry4] : having headaches which attributes to typical sinus symptoms during change in season. [FreeTextEntry5] : when nervous had holter and told fine in the past.  Followed by Dr. Maza [FreeTextEntry6] : on Steps [FreeTextEntry9] : pain walking and back pain to go to PT previous evaluation by sports medicine

## 2020-11-19 ENCOUNTER — APPOINTMENT (OUTPATIENT)
Dept: TRANSPLANT | Facility: CLINIC | Age: 60
End: 2020-11-19

## 2020-12-21 PROBLEM — J02.0 PHARYNGITIS DUE TO STREPTOCOCCUS SPECIES: Status: RESOLVED | Noted: 2019-07-18 | Resolved: 2020-12-21

## 2021-01-06 ENCOUNTER — APPOINTMENT (OUTPATIENT)
Dept: TRANSPLANT | Facility: CLINIC | Age: 61
End: 2021-01-06

## 2021-01-07 ENCOUNTER — APPOINTMENT (OUTPATIENT)
Dept: NEPHROLOGY | Facility: CLINIC | Age: 61
End: 2021-01-07

## 2021-02-03 ENCOUNTER — APPOINTMENT (OUTPATIENT)
Dept: TRANSPLANT | Facility: CLINIC | Age: 61
End: 2021-02-03

## 2021-02-03 LAB
25(OH)D3 SERPL-MCNC: 51.8 NG/ML
ALBUMIN SERPL ELPH-MCNC: 4.5 G/DL
ALP BLD-CCNC: 73 U/L
ALT SERPL-CCNC: 10 U/L
ANION GAP SERPL CALC-SCNC: 13 MMOL/L
APPEARANCE: CLEAR
AST SERPL-CCNC: 15 U/L
BACTERIA: NEGATIVE
BASOPHILS # BLD AUTO: 0.03 K/UL
BASOPHILS NFR BLD AUTO: 0.7 %
BILIRUB SERPL-MCNC: 0.7 MG/DL
BILIRUBIN URINE: NEGATIVE
BLOOD URINE: NEGATIVE
BUN SERPL-MCNC: 30 MG/DL
CALCIUM SERPL-MCNC: 9.7 MG/DL
CALCIUM SERPL-MCNC: 9.7 MG/DL
CHLORIDE SERPL-SCNC: 103 MMOL/L
CHOLEST SERPL-MCNC: 183 MG/DL
CO2 SERPL-SCNC: 23 MMOL/L
COLOR: COLORLESS
CREAT SERPL-MCNC: 1.73 MG/DL
CREAT SPEC-SCNC: 27 MG/DL
CREAT/PROT UR: NORMAL RATIO
CYCLOSPORINE SER-MCNC: 127 NG/ML
EOSINOPHIL # BLD AUTO: 0.05 K/UL
EOSINOPHIL NFR BLD AUTO: 1.2 %
ESTIMATED AVERAGE GLUCOSE: 117 MG/DL
GLUCOSE QUALITATIVE U: NEGATIVE
GLUCOSE SERPL-MCNC: 103 MG/DL
HBA1C MFR BLD HPLC: 5.7 %
HCT VFR BLD CALC: 46 %
HDLC SERPL-MCNC: 62 MG/DL
HGB BLD-MCNC: 14.5 G/DL
HYALINE CASTS: 0 /LPF
IMM GRANULOCYTES NFR BLD AUTO: 0.2 %
KETONES URINE: NEGATIVE
LDH SERPL-CCNC: 210 U/L
LDLC SERPL CALC-MCNC: 94 MG/DL
LEUKOCYTE ESTERASE URINE: NEGATIVE
LYMPHOCYTES # BLD AUTO: 1.2 K/UL
LYMPHOCYTES NFR BLD AUTO: 28.4 %
MAGNESIUM SERPL-MCNC: 2 MG/DL
MAN DIFF?: NORMAL
MCHC RBC-ENTMCNC: 30 PG
MCHC RBC-ENTMCNC: 31.5 GM/DL
MCV RBC AUTO: 95.2 FL
MICROSCOPIC-UA: NORMAL
MONOCYTES # BLD AUTO: 0.35 K/UL
MONOCYTES NFR BLD AUTO: 8.3 %
NEUTROPHILS # BLD AUTO: 2.59 K/UL
NEUTROPHILS NFR BLD AUTO: 61.2 %
NITRITE URINE: NEGATIVE
NONHDLC SERPL-MCNC: 121 MG/DL
PARATHYROID HORMONE INTACT: 69 PG/ML
PH URINE: 6
PHOSPHATE SERPL-MCNC: 3.4 MG/DL
PLATELET # BLD AUTO: 196 K/UL
POTASSIUM SERPL-SCNC: 5 MMOL/L
PROT SERPL-MCNC: 6.8 G/DL
PROT UR-MCNC: <4 MG/DL
PROTEIN URINE: NEGATIVE
RBC # BLD: 4.83 M/UL
RBC # FLD: 13.1 %
RED BLOOD CELLS URINE: 0 /HPF
SODIUM SERPL-SCNC: 140 MMOL/L
SPECIFIC GRAVITY URINE: 1
SQUAMOUS EPITHELIAL CELLS: 4 /HPF
TRIGL SERPL-MCNC: 136 MG/DL
URATE SERPL-MCNC: 10.4 MG/DL
UROBILINOGEN URINE: NORMAL
WBC # FLD AUTO: 4.23 K/UL
WHITE BLOOD CELLS URINE: 0 /HPF

## 2021-02-04 ENCOUNTER — APPOINTMENT (OUTPATIENT)
Dept: NEPHROLOGY | Facility: CLINIC | Age: 61
End: 2021-02-04
Payer: MEDICARE

## 2021-02-04 PROCEDURE — 99213 OFFICE O/P EST LOW 20 MIN: CPT | Mod: 95

## 2021-02-04 RX ORDER — FUROSEMIDE 20 MG/1
20 TABLET ORAL DAILY
Qty: 30 | Refills: 3 | Status: DISCONTINUED | COMMUNITY
Start: 2018-02-01 | End: 2021-02-04

## 2021-02-04 NOTE — ASSESSMENT
[FreeTextEntry1] : 59 y.o female with PCKD s/p LURT \par 1.  Renal transplant - Baseline creatinine about 1.3, last 1.7 after switch to cyclosporine.  DSA drawn in December was negative.  Pt with PRA of 49%.  Renal sonograms have been normal.   Will stop lasix and repeat labs next week with a cell free DNA.  Discussed plan with patient. \par 2.  Immunosuppression - now on cyclosporine target trough of 100-150, last level 127 which is ok.  Cont MMF 500mgs BID and pred 2.5.  Cannot increase MMF as she gets nauseous.  So far pt seems to feel better on cyclosporine over tacro - less fatigue and accomplishing more.  \par 3.  HTN - controlled at home.   Pt very reluctant to restart BP meds as they made her feel very wiped out in the past.  Will see Dr. Maza for palpitations, had holter monitor 1 year ago which was negative. \par 4.  hyperkalemia/ Prophylaxis - developed high methemoglobin levels on dapsone so stopped.  she is back on bactrim.  \par 5.  Wt gain - likely from prednisone.  Now only on 2.5mgs. \par 6.  Post transplant erythrocytosis - resolved.  Monitor\par 7.  DVT - Pt now following with Dr. Maza.  Pt still on xarelto\par 8.  Anxiety - pt has seen psychiatry and will be following with therapist.  Seems to be improving but still present. \par 9.  tremor/ exhaustion/ trouble concentration/ hair loss/ tremor - changed tacro to cyclosporine.  Hair loss improved.  Other symptoms improved but still present to some degree.    \par \par Pt will return to f/u with Dr. Hoff.  She will f/u here in 6 months.  \par Repeat labs next week.  Plan as above. \par

## 2021-02-04 NOTE — HISTORY OF PRESENT ILLNESS
[Home] : at home, [unfilled] , at the time of the visit. [Medical Office: ([facility])___] : at the medical office located in  [Verbal consent obtained from patient] : the patient, [unfilled] [FreeTextEntry1] : Pt with hx CKD stage 5 from PCKD now s/p LURT via kidney paired exchange on 11/7/17.  \par \par Interval events:\par Reviewed labs. Cr: 1.7, UA bland.  Cyclosporin trough ok - 127.  Pt feels ok.  Has occasional anxiety, palpitations.  BP usually 130 systolic at home.  Also having back and ankle problems.  Plans to see orthopedics.   Shubham has been sick.  Has dry eyes and mouth, no real edema but takes lasix daily.

## 2021-02-05 DIAGNOSIS — Z23 ENCOUNTER FOR IMMUNIZATION: ICD-10-CM

## 2021-02-05 LAB
BKV DNA SPEC QL NAA+PROBE: NOT DETECTED COPIES/ML
CMV DNA SPEC QL NAA+PROBE: NOT DETECTED IU/ML

## 2021-02-10 ENCOUNTER — LABORATORY RESULT (OUTPATIENT)
Age: 61
End: 2021-02-10

## 2021-02-10 ENCOUNTER — APPOINTMENT (OUTPATIENT)
Dept: TRANSPLANT | Facility: CLINIC | Age: 61
End: 2021-02-10

## 2021-02-11 LAB
ALBUMIN SERPL ELPH-MCNC: 4.3 G/DL
ALP BLD-CCNC: 72 U/L
ALT SERPL-CCNC: 17 U/L
ANION GAP SERPL CALC-SCNC: 15 MMOL/L
APPEARANCE: CLEAR
AST SERPL-CCNC: 22 U/L
BACTERIA: NEGATIVE
BASOPHILS # BLD AUTO: 0.03 K/UL
BASOPHILS NFR BLD AUTO: 0.8 %
BILIRUB SERPL-MCNC: 0.9 MG/DL
BILIRUBIN URINE: NEGATIVE
BLOOD URINE: NEGATIVE
BUN SERPL-MCNC: 20 MG/DL
CALCIUM SERPL-MCNC: 9.6 MG/DL
CHLORIDE SERPL-SCNC: 104 MMOL/L
CO2 SERPL-SCNC: 22 MMOL/L
COLOR: COLORLESS
CREAT SERPL-MCNC: 1.65 MG/DL
CREAT SPEC-SCNC: 16 MG/DL
CREAT/PROT UR: NORMAL RATIO
EOSINOPHIL # BLD AUTO: 0.07 K/UL
EOSINOPHIL NFR BLD AUTO: 1.8 %
GLUCOSE QUALITATIVE U: NEGATIVE
GLUCOSE SERPL-MCNC: 111 MG/DL
HCT VFR BLD CALC: 45.9 %
HGB BLD-MCNC: 14.2 G/DL
HYALINE CASTS: 0 /LPF
IMM GRANULOCYTES NFR BLD AUTO: 0.3 %
KETONES URINE: NEGATIVE
LDH SERPL-CCNC: 249 U/L
LEUKOCYTE ESTERASE URINE: NEGATIVE
LYMPHOCYTES # BLD AUTO: 0.94 K/UL
LYMPHOCYTES NFR BLD AUTO: 23.9 %
MAGNESIUM SERPL-MCNC: 2 MG/DL
MAN DIFF?: NORMAL
MCHC RBC-ENTMCNC: 29.6 PG
MCHC RBC-ENTMCNC: 30.9 GM/DL
MCV RBC AUTO: 95.6 FL
MICROSCOPIC-UA: NORMAL
MONOCYTES # BLD AUTO: 0.31 K/UL
MONOCYTES NFR BLD AUTO: 7.9 %
NEUTROPHILS # BLD AUTO: 2.58 K/UL
NEUTROPHILS NFR BLD AUTO: 65.3 %
NITRITE URINE: NEGATIVE
PH URINE: 6.5
PHOSPHATE SERPL-MCNC: 3.6 MG/DL
PLATELET # BLD AUTO: 195 K/UL
POTASSIUM SERPL-SCNC: 5.2 MMOL/L
PROT SERPL-MCNC: 6.8 G/DL
PROT UR-MCNC: <4 MG/DL
PROTEIN URINE: NEGATIVE
RBC # BLD: 4.8 M/UL
RBC # FLD: 13.1 %
RED BLOOD CELLS URINE: 0 /HPF
SODIUM SERPL-SCNC: 142 MMOL/L
SPECIFIC GRAVITY URINE: 1
SQUAMOUS EPITHELIAL CELLS: 1 /HPF
TACROLIMUS SERPL-MCNC: <2 NG/ML
URATE SERPL-MCNC: 8.9 MG/DL
UROBILINOGEN URINE: NORMAL
WBC # FLD AUTO: 3.94 K/UL
WHITE BLOOD CELLS URINE: 0 /HPF

## 2021-02-12 LAB
BKV DNA SPEC QL NAA+PROBE: NOT DETECTED COPIES/ML
CMV DNA SPEC QL NAA+PROBE: NOT DETECTED IU/ML

## 2021-03-10 RX ORDER — RIVAROXABAN 10 MG/1
10 TABLET, FILM COATED ORAL
Qty: 90 | Refills: 10 | Status: DISCONTINUED | COMMUNITY
Start: 2020-03-23 | End: 2021-03-10

## 2021-03-19 ENCOUNTER — NON-APPOINTMENT (OUTPATIENT)
Age: 61
End: 2021-03-19

## 2021-03-25 ENCOUNTER — LABORATORY RESULT (OUTPATIENT)
Age: 61
End: 2021-03-25

## 2021-03-25 ENCOUNTER — APPOINTMENT (OUTPATIENT)
Dept: TRANSPLANT | Facility: CLINIC | Age: 61
End: 2021-03-25

## 2021-03-26 LAB
ALBUMIN SERPL ELPH-MCNC: 4.2 G/DL
ALP BLD-CCNC: 70 U/L
ALT SERPL-CCNC: 10 U/L
ANION GAP SERPL CALC-SCNC: 12 MMOL/L
APPEARANCE: CLEAR
AST SERPL-CCNC: 15 U/L
BACTERIA: NEGATIVE
BASOPHILS # BLD AUTO: 0.03 K/UL
BASOPHILS NFR BLD AUTO: 0.7 %
BILIRUB SERPL-MCNC: 0.8 MG/DL
BILIRUBIN URINE: NEGATIVE
BLOOD URINE: NEGATIVE
BUN SERPL-MCNC: 22 MG/DL
CALCIUM SERPL-MCNC: 9.6 MG/DL
CHLORIDE SERPL-SCNC: 103 MMOL/L
CMV DNA SPEC QL NAA+PROBE: NOT DETECTED IU/ML
CO2 SERPL-SCNC: 22 MMOL/L
COLOR: NORMAL
CREAT SERPL-MCNC: 1.4 MG/DL
CREAT SPEC-SCNC: 31 MG/DL
CREAT/PROT UR: 0.8 RATIO
CYCLOSPORINE SER-MCNC: 88 NG/ML
EOSINOPHIL # BLD AUTO: 0.06 K/UL
EOSINOPHIL NFR BLD AUTO: 1.4 %
GLUCOSE QUALITATIVE U: NEGATIVE
GLUCOSE SERPL-MCNC: 109 MG/DL
HCT VFR BLD CALC: 45.5 %
HGB BLD-MCNC: 13.9 G/DL
HYALINE CASTS: 0 /LPF
IMM GRANULOCYTES NFR BLD AUTO: 0.2 %
KETONES URINE: NEGATIVE
LDH SERPL-CCNC: 217 U/L
LEUKOCYTE ESTERASE URINE: NEGATIVE
LYMPHOCYTES # BLD AUTO: 1.06 K/UL
LYMPHOCYTES NFR BLD AUTO: 24.9 %
MAGNESIUM SERPL-MCNC: 1.9 MG/DL
MAN DIFF?: NORMAL
MCHC RBC-ENTMCNC: 29.1 PG
MCHC RBC-ENTMCNC: 30.5 GM/DL
MCV RBC AUTO: 95.2 FL
MICROSCOPIC-UA: NORMAL
MONOCYTES # BLD AUTO: 0.4 K/UL
MONOCYTES NFR BLD AUTO: 9.4 %
NEUTROPHILS # BLD AUTO: 2.69 K/UL
NEUTROPHILS NFR BLD AUTO: 63.4 %
NITRITE URINE: NEGATIVE
PH URINE: 6
PHOSPHATE SERPL-MCNC: 3.3 MG/DL
PLATELET # BLD AUTO: 201 K/UL
POTASSIUM SERPL-SCNC: 5.3 MMOL/L
PROT SERPL-MCNC: 6.7 G/DL
PROT UR-MCNC: 25 MG/DL
PROTEIN URINE: NEGATIVE
RBC # BLD: 4.78 M/UL
RBC # FLD: 12.9 %
RED BLOOD CELLS URINE: 0 /HPF
SODIUM SERPL-SCNC: 137 MMOL/L
SPECIFIC GRAVITY URINE: 1.01
SQUAMOUS EPITHELIAL CELLS: 1 /HPF
URATE SERPL-MCNC: 8.1 MG/DL
UROBILINOGEN URINE: NORMAL
WBC # FLD AUTO: 4.25 K/UL
WHITE BLOOD CELLS URINE: 0 /HPF

## 2021-03-29 LAB
BKV DNA SPEC QL NAA+PROBE: NEGATIVE COPIES/ML
LOG 10 BK QUANTITATION PCR: NORMAL

## 2021-04-22 ENCOUNTER — APPOINTMENT (OUTPATIENT)
Dept: TRANSPLANT | Facility: CLINIC | Age: 61
End: 2021-04-22

## 2021-04-23 ENCOUNTER — NON-APPOINTMENT (OUTPATIENT)
Age: 61
End: 2021-04-23

## 2021-04-23 LAB
ALBUMIN SERPL ELPH-MCNC: 4.2 G/DL
ALP BLD-CCNC: 62 U/L
ALT SERPL-CCNC: 12 U/L
ANION GAP SERPL CALC-SCNC: 14 MMOL/L
APPEARANCE: CLEAR
AST SERPL-CCNC: 19 U/L
BACTERIA: NEGATIVE
BASOPHILS # BLD AUTO: 0.02 K/UL
BASOPHILS NFR BLD AUTO: 0.5 %
BILIRUB SERPL-MCNC: 0.8 MG/DL
BILIRUBIN URINE: NEGATIVE
BLOOD URINE: NEGATIVE
BUN SERPL-MCNC: 25 MG/DL
CALCIUM SERPL-MCNC: 9.4 MG/DL
CHLORIDE SERPL-SCNC: 104 MMOL/L
CO2 SERPL-SCNC: 24 MMOL/L
COLOR: NORMAL
CREAT SERPL-MCNC: 1.51 MG/DL
CREAT SPEC-SCNC: 34 MG/DL
CREAT/PROT UR: 0.1 RATIO
CYCLOSPORINE SER-MCNC: 208 NG/ML
EOSINOPHIL # BLD AUTO: 0.04 K/UL
EOSINOPHIL NFR BLD AUTO: 0.9 %
GLUCOSE QUALITATIVE U: NEGATIVE
GLUCOSE SERPL-MCNC: 95 MG/DL
HCT VFR BLD CALC: 47 %
HGB BLD-MCNC: 14.3 G/DL
HYALINE CASTS: 0 /LPF
IMM GRANULOCYTES NFR BLD AUTO: 0 %
KETONES URINE: NEGATIVE
LDH SERPL-CCNC: 222 U/L
LEUKOCYTE ESTERASE URINE: NEGATIVE
LYMPHOCYTES # BLD AUTO: 1.02 K/UL
LYMPHOCYTES NFR BLD AUTO: 23.2 %
MAGNESIUM SERPL-MCNC: 1.8 MG/DL
MAN DIFF?: NORMAL
MCHC RBC-ENTMCNC: 28.8 PG
MCHC RBC-ENTMCNC: 30.4 GM/DL
MCV RBC AUTO: 94.6 FL
MICROSCOPIC-UA: NORMAL
MONOCYTES # BLD AUTO: 0.49 K/UL
MONOCYTES NFR BLD AUTO: 11.1 %
NEUTROPHILS # BLD AUTO: 2.83 K/UL
NEUTROPHILS NFR BLD AUTO: 64.3 %
NITRITE URINE: NEGATIVE
PH URINE: 6.5
PHOSPHATE SERPL-MCNC: 3.3 MG/DL
PLATELET # BLD AUTO: 186 K/UL
POTASSIUM SERPL-SCNC: 5.1 MMOL/L
PROT SERPL-MCNC: 6.6 G/DL
PROT UR-MCNC: 5 MG/DL
PROTEIN URINE: NEGATIVE
RBC # BLD: 4.97 M/UL
RBC # FLD: 13.2 %
RED BLOOD CELLS URINE: 1 /HPF
SODIUM SERPL-SCNC: 143 MMOL/L
SPECIFIC GRAVITY URINE: 1.01
SQUAMOUS EPITHELIAL CELLS: 2 /HPF
URATE SERPL-MCNC: 9.3 MG/DL
UROBILINOGEN URINE: NORMAL
WBC # FLD AUTO: 4.4 K/UL
WHITE BLOOD CELLS URINE: 0 /HPF

## 2021-04-23 RX ORDER — CYCLOSPORINE 100 MG/1
100 CAPSULE, LIQUID FILLED ORAL
Qty: 180 | Refills: 2 | Status: DISCONTINUED | COMMUNITY
Start: 2020-01-23 | End: 2021-04-23

## 2021-04-26 LAB
BKV DNA SPEC QL NAA+PROBE: NEGATIVE COPIES/ML
LOG 10 BK QUANTITATION PCR: NORMAL

## 2021-05-03 ENCOUNTER — NON-APPOINTMENT (OUTPATIENT)
Age: 61
End: 2021-05-03

## 2021-05-04 ENCOUNTER — APPOINTMENT (OUTPATIENT)
Dept: OBGYN | Facility: CLINIC | Age: 61
End: 2021-05-04
Payer: MEDICARE

## 2021-05-04 VITALS
DIASTOLIC BLOOD PRESSURE: 90 MMHG | WEIGHT: 239 LBS | TEMPERATURE: 95.9 F | BODY MASS INDEX: 40.8 KG/M2 | HEIGHT: 64 IN | SYSTOLIC BLOOD PRESSURE: 160 MMHG

## 2021-05-04 PROCEDURE — 99396 PREV VISIT EST AGE 40-64: CPT | Mod: GY

## 2021-05-04 PROCEDURE — G0101: CPT

## 2021-05-04 NOTE — HISTORY OF PRESENT ILLNESS
[Patient reported mammogram was normal] : Patient reported mammogram was normal [Patient reported PAP Smear was normal] : Patient reported PAP Smear was normal [FreeTextEntry1] : s/p kidney transplant\par had one episode of staining \par No other symptoms  [Mammogramdate] : 2019  [PapSmeardate] : 2018  [ColonoscopyDate] : UTD

## 2021-05-04 NOTE — PLAN
[FreeTextEntry1] : one episode of staining\par Patient is high risk for EM cancer secondary to obesity and immunosuppression\par Will check sonogram and then decide next steps

## 2021-05-04 NOTE — PHYSICAL EXAM
[Appropriately responsive] : appropriately responsive [Alert] : alert [No Acute Distress] : no acute distress [No Lymphadenopathy] : no lymphadenopathy [Regular Rate Rhythm] : regular rate rhythm [No Murmurs] : no murmurs [Clear to Auscultation B/L] : clear to auscultation bilaterally [Soft] : soft [Non-tender] : non-tender [Non-distended] : non-distended [No HSM] : No HSM [No Lesions] : no lesions [No Mass] : no mass [Oriented x3] : oriented x3 [FreeTextEntry6] : left greater than right  [Examination Of The Breasts] : a normal appearance [No Masses] : no breast masses were palpable [Labia Majora] : normal [Labia Minora] : normal [Normal] : normal [Uterine Adnexae] : normal

## 2021-05-06 ENCOUNTER — APPOINTMENT (OUTPATIENT)
Dept: TRANSPLANT | Facility: CLINIC | Age: 61
End: 2021-05-06

## 2021-05-06 ENCOUNTER — LABORATORY RESULT (OUTPATIENT)
Age: 61
End: 2021-05-06

## 2021-05-06 LAB
COVID-19 SPIKE DOMAIN ANTIBODY INTERPRETATION: POSITIVE
HPV HIGH+LOW RISK DNA PNL CVX: NOT DETECTED
SARS-COV-2 AB SERPL IA-ACNC: 26.3 U/ML

## 2021-05-07 ENCOUNTER — NON-APPOINTMENT (OUTPATIENT)
Age: 61
End: 2021-05-07

## 2021-05-07 LAB
ALBUMIN SERPL ELPH-MCNC: 4.4 G/DL
ALP BLD-CCNC: 63 U/L
ALT SERPL-CCNC: 7 U/L
ANION GAP SERPL CALC-SCNC: 13 MMOL/L
APPEARANCE: CLEAR
AST SERPL-CCNC: 15 U/L
BACTERIA: NEGATIVE
BASOPHILS # BLD AUTO: 0.04 K/UL
BASOPHILS NFR BLD AUTO: 0.9 %
BILIRUB SERPL-MCNC: 0.7 MG/DL
BILIRUBIN URINE: NEGATIVE
BLOOD URINE: NEGATIVE
BUN SERPL-MCNC: 32 MG/DL
CALCIUM SERPL-MCNC: 9.5 MG/DL
CALCIUM SERPL-MCNC: 9.5 MG/DL
CHLORIDE SERPL-SCNC: 107 MMOL/L
CO2 SERPL-SCNC: 23 MMOL/L
COLOR: NORMAL
CREAT SERPL-MCNC: 1.55 MG/DL
CREAT SPEC-SCNC: 33 MG/DL
CREAT/PROT UR: NORMAL RATIO
EOSINOPHIL # BLD AUTO: 0.06 K/UL
EOSINOPHIL NFR BLD AUTO: 1.4 %
GLUCOSE QUALITATIVE U: NEGATIVE
GLUCOSE SERPL-MCNC: 100 MG/DL
HCT VFR BLD CALC: 44.4 %
HGB BLD-MCNC: 13.8 G/DL
HYALINE CASTS: 0 /LPF
IMM GRANULOCYTES NFR BLD AUTO: 0 %
KETONES URINE: NEGATIVE
LDH SERPL-CCNC: 238 U/L
LEUKOCYTE ESTERASE URINE: NEGATIVE
LYMPHOCYTES # BLD AUTO: 1.09 K/UL
LYMPHOCYTES NFR BLD AUTO: 25.3 %
MAGNESIUM SERPL-MCNC: 1.9 MG/DL
MAN DIFF?: NORMAL
MCHC RBC-ENTMCNC: 29.1 PG
MCHC RBC-ENTMCNC: 31.1 GM/DL
MCV RBC AUTO: 93.7 FL
MICROSCOPIC-UA: NORMAL
MONOCYTES # BLD AUTO: 0.41 K/UL
MONOCYTES NFR BLD AUTO: 9.5 %
NEUTROPHILS # BLD AUTO: 2.7 K/UL
NEUTROPHILS NFR BLD AUTO: 62.9 %
NITRITE URINE: NEGATIVE
PARATHYROID HORMONE INTACT: 102 PG/ML
PH URINE: 6
PHOSPHATE SERPL-MCNC: 3.7 MG/DL
PLATELET # BLD AUTO: 199 K/UL
POTASSIUM SERPL-SCNC: 4.7 MMOL/L
PROT SERPL-MCNC: 6.7 G/DL
PROT UR-MCNC: <4 MG/DL
PROTEIN URINE: NEGATIVE
RBC # BLD: 4.74 M/UL
RBC # FLD: 12.8 %
RED BLOOD CELLS URINE: 0 /HPF
SODIUM SERPL-SCNC: 142 MMOL/L
SPECIFIC GRAVITY URINE: 1.01
SQUAMOUS EPITHELIAL CELLS: 2 /HPF
TACROLIMUS SERPL-MCNC: <2 NG/ML
URATE SERPL-MCNC: 9 MG/DL
UROBILINOGEN URINE: NORMAL
WBC # FLD AUTO: 4.3 K/UL
WHITE BLOOD CELLS URINE: 1 /HPF

## 2021-05-10 ENCOUNTER — OUTPATIENT (OUTPATIENT)
Dept: OUTPATIENT SERVICES | Facility: HOSPITAL | Age: 61
LOS: 1 days | End: 2021-05-10
Payer: MEDICARE

## 2021-05-10 ENCOUNTER — APPOINTMENT (OUTPATIENT)
Dept: ULTRASOUND IMAGING | Facility: CLINIC | Age: 61
End: 2021-05-10
Payer: MEDICARE

## 2021-05-10 ENCOUNTER — RESULT REVIEW (OUTPATIENT)
Age: 61
End: 2021-05-10

## 2021-05-10 ENCOUNTER — APPOINTMENT (OUTPATIENT)
Dept: MAMMOGRAPHY | Facility: CLINIC | Age: 61
End: 2021-05-10
Payer: MEDICARE

## 2021-05-10 DIAGNOSIS — N95.0 POSTMENOPAUSAL BLEEDING: ICD-10-CM

## 2021-05-10 DIAGNOSIS — Z94.0 KIDNEY TRANSPLANT STATUS: Chronic | ICD-10-CM

## 2021-05-10 DIAGNOSIS — Z98.891 HISTORY OF UTERINE SCAR FROM PREVIOUS SURGERY: Chronic | ICD-10-CM

## 2021-05-10 LAB
CMV DNA SPEC QL NAA+PROBE: NOT DETECTED IU/ML
CYCLOSPORINE SER-MCNC: 225 NG/ML
CYTOLOGY CVX/VAG DOC THIN PREP: NORMAL

## 2021-05-10 PROCEDURE — 76856 US EXAM PELVIC COMPLETE: CPT

## 2021-05-10 PROCEDURE — 77063 BREAST TOMOSYNTHESIS BI: CPT | Mod: 26

## 2021-05-10 PROCEDURE — 77067 SCR MAMMO BI INCL CAD: CPT | Mod: 26

## 2021-05-10 PROCEDURE — 77063 BREAST TOMOSYNTHESIS BI: CPT

## 2021-05-10 PROCEDURE — 77067 SCR MAMMO BI INCL CAD: CPT

## 2021-05-10 PROCEDURE — 76856 US EXAM PELVIC COMPLETE: CPT | Mod: 26

## 2021-05-11 LAB
BKV DNA SPEC QL NAA+PROBE: NEGATIVE COPIES/ML
LOG 10 BK QUANTITATION PCR: NORMAL

## 2021-06-07 ENCOUNTER — OUTPATIENT (OUTPATIENT)
Dept: OUTPATIENT SERVICES | Facility: HOSPITAL | Age: 61
LOS: 1 days | End: 2021-06-07
Payer: MEDICARE

## 2021-06-07 ENCOUNTER — TRANSCRIPTION ENCOUNTER (OUTPATIENT)
Age: 61
End: 2021-06-07

## 2021-06-07 ENCOUNTER — APPOINTMENT (OUTPATIENT)
Dept: ULTRASOUND IMAGING | Facility: CLINIC | Age: 61
End: 2021-06-07
Payer: MEDICARE

## 2021-06-07 DIAGNOSIS — Z98.891 HISTORY OF UTERINE SCAR FROM PREVIOUS SURGERY: Chronic | ICD-10-CM

## 2021-06-07 DIAGNOSIS — Z86.718 PERSONAL HISTORY OF OTHER VENOUS THROMBOSIS AND EMBOLISM: ICD-10-CM

## 2021-06-07 DIAGNOSIS — Z94.0 KIDNEY TRANSPLANT STATUS: Chronic | ICD-10-CM

## 2021-06-07 PROCEDURE — 93970 EXTREMITY STUDY: CPT | Mod: 26

## 2021-06-07 PROCEDURE — 93970 EXTREMITY STUDY: CPT

## 2021-06-09 ENCOUNTER — NON-APPOINTMENT (OUTPATIENT)
Age: 61
End: 2021-06-09

## 2021-07-06 ENCOUNTER — APPOINTMENT (OUTPATIENT)
Dept: ORTHOPEDIC SURGERY | Facility: CLINIC | Age: 61
End: 2021-07-06
Payer: MEDICARE

## 2021-07-06 DIAGNOSIS — M47.812 SPONDYLOSIS W/OUT MYELOPATHY OR RADICULOPATHY, CERVICAL REGION: ICD-10-CM

## 2021-07-06 PROCEDURE — 99214 OFFICE O/P EST MOD 30 MIN: CPT

## 2021-07-06 NOTE — DISCUSSION/SUMMARY
[de-identified] : Patient was seen today for reevaluation of chronic low back pain without radiculopathy, she continues to have  pain from underlying osteoarthritis as well as general deconditioning secondary to body habitus and COVID-19 pandemic.  Patient would greatly benefit from chronic low back pain with recent atraumatic exacerbation, a course of physical therapy, I also recommend a trial of aquatic therapy.  Patient is also having chronic cervicothoracic back pain due to paraspinal muscle spasm and likely mild osteoarthritis.  No evidence of cervical radiculopathy.  Recommend a course of physical therapy to address this issue.  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  This has become more painful for the patient more recently, we discussed appropriate footwear.  Patient started on a course of topical NSAIDs, prescription given for diclofenac gel 1% to be applied to the area of pain 2-3 times daily as needed (We discussed all possible side effects of this medication).  Follow up as needed.  Patient appreciates and agrees with current plan.\par \par This note was generated using dragon medical dictation software.  A reasonable effort has been made for proofreading its contents, but typos may still remain.  If there are any questions or points of clarification needed please notify my office.\par

## 2021-07-06 NOTE — PHYSICAL EXAM
[de-identified] : Constitutional: Well-nourished, well-developed, No acute distress, morbidly obese\par Respiratory:  Good respiratory effort, no SOB\par Lymphatic: No regional lymphadenopathy, no lymphedema\par Psychiatric: Pleasant and normal affect, alert and oriented x3\par Skin: Clean dry and intact B/L UE/LE\par Musculoskeletal: normal except where as noted in regional exam\par \par Cervical Spine Exam\par APPEARANCE: no marked deformities or malalignment, normal curvature, good posture\par POSITIVE TENDERNESS: + Bilateral upper trapezius, levator scapula, rhomboid major, and rhomboid minor, + spasm noted in the same muscles.\par NONTENDER: no bony midline tenderness.\par ROM: limited in all planes, most notably in flexion and sidebending due to pain\par RESISTIVE TESTING: painful 4/5 resisted ext, bilateral sidebending, rotation and shoulder shrug , 5/5 flexion \par SPECIAL TESTS: neg Spurling's b/l\par Neuro: C5 - T1 intact to motor\par Sensation: Intact to light touch throughout b/l UE\par \par Thoracic Spine Exam:\par \par normal curvature and normal alignment. good posture. no midline bony tenderness, + marked spasm and associated tenderness of left paraspinal and periscapular muscles.  ROM mildly limited in sidebending and rotation due to noted spasm\par \par Lumbar Spine Exam\par \par APPEARANCE: no marked deformities or malalignment, + loss of lordotic curvature of the lumbosacral spine. no marked deformities. + poor posture\par POSITIVE TENDERNESS: + IL/SI/ST ligs, + TTP of b/l SI joints, + b/l lower lumbar tenderness and spasm noted in erector spinae and quadratus lumborum\par NONTENDER: no bony midline tenderness.\par ROM: Mildly limited in all directions, mildly painful at end range of flexion and extension\par RESISTIVE TESTING: painless 5/5 resisted flex/ext, sidebending b/l, and rotation\par SPECIAL TESTS: neg SLR b/l, neg MARGE b/l, neg Trendelenburg b/l \par NEURO:  L1 - S2 intact to sensation and motor\par

## 2021-07-06 NOTE — HISTORY OF PRESENT ILLNESS
[de-identified] : Patient is here for LBP follow up. She states that since her last visit she has done nothing to treat her pain and she has been staying in her home due to her condition. There has been no recent injury. She would like to start PT.\par She also complains of left heel pain that started around  a year ago. There was no inciting injury. She states that she has increased pain when wearing shoes with backs on them. She has done nothing to treat this pain.

## 2021-07-09 ENCOUNTER — TRANSCRIPTION ENCOUNTER (OUTPATIENT)
Age: 61
End: 2021-07-09

## 2021-08-11 ENCOUNTER — APPOINTMENT (OUTPATIENT)
Dept: NEPHROLOGY | Facility: CLINIC | Age: 61
End: 2021-08-11
Payer: MEDICARE

## 2021-08-11 VITALS — SYSTOLIC BLOOD PRESSURE: 160 MMHG | DIASTOLIC BLOOD PRESSURE: 100 MMHG | HEART RATE: 84 BPM

## 2021-08-11 LAB
25(OH)D3 SERPL-MCNC: 65.1 NG/ML
ALBUMIN SERPL ELPH-MCNC: 4.4 G/DL
ALP BLD-CCNC: 69 U/L
ALT SERPL-CCNC: 12 U/L
ANION GAP SERPL CALC-SCNC: 15 MMOL/L
APPEARANCE: CLEAR
AST SERPL-CCNC: 20 U/L
BACTERIA: NEGATIVE
BASOPHILS # BLD AUTO: 0.04 K/UL
BASOPHILS NFR BLD AUTO: 0.9 %
BILIRUB SERPL-MCNC: 0.9 MG/DL
BILIRUBIN URINE: NEGATIVE
BLOOD URINE: NEGATIVE
BUN SERPL-MCNC: 30 MG/DL
CALCIUM SERPL-MCNC: 9.6 MG/DL
CALCIUM SERPL-MCNC: 9.6 MG/DL
CHLORIDE SERPL-SCNC: 105 MMOL/L
CO2 SERPL-SCNC: 20 MMOL/L
COLOR: NORMAL
CREAT SERPL-MCNC: 1.55 MG/DL
CYCLOSPORINE SER-MCNC: 227 NG/ML
EOSINOPHIL # BLD AUTO: 0.06 K/UL
EOSINOPHIL NFR BLD AUTO: 1.3 %
ESTIMATED AVERAGE GLUCOSE: 114 MG/DL
GLUCOSE QUALITATIVE U: NEGATIVE
GLUCOSE SERPL-MCNC: 96 MG/DL
HBA1C MFR BLD HPLC: 5.6 %
HCT VFR BLD CALC: 45.2 %
HGB BLD-MCNC: 14 G/DL
HYALINE CASTS: 0 /LPF
IMM GRANULOCYTES NFR BLD AUTO: 0.2 %
KETONES URINE: NEGATIVE
LEUKOCYTE ESTERASE URINE: NEGATIVE
LYMPHOCYTES # BLD AUTO: 1.05 K/UL
LYMPHOCYTES NFR BLD AUTO: 22.8 %
MAGNESIUM SERPL-MCNC: 2 MG/DL
MAN DIFF?: NORMAL
MCHC RBC-ENTMCNC: 29.8 PG
MCHC RBC-ENTMCNC: 31 GM/DL
MCV RBC AUTO: 96.2 FL
MICROSCOPIC-UA: NORMAL
MONOCYTES # BLD AUTO: 0.5 K/UL
MONOCYTES NFR BLD AUTO: 10.9 %
NEUTROPHILS # BLD AUTO: 2.94 K/UL
NEUTROPHILS NFR BLD AUTO: 63.9 %
NITRITE URINE: NEGATIVE
PARATHYROID HORMONE INTACT: 57 PG/ML
PH URINE: 6
PHOSPHATE SERPL-MCNC: 3.8 MG/DL
PLATELET # BLD AUTO: 197 K/UL
POTASSIUM SERPL-SCNC: 5.5 MMOL/L
PROT SERPL-MCNC: 6.9 G/DL
PROTEIN URINE: NEGATIVE
RBC # BLD: 4.7 M/UL
RBC # FLD: 13.6 %
RED BLOOD CELLS URINE: 0 /HPF
SODIUM SERPL-SCNC: 140 MMOL/L
SPECIFIC GRAVITY URINE: 1.01
SQUAMOUS EPITHELIAL CELLS: 1 /HPF
UROBILINOGEN URINE: NORMAL
WBC # FLD AUTO: 4.6 K/UL
WHITE BLOOD CELLS URINE: 0 /HPF

## 2021-08-11 PROCEDURE — 99214 OFFICE O/P EST MOD 30 MIN: CPT

## 2021-08-13 NOTE — REVIEW OF SYSTEMS
[Feeling Tired] : feeling tired [Palpitations] : palpitations [SOB on Exertion] : shortness of breath during exertion [Anxiety] : anxiety [Negative] : Eyes [Fever] : no fever [Chills] : no chills [Nosebleeds] : no nosebleeds [Sore Throat] : no sore throat [Hoarseness] : no hoarseness [Chest Pain] : no chest pain [Shortness Of Breath] : no shortness of breath [Cough] : no cough [Abdominal Pain] : no abdominal pain [Constipation] : no constipation [Diarrhea] : no diarrhea [Itching] : no itching [Dizziness] : no dizziness [Fainting] : no fainting [FreeTextEntry4] : having headaches which attributes to typical sinus symptoms during change in season. [FreeTextEntry5] : Negative holter in the past.  Followed by Dr. Maza [FreeTextEntry6] : Most pronounced on Steps [FreeTextEntry9] : pain walking and back pain

## 2021-08-13 NOTE — PHYSICAL EXAM
[General Appearance - Alert] : alert [General Appearance - In No Acute Distress] : in no acute distress [Sclera] : the sclera and conjunctiva were normal [PERRL With Normal Accommodation] : pupils were equal in size, round, and reactive to light [Neck Appearance] : the appearance of the neck was normal [Neck Cervical Mass (___cm)] : no neck mass was observed [Jugular Venous Distention Increased] : there was no jugular-venous distention [] : no respiratory distress [Thyroid Diffuse Enlargement] : the thyroid was not enlarged [Respiration, Rhythm And Depth] : normal respiratory rhythm and effort [Exaggerated Use Of Accessory Muscles For Inspiration] : no accessory muscle use [Auscultation Breath Sounds / Voice Sounds] : lungs were clear to auscultation bilaterally [Heart Rate And Rhythm] : heart rate was normal and rhythm regular [Heart Sounds] : normal S1 and S2 [Murmurs] : no murmurs [Arterial Pulses Carotid] : carotid pulses were normal with no bruits [Edema] : there was no peripheral edema [Bowel Sounds] : normal bowel sounds [Abdomen Soft] : soft [Abdomen Tenderness] : non-tender [Supraclavicular Lymph Nodes Enlarged Bilaterally] : supraclavicular [Cervical Lymph Nodes Enlarged Posterior Bilaterally] : posterior cervical [Nail Clubbing] : no clubbing  or cyanosis of the fingernails [FreeTextEntry1] : anxious and tearful

## 2021-08-13 NOTE — ASSESSMENT
[FreeTextEntry1] : 60 y.o female with PCKD s/p LURT \par 1.  Renal transplant - creatinine at baseline of 1.55, UA without proteinuria.\par 2.  Immunosuppression - now on cyclosporine, last level >200.  Cont MMF 500mgs BID and pred 2.5.  Cannot increase MMF as she gets nauseous.  Would not taper prednisone further for risk of rejection.\par 3.  HTN - controlled at home.   \par 4.  hyperkalemia/ Prophylaxis - developed high methemoglobin levels on dapsone so stopped.  she is back on bactrim.  K now controlled\par 5.  Wt gain - Would like to lose weight.\par 6.  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  HCT stable no need for treatment at this point.\par 7.  DVT - Pt now following with Dr. Maza.  Pt still on xarelto\par 8.  Anxiety - REcommended that patient re-establish herself with psychiatry.  Did not tolerate zoloft.  \par 9.  tremor/ exhaustion/ trouble concentration/ hair loss/ tremor - changed tacro to cyclosporine.  Hair loss reversed.  Other symptoms improved but still present to some degree.\par 10. Seen by orthopedics and found to have OA and will start aquatherapy for PT\par 11. Cerebral aneurysm - Spoke to Patient and Dr. Stacy prior to pandemic.  Reminded to repeat MRI/MRA and then to see Dr. Stacy.  I again today discussed the importance of this follow up.  She will consider it.\par 12.  Pulmonary nodule - Previously reviewed CT chest. Most recent study without nodule but Dr. Youngblood recommended 1 year follow up after 3/15/17 study. Patient at this time not interested in repeating study.\par 14.RTC 2 months

## 2021-08-13 NOTE — HISTORY OF PRESENT ILLNESS
[FreeTextEntry1] : Pt with PCKD now s/p LURT via kidney paired exchange on 11/7/17.  \par \par Since last visit she feels she is getting old\par Labs from 8/10 reviewed which are stable.\par She remains anxious.  She could not tolerate zoloft and has not followed with psychiatrist.\par Needs to eventually have cataract surgery = Dr. Karime Brooks to have a f/u appointment soon\par Has not repeated the MRI/MRA and does not want to repeat the Chest CT as recommended by Dr. Youngblood.\par BP at home 132/85.\par

## 2021-08-30 ENCOUNTER — TRANSCRIPTION ENCOUNTER (OUTPATIENT)
Age: 61
End: 2021-08-30

## 2021-09-14 LAB
ALBUMIN SERPL ELPH-MCNC: 4.3 G/DL
ANION GAP SERPL CALC-SCNC: 13 MMOL/L
BUN SERPL-MCNC: 33 MG/DL
CALCIUM SERPL-MCNC: 9.5 MG/DL
CHLORIDE SERPL-SCNC: 109 MMOL/L
CO2 SERPL-SCNC: 23 MMOL/L
CREAT SERPL-MCNC: 1.55 MG/DL
CYCLOSPORINE SER-MCNC: 115 NG/ML
GLUCOSE SERPL-MCNC: 114 MG/DL
PHOSPHATE SERPL-MCNC: 3.6 MG/DL
POTASSIUM SERPL-SCNC: 5 MMOL/L
SODIUM SERPL-SCNC: 144 MMOL/L

## 2021-09-23 LAB
ALBUMIN SERPL ELPH-MCNC: 4.2 G/DL
ANION GAP SERPL CALC-SCNC: 13 MMOL/L
BUN SERPL-MCNC: 28 MG/DL
CALCIUM SERPL-MCNC: 9.7 MG/DL
CHLORIDE SERPL-SCNC: 106 MMOL/L
CO2 SERPL-SCNC: 24 MMOL/L
CREAT SERPL-MCNC: 1.53 MG/DL
CYCLOSPORINE SER-MCNC: 129 NG/ML
GLUCOSE SERPL-MCNC: 95 MG/DL
PHOSPHATE SERPL-MCNC: 3.8 MG/DL
POTASSIUM SERPL-SCNC: 5.3 MMOL/L
SODIUM SERPL-SCNC: 143 MMOL/L
TACROLIMUS SERPL-MCNC: <2 NG/ML

## 2021-10-04 ENCOUNTER — APPOINTMENT (OUTPATIENT)
Dept: ORTHOPEDIC SURGERY | Facility: CLINIC | Age: 61
End: 2021-10-04
Payer: MEDICARE

## 2021-10-04 DIAGNOSIS — M76.62 ACHILLES TENDINITIS, LEFT LEG: ICD-10-CM

## 2021-10-04 PROCEDURE — 99213 OFFICE O/P EST LOW 20 MIN: CPT

## 2021-10-04 NOTE — HISTORY OF PRESENT ILLNESS
[de-identified] : Patient is here for left heel/LBP follow up. She is attending aquatic therapy and tried using the Voltaren gel. She does not feel that it has improved her pain. There has been no recent injury.

## 2021-10-04 NOTE — PHYSICAL EXAM
[de-identified] : Constitutional: Well-nourished, well-developed, No acute distress, morbidly obese\par Respiratory:  Good respiratory effort, no SOB\par Psychiatric: Pleasant and normal affect, alert and oriented x3\par Musculoskeletal: normal except where as noted in regional exam\par \par Lumbar Spine Exam\par \par APPEARANCE: no marked deformities or malalignment, + loss of lordotic curvature of the lumbosacral spine. no marked deformities. + poor posture\par POSITIVE TENDERNESS: + IL/SI/ST ligs, + TTP of b/l SI joints, + b/l lower lumbar tenderness and spasm noted in erector spinae and quadratus lumborum\par NONTENDER: no bony midline tenderness.\par ROM: Mildly limited in all directions, mildly painful at end range of flexion and extension\par RESISTIVE TESTING: painless 5/5 resisted flex/ext, sidebending b/l, and rotation\par SPECIAL TESTS: neg SLR b/l, neg MARGE b/l, neg Trendelenburg b/l \par NEURO:  L1 - S2 intact to sensation and motor\par \par Left ankle:\par APPEARANCE: + Jailyn's deformity with moderate swelling of posterior heel, tenderness at insertion and in midsubstance of achilles tendon, no marked deformities or malalignment\par POSITIVE TENDERNESS: achilles tendon, + thickening of tendon\par NONTENDER: medial malleolus, lateral malleolus, tibialis posterior tendon, ATFL, CFL, PTFL, anterior tibiofibular ligament (high ankle), sinus tarsus, deltoid ligaments, 5th metatarsal. \par RANGE OF MOTION: Mildly limited DF due to stiffness and pain in achilles tendon. \par RESISTIVE TESTING: painless resisted dorsiflexion, plantar flexion, inversion & eversion. \par

## 2021-10-04 NOTE — DISCUSSION/SUMMARY
[de-identified] : Patient was seen today for reevaluation of chronic low back pain without radiculopathy due to underlying significant osteoarthritis.  She has been undergoing her course of aquatic therapy with temporary relief after each visit, but continues to have chronic back pain.  At this time I recommend we proceed with advanced imaging with MRI of the lumbar spine and subsequent physiatry referral to discuss risk/benefits of facet/epidural steroid injections.\par Patient was also seen today for reevaluation of chronic left heel pain due to insertional Achilles tendinosis and arthritis/Jailyn's deformity.  Patient has tried topical anti-inflammatories, aquatic therapy, heel lifts, home stretching, and other conservative measures without relief.  She continues to have pain and dysfunction with ADLs.  She has to take steps one at a time because of this pain.  At this time recommend foot orthopedic consultation to discuss risk/benefits of surgical intervention.\par   Patient appreciates and agrees with current plan.\par \par I work as part of an academic orthopedic group and routinely have a physician in training (resident / fellow) working with me.  Any part of the history and physical exam performed by the physician in training was either directly reviewed and/or replicated by myself.  Any procedure performed by the physician in training was performed under my direct supervision and with the consent of the patient.\par \par This note was generated using dragon medical dictation software.  A reasonable effort has been made for proofreading its contents, but typos may still remain.  If there are any questions or points of clarification needed please notify my office.

## 2021-10-20 ENCOUNTER — APPOINTMENT (OUTPATIENT)
Dept: NEPHROLOGY | Facility: CLINIC | Age: 61
End: 2021-10-20
Payer: MEDICARE

## 2021-10-20 VITALS — TEMPERATURE: 98.06 F | HEART RATE: 86 BPM | OXYGEN SATURATION: 97 % | HEIGHT: 64 IN

## 2021-10-20 LAB
25(OH)D3 SERPL-MCNC: 62.7 NG/ML
ALBUMIN SERPL ELPH-MCNC: 4.5 G/DL
ALP BLD-CCNC: 68 U/L
ALT SERPL-CCNC: 9 U/L
ANION GAP SERPL CALC-SCNC: 14 MMOL/L
APPEARANCE: CLEAR
AST SERPL-CCNC: 15 U/L
BACTERIA: NEGATIVE
BASOPHILS # BLD AUTO: 0.06 K/UL
BASOPHILS NFR BLD AUTO: 1.3 %
BILIRUB SERPL-MCNC: 0.6 MG/DL
BILIRUBIN URINE: NEGATIVE
BLOOD URINE: NEGATIVE
BUN SERPL-MCNC: 31 MG/DL
CALCIUM SERPL-MCNC: 9.3 MG/DL
CALCIUM SERPL-MCNC: 9.3 MG/DL
CHLORIDE SERPL-SCNC: 105 MMOL/L
CHOLEST SERPL-MCNC: 190 MG/DL
CO2 SERPL-SCNC: 22 MMOL/L
COLOR: NORMAL
COVID-19 SPIKE DOMAIN ANTIBODY INTERPRETATION: POSITIVE
CREAT SERPL-MCNC: 1.4 MG/DL
CYCLOSPORINE SER-MCNC: 118 NG/ML
EOSINOPHIL # BLD AUTO: 0.12 K/UL
EOSINOPHIL NFR BLD AUTO: 2.7 %
ESTIMATED AVERAGE GLUCOSE: 120 MG/DL
GLUCOSE QUALITATIVE U: NEGATIVE
GLUCOSE SERPL-MCNC: 97 MG/DL
HBA1C MFR BLD HPLC: 5.8 %
HCT VFR BLD CALC: 43.4 %
HDLC SERPL-MCNC: 61 MG/DL
HGB BLD-MCNC: 13.9 G/DL
HYALINE CASTS: 1 /LPF
IMM GRANULOCYTES NFR BLD AUTO: 0 %
KETONES URINE: NEGATIVE
LDLC SERPL CALC-MCNC: 105 MG/DL
LEUKOCYTE ESTERASE URINE: NEGATIVE
LYMPHOCYTES # BLD AUTO: 1.23 K/UL
LYMPHOCYTES NFR BLD AUTO: 27.6 %
MAGNESIUM SERPL-MCNC: 1.9 MG/DL
MAN DIFF?: NORMAL
MCHC RBC-ENTMCNC: 30.1 PG
MCHC RBC-ENTMCNC: 32 GM/DL
MCV RBC AUTO: 93.9 FL
MICROSCOPIC-UA: NORMAL
MONOCYTES # BLD AUTO: 0.35 K/UL
MONOCYTES NFR BLD AUTO: 7.8 %
NEUTROPHILS # BLD AUTO: 2.7 K/UL
NEUTROPHILS NFR BLD AUTO: 60.6 %
NITRITE URINE: NEGATIVE
NONHDLC SERPL-MCNC: 129 MG/DL
PARATHYROID HORMONE INTACT: 102 PG/ML
PH URINE: 6
PHOSPHATE SERPL-MCNC: 3.4 MG/DL
PLATELET # BLD AUTO: 207 K/UL
POTASSIUM SERPL-SCNC: 5 MMOL/L
PROT SERPL-MCNC: 6.8 G/DL
PROTEIN URINE: NEGATIVE
RBC # BLD: 4.62 M/UL
RBC # FLD: 13.2 %
RED BLOOD CELLS URINE: 0 /HPF
SARS-COV-2 AB SERPL IA-ACNC: >250 U/ML
SODIUM SERPL-SCNC: 141 MMOL/L
SPECIFIC GRAVITY URINE: 1.01
SQUAMOUS EPITHELIAL CELLS: 3 /HPF
TRIGL SERPL-MCNC: 120 MG/DL
UROBILINOGEN URINE: NORMAL
WBC # FLD AUTO: 4.46 K/UL
WHITE BLOOD CELLS URINE: 1 /HPF

## 2021-10-20 PROCEDURE — 99214 OFFICE O/P EST MOD 30 MIN: CPT

## 2021-10-20 RX ORDER — AMOXICILLIN AND CLAVULANATE POTASSIUM 875; 125 MG/1; MG/1
875-125 TABLET, COATED ORAL
Qty: 14 | Refills: 0 | Status: DISCONTINUED | COMMUNITY
Start: 2018-11-15 | End: 2021-10-20

## 2021-10-20 RX ORDER — DICLOFENAC SODIUM 1% 10 MG/G
1 GEL TOPICAL
Qty: 100 | Refills: 0 | Status: DISCONTINUED | COMMUNITY
Start: 2021-07-09 | End: 2021-10-20

## 2021-10-21 NOTE — ASSESSMENT
[FreeTextEntry1] : 61 y.o female with PCKD s/p LURT reviewed labs today\par 1.  Renal transplant - creatinine at baseline of 1.4, UA without proteinuria.\par 2.  Immunosuppression - now on cyclosporine, goal 100-150.  Cont MMF 500mgs BID and pred 2.5.  Cannot increase MMF as she gets nauseous.  Would not taper prednisone further for risk of rejection.\par 3.  HTN - controlled at home 130s/80s at home.  taken every few weeks.  Now 180/100 will restart norvasc 5.\par 4.  hyperkalemia/ Prophylaxis - developed high methemoglobin levels on dapsone so stopped.  she is back on bactrim.  K now controlled.  Dapsone allergy added\par 5.  Wt gain - Would like to lose weight but unable to exercise.  Weight loss will help elevated A1C\par 6.  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  HCT stable no need for treatment at this point.\par 7.  DVT - Pt now following with Dr. Maza.  Pt still on xarelto\par 8.  Anxiety - Recommended that patient re-establish herself with psychiatry.  Did not tolerate zoloft.  \par 9.  tremor/ exhaustion/ trouble concentration/ hair loss/ tremor - changed tacro to cyclosporine.  Hair loss reversed.  Other symptoms improved but still present to a minor degree.\par 10. Seen by orthopedics and found to heel spur and OA of the spine needs MRI as aquatic therapy did not work may need epidural.\par 11. Cerebral aneurysm - Spoke to Patient and Dr. Stacy prior to pandemic.  Reminded to repeat MRI/MRA and then to see Dr. Stacy.  I again today discussed the importance of this follow up.  She does not want to schedule at this time.\par 12.  Pulmonary nodule - Previously reviewed CT chest. Most recent study without nodule but Dr. Youngblood recommended 1 year follow up after 3/15/17 study. Patient at this time not interested in repeating study.\par 13. COVID received booster pfizer.\par 14.RTC 2 months

## 2021-10-21 NOTE — HISTORY OF PRESENT ILLNESS
[FreeTextEntry1] : Pt with PCKD now s/p LURT via kidney paired exchange on 11/7/17.  \par \par Since last visit overall doing well.\par 1) Cataract - has not followed up with Dr. Karime Brooks yet\par 2) Aneurysm - needs repeat MRI/MRA has not scheduled\par 3) CT chest - does not want further follow up\par 4) Back pain from OA awaiting repeat MRI\par 5) Heel spur - to see a Foot surgeon\par 5) CSA taking 50/75 goal trough 100-150\par

## 2021-10-21 NOTE — REVIEW OF SYSTEMS
[Feeling Tired] : feeling tired [Palpitations] : palpitations [SOB on Exertion] : shortness of breath during exertion [Anxiety] : anxiety [Negative] : Eyes [Fever] : no fever [Chills] : no chills [Nosebleeds] : no nosebleeds [Sore Throat] : no sore throat [Hoarseness] : no hoarseness [Chest Pain] : no chest pain [Shortness Of Breath] : no shortness of breath [Cough] : no cough [Abdominal Pain] : no abdominal pain [Constipation] : no constipation [Diarrhea] : no diarrhea [Itching] : no itching [Dizziness] : no dizziness [Fainting] : no fainting [FreeTextEntry4] : having headaches which attributes to typical sinus symptoms during change in season. [FreeTextEntry5] : Negative holter in the past.  Followed by Dr. Maza [FreeTextEntry9] : pain walking and back pain [FreeTextEntry6] : Most pronounced on Steps

## 2021-11-03 ENCOUNTER — APPOINTMENT (OUTPATIENT)
Dept: ORTHOPEDIC SURGERY | Facility: CLINIC | Age: 61
End: 2021-11-03
Payer: MEDICARE

## 2021-11-03 DIAGNOSIS — M92.60 JUVENILE OSTEOCHONDROSIS OF TARSUS, UNSPECIFIED ANKLE: ICD-10-CM

## 2021-11-03 DIAGNOSIS — M77.8 OTHER ENTHESOPATHIES, NOT ELSEWHERE CLASSIFIED: ICD-10-CM

## 2021-11-03 DIAGNOSIS — M67.88 OTHER SPECIFIED DISORDERS OF SYNOVIUM AND TENDON, OTHER SITE: ICD-10-CM

## 2021-11-03 DIAGNOSIS — M62.89 OTHER SPECIFIED DISORDERS OF MUSCLE: ICD-10-CM

## 2021-11-03 PROCEDURE — 99214 OFFICE O/P EST MOD 30 MIN: CPT

## 2021-11-03 PROCEDURE — 73630 X-RAY EXAM OF FOOT: CPT | Mod: LT

## 2021-11-05 PROBLEM — M77.8 ENTHESOPATHY OF FOOT: Status: ACTIVE | Noted: 2021-11-05

## 2021-11-05 PROBLEM — M67.88 ACHILLES TENDINOSIS OF LEFT LOWER EXTREMITY: Status: ACTIVE | Noted: 2021-11-05

## 2021-11-05 PROBLEM — M92.60 HAGLUND'S DEFORMITY: Status: ACTIVE | Noted: 2021-11-05

## 2021-11-05 PROBLEM — M62.89 TIGHTNESS OF BOTH GASTROCNEMIUS MUSCLES: Status: ACTIVE | Noted: 2021-11-05

## 2021-11-11 NOTE — ED PROVIDER NOTE - NS ED ROS FT
[Well Developed] : well developed [Well Nourished] : well nourished [No Acute Distress] : no acute distress [Normal Conjunctiva] : normal conjunctiva [Normal Venous Pressure] : normal venous pressure [No Carotid Bruit] : no carotid bruit [Normal S1, S2] : normal S1, S2 [No Rub] : no rub [5th Left ICS - MCL] : palpated at the 5th LICS in the midclavicular line [Normal] : normal [No Precordial Heave] : no precordial heave was noted [Normal Rate] : normal [Rhythm Regular] : regular [Normal S1] : normal S1 REVIEW OF SYSTEMS:  HEENT: no headache  Cardiac: no chest pain  Respiratory: no shortness of breath  Gastrointestinal: no abdominal pain, + nausea, + vomiting  -Xin Brown PGY-2 [Normal S2] : normal S2 [No Gallop] : no gallop heard [II] : a grade 2 [No Pitting Edema] : no pitting edema present [2+] : left 2+ [Clear Lung Fields] : clear lung fields [Good Air Entry] : good air entry [No Respiratory Distress] : no respiratory distress  [Soft] : abdomen soft [Non Tender] : non-tender [No Masses/organomegaly] : no masses/organomegaly [Normal Bowel Sounds] : normal bowel sounds [Normal Gait] : normal gait [No Edema] : no edema [No Cyanosis] : no cyanosis [No Clubbing] : no clubbing [No Varicosities] : no varicosities [No Rash] : no rash [No Skin Lesions] : no skin lesions [Moves all extremities] : moves all extremities [No Focal Deficits] : no focal deficits [Normal Speech] : normal speech [Alert and Oriented] : alert and oriented [Normal memory] : normal memory

## 2021-11-28 ENCOUNTER — NON-APPOINTMENT (OUTPATIENT)
Age: 61
End: 2021-11-28

## 2021-12-07 ENCOUNTER — APPOINTMENT (OUTPATIENT)
Dept: TRANSPLANT | Facility: CLINIC | Age: 61
End: 2021-12-07

## 2021-12-09 ENCOUNTER — APPOINTMENT (OUTPATIENT)
Dept: NEPHROLOGY | Facility: CLINIC | Age: 61
End: 2021-12-09
Payer: MEDICARE

## 2021-12-09 VITALS
RESPIRATION RATE: 16 BRPM | HEART RATE: 90 BPM | HEIGHT: 64 IN | OXYGEN SATURATION: 98 % | TEMPERATURE: 209.12 F | DIASTOLIC BLOOD PRESSURE: 84 MMHG | SYSTOLIC BLOOD PRESSURE: 144 MMHG

## 2021-12-09 LAB
ALBUMIN SERPL ELPH-MCNC: 4.5 G/DL
ALP BLD-CCNC: 69 U/L
ALT SERPL-CCNC: <5 U/L
ANION GAP SERPL CALC-SCNC: 15 MMOL/L
APPEARANCE: CLEAR
AST SERPL-CCNC: 14 U/L
BACTERIA: NEGATIVE
BASOPHILS # BLD AUTO: 0.03 K/UL
BASOPHILS NFR BLD AUTO: 0.7 %
BILIRUB SERPL-MCNC: 0.8 MG/DL
BILIRUBIN URINE: NEGATIVE
BKV DNA SPEC QL NAA+PROBE: NOT DETECTED COPIES/ML
BLOOD URINE: NEGATIVE
BUN SERPL-MCNC: 33 MG/DL
CALCIUM SERPL-MCNC: 9.8 MG/DL
CALCIUM SERPL-MCNC: 9.8 MG/DL
CHLORIDE SERPL-SCNC: 105 MMOL/L
CMV DNA SPEC QL NAA+PROBE: NOT DETECTED IU/ML
CO2 SERPL-SCNC: 23 MMOL/L
COLOR: COLORLESS
CREAT SERPL-MCNC: 1.55 MG/DL
CREAT SPEC-SCNC: 28 MG/DL
CREAT/PROT UR: NORMAL RATIO
CYCLOSPORINE SER-MCNC: 118 NG/ML
EOSINOPHIL # BLD AUTO: 0.09 K/UL
EOSINOPHIL NFR BLD AUTO: 2.2 %
GLUCOSE QUALITATIVE U: NEGATIVE
GLUCOSE SERPL-MCNC: 103 MG/DL
HCT VFR BLD CALC: 44.5 %
HGB BLD-MCNC: 13.8 G/DL
HYALINE CASTS: 0 /LPF
IMM GRANULOCYTES NFR BLD AUTO: 0.2 %
KETONES URINE: NEGATIVE
LDH SERPL-CCNC: 217 U/L
LEUKOCYTE ESTERASE URINE: NEGATIVE
LYMPHOCYTES # BLD AUTO: 1.02 K/UL
LYMPHOCYTES NFR BLD AUTO: 25.1 %
MAGNESIUM SERPL-MCNC: 2 MG/DL
MAN DIFF?: NORMAL
MCHC RBC-ENTMCNC: 30 PG
MCHC RBC-ENTMCNC: 31 GM/DL
MCV RBC AUTO: 96.7 FL
MICROSCOPIC-UA: NORMAL
MONOCYTES # BLD AUTO: 0.39 K/UL
MONOCYTES NFR BLD AUTO: 9.6 %
NEUTROPHILS # BLD AUTO: 2.53 K/UL
NEUTROPHILS NFR BLD AUTO: 62.2 %
NITRITE URINE: NEGATIVE
PARATHYROID HORMONE INTACT: 65 PG/ML
PH URINE: 6
PHOSPHATE SERPL-MCNC: 3.9 MG/DL
PLATELET # BLD AUTO: 206 K/UL
POTASSIUM SERPL-SCNC: 5.2 MMOL/L
PROT SERPL-MCNC: 6.8 G/DL
PROT UR-MCNC: <4 MG/DL
PROTEIN URINE: NEGATIVE
RBC # BLD: 4.6 M/UL
RBC # FLD: 13.1 %
RED BLOOD CELLS URINE: 0 /HPF
SODIUM SERPL-SCNC: 143 MMOL/L
SPECIFIC GRAVITY URINE: 1.01
SQUAMOUS EPITHELIAL CELLS: 1 /HPF
TACROLIMUS SERPL-MCNC: <2 NG/ML
URATE SERPL-MCNC: 9 MG/DL
UROBILINOGEN URINE: NORMAL
WBC # FLD AUTO: 4.07 K/UL
WHITE BLOOD CELLS URINE: 0 /HPF

## 2021-12-09 PROCEDURE — 99214 OFFICE O/P EST MOD 30 MIN: CPT

## 2021-12-09 RX ORDER — LABETALOL HYDROCHLORIDE 200 MG/1
200 TABLET, FILM COATED ORAL
Qty: 180 | Refills: 3 | Status: DISCONTINUED | COMMUNITY
Start: 2021-11-28 | End: 2021-12-09

## 2021-12-09 NOTE — PHYSICAL EXAM
[General Appearance - Alert] : alert [General Appearance - In No Acute Distress] : in no acute distress [General Appearance - Well Nourished] : well nourished [Sclera] : the sclera and conjunctiva were normal [Extraocular Movements] : extraocular movements were intact [Outer Ear] : the ears and nose were normal in appearance [Oriented To Time, Place, And Person] : oriented to person, place, and time [Impaired Insight] : insight and judgment were intact [Affect] : the affect was normal [Nasal Cavity] : the nasal mucosa and septum were normal [Neck Appearance] : the appearance of the neck was normal [Neck Cervical Mass (___cm)] : no neck mass was observed [Respiration, Rhythm And Depth] : normal respiratory rhythm and effort [Auscultation Breath Sounds / Voice Sounds] : lungs were clear to auscultation bilaterally [Heart Rate And Rhythm] : heart rate was normal and rhythm regular [Heart Sounds] : normal S1 and S2 [Heart Sounds Gallop] : no gallops [Edema] : there was no peripheral edema [Bowel Sounds] : normal bowel sounds [Abdomen Soft] : soft [Abdomen Tenderness] : non-tender [Cervical Lymph Nodes Enlarged Posterior Bilaterally] : posterior cervical [Cervical Lymph Nodes Enlarged Anterior Bilaterally] : anterior cervical [Abnormal Walk] : normal gait [Nail Clubbing] : no clubbing  or cyanosis of the fingernails [Musculoskeletal - Swelling] : no joint swelling seen [Skin Color & Pigmentation] : normal skin color and pigmentation [Skin Turgor] : normal skin turgor [] : no rash

## 2021-12-09 NOTE — HISTORY OF PRESENT ILLNESS
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  \par \par Interval events:\par Reviewed labs. Creatinine 1.5 and cyclosporine trough 118.  Amlodipine was changed to labetalol for ankle edema but pt felt dizzy so stopped. Now occasionally using amlodipine but has side effects and would prefer to stop.   Shubham is still ill.  She has been vaccinated against Covid and has antibodies.

## 2021-12-09 NOTE — ASSESSMENT
[FreeTextEntry1] : 61 y.o female with PCKD s/p LURT \par 1.  Renal transplant - Baseline creatinine about 1.3, now stable at 1.5 after switch to cyclosporine.  DSA drawn in December 2020 was negative.  Pt with PRA of 49%.  Renal sonograms have been normal.   \par 2.  Immunosuppression - now on cyclosporine target trough of 100-150, last level 118 which is ok.  Cont MMF 500mgs BID and pred 2.5.  Cannot increase MMF as she gets nauseous.  So far pt seems to feel better on cyclosporine over tacro - less fatigue and accomplishing more.  \par 3.  HTN - controlled at home.   Will change to amlodipine 2.5mgs daily.   \par 4.  hyperkalemia/ Prophylaxis - developed high methemoglobin levels on dapsone so stopped.  she is back on bactrim.  \par 5.  Wt gain - likely from prednisone.  Now only on 2.5mgs. \par 6.  Post transplant erythrocytosis - resolved.  Monitor\par 7.  DVT - Pt now following with Dr. Maza.  Pt still on xarelto\par 8.  Anxiety - pt has seen psychiatry and will be following with therapist.  Seems to be improving but still present. \par 9.  tremor/ exhaustion/ trouble concentration/ hair loss/ tremor - changed tacro to cyclosporine.  Hair loss improved.  Other symptoms improved but still present to some degree.    \par \par Pt will return to f/u with Dr. Hoff.  She will f/u here in 6 months.  \par \par

## 2021-12-13 ENCOUNTER — TRANSCRIPTION ENCOUNTER (OUTPATIENT)
Age: 61
End: 2021-12-13

## 2021-12-25 ENCOUNTER — NON-APPOINTMENT (OUTPATIENT)
Age: 61
End: 2021-12-25

## 2021-12-27 LAB — SARS-COV-2 N GENE NPH QL NAA+PROBE: NOT DETECTED

## 2022-02-15 ENCOUNTER — NON-APPOINTMENT (OUTPATIENT)
Age: 62
End: 2022-02-15

## 2022-02-16 ENCOUNTER — APPOINTMENT (OUTPATIENT)
Dept: NEPHROLOGY | Facility: CLINIC | Age: 62
End: 2022-02-16
Payer: MEDICARE

## 2022-02-16 VITALS — HEART RATE: 84 BPM | DIASTOLIC BLOOD PRESSURE: 90 MMHG | SYSTOLIC BLOOD PRESSURE: 160 MMHG

## 2022-02-16 DIAGNOSIS — F41.9 ANXIETY DISORDER, UNSPECIFIED: ICD-10-CM

## 2022-02-16 LAB
ALBUMIN SERPL ELPH-MCNC: 4.5 G/DL
ALP BLD-CCNC: 66 U/L
ALT SERPL-CCNC: 9 U/L
ANION GAP SERPL CALC-SCNC: 15 MMOL/L
APPEARANCE: CLEAR
AST SERPL-CCNC: 15 U/L
BACTERIA: NEGATIVE
BASOPHILS # BLD AUTO: 0.04 K/UL
BASOPHILS NFR BLD AUTO: 1 %
BILIRUB SERPL-MCNC: 0.7 MG/DL
BILIRUBIN URINE: NEGATIVE
BLOOD URINE: NEGATIVE
BUN SERPL-MCNC: 25 MG/DL
CALCIUM SERPL-MCNC: 9.8 MG/DL
CHLORIDE SERPL-SCNC: 104 MMOL/L
CO2 SERPL-SCNC: 23 MMOL/L
COLOR: NORMAL
CREAT SERPL-MCNC: 1.56 MG/DL
CYCLOSPORINE SER-MCNC: 133 NG/ML
EOSINOPHIL # BLD AUTO: 0.05 K/UL
EOSINOPHIL NFR BLD AUTO: 1.2 %
ESTIMATED AVERAGE GLUCOSE: 123 MG/DL
GLUCOSE QUALITATIVE U: NEGATIVE
GLUCOSE SERPL-MCNC: 104 MG/DL
HBA1C MFR BLD HPLC: 5.9 %
HCT VFR BLD CALC: 45.8 %
HGB BLD-MCNC: 14.2 G/DL
HYALINE CASTS: 0 /LPF
IMM GRANULOCYTES NFR BLD AUTO: 0.2 %
KETONES URINE: NEGATIVE
LEUKOCYTE ESTERASE URINE: NEGATIVE
LYMPHOCYTES # BLD AUTO: 1.02 K/UL
LYMPHOCYTES NFR BLD AUTO: 24.4 %
MAGNESIUM SERPL-MCNC: 2 MG/DL
MAN DIFF?: NORMAL
MCHC RBC-ENTMCNC: 29.6 PG
MCHC RBC-ENTMCNC: 31 GM/DL
MCV RBC AUTO: 95.4 FL
MICROSCOPIC-UA: NORMAL
MONOCYTES # BLD AUTO: 0.36 K/UL
MONOCYTES NFR BLD AUTO: 8.6 %
NEUTROPHILS # BLD AUTO: 2.7 K/UL
NEUTROPHILS NFR BLD AUTO: 64.6 %
NITRITE URINE: NEGATIVE
PH URINE: 6
PHOSPHATE SERPL-MCNC: 3.6 MG/DL
PLATELET # BLD AUTO: 213 K/UL
POTASSIUM SERPL-SCNC: 5 MMOL/L
PROT SERPL-MCNC: 6.8 G/DL
PROTEIN URINE: NEGATIVE
RBC # BLD: 4.8 M/UL
RBC # FLD: 13.1 %
RED BLOOD CELLS URINE: 0 /HPF
SODIUM SERPL-SCNC: 142 MMOL/L
SPECIFIC GRAVITY URINE: 1.01
SQUAMOUS EPITHELIAL CELLS: 1 /HPF
UROBILINOGEN URINE: NORMAL
WBC # FLD AUTO: 4.18 K/UL
WHITE BLOOD CELLS URINE: 0 /HPF

## 2022-02-16 PROCEDURE — 99214 OFFICE O/P EST MOD 30 MIN: CPT

## 2022-02-16 NOTE — ADDENDUM
[FreeTextEntry1] : 61 y.o female with PCKD s/p LURT \par 1.  Renal transplant - Baseline creatinine about 1.3, now stable at 1.5 after switch to cyclosporine.  DSA drawn in December 2020 was negative.  Pt with PRA of 49%.  Renal sonograms have been normal.   \par 2.  Immunosuppression - now on cyclosporine target trough of 100-150, last level 133 which is ok.  Cont MMF 500mgs BID and pred 2.5.  So far pt seems to feel better on cyclosporine over tacro - less fatigue and accomplishing more on 50/75.  \par 3.  HTN - controlled at home.   Will continue amlodipine 2.5mgs daily.   \par 4.  hyperkalemia/ Prophylaxis - developed high methemoglobin levels on dapsone so stopped.  she is back on bactrim.  \par 5.  Wt gain - likely from prednisone.  Now only on 2.5mgs. \par 6.  Post transplant erythrocytosis - Stable continue to monitor\par 7.  DVT - Pt now following with Dr. Maza.  Pt still on xarelto\par 8.  Anxiety - pt has seen psychiatry and will be following with therapist.  Seems to be improving but still present. \par 9.  tremor/ exhaustion/ trouble concentration/ hair loss/ tremor - changed tacro to cyclosporine.  Hair loss improved.  Other symptoms improved but still present to some degree.    \par 10. Seen by orthopedics for back pain  to get MRI may need epidural.\par 11. Cerebral aneurysm - Spoke to Patient and Dr. Stacy prior to pandemic. Reminded to repeat MRI/MRA and then to see Dr. Stacy. I again today discussed the importance of this follow up. She does not want to schedule at this time.\par 12. Pulmonary nodule - Previously reviewed CT chest. Most recent study without nodule but Dr. Youngblood recommended 1 year follow up after 3/15/17 study. Patient at this time not interested in repeating study.\par 13. COVID received 3rd dose to schedule booster pfizer.\par 14.RTC 2 months. \par

## 2022-02-16 NOTE — REVIEW OF SYSTEMS
[Fever] : no fever [Chills] : no chills [Dry Eyes] : dryness of the eyes [Nosebleeds] : no nosebleeds [Sore Throat] : no sore throat [Hoarseness] : no hoarseness [Chest Pain] : no chest pain [Palpitations] : no palpitations [Cough] : no cough [SOB on Exertion] : shortness of breath during exertion [Heartburn] : no heartburn [As Noted in HPI] : as noted in HPI [Dizziness] : no dizziness [Fainting] : no fainting [Anxiety] : anxiety [Negative] : Genitourinary [FreeTextEntry3] : poor vision [FreeTextEntry6] : due to mask [FreeTextEntry7] : pepcid works [de-identified] : to go to dermatologist for age spots.

## 2022-02-16 NOTE — PHYSICAL EXAM
[General Appearance - Alert] : alert [General Appearance - In No Acute Distress] : in no acute distress [Sclera] : the sclera and conjunctiva were normal [Extraocular Movements] : extraocular movements were intact [Neck Appearance] : the appearance of the neck was normal [Neck Cervical Mass (___cm)] : no neck mass was observed [Respiration, Rhythm And Depth] : normal respiratory rhythm and effort [Auscultation Breath Sounds / Voice Sounds] : lungs were clear to auscultation bilaterally [Heart Rate And Rhythm] : heart rate was normal and rhythm regular [Heart Sounds] : normal S1 and S2 [Heart Sounds Gallop] : no gallops [Edema] : there was no peripheral edema [Bowel Sounds] : normal bowel sounds [Abdomen Soft] : soft [Abdomen Tenderness] : non-tender [Cervical Lymph Nodes Enlarged Posterior Bilaterally] : posterior cervical [Cervical Lymph Nodes Enlarged Anterior Bilaterally] : anterior cervical [Nail Clubbing] : no clubbing  or cyanosis of the fingernails [Skin Color & Pigmentation] : normal skin color and pigmentation [Skin Turgor] : normal skin turgor [] : no rash [Impaired Insight] : insight and judgment were intact [Affect] : the affect was normal [PERRL With Normal Accommodation] : pupils were equal in size, round, and reactive to light [Oropharynx] : the oropharynx was normal [Jugular Venous Distention Increased] : there was no jugular-venous distention [Thyroid Diffuse Enlargement] : the thyroid was not enlarged [Exaggerated Use Of Accessory Muscles For Inspiration] : no accessory muscle use [Arterial Pulses Carotid] : carotid pulses were normal with no bruits [Supraclavicular Lymph Nodes Enlarged Bilaterally] : supraclavicular [Mood] : the mood was normal

## 2022-02-16 NOTE — HISTORY OF PRESENT ILLNESS
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  \par \par Interval events:\par Reviewed labs. Creatinine 1.56 and cyclosporine trough 133.  \par Back pain and scoliosis was getting aquatic therapy.\par Monday to go for an MRI may get shots.\par Has achilles tendonitis to start therapy seeing Dr. Gonzalez.\par BP at home 110-130/70s\par Takes tylenol for pain which is fine.\par Becoming absent minded.\par Has received 3 pfizer last september.  In march should get 4th dose.\par \par \par \par

## 2022-02-21 ENCOUNTER — OUTPATIENT (OUTPATIENT)
Dept: OUTPATIENT SERVICES | Facility: HOSPITAL | Age: 62
LOS: 1 days | End: 2022-02-21
Payer: MEDICARE

## 2022-02-21 ENCOUNTER — APPOINTMENT (OUTPATIENT)
Dept: MRI IMAGING | Facility: CLINIC | Age: 62
End: 2022-02-21
Payer: MEDICARE

## 2022-02-21 DIAGNOSIS — Z98.891 HISTORY OF UTERINE SCAR FROM PREVIOUS SURGERY: Chronic | ICD-10-CM

## 2022-02-21 DIAGNOSIS — Z94.0 KIDNEY TRANSPLANT STATUS: Chronic | ICD-10-CM

## 2022-02-21 DIAGNOSIS — M47.816 SPONDYLOSIS WITHOUT MYELOPATHY OR RADICULOPATHY, LUMBAR REGION: ICD-10-CM

## 2022-02-21 PROCEDURE — 72148 MRI LUMBAR SPINE W/O DYE: CPT

## 2022-02-21 PROCEDURE — 72148 MRI LUMBAR SPINE W/O DYE: CPT | Mod: 26,MH

## 2022-02-28 ENCOUNTER — NON-APPOINTMENT (OUTPATIENT)
Age: 62
End: 2022-02-28

## 2022-03-02 ENCOUNTER — APPOINTMENT (OUTPATIENT)
Dept: ORTHOPEDIC SURGERY | Facility: CLINIC | Age: 62
End: 2022-03-02
Payer: MEDICARE

## 2022-03-02 DIAGNOSIS — M47.816 SPONDYLOSIS W/OUT MYELOPATHY OR RADICULOPATHY, LUMBAR REGION: ICD-10-CM

## 2022-03-02 PROCEDURE — 99213 OFFICE O/P EST LOW 20 MIN: CPT

## 2022-03-02 NOTE — PHYSICAL EXAM
[de-identified] : Constitutional: Well-nourished, well-developed, No acute distress, morbidly obese\par Respiratory:  Good respiratory effort, no SOB\par Psychiatric: Pleasant and normal affect, alert and oriented x3\par Musculoskeletal: normal except where as noted in regional exam\par \par Lumbar Spine Exam\par \par APPEARANCE: no marked deformities or malalignment, + loss of lordotic curvature of the lumbosacral spine. no marked deformities. + poor posture\par POSITIVE TENDERNESS: + IL/SI/ST ligs, + TTP of b/l SI joints, + b/l lower lumbar tenderness and spasm noted in erector spinae and quadratus lumborum\par NONTENDER: no bony midline tenderness.\par ROM: Mildly limited in all directions, mildly painful at end range of flexion and extension\par RESISTIVE TESTING: painless 5/5 resisted flex/ext, sidebending b/l, and rotation\par SPECIAL TESTS: neg SLR b/l, neg MARGE b/l, neg Trendelenburg b/l \par NEURO:  L1 - S2 intact to sensation and motor\par \par Left ankle:\par APPEARANCE: + Jailyn's deformity with moderate swelling of posterior heel, tenderness at insertion and in midsubstance of achilles tendon, no marked deformities or malalignment\par POSITIVE TENDERNESS: achilles tendon, + thickening of tendon\par NONTENDER: medial malleolus, lateral malleolus, tibialis posterior tendon, ATFL, CFL, PTFL, anterior tibiofibular ligament (high ankle), sinus tarsus, deltoid ligaments, 5th metatarsal. \par RANGE OF MOTION: Mildly limited DF due to stiffness and pain in achilles tendon. \par RESISTIVE TESTING: painless resisted dorsiflexion, plantar flexion, inversion & eversion. \par  [de-identified] : I reviewed, interpreted and clinically correlated the following outside imaging studies,\par \par EXAM: [id number] - MR SPINE LUMBAR - ORDERED BY: LLOYD TOMLIN\par \par \par PROCEDURE DATE: 02/21/2022\par \par \par \par INTERPRETATION: EXAMINATION: MR LUMBAR SPINE\par \par CLINICAL INDICATION: Low back pain, symptoms persist with > 6wks conservative treatment\par \par TECHNIQUE: Multi-planar, multi-sequence MR of the lumbar spine was performed without intravenous contrast.\par \par COMPARISON: None.\par \par INTERPRETATION:\par LOCALIZER: History of renal transplantation. The native kidneys demonstrate innumerable cysts. Right lower quadrant renal transplant is partly imaged.\par BONES AND BONE MARROW: There is no evidence of fracture. There is endplate discogenic edema at L3-4 and L4-5.\par INTERVERTEBRAL DISCS: There is disc desiccation and loss of disc height predominantly at L3-4 and L4-5.\par ALIGNMENT: The spinal alignment is maintained.\par CONUS AND CAUDA EQUINA: The conus medullaris is normal in size, signal and location. The conus terminates at the L1 level.\par EXTRASPINAL: There is a 2.5 cm sacral perineural cyst.\par \par Evaluation of individual lumbar disc space levels demonstrates the following:\par \par L1/L2, small posterior disc herniation. There is no significant spinal canal or neural foraminal stenosis.\par L2/L3, small posterior disc herniation. There is no significant spinal canal or neural foraminal stenosis.\par L3/L4, small posterior disc herniation. There is no significant spinal canal or neural foraminal stenosis.\par L4/L5, left foraminal posterior disc herniation with moderate left and mild right neural foraminal stenosis. No central stenosis.\par L5/S1, small posterior disc herniation. There is no significant spinal canal or neural foraminal stenosis.\par \par IMPRESSION:\par Mild lumbar spondylosis with mild right and moderate left neural foraminal stenosis at L4-5. No central stenosis. 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

## 2022-03-02 NOTE — DISCUSSION/SUMMARY
[de-identified] : Patient was seen today for reevaluation of chronic low back pain with intermittent lower extremity radiculopathy.  Patient has been undergoing physical therapy since last evaluation with some mild improvement.  Patient cannot take oral NSAIDs due to history of nephrectomy, she is already on oral steroids, she takes Tylenol as needed for breakthrough pain.  I advised the patient that there are no other oral medications available to address her chronic pain at this time without getting into medications that have significant side effects.  We reviewed the patient's MRI results today which shows she has significant multilevel degenerative disc disease.  At this time I advised the patient she would benefit from consultation with physiatry to discuss risk/benefits of ELI.  She will continue her course of physical therapy in the meantime.  Patient appreciates and agrees with current plan.\par \par I work as part of an academic orthopedic group and routinely have a physician in training (resident / fellow) working with me.  Any part of the history and physical exam performed by the physician in training was either directly reviewed and/or replicated by myself.  Any procedure performed by the physician in training was performed under my direct supervision and with the consent of the patient.\par \par This note was generated using dragon medical dictation software.  A reasonable effort has been made for proofreading its contents, but typos may still remain.  If there are any questions or points of clarification needed please notify my office.

## 2022-03-02 NOTE — HISTORY OF PRESENT ILLNESS
[de-identified] : Patient is here for LBP follow up. She is here for MRI review. She stopped doing aquatic therapy, on some occasion left sided radiating pain down her left leg. Getting up in the morning is the worse pain and she does have constant pain through out the day. Having problems sitting and standing for a long time. She is having trouble walking due to back pain and left Achilles tendon pain.

## 2022-03-09 ENCOUNTER — OUTPATIENT (OUTPATIENT)
Dept: OUTPATIENT SERVICES | Facility: HOSPITAL | Age: 62
LOS: 1 days | End: 2022-03-09
Payer: MEDICARE

## 2022-03-09 ENCOUNTER — APPOINTMENT (OUTPATIENT)
Dept: ULTRASOUND IMAGING | Facility: CLINIC | Age: 62
End: 2022-03-09
Payer: MEDICARE

## 2022-03-09 DIAGNOSIS — Z94.0 KIDNEY TRANSPLANT STATUS: Chronic | ICD-10-CM

## 2022-03-09 DIAGNOSIS — Z86.718 PERSONAL HISTORY OF OTHER VENOUS THROMBOSIS AND EMBOLISM: ICD-10-CM

## 2022-03-09 DIAGNOSIS — Z98.891 HISTORY OF UTERINE SCAR FROM PREVIOUS SURGERY: Chronic | ICD-10-CM

## 2022-03-09 PROCEDURE — 93970 EXTREMITY STUDY: CPT

## 2022-03-09 PROCEDURE — 93970 EXTREMITY STUDY: CPT | Mod: 26

## 2022-03-18 ENCOUNTER — APPOINTMENT (OUTPATIENT)
Dept: CARDIOLOGY | Facility: CLINIC | Age: 62
End: 2022-03-18
Payer: MEDICARE

## 2022-03-18 VITALS — DIASTOLIC BLOOD PRESSURE: 86 MMHG | SYSTOLIC BLOOD PRESSURE: 148 MMHG | OXYGEN SATURATION: 97 % | HEART RATE: 89 BPM

## 2022-03-18 DIAGNOSIS — N18.4 CHRONIC KIDNEY DISEASE, STAGE 4 (SEVERE): ICD-10-CM

## 2022-03-18 PROCEDURE — 99214 OFFICE O/P EST MOD 30 MIN: CPT

## 2022-03-28 NOTE — HISTORY OF PRESENT ILLNESS
[FreeTextEntry1] : 3/18/2022\par \par LE venous duplex 3/9 negative for DVT, mild postphlebitic changes noted on image review. \par Denies C/P. \par Reports ambulating around the house without any symptoms.\par Reports KEANE when climbing a flight of stairs, chronic deconditioning. \par Reports mild LE edema, after Norvasc 2.5 mg daily started.\par \par Medications:\par amLODIPine Besylate 2.5 MG Oral Tablet; TAKE 1 TABLET BY MOUTH ONCE DAILY\par Atorvastatin Calcium 20 MG Oral Tablet; 1 tablet twice weekly\par cycloSPORINE \par Famotidine 20 MG Oral Tablet; TAKE 1 TABLET Every twelve hours\par Mycophenolate Mofetil 500 MG Oral Tablet; TAKE 1 TABLET TWICE DAILY\par predniSONE 2.5 MG Oral Tablet; Take 1 tablet daily\par Sulfamethoxazole-Trimethoprim 400-80 MG Oral Tablet; Take 1 tablet daily\par Vitamin D3 50 MCG (2000 UT) Oral Tablet\par Xarelto 10 MG Oral Tablet; Take 1 tablet daily\par \par 1/21/2020\par Seen by psychiatrist.  Started on Zoloft, had to stop it due vomiting.  Has anxiety which is active.  \par No new issues.  No CP or SOB\par Remains on enalapril 2.5mg daily \par Remains on Xarelto 10mg daily \par Takes Vitamin D \par No blood in the urine or stool. \par mild bruising. \par Brother with DVT post kidney transplant. \par Has palptiations at times.  \par

## 2022-03-28 NOTE — PHYSICAL EXAM
[General Appearance - Well Developed] : well developed [Normal Appearance] : normal appearance [Well Groomed] : well groomed [General Appearance - Well Nourished] : well nourished [General Appearance - In No Acute Distress] : no acute distress [No Deformities] : no deformities [Normal Jugular Venous A Waves Present] : normal jugular venous A waves present [Normal Jugular Venous V Waves Present] : normal jugular venous V waves present [No Jugular Venous Pierce A Waves] : no jugular venous pierce A waves [Exaggerated Use Of Accessory Muscles For Inspiration] : no accessory muscle use [Respiration, Rhythm And Depth] : normal respiratory rhythm and effort [Auscultation Breath Sounds / Voice Sounds] : lungs were clear to auscultation bilaterally [Heart Rate And Rhythm] : heart rate and rhythm were normal [Heart Sounds] : normal S1 and S2 [Murmurs] : no murmurs present [Abdomen Soft] : soft [Abdomen Tenderness] : non-tender [Abdomen Mass (___ Cm)] : no abdominal mass palpated [Abnormal Walk] : normal gait [Nail Clubbing] : no clubbing of the fingernails [Cyanosis, Localized] : no localized cyanosis [Petechial Hemorrhages (___cm)] : no petechial hemorrhages [Skin Color & Pigmentation] : normal skin color and pigmentation [] : no rash [No Venous Stasis] : no venous stasis [No Skin Ulcers] : no skin ulcer [Bowel Sounds] : normal bowel sounds [Oriented To Time, Place, And Person] : oriented to person, place, and time [FreeTextEntry1] : Reticular veins. Trace ankle edema.

## 2022-03-28 NOTE — ASSESSMENT
[FreeTextEntry1] : Assessment:\par 1. h/o  R LE DVT\par - unclear if provoked, however symptoms started post op Renal transplant\par - diagnosed several months afterwards\par - on Xarelto 10 mg daily \par 2.  Renal Transplant\par 3.  PCKD\par 4.  Venous insufficiency / Reticular Veins\par \par Plan:\par 1. Continue with Xarelto 10 for extended therapy - [facility] trial\par 2. VALERIA 1 +, family history of VTE, being overweight puts her at risk of recurrence of VTE.  Favor extended therapy with half dose DOAC.   \par 3. LE venous duplex 3/9 negative for DVT, mild postphlebitic changes noted on image review. \par 4. Echocardiogram for history of deconditioning.\par 5. Continue excellent care with Dr. Hoff.\par 6. Follow-up in 1 year. Will call with test results.

## 2022-04-15 LAB — SARS-COV-2 N GENE NPH QL NAA+PROBE: NOT DETECTED

## 2022-04-27 ENCOUNTER — APPOINTMENT (OUTPATIENT)
Dept: NEPHROLOGY | Facility: CLINIC | Age: 62
End: 2022-04-27
Payer: MEDICARE

## 2022-04-27 VITALS — HEIGHT: 64 IN | HEART RATE: 91 BPM | TEMPERATURE: 208.22 F | OXYGEN SATURATION: 97 %

## 2022-04-27 LAB
ALBUMIN SERPL ELPH-MCNC: 4.3 G/DL
ALP BLD-CCNC: 60 U/L
ALT SERPL-CCNC: 11 U/L
ANION GAP SERPL CALC-SCNC: 14 MMOL/L
APPEARANCE: CLEAR
AST SERPL-CCNC: 15 U/L
BACTERIA: NEGATIVE
BASOPHILS # BLD AUTO: 0.03 K/UL
BASOPHILS NFR BLD AUTO: 0.7 %
BILIRUB SERPL-MCNC: 0.6 MG/DL
BILIRUBIN URINE: NEGATIVE
BLOOD URINE: NEGATIVE
BUN SERPL-MCNC: 31 MG/DL
CALCIUM SERPL-MCNC: 9.5 MG/DL
CALCIUM SERPL-MCNC: 9.5 MG/DL
CHLORIDE SERPL-SCNC: 106 MMOL/L
CO2 SERPL-SCNC: 22 MMOL/L
COLOR: COLORLESS
CREAT SERPL-MCNC: 1.32 MG/DL
CYCLOSPORINE SER-MCNC: 81 NG/ML
EGFR: 46 ML/MIN/1.73M2
EOSINOPHIL # BLD AUTO: 0.06 K/UL
EOSINOPHIL NFR BLD AUTO: 1.4 %
ESTIMATED AVERAGE GLUCOSE: 120 MG/DL
GLUCOSE QUALITATIVE U: NEGATIVE
GLUCOSE SERPL-MCNC: 98 MG/DL
HBA1C MFR BLD HPLC: 5.8 %
HCT VFR BLD CALC: 43.9 %
HGB BLD-MCNC: 13.7 G/DL
HYALINE CASTS: 1 /LPF
IMM GRANULOCYTES NFR BLD AUTO: 0.2 %
KETONES URINE: NEGATIVE
LEUKOCYTE ESTERASE URINE: NEGATIVE
LYMPHOCYTES # BLD AUTO: 1.01 K/UL
LYMPHOCYTES NFR BLD AUTO: 23.1 %
MAGNESIUM SERPL-MCNC: 2 MG/DL
MAN DIFF?: NORMAL
MCHC RBC-ENTMCNC: 29.8 PG
MCHC RBC-ENTMCNC: 31.2 GM/DL
MCV RBC AUTO: 95.6 FL
MICROSCOPIC-UA: NORMAL
MONOCYTES # BLD AUTO: 0.42 K/UL
MONOCYTES NFR BLD AUTO: 9.6 %
NEUTROPHILS # BLD AUTO: 2.84 K/UL
NEUTROPHILS NFR BLD AUTO: 65 %
NITRITE URINE: NEGATIVE
PARATHYROID HORMONE INTACT: 59 PG/ML
PH URINE: 6
PHOSPHATE SERPL-MCNC: 3.9 MG/DL
PLATELET # BLD AUTO: 203 K/UL
POTASSIUM SERPL-SCNC: 5.1 MMOL/L
PROT SERPL-MCNC: 6.5 G/DL
PROTEIN URINE: NEGATIVE
RBC # BLD: 4.59 M/UL
RBC # FLD: 13.8 %
RED BLOOD CELLS URINE: 1 /HPF
SODIUM SERPL-SCNC: 142 MMOL/L
SPECIFIC GRAVITY URINE: 1.01
SQUAMOUS EPITHELIAL CELLS: 1 /HPF
UROBILINOGEN URINE: NORMAL
WBC # FLD AUTO: 4.37 K/UL
WHITE BLOOD CELLS URINE: 0 /HPF

## 2022-04-27 PROCEDURE — 99214 OFFICE O/P EST MOD 30 MIN: CPT

## 2022-04-27 RX ORDER — AMLODIPINE BESYLATE 2.5 MG/1
2.5 TABLET ORAL
Qty: 30 | Refills: 3 | Status: DISCONTINUED | COMMUNITY
Start: 2021-12-09 | End: 2022-04-27

## 2022-05-01 NOTE — PHYSICAL EXAM
[General Appearance - Alert] : alert [General Appearance - In No Acute Distress] : in no acute distress [Sclera] : the sclera and conjunctiva were normal [PERRL With Normal Accommodation] : pupils were equal in size, round, and reactive to light [Extraocular Movements] : extraocular movements were intact [Oropharynx] : the oropharynx was normal [Neck Appearance] : the appearance of the neck was normal [Neck Cervical Mass (___cm)] : no neck mass was observed [Jugular Venous Distention Increased] : there was no jugular-venous distention [Thyroid Diffuse Enlargement] : the thyroid was not enlarged [Respiration, Rhythm And Depth] : normal respiratory rhythm and effort [Exaggerated Use Of Accessory Muscles For Inspiration] : no accessory muscle use [Auscultation Breath Sounds / Voice Sounds] : lungs were clear to auscultation bilaterally [Heart Rate And Rhythm] : heart rate was normal and rhythm regular [Heart Sounds] : normal S1 and S2 [Heart Sounds Gallop] : no gallops [Arterial Pulses Carotid] : carotid pulses were normal with no bruits [Edema] : there was no peripheral edema [Bowel Sounds] : normal bowel sounds [Abdomen Soft] : soft [Abdomen Tenderness] : non-tender [Cervical Lymph Nodes Enlarged Posterior Bilaterally] : posterior cervical [Supraclavicular Lymph Nodes Enlarged Bilaterally] : supraclavicular [Cervical Lymph Nodes Enlarged Anterior Bilaterally] : anterior cervical [Nail Clubbing] : no clubbing  or cyanosis of the fingernails [Skin Color & Pigmentation] : normal skin color and pigmentation [Skin Turgor] : normal skin turgor [Impaired Insight] : insight and judgment were intact [] : no rash [Affect] : the affect was normal [Mood] : the mood was normal [FreeTextEntry1] : obese

## 2022-05-01 NOTE — HISTORY OF PRESENT ILLNESS
[FreeTextEntry1] : 61F ADPKD s/p LURT via kidney paired exchange on 11/7/17.  \par \par Interval events:\par Reviewed labs. Creatinine 1.32 and cyclosporine trough 81.  \par Back pain awaiting physical therapy and injections\par Has Achilles tendonitis awaiting therapy.\par BP at home 110-120/70s\par Has received 4 pfizer March

## 2022-05-01 NOTE — ASSESSMENT
[FreeTextEntry1] : 61 y.o female with PCKD s/p LURT 11/2017\par 1.  Renal transplant - Baseline creatinine about 1.3, now stable at 1.5 after switch to cyclosporine.  DSA drawn in December 2020 was negative.  Pt with PRA of 49%.  Renal sonograms have been normal.   \par 2.  Immunosuppression - now on cyclosporine target trough of 100-150.  Cont MMF 500mgs BID and pred 2.5.  So far pt seems to feel better on cyclosporine over tacro - less fatigue and accomplishing more on 50/75.  Will increase to 75/75 and repeat labs 1-2 weeks (ordered).\par 3.  HTN - controlled at home.   Would continue amlodipine 2.5mgs daily but patient is concerned about intermittent swelling so will d/c amlodipine and add hctz 12.5/day.  Discussed risks including, gout, hypotension, hypokalema and need to check labs in 1-2 weeks and blood pressure routinely..   \par 4.  hyperkalemia/ Prophylaxis - developed high methemoglobin levels on dapsone so stopped.  she is back on bactrim.  \par 5.  Wt gain - unlikely from prednisone.  Now only on 2.5mgs. \par 6.  Post transplant erythrocytosis - resolved\par 7.  DVT - Pt now following with Dr. Maza.  Pt still on xarelto\par 8.  Anxiety - pt has seen psychiatry and will be following with therapist.  Seems to be improving but still present. \par 9.  tremor/ exhaustion/ trouble concentration/ hair loss/ tremor - changed tacro to cyclosporine.  Hair loss improved.  Other symptoms improved but still present to some degree.    \par 10. Seen by orthopedics for back pain may need epidural.\par 11. Cerebral aneurysm - Spoke to Patient repeatedly and Dr. Stacy prior to pandemic. Reminded to repeat MRI/MRA and then to see Dr. Stacy. I again today discussed the importance of this follow up. She does not want to schedule at this time.\par 12. Pulmonary nodule - Previously reviewed CT chest. Most recent study without nodule but Dr. Youngblood recommended 1 year follow up after 3/15/17 study. Patient at this time not interested in repeating study.\par 13. COVID received 4th dose of pfizer.\par 14.RTC 2 months. \par will start HCTz 12.5/day and stop amlodipine.

## 2022-05-01 NOTE — REVIEW OF SYSTEMS
[Dry Eyes] : dryness of the eyes [SOB on Exertion] : shortness of breath during exertion [As Noted in HPI] : as noted in HPI [Limb Swelling] : limb swelling [Anxiety] : anxiety [Negative] : Genitourinary [Fever] : no fever [Chills] : no chills [Nosebleeds] : no nosebleeds [Sore Throat] : no sore throat [Hoarseness] : no hoarseness [Chest Pain] : no chest pain [Palpitations] : no palpitations [Shortness Of Breath] : no shortness of breath [Cough] : no cough [Abdominal Pain] : no abdominal pain [Constipation] : no constipation [Diarrhea] : no diarrhea [Heartburn] : no heartburn [Dizziness] : no dizziness [Fainting] : no fainting [FreeTextEntry9] : ankle swelling, right>left after clot [FreeTextEntry7] : pepcid works

## 2022-05-16 LAB
ALBUMIN SERPL ELPH-MCNC: 4.6 G/DL
ANION GAP SERPL CALC-SCNC: 14 MMOL/L
BUN SERPL-MCNC: 36 MG/DL
CALCIUM SERPL-MCNC: 10 MG/DL
CHLORIDE SERPL-SCNC: 103 MMOL/L
CO2 SERPL-SCNC: 23 MMOL/L
CREAT SERPL-MCNC: 1.69 MG/DL
CYCLOSPORINE SER-MCNC: 120 NG/ML
EGFR: 34 ML/MIN/1.73M2
GLUCOSE SERPL-MCNC: 111 MG/DL
PHOSPHATE SERPL-MCNC: 3.6 MG/DL
POTASSIUM SERPL-SCNC: 5.1 MMOL/L
SODIUM SERPL-SCNC: 141 MMOL/L

## 2022-05-17 ENCOUNTER — APPOINTMENT (OUTPATIENT)
Dept: TRANSPLANT | Facility: CLINIC | Age: 62
End: 2022-05-17

## 2022-05-17 ENCOUNTER — TRANSCRIPTION ENCOUNTER (OUTPATIENT)
Age: 62
End: 2022-05-17

## 2022-05-18 ENCOUNTER — APPOINTMENT (OUTPATIENT)
Dept: CARDIOLOGY | Facility: CLINIC | Age: 62
End: 2022-05-18

## 2022-05-19 ENCOUNTER — APPOINTMENT (OUTPATIENT)
Dept: NEPHROLOGY | Facility: CLINIC | Age: 62
End: 2022-05-19
Payer: MEDICARE

## 2022-05-19 VITALS
HEART RATE: 85 BPM | HEIGHT: 64 IN | SYSTOLIC BLOOD PRESSURE: 165 MMHG | WEIGHT: 240 LBS | OXYGEN SATURATION: 99 % | BODY MASS INDEX: 40.97 KG/M2 | RESPIRATION RATE: 16 BRPM | DIASTOLIC BLOOD PRESSURE: 92 MMHG

## 2022-05-19 LAB
25(OH)D3 SERPL-MCNC: 69.1 NG/ML
ALBUMIN SERPL ELPH-MCNC: 4.6 G/DL
ALP BLD-CCNC: 64 U/L
ALT SERPL-CCNC: 9 U/L
ANION GAP SERPL CALC-SCNC: 17 MMOL/L
APPEARANCE: CLEAR
AST SERPL-CCNC: 17 U/L
BACTERIA: NEGATIVE
BASOPHILS # BLD AUTO: 0.03 K/UL
BASOPHILS NFR BLD AUTO: 0.7 %
BILIRUB SERPL-MCNC: 0.7 MG/DL
BILIRUBIN URINE: NEGATIVE
BLOOD URINE: NEGATIVE
BUN SERPL-MCNC: 39 MG/DL
CALCIUM SERPL-MCNC: 9.9 MG/DL
CALCIUM SERPL-MCNC: 9.9 MG/DL
CHLORIDE SERPL-SCNC: 103 MMOL/L
CHOLEST SERPL-MCNC: 203 MG/DL
CMV DNA SPEC QL NAA+PROBE: NOT DETECTED IU/ML
CMVPCR LOG: NOT DETECTED LOG10IU/ML
CO2 SERPL-SCNC: 24 MMOL/L
COLOR: COLORLESS
CREAT SERPL-MCNC: 1.65 MG/DL
CREAT SPEC-SCNC: 29 MG/DL
CREAT/PROT UR: NORMAL RATIO
CYCLOSPORINE SER-MCNC: 146 NG/ML
EGFR: 35 ML/MIN/1.73M2
EOSINOPHIL # BLD AUTO: 0.08 K/UL
EOSINOPHIL NFR BLD AUTO: 1.8 %
GLUCOSE QUALITATIVE U: NEGATIVE
GLUCOSE SERPL-MCNC: 104 MG/DL
HCT VFR BLD CALC: 45 %
HDLC SERPL-MCNC: 69 MG/DL
HGB BLD-MCNC: 14.1 G/DL
HYALINE CASTS: 0 /LPF
IMM GRANULOCYTES NFR BLD AUTO: 0.2 %
KETONES URINE: NEGATIVE
LDLC SERPL CALC-MCNC: 109 MG/DL
LEUKOCYTE ESTERASE URINE: NEGATIVE
LYMPHOCYTES # BLD AUTO: 1.12 K/UL
LYMPHOCYTES NFR BLD AUTO: 25.6 %
MAGNESIUM SERPL-MCNC: 1.9 MG/DL
MAN DIFF?: NORMAL
MCHC RBC-ENTMCNC: 29.7 PG
MCHC RBC-ENTMCNC: 31.3 GM/DL
MCV RBC AUTO: 94.9 FL
MICROSCOPIC-UA: NORMAL
MONOCYTES # BLD AUTO: 0.37 K/UL
MONOCYTES NFR BLD AUTO: 8.5 %
NEUTROPHILS # BLD AUTO: 2.76 K/UL
NEUTROPHILS NFR BLD AUTO: 63.2 %
NITRITE URINE: NEGATIVE
NONHDLC SERPL-MCNC: 134 MG/DL
PARATHYROID HORMONE INTACT: 78 PG/ML
PH URINE: 6
PHOSPHATE SERPL-MCNC: 4 MG/DL
PLATELET # BLD AUTO: 201 K/UL
POTASSIUM SERPL-SCNC: 4.5 MMOL/L
PROT SERPL-MCNC: 7 G/DL
PROT UR-MCNC: <4 MG/DL
PROTEIN URINE: NEGATIVE
RBC # BLD: 4.74 M/UL
RBC # FLD: 13.3 %
RED BLOOD CELLS URINE: 0 /HPF
SODIUM SERPL-SCNC: 143 MMOL/L
SPECIFIC GRAVITY URINE: 1.01
SQUAMOUS EPITHELIAL CELLS: 1 /HPF
TRIGL SERPL-MCNC: 129 MG/DL
UROBILINOGEN URINE: NORMAL
WBC # FLD AUTO: 4.37 K/UL
WHITE BLOOD CELLS URINE: 0 /HPF

## 2022-05-19 PROCEDURE — 99214 OFFICE O/P EST MOD 30 MIN: CPT

## 2022-05-19 NOTE — PHYSICAL EXAM
[General Appearance - Alert] : alert [General Appearance - In No Acute Distress] : in no acute distress [General Appearance - Well Nourished] : well nourished [Sclera] : the sclera and conjunctiva were normal [Extraocular Movements] : extraocular movements were intact [Outer Ear] : the ears and nose were normal in appearance [Nasal Cavity] : the nasal mucosa and septum were normal [Neck Appearance] : the appearance of the neck was normal [Neck Cervical Mass (___cm)] : no neck mass was observed [Respiration, Rhythm And Depth] : normal respiratory rhythm and effort [Auscultation Breath Sounds / Voice Sounds] : lungs were clear to auscultation bilaterally [Heart Rate And Rhythm] : heart rate was normal and rhythm regular [Heart Sounds] : normal S1 and S2 [Heart Sounds Gallop] : no gallops [Edema] : there was no peripheral edema [Bowel Sounds] : normal bowel sounds [Abdomen Soft] : soft [Abdomen Tenderness] : non-tender [Cervical Lymph Nodes Enlarged Posterior Bilaterally] : posterior cervical [Cervical Lymph Nodes Enlarged Anterior Bilaterally] : anterior cervical [Abnormal Walk] : normal gait [Nail Clubbing] : no clubbing  or cyanosis of the fingernails [Musculoskeletal - Swelling] : no joint swelling seen [Skin Color & Pigmentation] : normal skin color and pigmentation [Skin Turgor] : normal skin turgor [] : no rash [Oriented To Time, Place, And Person] : oriented to person, place, and time [Impaired Insight] : insight and judgment were intact [Affect] : the affect was normal

## 2022-05-19 NOTE — ASSESSMENT
[FreeTextEntry1] : 61 y.o female with PCKD s/p LURT \par 1.  Renal transplant - Baseline creatinine about 1.5, now 1.6 after small increase in csa dose.  DSA drawn in December 2020 was negative.  Pt with PRA of 49%.  Renal sonograms have been normal.   \par 2.  Immunosuppression - now on cyclosporine last level 146.  PReviously dropped to 80 which is probably ok now.  Level of  is reasonable - will reduce back to 50/75 due to side effects and discuss with Dr. Hoff.  Cont MMF 500mgs BID and pred 2.5.  Cannot increase MMF as she gets nauseous.Pt feels better on cyclosporine over tacro - less fatigue and accomplishing more.  \par 3.  HTN - controlled at home.    \par 4.  hyperkalemia/ Prophylaxis - developed high methemoglobin levels on dapsone so stopped.  she is back on bactrim.  \par 5.  Wt gain - likely from prednisone.  Now only on 2.5mgs. \par 6.  Post transplant erythrocytosis - resolved.  Monitor\par 7.  DVT - Pt now following with Dr. Maza.  Pt still on xarelto\par 8.  Anxiety - pt has seen psychiatry and will be following with therapist.  Seems to be improving but still present. \par 9.  tremor/ exhaustion/ trouble concentration/ hair loss/ tremor - changed tacro to cyclosporine.  Hair loss improved.  Other symptoms improved but still present to some degree.    \par \par Pt will return to f/u with Dr. Hoff.  She will f/u here in 6 months.  \par \par

## 2022-05-19 NOTE — HISTORY OF PRESENT ILLNESS
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  \par \par Interval events:\par Reviewed labs. Creatinine 1.66 and cyclosporine trough 145.  She is also now on HCTZ.  Csa was increased to 75mgs BID and she has started feeling worse.  Would like to reduce back to 50/75.  Has been going through a lot of stress recently.   Shubham has been ill.

## 2022-05-20 LAB
ALBUMIN SERPL ELPH-MCNC: 4.6 G/DL
ALP BLD-CCNC: 64 U/L
ALT SERPL-CCNC: 9 U/L
ANION GAP SERPL CALC-SCNC: 17 MMOL/L
APPEARANCE: CLEAR
AST SERPL-CCNC: 16 U/L
BACTERIA: NEGATIVE
BILIRUB SERPL-MCNC: 0.7 MG/DL
BILIRUBIN URINE: NEGATIVE
BKV DNA SPEC QL NAA+PROBE: NOT DETECTED COPIES/ML
BLOOD URINE: NEGATIVE
BUN SERPL-MCNC: 38 MG/DL
CALCIUM SERPL-MCNC: 9.8 MG/DL
CHLORIDE SERPL-SCNC: 102 MMOL/L
CO2 SERPL-SCNC: 22 MMOL/L
COLOR: COLORLESS
CREAT SERPL-MCNC: 1.66 MG/DL
CYCLOSPORINE SER-MCNC: 145 NG/ML
EGFR: 35 ML/MIN/1.73M2
GLUCOSE QUALITATIVE U: NEGATIVE
GLUCOSE SERPL-MCNC: 102 MG/DL
HYALINE CASTS: 0 /LPF
KETONES URINE: NEGATIVE
LEUKOCYTE ESTERASE URINE: NEGATIVE
MICROSCOPIC-UA: NORMAL
NITRITE URINE: NEGATIVE
PH URINE: 6
POTASSIUM SERPL-SCNC: 4.3 MMOL/L
PROT SERPL-MCNC: 7.1 G/DL
PROTEIN URINE: NEGATIVE
RED BLOOD CELLS URINE: 0 /HPF
SODIUM SERPL-SCNC: 141 MMOL/L
SPECIFIC GRAVITY URINE: 1.01
SQUAMOUS EPITHELIAL CELLS: 1 /HPF
UROBILINOGEN URINE: NORMAL
WHITE BLOOD CELLS URINE: 0 /HPF

## 2022-06-02 ENCOUNTER — APPOINTMENT (OUTPATIENT)
Dept: TRANSPLANT | Facility: CLINIC | Age: 62
End: 2022-06-02

## 2022-06-07 ENCOUNTER — NON-APPOINTMENT (OUTPATIENT)
Age: 62
End: 2022-06-07

## 2022-06-07 LAB
ALBUMIN SERPL ELPH-MCNC: 4.4 G/DL
ALP BLD-CCNC: 64 U/L
ALT SERPL-CCNC: 12 U/L
ANION GAP SERPL CALC-SCNC: 15 MMOL/L
APPEARANCE: CLEAR
AST SERPL-CCNC: 17 U/L
BACTERIA: NEGATIVE
BASOPHILS # BLD AUTO: 0.03 K/UL
BASOPHILS NFR BLD AUTO: 0.8 %
BILIRUB SERPL-MCNC: 0.6 MG/DL
BILIRUBIN URINE: NEGATIVE
BLOOD URINE: NEGATIVE
BUN SERPL-MCNC: 34 MG/DL
CALCIUM SERPL-MCNC: 9.7 MG/DL
CHLORIDE SERPL-SCNC: 105 MMOL/L
CMV DNA SPEC QL NAA+PROBE: NOT DETECTED IU/ML
CMVPCR LOG: NOT DETECTED LOG10IU/ML
CO2 SERPL-SCNC: 24 MMOL/L
COLOR: COLORLESS
CREAT SERPL-MCNC: 1.54 MG/DL
CREAT SPEC-SCNC: 31 MG/DL
CREAT/PROT UR: 0.1 RATIO
CYCLOSPORINE SER-MCNC: 136 NG/ML
EGFR: 38 ML/MIN/1.73M2
EOSINOPHIL # BLD AUTO: 0.04 K/UL
EOSINOPHIL NFR BLD AUTO: 1.1 %
GLUCOSE QUALITATIVE U: NEGATIVE
GLUCOSE SERPL-MCNC: 113 MG/DL
HCT VFR BLD CALC: 44.5 %
HGB BLD-MCNC: 13.8 G/DL
HYALINE CASTS: 0 /LPF
IMM GRANULOCYTES NFR BLD AUTO: 0 %
KETONES URINE: NEGATIVE
LEUKOCYTE ESTERASE URINE: NEGATIVE
LYMPHOCYTES # BLD AUTO: 1 K/UL
LYMPHOCYTES NFR BLD AUTO: 27.6 %
MAGNESIUM SERPL-MCNC: 1.8 MG/DL
MAN DIFF?: NORMAL
MCHC RBC-ENTMCNC: 29.7 PG
MCHC RBC-ENTMCNC: 31 GM/DL
MCV RBC AUTO: 95.9 FL
MICROSCOPIC-UA: NORMAL
MONOCYTES # BLD AUTO: 0.31 K/UL
MONOCYTES NFR BLD AUTO: 8.6 %
NEUTROPHILS # BLD AUTO: 2.24 K/UL
NEUTROPHILS NFR BLD AUTO: 61.9 %
NITRITE URINE: NEGATIVE
PH URINE: 6
PHOSPHATE SERPL-MCNC: 3.6 MG/DL
PLATELET # BLD AUTO: 199 K/UL
POTASSIUM SERPL-SCNC: 5 MMOL/L
PROT SERPL-MCNC: 6.7 G/DL
PROT UR-MCNC: 4 MG/DL
PROTEIN URINE: NEGATIVE
RBC # BLD: 4.64 M/UL
RBC # FLD: 13.2 %
RED BLOOD CELLS URINE: 1 /HPF
SODIUM SERPL-SCNC: 144 MMOL/L
SPECIFIC GRAVITY URINE: 1.01
SQUAMOUS EPITHELIAL CELLS: 1 /HPF
URATE SERPL-MCNC: 9.9 MG/DL
UROBILINOGEN URINE: NORMAL
WBC # FLD AUTO: 3.62 K/UL
WHITE BLOOD CELLS URINE: 0 /HPF

## 2022-06-08 LAB — BKV DNA SPEC QL NAA+PROBE: NOT DETECTED COPIES/ML

## 2022-06-20 ENCOUNTER — TRANSCRIPTION ENCOUNTER (OUTPATIENT)
Age: 62
End: 2022-06-20

## 2022-06-21 ENCOUNTER — OUTPATIENT (OUTPATIENT)
Dept: OUTPATIENT SERVICES | Facility: HOSPITAL | Age: 62
LOS: 1 days | End: 2022-06-21

## 2022-06-21 ENCOUNTER — APPOINTMENT (OUTPATIENT)
Dept: DISASTER EMERGENCY | Facility: HOSPITAL | Age: 62
End: 2022-06-21

## 2022-06-21 VITALS
WEIGHT: 231.49 LBS | SYSTOLIC BLOOD PRESSURE: 171 MMHG | OXYGEN SATURATION: 95 % | DIASTOLIC BLOOD PRESSURE: 78 MMHG | HEART RATE: 100 BPM | TEMPERATURE: 99 F | RESPIRATION RATE: 17 BRPM | HEIGHT: 64 IN

## 2022-06-21 VITALS
TEMPERATURE: 98 F | SYSTOLIC BLOOD PRESSURE: 145 MMHG | HEART RATE: 94 BPM | DIASTOLIC BLOOD PRESSURE: 76 MMHG | RESPIRATION RATE: 20 BRPM | OXYGEN SATURATION: 97 %

## 2022-06-21 DIAGNOSIS — U07.1 COVID-19: ICD-10-CM

## 2022-06-21 DIAGNOSIS — Z94.0 KIDNEY TRANSPLANT STATUS: Chronic | ICD-10-CM

## 2022-06-21 DIAGNOSIS — Z98.891 HISTORY OF UTERINE SCAR FROM PREVIOUS SURGERY: Chronic | ICD-10-CM

## 2022-06-21 RX ORDER — BEBTELOVIMAB 87.5 MG/ML
175 INJECTION, SOLUTION INTRAVENOUS ONCE
Refills: 0 | Status: COMPLETED | OUTPATIENT
Start: 2022-06-21 | End: 2022-06-21

## 2022-06-21 RX ORDER — DIPHENHYDRAMINE HCL 50 MG
50 CAPSULE ORAL ONCE
Refills: 0 | Status: COMPLETED | OUTPATIENT
Start: 2022-06-21 | End: 2022-06-21

## 2022-06-21 RX ADMIN — Medication 50 MILLIGRAM(S): at 08:04

## 2022-06-21 RX ADMIN — BEBTELOVIMAB 175 MILLIGRAM(S): 87.5 INJECTION, SOLUTION INTRAVENOUS at 08:00

## 2022-06-21 NOTE — CHART NOTE - NSCHARTNOTEFT_GEN_A_CORE
CC: Monoclonal Antibody Administration/COVID 19 Positive      History: Patient presents for administration of monoclonal antibody  Patient has been screened and was deemed to be a candidate.    Exposure: Visited  in hospital several times this week    Symptoms/ Criteria: fever cough headache sore throat body aches    Inclusion Criteria: BMI > 35 HTN Renal transplant    Date of Positive Test: verified by clinical call center     Date of symptom onset:     Vaccine status: Pfizer x 4  last     PMHx:  Infection due to severe acute respiratory syndrome coronavirus 2 (SARS-CoV-2)    HTN (hypertension)    Polycystic kidney disease    ESRD (end stage renal disease)    Phlebitis    Petechial rash    Kidney transplant recipient    S/P     History of living-donor kidney transplantation          T(C): 36.8 (22 @ 08:15), Max: 37.2 (22 @ 07:41)  HR: 89 (22 @ 08:15) (89 - 100)  BP: 186/80 (22 @ 08:15) (171/78 - 186/80)  RR: 16 (22 @ 08:15) (16 - 17)  SpO2: 97% (22 @ 08:15) (95% - 97%)      PE:   Appearance: NAD	  HEENT:   Normal oral mucosa.   Lymphatic: No lymphadenopathy  Cardiovascular: Normal S1 S2, No JVD, No murmurs, No edema  Respiratory: Lungs clear to auscultation	  Gastrointestinal:  Soft, Non-tender. No guarding   Skin: warm and dry  Neurologic: Non-focal  Extremities: Normal range of motion.     ASSESSMENT:  Pt is a 61y year old Female with PMH Renal transplant  HTN  Covid +  referred to the infusion center for Monoclonal antibody administration  (Bebtelovimab).        PLAN:  - Administration procedure explained to patient   - Consent for monoclonal antibody administration obtained   - Risk & benefits discussed/all questions answered  - Administer Bebtelovimab per protocol  - will observe patient for one hour post administration  and then if stable discharge home with outpt follow up as planned by PMD.        - Patient stated after injection facial flushing, back pain>treated with Benadryl 50 mg IVP x 1> IV  cc over 30 mins  with resolution of symptoms  Observed x 1 hour Remains with stable hemodynamics>cleared for discharge  tolerated treatment well denies complaints of chest pain/SOB/dizziness/ palpitations  - VSS for discharge home  - D/C instructions given/ fact sheet included.  - Patient to follow-up with PCP as needed.

## 2022-07-20 ENCOUNTER — APPOINTMENT (OUTPATIENT)
Dept: NEPHROLOGY | Facility: CLINIC | Age: 62
End: 2022-07-20

## 2022-08-16 ENCOUNTER — APPOINTMENT (OUTPATIENT)
Dept: TRANSPLANT | Facility: CLINIC | Age: 62
End: 2022-08-16

## 2022-08-17 ENCOUNTER — APPOINTMENT (OUTPATIENT)
Dept: NEPHROLOGY | Facility: CLINIC | Age: 62
End: 2022-08-17

## 2022-08-17 VITALS — DIASTOLIC BLOOD PRESSURE: 90 MMHG | SYSTOLIC BLOOD PRESSURE: 160 MMHG

## 2022-08-17 DIAGNOSIS — E87.5 HYPERKALEMIA: ICD-10-CM

## 2022-08-17 PROCEDURE — 99214 OFFICE O/P EST MOD 30 MIN: CPT

## 2022-08-17 NOTE — HISTORY OF PRESENT ILLNESS
[FreeTextEntry1] : 61F ADPKD s/p LURT via kidney paired exchange on 11/7/17.  \par \par Interval events:\par Reviewed labs. Creatinine 1.66 and cyclosporine trough 122.  \par With her  who is hospitalized daily\par BP at home 110-120/70s\par Has received 4 pfizer March\par

## 2022-08-17 NOTE — REVIEW OF SYSTEMS
[Dry Eyes] : dryness of the eyes [SOB on Exertion] : shortness of breath during exertion [As Noted in HPI] : as noted in HPI [Limb Swelling] : limb swelling [Anxiety] : anxiety [Negative] : Genitourinary [Fever] : no fever [Chills] : no chills [Nosebleeds] : no nosebleeds [Sore Throat] : no sore throat [Hoarseness] : no hoarseness [Chest Pain] : no chest pain [Palpitations] : no palpitations [Shortness Of Breath] : no shortness of breath [Cough] : no cough [Abdominal Pain] : no abdominal pain [Constipation] : no constipation [Diarrhea] : no diarrhea [Heartburn] : no heartburn [Dizziness] : no dizziness [Fainting] : no fainting [FreeTextEntry7] : pepcid works for heartburn [FreeTextEntry9] : ankle swelling at the end of the day

## 2022-08-17 NOTE — ASSESSMENT
[FreeTextEntry1] : 61 y.o female with PCKD s/p LURT 11/2017\par 1.  Renal transplant - Baseline creatinine about 1.5 after switch to cyclosporine.  DSA drawn in December 2020 was negative.  Pt with PRA of 49%.  Renal sonograms have been normal.   \par 2.  Immunosuppression - now on cyclosporine target trough of 100-150.  Cont MMF 500mgs BID and pred 2.5.  So far pt seems to feel better on cyclosporine over tacro - less fatigue and accomplishing more on 50/75 currently.  \par 3.  HTN - controlled at home.   Would continue HCTZ 12.5/day.    \par 4.  hyperkalemia/ Prophylaxis - developed high methemoglobin levels on dapsone so stopped.  she is back on bactrim.  \par 5.  Wt gain - unlikely from prednisone.  Now only on 2.5mgs.  Encourage healthy diet and exercise \par 6.  Post transplant erythrocytosis - resolved\par 7.  DVT - Pt now following with Dr. Maza.  Pt still on xarelto\par 8.  Anxiety - pt had been seen by psychiatry but no longer wants to. \par 9.  tremor/ exhaustion/ trouble concentration/ hair loss/ tremor - changed tacro to cyclosporine.  Hair loss improved.  Other symptoms improved but still present to some degree.    \par 10. Seen by orthopedics for back pain may need epidural but waiting until Shubham is better.\par 11. Cerebral aneurysm - Spoke to Patient repeatedly and Dr. Stacy prior to pandemic. Reminded to repeat MRI/MRA and then to see Dr. Stacy. \par 12. Pulmonary nodule - Previously reviewed CT chest. Most recent study without nodule but Dr. Youngblood recommended 1 year follow up after 3/15/17 study. Patient at this time not interested in repeating study.\par 13. COVID received 4th dose of pfizer.  Got COVID was treated with monoclonal antibody.  Symptoms were flulike.\par 14.RTC 3 months.

## 2022-08-17 NOTE — PHYSICAL EXAM
[General Appearance - Alert] : alert [General Appearance - In No Acute Distress] : in no acute distress [Sclera] : the sclera and conjunctiva were normal [PERRL With Normal Accommodation] : pupils were equal in size, round, and reactive to light [Extraocular Movements] : extraocular movements were intact [Oropharynx] : the oropharynx was normal [Neck Appearance] : the appearance of the neck was normal [Neck Cervical Mass (___cm)] : no neck mass was observed [Jugular Venous Distention Increased] : there was no jugular-venous distention [Thyroid Diffuse Enlargement] : the thyroid was not enlarged [Respiration, Rhythm And Depth] : normal respiratory rhythm and effort [Exaggerated Use Of Accessory Muscles For Inspiration] : no accessory muscle use [Auscultation Breath Sounds / Voice Sounds] : lungs were clear to auscultation bilaterally [Heart Rate And Rhythm] : heart rate was normal and rhythm regular [Heart Sounds] : normal S1 and S2 [Heart Sounds Gallop] : no gallops [Arterial Pulses Carotid] : carotid pulses were normal with no bruits [Edema] : there was no peripheral edema [Bowel Sounds] : normal bowel sounds [Abdomen Soft] : soft [Abdomen Tenderness] : non-tender [Cervical Lymph Nodes Enlarged Posterior Bilaterally] : posterior cervical [Cervical Lymph Nodes Enlarged Anterior Bilaterally] : anterior cervical [Supraclavicular Lymph Nodes Enlarged Bilaterally] : supraclavicular [Nail Clubbing] : no clubbing  or cyanosis of the fingernails [Skin Color & Pigmentation] : normal skin color and pigmentation [Skin Turgor] : normal skin turgor [] : no rash [Impaired Insight] : insight and judgment were intact [Affect] : the affect was normal [Mood] : the mood was normal [FreeTextEntry1] : RALPH JOSÉ

## 2022-08-29 LAB
ALBUMIN SERPL ELPH-MCNC: 4.4 G/DL
ALP BLD-CCNC: 61 U/L
ALT SERPL-CCNC: 14 U/L
ANION GAP SERPL CALC-SCNC: 14 MMOL/L
APPEARANCE: CLEAR
AST SERPL-CCNC: 20 U/L
BACTERIA: NEGATIVE
BASOPHILS # BLD AUTO: 0.02 K/UL
BASOPHILS NFR BLD AUTO: 0.6 %
BILIRUB SERPL-MCNC: 0.8 MG/DL
BILIRUBIN URINE: NEGATIVE
BLOOD URINE: NEGATIVE
BUN SERPL-MCNC: 32 MG/DL
CALCIUM SERPL-MCNC: 9.6 MG/DL
CALCIUM SERPL-MCNC: 9.6 MG/DL
CHLORIDE SERPL-SCNC: 104 MMOL/L
CO2 SERPL-SCNC: 23 MMOL/L
COLOR: NORMAL
CREAT SERPL-MCNC: 1.66 MG/DL
CYCLOSPORINE SER-MCNC: 122 NG/ML
EGFR: 35 ML/MIN/1.73M2
EOSINOPHIL # BLD AUTO: 0.05 K/UL
EOSINOPHIL NFR BLD AUTO: 1.4 %
ESTIMATED AVERAGE GLUCOSE: 128 MG/DL
GLUCOSE QUALITATIVE U: NEGATIVE
GLUCOSE SERPL-MCNC: 102 MG/DL
HBA1C MFR BLD HPLC: 6.1 %
HCT VFR BLD CALC: 44.3 %
HGB BLD-MCNC: 13.9 G/DL
HYALINE CASTS: 0 /LPF
IMM GRANULOCYTES NFR BLD AUTO: 0.3 %
KETONES URINE: NEGATIVE
LEUKOCYTE ESTERASE URINE: NEGATIVE
LYMPHOCYTES # BLD AUTO: 0.77 K/UL
LYMPHOCYTES NFR BLD AUTO: 22.2 %
MAGNESIUM SERPL-MCNC: 1.9 MG/DL
MAN DIFF?: NORMAL
MCHC RBC-ENTMCNC: 29.6 PG
MCHC RBC-ENTMCNC: 31.4 GM/DL
MCV RBC AUTO: 94.5 FL
MICROSCOPIC-UA: NORMAL
MONOCYTES # BLD AUTO: 0.4 K/UL
MONOCYTES NFR BLD AUTO: 11.5 %
NEUTROPHILS # BLD AUTO: 2.22 K/UL
NEUTROPHILS NFR BLD AUTO: 64 %
NITRITE URINE: NEGATIVE
PARATHYROID HORMONE INTACT: 101 PG/ML
PH URINE: 6
PHOSPHATE SERPL-MCNC: 3.3 MG/DL
PLATELET # BLD AUTO: 189 K/UL
POTASSIUM SERPL-SCNC: 4.5 MMOL/L
PROT SERPL-MCNC: 6.8 G/DL
PROTEIN URINE: NEGATIVE
RBC # BLD: 4.69 M/UL
RBC # FLD: 13.2 %
RED BLOOD CELLS URINE: 0 /HPF
SODIUM SERPL-SCNC: 141 MMOL/L
SPECIFIC GRAVITY URINE: 1.01
SQUAMOUS EPITHELIAL CELLS: 2 /HPF
UROBILINOGEN URINE: NORMAL
WBC # FLD AUTO: 3.47 K/UL
WHITE BLOOD CELLS URINE: 1 /HPF

## 2022-11-16 ENCOUNTER — APPOINTMENT (OUTPATIENT)
Dept: NEPHROLOGY | Facility: CLINIC | Age: 62
End: 2022-11-16

## 2022-12-06 ENCOUNTER — NON-APPOINTMENT (OUTPATIENT)
Age: 62
End: 2022-12-06

## 2022-12-06 ENCOUNTER — APPOINTMENT (OUTPATIENT)
Dept: TRANSPLANT | Facility: CLINIC | Age: 62
End: 2022-12-06

## 2022-12-06 LAB
25(OH)D3 SERPL-MCNC: 65.2 NG/ML
ALBUMIN SERPL ELPH-MCNC: 4.4 G/DL
ALP BLD-CCNC: 68 U/L
ALT SERPL-CCNC: 13 U/L
ANION GAP SERPL CALC-SCNC: 13 MMOL/L
APPEARANCE: CLEAR
AST SERPL-CCNC: 18 U/L
BACTERIA: NEGATIVE
BASOPHILS # BLD AUTO: 0.06 K/UL
BASOPHILS NFR BLD AUTO: 1.4 %
BILIRUB SERPL-MCNC: 0.7 MG/DL
BILIRUBIN URINE: NEGATIVE
BLOOD URINE: NEGATIVE
BUN SERPL-MCNC: 31 MG/DL
CALCIUM SERPL-MCNC: 9.5 MG/DL
CALCIUM SERPL-MCNC: 9.5 MG/DL
CHLORIDE SERPL-SCNC: 105 MMOL/L
CHOLEST SERPL-MCNC: 187 MG/DL
CO2 SERPL-SCNC: 25 MMOL/L
COLOR: NORMAL
CREAT SERPL-MCNC: 1.54 MG/DL
CREAT SPEC-SCNC: 30 MG/DL
CREAT/PROT UR: 0.1 RATIO
CYCLOSPORINE SER-MCNC: 105 NG/ML
EGFR: 38 ML/MIN/1.73M2
EOSINOPHIL # BLD AUTO: 0.18 K/UL
EOSINOPHIL NFR BLD AUTO: 4.3 %
ESTIMATED AVERAGE GLUCOSE: 126 MG/DL
GLUCOSE QUALITATIVE U: NEGATIVE
GLUCOSE SERPL-MCNC: 108 MG/DL
HBA1C MFR BLD HPLC: 6 %
HCT VFR BLD CALC: 43.5 %
HDLC SERPL-MCNC: 64 MG/DL
HGB BLD-MCNC: 13.9 G/DL
HYALINE CASTS: 0 /LPF
IMM GRANULOCYTES NFR BLD AUTO: 0.2 %
KETONES URINE: NEGATIVE
LDH SERPL-CCNC: 244 U/L
LDLC SERPL CALC-MCNC: 92 MG/DL
LEUKOCYTE ESTERASE URINE: NEGATIVE
LYMPHOCYTES # BLD AUTO: 1.08 K/UL
LYMPHOCYTES NFR BLD AUTO: 25.6 %
MAGNESIUM SERPL-MCNC: 1.8 MG/DL
MAN DIFF?: NORMAL
MCHC RBC-ENTMCNC: 30 PG
MCHC RBC-ENTMCNC: 32 GM/DL
MCV RBC AUTO: 94 FL
MICROSCOPIC-UA: NORMAL
MONOCYTES # BLD AUTO: 0.49 K/UL
MONOCYTES NFR BLD AUTO: 11.6 %
NEUTROPHILS # BLD AUTO: 2.4 K/UL
NEUTROPHILS NFR BLD AUTO: 56.9 %
NITRITE URINE: NEGATIVE
NONHDLC SERPL-MCNC: 123 MG/DL
PARATHYROID HORMONE INTACT: 83 PG/ML
PH URINE: 6
PHOSPHATE SERPL-MCNC: 3.7 MG/DL
PLATELET # BLD AUTO: 198 K/UL
POTASSIUM SERPL-SCNC: 4.8 MMOL/L
PROT SERPL-MCNC: 6.7 G/DL
PROT UR-MCNC: 4 MG/DL
PROTEIN URINE: NEGATIVE
RBC # BLD: 4.63 M/UL
RBC # FLD: 13.1 %
RED BLOOD CELLS URINE: 1 /HPF
SODIUM SERPL-SCNC: 143 MMOL/L
SPECIFIC GRAVITY URINE: 1.01
SQUAMOUS EPITHELIAL CELLS: 3 /HPF
TRIGL SERPL-MCNC: 157 MG/DL
URATE SERPL-MCNC: 10.3 MG/DL
UROBILINOGEN URINE: NORMAL
WBC # FLD AUTO: 4.22 K/UL
WHITE BLOOD CELLS URINE: 1 /HPF

## 2022-12-08 ENCOUNTER — APPOINTMENT (OUTPATIENT)
Dept: NEPHROLOGY | Facility: CLINIC | Age: 62
End: 2022-12-08

## 2022-12-08 VITALS
DIASTOLIC BLOOD PRESSURE: 74 MMHG | OXYGEN SATURATION: 98 % | SYSTOLIC BLOOD PRESSURE: 137 MMHG | HEIGHT: 64 IN | HEART RATE: 73 BPM | RESPIRATION RATE: 18 BRPM | TEMPERATURE: 208.22 F

## 2022-12-08 PROCEDURE — 99214 OFFICE O/P EST MOD 30 MIN: CPT

## 2022-12-08 NOTE — ASSESSMENT
[FreeTextEntry1] : 62 y.o female with PCKD s/p LURT \par 1.  Renal transplant - Baseline creatinine about 1.5.  DSA drawn in December 2020 was negative.  Pt with PRA of 49%.  Renal sonograms have been normal.   \par 2.  Immunosuppression - now on cyclosporine last level 105.  Cont MMF 500mgs BID and pred 2.5.  Cannot increase MMF as she gets nauseous.Pt feels better on cyclosporine over tacro - less fatigue and accomplishing more.  \par 3.  HTN - controlled at home.    \par 4.  hyperkalemia/ Prophylaxis - developed high methemoglobin levels on dapsone so stopped.  She is on bactrim.  \par 5.  Wt gain - likely from prednisone.  Now only on 2.5mgs. \par 6.  Post transplant erythrocytosis - resolved.  Monitor\par 7.  DVT - Pt following with Dr. Maza.  Pt still on xarelto\par \par \par Pt will return to f/u with Dr. Hoff.  She will f/u here in 6 months.  \par Received influenza vaccine 10/22/2022\par Schedule bivalent covid vaccine\par

## 2022-12-08 NOTE — HISTORY OF PRESENT ILLNESS
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  \par \par   [de-identified] : Pts   in October.  She has been grieving, good family support.  Reviewed labs. Creatinine 1.54 and cyclosporine trough 105.  She has been suffering from back pain, still gets fatigue rather quickly but all stable since transplant.

## 2022-12-11 LAB
BKV DNA SPEC QL NAA+PROBE: NOT DETECTED IU/ML
CMV DNA SPEC QL NAA+PROBE: NOT DETECTED IU/ML
CMVPCR LOG: NOT DETECTED LOG10IU/ML

## 2023-04-17 ENCOUNTER — NON-APPOINTMENT (OUTPATIENT)
Age: 63
End: 2023-04-17

## 2023-04-19 ENCOUNTER — APPOINTMENT (OUTPATIENT)
Dept: NEPHROLOGY | Facility: CLINIC | Age: 63
End: 2023-04-19
Payer: MEDICARE

## 2023-04-19 VITALS
HEART RATE: 79 BPM | HEIGHT: 64 IN | TEMPERATURE: 97.6 F | DIASTOLIC BLOOD PRESSURE: 84 MMHG | OXYGEN SATURATION: 98 % | SYSTOLIC BLOOD PRESSURE: 175 MMHG

## 2023-04-19 DIAGNOSIS — M19.90 UNSPECIFIED OSTEOARTHRITIS, UNSPECIFIED SITE: ICD-10-CM

## 2023-04-19 LAB
ALBUMIN SERPL ELPH-MCNC: 4.4 G/DL
ALP BLD-CCNC: 64 U/L
ALT SERPL-CCNC: 15 U/L
ANION GAP SERPL CALC-SCNC: 14 MMOL/L
APPEARANCE: CLEAR
AST SERPL-CCNC: 20 U/L
BACTERIA: NEGATIVE
BASOPHILS # BLD AUTO: 0.03 K/UL
BASOPHILS NFR BLD AUTO: 0.7 %
BILIRUB SERPL-MCNC: 0.7 MG/DL
BILIRUBIN URINE: NEGATIVE
BLOOD URINE: NEGATIVE
BUN SERPL-MCNC: 39 MG/DL
CALCIUM SERPL-MCNC: 10.1 MG/DL
CALCIUM SERPL-MCNC: 10.1 MG/DL
CHLORIDE SERPL-SCNC: 105 MMOL/L
CO2 SERPL-SCNC: 24 MMOL/L
COLOR: COLORLESS
CREAT SERPL-MCNC: 1.56 MG/DL
CYCLOSPORINE SER-MCNC: 88 NG/ML
EGFR: 37 ML/MIN/1.73M2
EOSINOPHIL # BLD AUTO: 0.1 K/UL
EOSINOPHIL NFR BLD AUTO: 2.3 %
ESTIMATED AVERAGE GLUCOSE: 123 MG/DL
GLUCOSE QUALITATIVE U: NEGATIVE
GLUCOSE SERPL-MCNC: 99 MG/DL
HBA1C MFR BLD HPLC: 5.9 %
HCT VFR BLD CALC: 43.2 %
HGB BLD-MCNC: 13.6 G/DL
HYALINE CASTS: 0 /LPF
IMM GRANULOCYTES NFR BLD AUTO: 0 %
KETONES URINE: NEGATIVE
LEUKOCYTE ESTERASE URINE: NEGATIVE
LYMPHOCYTES # BLD AUTO: 1.06 K/UL
LYMPHOCYTES NFR BLD AUTO: 24.4 %
MAGNESIUM SERPL-MCNC: 1.7 MG/DL
MAN DIFF?: NORMAL
MCHC RBC-ENTMCNC: 30 PG
MCHC RBC-ENTMCNC: 31.5 GM/DL
MCV RBC AUTO: 95.2 FL
MICROSCOPIC-UA: NORMAL
MONOCYTES # BLD AUTO: 0.41 K/UL
MONOCYTES NFR BLD AUTO: 9.4 %
NEUTROPHILS # BLD AUTO: 2.74 K/UL
NEUTROPHILS NFR BLD AUTO: 63.2 %
NITRITE URINE: NEGATIVE
PARATHYROID HORMONE INTACT: 61 PG/ML
PH URINE: 6
PHOSPHATE SERPL-MCNC: 3.9 MG/DL
PLATELET # BLD AUTO: 191 K/UL
POTASSIUM SERPL-SCNC: 5.1 MMOL/L
PROT SERPL-MCNC: 6.6 G/DL
PROTEIN URINE: NEGATIVE
RBC # BLD: 4.54 M/UL
RBC # FLD: 13.2 %
RED BLOOD CELLS URINE: 0 /HPF
SODIUM SERPL-SCNC: 143 MMOL/L
SPECIFIC GRAVITY URINE: 1.01
SQUAMOUS EPITHELIAL CELLS: 1 /HPF
UROBILINOGEN URINE: NORMAL
WBC # FLD AUTO: 4.34 K/UL
WHITE BLOOD CELLS URINE: 0 /HPF

## 2023-04-19 PROCEDURE — 99214 OFFICE O/P EST MOD 30 MIN: CPT

## 2023-04-19 NOTE — PHYSICAL EXAM
[General Appearance - Alert] : alert [General Appearance - Well Nourished] : well nourished [General Appearance - In No Acute Distress] : in no acute distress [Sclera] : the sclera and conjunctiva were normal [Extraocular Movements] : extraocular movements were intact [Outer Ear] : the ears and nose were normal in appearance [Neck Appearance] : the appearance of the neck was normal [Neck Cervical Mass (___cm)] : no neck mass was observed [Respiration, Rhythm And Depth] : normal respiratory rhythm and effort [Auscultation Breath Sounds / Voice Sounds] : lungs were clear to auscultation bilaterally [Heart Rate And Rhythm] : heart rate was normal and rhythm regular [Heart Sounds] : normal S1 and S2 [Heart Sounds Gallop] : no gallops [Edema] : there was no peripheral edema [Bowel Sounds] : normal bowel sounds [Abdomen Soft] : soft [Abdomen Tenderness] : non-tender [Cervical Lymph Nodes Enlarged Posterior Bilaterally] : posterior cervical [Cervical Lymph Nodes Enlarged Anterior Bilaterally] : anterior cervical [Abnormal Walk] : normal gait [Nail Clubbing] : no clubbing  or cyanosis of the fingernails [Musculoskeletal - Swelling] : no joint swelling seen [Skin Color & Pigmentation] : normal skin color and pigmentation [Skin Turgor] : normal skin turgor [] : no rash [Oriented To Time, Place, And Person] : oriented to person, place, and time [Impaired Insight] : insight and judgment were intact [Affect] : the affect was normal [PERRL With Normal Accommodation] : pupils were equal in size, round, and reactive to light [Oropharynx] : the oropharynx was normal [Jugular Venous Distention Increased] : there was no jugular-venous distention [Thyroid Diffuse Enlargement] : the thyroid was not enlarged [Supraclavicular Lymph Nodes Enlarged Bilaterally] : supraclavicular [Mood] : the mood was normal [FreeTextEntry1] : RLQ graft NT

## 2023-04-19 NOTE — REVIEW OF SYSTEMS
[SOB on Exertion] : shortness of breath during exertion [Depression] : depression [As Noted in HPI] : as noted in HPI [Fever] : no fever [Chills] : no chills [Nosebleeds] : no nosebleeds [Sore Throat] : no sore throat [Hoarseness] : no hoarseness [Chest Pain] : no chest pain [Palpitations] : no palpitations [Shortness Of Breath] : no shortness of breath [Cough] : no cough [Abdominal Pain] : no abdominal pain [Constipation] : no constipation [Diarrhea] : no diarrhea [Dysuria] : no dysuria [Anxiety] : no anxiety [FreeTextEntry3] : no vision problems

## 2023-04-19 NOTE — ASSESSMENT
[FreeTextEntry1] : 62 y.o female with PCKD s/p LURT 2017\par 1.  Renal transplant - Baseline creatinine about 1.5.  DSA drawn in December 2020 was negative.  Pt with PRA of 49%.  Renal sonograms have been normal.   \par 2.  Immunosuppression - now on cyclosporine 50/75 last level 88.  Cont MMF 500mgs BID and pred 2.5.  Pt feels better on cyclosporine over tacro - less fatigue and accomplishing more but still some neuropathic complaints \par 3.  HTN - controlled at home with white coat component on HCTZ.\par 4.  hyperkalemia/ Prophylaxis - developed high methemoglobin levels on dapsone so stopped.  She is on bactrim.  \par 5.  Wt gain - likely from prednisone.  Now only on 2.5mgs. \par 6.  Post transplant erythrocytosis - resolved.\par 7.  DVT - Pt following with Dr. Maza.  Pt still on xarelto\par 9. Anxiety - improved\par 10. Seen by orthopedics for back pain to pursue epidural.\par 11. Cerebral aneurysm - Spoke to Patient repeatedly and Dr. Stacy prior to pandemic. Reminded to repeat MRI/MRA and then to see Dr. Stacy. She is afraid that she will get claustrophobic.  She will try and if she cannot tolerate the test we will look for additional options.\par 12. Pulmonary nodule - Previously reviewed CT chest. Most recent study without nodule but Dr. Youngblood recommended 1 year follow up after 3/15/17 study. Patient not interested in repeating study.\par 13. COVID received 4th dose of pfizer. Got COVID was treated with monoclonal antibody. Symptoms were flulike.\par 14.Follow up with Dr. Delgado in May and me in August  \par .

## 2023-04-19 NOTE — HISTORY OF PRESENT ILLNESS
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  \par \par   [de-identified] : Patient's   in October.  She has been grieving, good family support.  Reviewed labs. Creatinine 1.56 and cyclosporine trough 88.  Her concerns include:\par 1) Complaints around back pain.  Seen by Dr. Finnegan who recommended epidural injections.  She will follow up with pain MD.\par 2) She wants to take Coq10 which I said was safe\par 3) She asked if she could get vaccinacted against zoster.  I told her only shingrix as not a live virus.\par 4) She hared that her blood pressure at home is 130s/70s\par \par

## 2023-04-30 ENCOUNTER — NON-APPOINTMENT (OUTPATIENT)
Age: 63
End: 2023-04-30

## 2023-05-02 ENCOUNTER — APPOINTMENT (OUTPATIENT)
Dept: CARDIOLOGY | Facility: CLINIC | Age: 63
End: 2023-05-02
Payer: MEDICARE

## 2023-05-02 PROCEDURE — 93306 TTE W/DOPPLER COMPLETE: CPT

## 2023-05-11 ENCOUNTER — APPOINTMENT (OUTPATIENT)
Dept: CARDIOLOGY | Facility: CLINIC | Age: 63
End: 2023-05-11
Payer: MEDICARE

## 2023-05-11 ENCOUNTER — NON-APPOINTMENT (OUTPATIENT)
Age: 63
End: 2023-05-11

## 2023-05-11 VITALS
WEIGHT: 240 LBS | BODY MASS INDEX: 41.2 KG/M2 | DIASTOLIC BLOOD PRESSURE: 89 MMHG | SYSTOLIC BLOOD PRESSURE: 154 MMHG | OXYGEN SATURATION: 97 % | HEART RATE: 92 BPM

## 2023-05-11 PROCEDURE — 93000 ELECTROCARDIOGRAM COMPLETE: CPT

## 2023-05-11 PROCEDURE — 99214 OFFICE O/P EST MOD 30 MIN: CPT

## 2023-05-11 NOTE — HISTORY OF PRESENT ILLNESS
[FreeTextEntry1] : 5/11/2023\par \par Seen by Dr. Hoff, going to see Tranplant clinic\par She states she needs MRI head\par She lost her , and is tearful\par She needs to see physiatry for her back\par Edema is gone\par She does get winded with stairs.\par No pressure or pain in the chest. \par Echo done last week - normal\par \par \par \par 3/18/2022\par \par LE venous duplex 3/9 negative for DVT, mild postphlebitic changes noted on image review. \par Denies C/P. \par Reports ambulating around the house without any symptoms.\par Reports KEANE when climbing a flight of stairs, chronic deconditioning. \par Reports mild LE edema, after Norvasc 2.5 mg daily started.\par \par Medications:\par amLODIPine Besylate 2.5 MG Oral Tablet; TAKE 1 TABLET BY MOUTH ONCE DAILY\par Atorvastatin Calcium 20 MG Oral Tablet; 1 tablet twice weekly\par cycloSPORINE \par Famotidine 20 MG Oral Tablet; TAKE 1 TABLET Every twelve hours\par Mycophenolate Mofetil 500 MG Oral Tablet; TAKE 1 TABLET TWICE DAILY\par predniSONE 2.5 MG Oral Tablet; Take 1 tablet daily\par Sulfamethoxazole-Trimethoprim 400-80 MG Oral Tablet; Take 1 tablet daily\par Vitamin D3 50 MCG (2000 UT) Oral Tablet\par Xarelto 10 MG Oral Tablet; Take 1 tablet daily\par \par 1/21/2020\par Seen by psychiatrist.  Started on Zoloft, had to stop it due vomiting.  Has anxiety which is active.  \par No new issues.  No CP or SOB\par Remains on enalapril 2.5mg daily \par Remains on Xarelto 10mg daily \par Takes Vitamin D \par No blood in the urine or stool. \par mild bruising. \par Brother with DVT post kidney transplant. \par Has palptiations at times.  \par

## 2023-05-11 NOTE — PHYSICAL EXAM
[General Appearance - Well Developed] : well developed [Normal Appearance] : normal appearance [Well Groomed] : well groomed [General Appearance - Well Nourished] : well nourished [No Deformities] : no deformities [General Appearance - In No Acute Distress] : no acute distress [Normal Jugular Venous A Waves Present] : normal jugular venous A waves present [Normal Jugular Venous V Waves Present] : normal jugular venous V waves present [No Jugular Venous Pierce A Waves] : no jugular venous pierce A waves [Respiration, Rhythm And Depth] : normal respiratory rhythm and effort [Exaggerated Use Of Accessory Muscles For Inspiration] : no accessory muscle use [Auscultation Breath Sounds / Voice Sounds] : lungs were clear to auscultation bilaterally [Heart Rate And Rhythm] : heart rate and rhythm were normal [Heart Sounds] : normal S1 and S2 [Murmurs] : no murmurs present [Bowel Sounds] : normal bowel sounds [Abdomen Soft] : soft [Abdomen Tenderness] : non-tender [Abdomen Mass (___ Cm)] : no abdominal mass palpated [Abnormal Walk] : normal gait [Nail Clubbing] : no clubbing of the fingernails [Cyanosis, Localized] : no localized cyanosis [Petechial Hemorrhages (___cm)] : no petechial hemorrhages [Skin Color & Pigmentation] : normal skin color and pigmentation [] : no rash [No Venous Stasis] : no venous stasis [No Skin Ulcers] : no skin ulcer [Oriented To Time, Place, And Person] : oriented to person, place, and time [FreeTextEntry1] : Reticular veins. Trace ankle edema.

## 2023-05-11 NOTE — ASSESSMENT
[FreeTextEntry1] : Assessment:\par 1. h/o  R LE DVT\par - unclear if provoked, however symptoms started post op Renal transplant\par - diagnosed several months afterwards\par - on Xarelto 10 mg daily \par 2.  Renal Transplant\par 3.  PCKD\par 4.  Venous insufficiency / Reticular Veins\par \par Plan:\par 1. Continue with Xarelto 10 for extended therapy - [facility] CHOICE trial\par 2. VALERIA 1 +, family history of VTE, being overweight puts her at risk of recurrence of VTE.  Favor extended therapy with half dose DOAC.   \par 3.  Weight loss\par 4.  Echo was ok, she has no angina, ecg looks fine.\par 5.  No further cardiac testing. \par 6.  Ok to start CoQ 10\par 7.  RTC in 1 year.

## 2023-06-27 ENCOUNTER — APPOINTMENT (OUTPATIENT)
Dept: TRANSPLANT | Facility: CLINIC | Age: 63
End: 2023-06-27

## 2023-06-29 ENCOUNTER — APPOINTMENT (OUTPATIENT)
Dept: NEPHROLOGY | Facility: CLINIC | Age: 63
End: 2023-06-29
Payer: MEDICARE

## 2023-06-29 VITALS
SYSTOLIC BLOOD PRESSURE: 146 MMHG | HEART RATE: 96 BPM | DIASTOLIC BLOOD PRESSURE: 77 MMHG | TEMPERATURE: 98 F | HEIGHT: 64 IN | BODY MASS INDEX: 40.97 KG/M2 | OXYGEN SATURATION: 96 % | RESPIRATION RATE: 18 BRPM | WEIGHT: 240 LBS

## 2023-06-29 LAB
ALBUMIN SERPL ELPH-MCNC: 4.6 G/DL
ALP BLD-CCNC: 61 U/L
ALT SERPL-CCNC: 15 U/L
ANION GAP SERPL CALC-SCNC: 15 MMOL/L
APPEARANCE: CLEAR
AST SERPL-CCNC: 19 U/L
BACTERIA: NEGATIVE /HPF
BILIRUB SERPL-MCNC: 0.8 MG/DL
BILIRUBIN URINE: NEGATIVE
BKV DNA SPEC QL NAA+PROBE: NOT DETECTED IU/ML
BLOOD URINE: NEGATIVE
BUN SERPL-MCNC: 30 MG/DL
CALCIUM SERPL-MCNC: 10.1 MG/DL
CALCIUM SERPL-MCNC: 10.1 MG/DL
CAST: 0 /LPF
CHLORIDE SERPL-SCNC: 104 MMOL/L
CMV DNA SPEC QL NAA+PROBE: NOT DETECTED IU/ML
CMVPCR LOG: NOT DETECTED LOG10IU/ML
CO2 SERPL-SCNC: 23 MMOL/L
COLOR: YELLOW
CREAT SERPL-MCNC: 1.55 MG/DL
CREAT SPEC-SCNC: 32 MG/DL
CREAT/PROT UR: NORMAL RATIO
EGFR: 37 ML/MIN/1.73M2
EPITHELIAL CELLS: 2 /HPF
GLUCOSE QUALITATIVE U: NEGATIVE MG/DL
GLUCOSE SERPL-MCNC: 111 MG/DL
KETONES URINE: NEGATIVE MG/DL
LEUKOCYTE ESTERASE URINE: NEGATIVE
MICROSCOPIC-UA: NORMAL
NITRITE URINE: NEGATIVE
PARATHYROID HORMONE INTACT: 74 PG/ML
PH URINE: 6
POTASSIUM SERPL-SCNC: 4.8 MMOL/L
PROT SERPL-MCNC: 7 G/DL
PROT UR-MCNC: <4 MG/DL
PROTEIN URINE: NEGATIVE MG/DL
RED BLOOD CELLS URINE: 0 /HPF
SODIUM SERPL-SCNC: 141 MMOL/L
SPECIFIC GRAVITY URINE: 1.01
TACROLIMUS SERPL-MCNC: <2 NG/ML
URATE SERPL-MCNC: 9.6 MG/DL
UROBILINOGEN URINE: 0.2 MG/DL
WHITE BLOOD CELLS URINE: 0 /HPF

## 2023-06-29 PROCEDURE — 99214 OFFICE O/P EST MOD 30 MIN: CPT

## 2023-06-29 NOTE — HISTORY OF PRESENT ILLNESS
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  \par \par   [de-identified] : Pts   in 2022.  She has been grieving, good family support.  Reviewed labs. Creatinine 1.5.   She has been suffering from back pain, still gets fatigue rather quickly but all stable since transplant.

## 2023-06-29 NOTE — ASSESSMENT
[FreeTextEntry1] : 62 y.o female with PCKD s/p LURT \par 1.  Renal transplant - Baseline creatinine about 1.5.  DSA drawn in December 2020 was negative.  Pt with PRA of 49%.  Renal sonograms have been normal.   \par 2.  Immunosuppression - now on cyclosporine last level 88.  Cont MMF 500mgs BID and pred 2.5.  Cannot increase MMF as she gets nauseous. Pt feels better on cyclosporine over tacro - less fatigue and accomplishing more.  \par 3.  HTN - controlled at home.    \par 4.  hyperkalemia/ Prophylaxis - developed high methemoglobin levels on dapsone so stopped.  She is on bactrim.  \par 5.  Wt gain - likely from prednisone.  Now only on 2.5mgs.  Pts A1c also high, will check TSH and question Ozempic. \par 6.  Post transplant erythrocytosis - resolved.  Monitor\par 7.  DVT - Pt following with Dr. Maza.  Pt still on xarelto\par \par \par Pt will return to f/u with Dr. Hoff.  She will f/u here in 6 months.  \par Received influenza vaccine 10/22/2022\par Schedule bivalent covid vaccine\par

## 2023-07-03 LAB
CYCLOSPORINE SER-MCNC: 72 NG/ML
TSH SERPL-ACNC: 2.24 UIU/ML

## 2023-07-13 ENCOUNTER — APPOINTMENT (OUTPATIENT)
Dept: TRANSPLANT | Facility: CLINIC | Age: 63
End: 2023-07-13

## 2023-07-14 ENCOUNTER — NON-APPOINTMENT (OUTPATIENT)
Age: 63
End: 2023-07-14

## 2023-07-14 LAB
ALBUMIN SERPL ELPH-MCNC: 4.4 G/DL
ANION GAP SERPL CALC-SCNC: 14 MMOL/L
BUN SERPL-MCNC: 37 MG/DL
CALCIUM SERPL-MCNC: 9.6 MG/DL
CHLORIDE SERPL-SCNC: 102 MMOL/L
CO2 SERPL-SCNC: 23 MMOL/L
CREAT SERPL-MCNC: 1.64 MG/DL
CYCLOSPORINE SER-MCNC: 121 NG/ML
EGFR: 35 ML/MIN/1.73M2
GLUCOSE SERPL-MCNC: 110 MG/DL
PHOSPHATE SERPL-MCNC: 3.8 MG/DL
POTASSIUM SERPL-SCNC: 4.5 MMOL/L
SODIUM SERPL-SCNC: 140 MMOL/L

## 2023-08-04 ENCOUNTER — APPOINTMENT (OUTPATIENT)
Dept: MRI IMAGING | Facility: CLINIC | Age: 63
End: 2023-08-04
Payer: MEDICARE

## 2023-08-04 ENCOUNTER — OUTPATIENT (OUTPATIENT)
Dept: OUTPATIENT SERVICES | Facility: HOSPITAL | Age: 63
LOS: 1 days | End: 2023-08-04
Payer: MEDICARE

## 2023-08-04 DIAGNOSIS — Z94.0 KIDNEY TRANSPLANT STATUS: Chronic | ICD-10-CM

## 2023-08-04 DIAGNOSIS — Z00.00 ENCOUNTER FOR GENERAL ADULT MEDICAL EXAMINATION WITHOUT ABNORMAL FINDINGS: ICD-10-CM

## 2023-08-04 DIAGNOSIS — Z98.891 HISTORY OF UTERINE SCAR FROM PREVIOUS SURGERY: Chronic | ICD-10-CM

## 2023-08-04 PROCEDURE — 72148 MRI LUMBAR SPINE W/O DYE: CPT | Mod: 26,MH

## 2023-08-04 PROCEDURE — 72148 MRI LUMBAR SPINE W/O DYE: CPT | Mod: MH

## 2023-09-15 ENCOUNTER — NON-APPOINTMENT (OUTPATIENT)
Age: 63
End: 2023-09-15

## 2023-09-18 ENCOUNTER — APPOINTMENT (OUTPATIENT)
Dept: TRANSPLANT | Facility: CLINIC | Age: 63
End: 2023-09-18

## 2023-09-20 ENCOUNTER — APPOINTMENT (OUTPATIENT)
Dept: NEPHROLOGY | Facility: CLINIC | Age: 63
End: 2023-09-20
Payer: MEDICARE

## 2023-09-20 VITALS — SYSTOLIC BLOOD PRESSURE: 160 MMHG | DIASTOLIC BLOOD PRESSURE: 90 MMHG

## 2023-09-20 VITALS — HEART RATE: 75 BPM | OXYGEN SATURATION: 98 % | HEIGHT: 64 IN | TEMPERATURE: 97.4 F

## 2023-09-20 DIAGNOSIS — M47.16 OTHER SPONDYLOSIS WITH MYELOPATHY, LUMBAR REGION: ICD-10-CM

## 2023-09-20 DIAGNOSIS — Q61.2 POLYCYSTIC KIDNEY, ADULT TYPE: ICD-10-CM

## 2023-09-20 DIAGNOSIS — I67.1 CEREBRAL ANEURYSM, NONRUPTURED: ICD-10-CM

## 2023-09-20 LAB
ALBUMIN SERPL ELPH-MCNC: 4.5 G/DL
ALP BLD-CCNC: 63 U/L
ALT SERPL-CCNC: 13 U/L
ANION GAP SERPL CALC-SCNC: 16 MMOL/L
APPEARANCE: CLEAR
AST SERPL-CCNC: 17 U/L
BACTERIA: NEGATIVE /HPF
BILIRUB SERPL-MCNC: 0.7 MG/DL
BILIRUBIN URINE: NEGATIVE
BLOOD URINE: NEGATIVE
BUN SERPL-MCNC: 39 MG/DL
CALCIUM SERPL-MCNC: 9.9 MG/DL
CALCIUM SERPL-MCNC: 9.9 MG/DL
CAST: 0 /LPF
CHLORIDE SERPL-SCNC: 104 MMOL/L
CO2 SERPL-SCNC: 21 MMOL/L
COLOR: YELLOW
CREAT SERPL-MCNC: 1.81 MG/DL
CYCLOSPORINE SER-MCNC: 113 NG/ML
EGFR: 31 ML/MIN/1.73M2
EPITHELIAL CELLS: 1 /HPF
ESTIMATED AVERAGE GLUCOSE: 128 MG/DL
GLUCOSE QUALITATIVE U: NEGATIVE MG/DL
GLUCOSE SERPL-MCNC: 111 MG/DL
HBA1C MFR BLD HPLC: 6.1 %
KETONES URINE: NEGATIVE MG/DL
LEUKOCYTE ESTERASE URINE: NEGATIVE
MAGNESIUM SERPL-MCNC: 1.7 MG/DL
MICROSCOPIC-UA: NORMAL
NITRITE URINE: NEGATIVE
PARATHYROID HORMONE INTACT: 58 PG/ML
PH URINE: 6
PHOSPHATE SERPL-MCNC: 4.3 MG/DL
POTASSIUM SERPL-SCNC: 4.3 MMOL/L
PROT SERPL-MCNC: 6.8 G/DL
PROTEIN URINE: NEGATIVE MG/DL
RED BLOOD CELLS URINE: 0 /HPF
SODIUM SERPL-SCNC: 141 MMOL/L
SPECIFIC GRAVITY URINE: 1.01
URATE SERPL-MCNC: 9.1 MG/DL
UROBILINOGEN URINE: 0.2 MG/DL
WHITE BLOOD CELLS URINE: 0 /HPF

## 2023-09-20 PROCEDURE — 99214 OFFICE O/P EST MOD 30 MIN: CPT

## 2023-09-22 LAB
ALBUMIN SERPL ELPH-MCNC: 4.6 G/DL
ANION GAP SERPL CALC-SCNC: 15 MMOL/L
BUN SERPL-MCNC: 46 MG/DL
CALCIUM SERPL-MCNC: 9.9 MG/DL
CHLORIDE SERPL-SCNC: 103 MMOL/L
CO2 SERPL-SCNC: 23 MMOL/L
CREAT SERPL-MCNC: 1.99 MG/DL
EGFR: 28 ML/MIN/1.73M2
GLUCOSE SERPL-MCNC: 115 MG/DL
HCT VFR BLD CALC: 44.2 %
HGB BLD-MCNC: 14 G/DL
MCHC RBC-ENTMCNC: 30 PG
MCHC RBC-ENTMCNC: 31.7 GM/DL
MCV RBC AUTO: 94.6 FL
PHOSPHATE SERPL-MCNC: 4.1 MG/DL
PLATELET # BLD AUTO: 217 K/UL
POTASSIUM SERPL-SCNC: 5.4 MMOL/L
RBC # BLD: 4.67 M/UL
RBC # FLD: 13.5 %
SODIUM SERPL-SCNC: 141 MMOL/L
WBC # FLD AUTO: 5.63 K/UL

## 2023-09-28 ENCOUNTER — NON-APPOINTMENT (OUTPATIENT)
Age: 63
End: 2023-09-28

## 2023-09-28 ENCOUNTER — APPOINTMENT (OUTPATIENT)
Dept: TRANSPLANT | Facility: CLINIC | Age: 63
End: 2023-09-28

## 2023-09-28 LAB
ALBUMIN SERPL ELPH-MCNC: 4.5 G/DL
ALP BLD-CCNC: 64 U/L
ALT SERPL-CCNC: 11 U/L
ANION GAP SERPL CALC-SCNC: 14 MMOL/L
APPEARANCE: CLEAR
AST SERPL-CCNC: 18 U/L
BILIRUB SERPL-MCNC: 0.7 MG/DL
BILIRUBIN URINE: NEGATIVE
BLOOD URINE: NEGATIVE
BUN SERPL-MCNC: 43 MG/DL
CALCIUM SERPL-MCNC: 10.1 MG/DL
CHLORIDE SERPL-SCNC: 105 MMOL/L
CO2 SERPL-SCNC: 24 MMOL/L
COLOR: YELLOW
CREAT SERPL-MCNC: 1.69 MG/DL
CREAT SPEC-SCNC: 31 MG/DL
CYCLOSPORINE SER-MCNC: 125 NG/ML
EGFR: 34 ML/MIN/1.73M2
GLUCOSE QUALITATIVE U: NEGATIVE MG/DL
GLUCOSE SERPL-MCNC: 111 MG/DL
HCT VFR BLD CALC: 43.6 %
HGB BLD-MCNC: 13.8 G/DL
KETONES URINE: NEGATIVE MG/DL
LEUKOCYTE ESTERASE URINE: NEGATIVE
MAGNESIUM SERPL-MCNC: 1.8 MG/DL
MCHC RBC-ENTMCNC: 29.7 PG
MCHC RBC-ENTMCNC: 31.7 GM/DL
MCV RBC AUTO: 93.8 FL
MICROALBUMIN 24H UR DL<=1MG/L-MCNC: <1.2 MG/DL
MICROALBUMIN/CREAT 24H UR-RTO: NORMAL MG/G
NITRITE URINE: NEGATIVE
PH URINE: 6
PHOSPHATE SERPL-MCNC: 4.7 MG/DL
PLATELET # BLD AUTO: 207 K/UL
POTASSIUM SERPL-SCNC: 4.5 MMOL/L
PROT SERPL-MCNC: 7 G/DL
PROTEIN URINE: NEGATIVE MG/DL
RBC # BLD: 4.65 M/UL
RBC # FLD: 13.2 %
SODIUM SERPL-SCNC: 143 MMOL/L
SPECIFIC GRAVITY URINE: 1.01
TACROLIMUS SERPL-MCNC: <2 NG/ML
UROBILINOGEN URINE: 0.2 MG/DL
WBC # FLD AUTO: 3.91 K/UL

## 2023-09-29 LAB
CMV DNA SPEC QL NAA+PROBE: NOT DETECTED IU/ML
CMVPCR LOG: NOT DETECTED LOG10IU/ML

## 2023-10-02 LAB — BKV DNA SPEC QL NAA+PROBE: NOT DETECTED IU/ML

## 2023-10-16 RX ORDER — PREDNISONE 2.5 MG/1
2.5 TABLET ORAL
Qty: 90 | Refills: 3 | Status: ACTIVE | COMMUNITY
Start: 2017-11-09 | End: 1900-01-01

## 2023-11-20 ENCOUNTER — TRANSCRIPTION ENCOUNTER (OUTPATIENT)
Age: 63
End: 2023-11-20

## 2023-11-21 ENCOUNTER — TRANSCRIPTION ENCOUNTER (OUTPATIENT)
Age: 63
End: 2023-11-21

## 2023-11-27 ENCOUNTER — TRANSCRIPTION ENCOUNTER (OUTPATIENT)
Age: 63
End: 2023-11-27

## 2023-11-29 ENCOUNTER — APPOINTMENT (OUTPATIENT)
Dept: NEPHROLOGY | Facility: CLINIC | Age: 63
End: 2023-11-29
Payer: MEDICARE

## 2023-11-29 VITALS
HEIGHT: 64 IN | DIASTOLIC BLOOD PRESSURE: 81 MMHG | TEMPERATURE: 98 F | HEART RATE: 85 BPM | SYSTOLIC BLOOD PRESSURE: 157 MMHG | OXYGEN SATURATION: 96 %

## 2023-11-29 LAB
ALBUMIN SERPL ELPH-MCNC: 4.6 G/DL
ALP BLD-CCNC: 61 U/L
ALT SERPL-CCNC: <5 U/L
ANION GAP SERPL CALC-SCNC: 15 MMOL/L
APPEARANCE: CLEAR
AST SERPL-CCNC: 10 U/L
BACTERIA: NEGATIVE /HPF
BILIRUB SERPL-MCNC: 0.6 MG/DL
BILIRUBIN URINE: NEGATIVE
BLOOD URINE: NEGATIVE
BUN SERPL-MCNC: 38 MG/DL
CALCIUM SERPL-MCNC: 9.8 MG/DL
CALCIUM SERPL-MCNC: 9.8 MG/DL
CAST: 0 /LPF
CHLORIDE SERPL-SCNC: 104 MMOL/L
CHOLEST SERPL-MCNC: 185 MG/DL
CO2 SERPL-SCNC: 23 MMOL/L
COLOR: YELLOW
CREAT SERPL-MCNC: 1.98 MG/DL
CYCLOSPORINE SER-MCNC: 152 NG/ML
EGFR: 28 ML/MIN/1.73M2
EPITHELIAL CELLS: 2 /HPF
ESTIMATED AVERAGE GLUCOSE: 126 MG/DL
GLUCOSE QUALITATIVE U: NEGATIVE MG/DL
GLUCOSE SERPL-MCNC: 110 MG/DL
HBA1C MFR BLD HPLC: 6 %
HDLC SERPL-MCNC: 62 MG/DL
KETONES URINE: NEGATIVE MG/DL
LDLC SERPL CALC-MCNC: 97 MG/DL
LEUKOCYTE ESTERASE URINE: NEGATIVE
MAGNESIUM SERPL-MCNC: 1.7 MG/DL
MICROSCOPIC-UA: NORMAL
NITRITE URINE: NEGATIVE
NONHDLC SERPL-MCNC: 124 MG/DL
PARATHYROID HORMONE INTACT: 86 PG/ML
PH URINE: 6
PHOSPHATE SERPL-MCNC: 3.8 MG/DL
POTASSIUM SERPL-SCNC: 4.9 MMOL/L
PROT SERPL-MCNC: 7 G/DL
PROTEIN URINE: NEGATIVE MG/DL
RED BLOOD CELLS URINE: 0 /HPF
REVIEW: NORMAL
SODIUM SERPL-SCNC: 142 MMOL/L
SPECIFIC GRAVITY URINE: 1.01
TRIGL SERPL-MCNC: 152 MG/DL
UROBILINOGEN URINE: 0.2 MG/DL
WHITE BLOOD CELLS URINE: 0 /HPF

## 2023-11-29 PROCEDURE — 99214 OFFICE O/P EST MOD 30 MIN: CPT

## 2023-12-08 ENCOUNTER — TRANSCRIPTION ENCOUNTER (OUTPATIENT)
Age: 63
End: 2023-12-08

## 2023-12-20 ENCOUNTER — APPOINTMENT (OUTPATIENT)
Dept: NEPHROLOGY | Facility: CLINIC | Age: 63
End: 2023-12-20
Payer: MEDICARE

## 2023-12-20 PROCEDURE — 99214 OFFICE O/P EST MOD 30 MIN: CPT | Mod: 95

## 2023-12-20 NOTE — PLAN
[FreeTextEntry1] : 1. Renal transplant - Baseline creatinine about 1.5. DSA drawn in December 2020 was negative. Pt with PRA of 49%. Renal sonograms have been normal.  Creatinine has been slowly rising with increase in cyclo troughs.  May have CNI toxicity.  2. Immunosuppression - now on cyclosporine last level 150.  Will reduce back to 50/75 mgs.  Cont MMF 500mgs BID and pred 2.5. Cannot increase MMF as she gets nauseous. Pt feels better on cyclosporine over tacro - less fatigue and accomplishing more.  Still may need to change to belatacept or sirolimus.  3. HTN - controlled at home. 4. hyperkalemia/ Prophylaxis - developed high methemoglobin levels on dapsone so stopped. She is on bactrim. 5. Wt gain - likely from prednisone. Now only on 2.5mgs. Pts A1c also high, may benefit from Ozempic. 6. Post transplant erythrocytosis - resolved. Monitor 7. DVT - Pt following with Dr. Maza. Pt still on xarelto  Pt will return to f/u with Dr. Hoff. She will f/u here in 6 months. Reduce cyclosporine to 50/75 f/u in 3 weeks.

## 2024-01-11 ENCOUNTER — APPOINTMENT (OUTPATIENT)
Dept: NEPHROLOGY | Facility: CLINIC | Age: 64
End: 2024-01-11
Payer: MEDICARE

## 2024-01-11 VITALS
SYSTOLIC BLOOD PRESSURE: 141 MMHG | DIASTOLIC BLOOD PRESSURE: 67 MMHG | OXYGEN SATURATION: 99 % | TEMPERATURE: 97 F | HEART RATE: 97 BPM

## 2024-01-11 LAB
ALBUMIN SERPL ELPH-MCNC: 4.5 G/DL
ALP BLD-CCNC: 69 U/L
ALT SERPL-CCNC: 13 U/L
ANION GAP SERPL CALC-SCNC: 14 MMOL/L
APPEARANCE: CLEAR
AST SERPL-CCNC: 16 U/L
BILIRUB SERPL-MCNC: 0.6 MG/DL
BILIRUBIN URINE: NEGATIVE
BLOOD URINE: NEGATIVE
BUN SERPL-MCNC: 44 MG/DL
CALCIUM SERPL-MCNC: 9.7 MG/DL
CHLORIDE SERPL-SCNC: 104 MMOL/L
CMV DNA SPEC QL NAA+PROBE: NOT DETECTED IU/ML
CMVPCR LOG: NOT DETECTED LOG10IU/ML
CO2 SERPL-SCNC: 24 MMOL/L
COLOR: YELLOW
CREAT SERPL-MCNC: 1.74 MG/DL
CYCLOSPORINE SER-MCNC: 122 NG/ML
EGFR: 32 ML/MIN/1.73M2
GLUCOSE QUALITATIVE U: NEGATIVE MG/DL
GLUCOSE SERPL-MCNC: 118 MG/DL
HCT VFR BLD CALC: 41.3 %
HGB BLD-MCNC: 13 G/DL
KETONES URINE: NEGATIVE MG/DL
LEUKOCYTE ESTERASE URINE: NEGATIVE
MAGNESIUM SERPL-MCNC: 1.7 MG/DL
MCHC RBC-ENTMCNC: 29.4 PG
MCHC RBC-ENTMCNC: 31.5 GM/DL
MCV RBC AUTO: 93.4 FL
NITRITE URINE: NEGATIVE
PH URINE: 6
PHOSPHATE SERPL-MCNC: 4.4 MG/DL
PLATELET # BLD AUTO: 231 K/UL
POTASSIUM SERPL-SCNC: 4.5 MMOL/L
PROT SERPL-MCNC: 6.8 G/DL
PROTEIN URINE: NEGATIVE MG/DL
RBC # BLD: 4.42 M/UL
RBC # FLD: 13.1 %
SODIUM SERPL-SCNC: 143 MMOL/L
SPECIFIC GRAVITY URINE: 1.01
UROBILINOGEN URINE: 0.2 MG/DL
WBC # FLD AUTO: 4.78 K/UL

## 2024-01-11 PROCEDURE — 99214 OFFICE O/P EST MOD 30 MIN: CPT

## 2024-01-11 NOTE — PHYSICAL EXAM
[General Appearance - Alert] : alert [General Appearance - In No Acute Distress] : in no acute distress [General Appearance - Well Nourished] : well nourished [Sclera] : the sclera and conjunctiva were normal [Extraocular Movements] : extraocular movements were intact [Outer Ear] : the ears and nose were normal in appearance [Nasal Cavity] : the nasal mucosa and septum were normal [Neck Appearance] : the appearance of the neck was normal [Oriented To Time, Place, And Person] : oriented to person, place, and time [Impaired Insight] : insight and judgment were intact [Affect] : the affect was normal [Respiration, Rhythm And Depth] : normal respiratory rhythm and effort [Exaggerated Use Of Accessory Muscles For Inspiration] : no accessory muscle use [Auscultation Breath Sounds / Voice Sounds] : lungs were clear to auscultation bilaterally [Heart Rate And Rhythm] : heart rate was normal and rhythm regular [Heart Sounds] : normal S1 and S2 [Heart Sounds Gallop] : no gallops [Murmurs] : no murmurs [Bowel Sounds] : normal bowel sounds [Abdomen Soft] : soft [Abdomen Tenderness] : non-tender [Cervical Lymph Nodes Enlarged Posterior Bilaterally] : posterior cervical [Cervical Lymph Nodes Enlarged Anterior Bilaterally] : anterior cervical [Supraclavicular Lymph Nodes Enlarged Bilaterally] : supraclavicular [Abnormal Walk] : normal gait [Nail Clubbing] : no clubbing  or cyanosis of the fingernails [Musculoskeletal - Swelling] : no joint swelling seen [Skin Color & Pigmentation] : normal skin color and pigmentation [Skin Turgor] : normal skin turgor [] : no rash

## 2024-01-11 NOTE — HISTORY OF PRESENT ILLNESS
[Home] : at home, [unfilled] , at the time of the visit. 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  \par  \par    [de-identified] : Pts   in 2022.  She has been grieving, good family support.  Reviewed labs. Creatinine 1.7.   Feeling better since cyclosporine reduced but still with many vague symptoms.

## 2024-01-11 NOTE — PLAN
[FreeTextEntry1] : 1. Renal transplant - Baseline creatinine about 1.5. DSA drawn in December 2020 was negative. Pt with PRA of 49%. Renal sonograms have been normal.  Creatinine has been slowly rising with increase in cyclo troughs.  May have CNI toxicity. Will change to belatacept.   2. Immunosuppression - now on cyclosporine last level 150.  Will reduce back to 50/75 mgs.  Cont MMF 500mgs BID and pred 2.5. Cannot increase MMF as she gets nauseous. Pt feels better on cyclosporine over tacro - less fatigue and accomplishing more.  Will plan to change to belatacept.   3. HTN - controlled at home. 4. hyperkalemia/ Prophylaxis - developed high methemoglobin levels on dapsone so stopped. She is on bactrim. 5. Wt gain - likely from prednisone. Now only on 2.5mgs. Pts A1c also high, may benefit from Ozempic. 6. Post transplant erythrocytosis - resolved. Monitor 7. DVT - Pt following with Dr. Maza. Pt still on xarelto  Pt will return to f/u with Dr. Hoff. She will f/u here in 6 months. Reduce cyclosporine to 50/75 f/u in 3 weeks.

## 2024-01-12 LAB — BKV DNA SPEC QL NAA+PROBE: NOT DETECTED IU/ML

## 2024-01-16 RX ORDER — BELATACEPT 250 MG/1
250 INJECTION, POWDER, LYOPHILIZED, FOR SOLUTION INTRAVENOUS
Qty: 5 | Refills: 11 | Status: ACTIVE | OUTPATIENT
Start: 2024-01-12

## 2024-02-05 RX ORDER — BELATACEPT 250 MG/1
250 INJECTION, POWDER, LYOPHILIZED, FOR SOLUTION INTRAVENOUS
Refills: 0 | Status: ACTIVE | OUTPATIENT
Start: 2024-02-05 | End: 1900-01-01

## 2024-02-20 ENCOUNTER — APPOINTMENT (OUTPATIENT)
Dept: TRANSPLANT | Facility: CLINIC | Age: 64
End: 2024-02-20

## 2024-02-22 ENCOUNTER — APPOINTMENT (OUTPATIENT)
Dept: NEPHROLOGY | Facility: CLINIC | Age: 64
End: 2024-02-22
Payer: MEDICARE

## 2024-02-22 VITALS
TEMPERATURE: 98 F | HEIGHT: 64 IN | HEART RATE: 81 BPM | DIASTOLIC BLOOD PRESSURE: 74 MMHG | RESPIRATION RATE: 18 BRPM | BODY MASS INDEX: 40.97 KG/M2 | WEIGHT: 240 LBS | OXYGEN SATURATION: 99 % | SYSTOLIC BLOOD PRESSURE: 126 MMHG

## 2024-02-22 LAB
ALBUMIN SERPL ELPH-MCNC: 4.4 G/DL
ALP BLD-CCNC: 70 U/L
ALT SERPL-CCNC: 15 U/L
ANION GAP SERPL CALC-SCNC: 14 MMOL/L
APPEARANCE: CLEAR
AST SERPL-CCNC: 18 U/L
BACTERIA: NEGATIVE /HPF
BILIRUB SERPL-MCNC: 0.7 MG/DL
BILIRUBIN URINE: NEGATIVE
BKV DNA SPEC QL NAA+PROBE: NOT DETECTED IU/ML
BLOOD URINE: NEGATIVE
BUN SERPL-MCNC: 32 MG/DL
CALCIUM SERPL-MCNC: 9.9 MG/DL
CAST: 0 /LPF
CHLORIDE SERPL-SCNC: 107 MMOL/L
CMV DNA SPEC QL NAA+PROBE: NOT DETECTED IU/ML
CMVPCR LOG: NOT DETECTED LOG10IU/ML
CO2 SERPL-SCNC: 24 MMOL/L
COLOR: YELLOW
CREAT SERPL-MCNC: 1.62 MG/DL
CREAT SPEC-SCNC: 35 MG/DL
CREAT/PROT UR: NORMAL RATIO
CYCLOSPORINE SER-MCNC: 129 NG/ML
EGFR: 35 ML/MIN/1.73M2
EPITHELIAL CELLS: 2 /HPF
GLUCOSE QUALITATIVE U: NEGATIVE MG/DL
GLUCOSE SERPL-MCNC: 105 MG/DL
HCT VFR BLD CALC: 41.4 %
HGB BLD-MCNC: 13.2 G/DL
KETONES URINE: NEGATIVE MG/DL
LEUKOCYTE ESTERASE URINE: NEGATIVE
M TB IFN-G BLD-IMP: NEGATIVE
MAGNESIUM SERPL-MCNC: 1.7 MG/DL
MCHC RBC-ENTMCNC: 29.4 PG
MCHC RBC-ENTMCNC: 31.9 GM/DL
MCV RBC AUTO: 92.2 FL
MICROSCOPIC-UA: NORMAL
NITRITE URINE: NEGATIVE
PH URINE: 6
PHOSPHATE SERPL-MCNC: 4.4 MG/DL
PLATELET # BLD AUTO: 207 K/UL
POTASSIUM SERPL-SCNC: 5 MMOL/L
PROT SERPL-MCNC: 6.8 G/DL
PROT UR-MCNC: <4 MG/DL
PROTEIN URINE: NEGATIVE MG/DL
QUANTIFERON TB PLUS MITOGEN MINUS NIL: 3.15 IU/ML
QUANTIFERON TB PLUS NIL: 0.02 IU/ML
QUANTIFERON TB PLUS TB1 MINUS NIL: 0 IU/ML
QUANTIFERON TB PLUS TB2 MINUS NIL: 0 IU/ML
RBC # BLD: 4.49 M/UL
RBC # FLD: 13.3 %
RED BLOOD CELLS URINE: 0 /HPF
SODIUM SERPL-SCNC: 145 MMOL/L
SPECIFIC GRAVITY URINE: 1.01
UROBILINOGEN URINE: 0.2 MG/DL
WBC # FLD AUTO: 4.46 K/UL
WHITE BLOOD CELLS URINE: 0 /HPF

## 2024-02-22 PROCEDURE — 99214 OFFICE O/P EST MOD 30 MIN: CPT

## 2024-02-22 NOTE — PLAN
[FreeTextEntry1] : 1. Renal transplant - Baseline creatinine about 1.5. DSA drawn in December 2020 was negative. Pt with PRA of 49%. Renal sonograms have been normal.  Creatinine has been slowly rising with increase in cyclo troughs although now better at 1.6.  May have CNI toxicity. Will change to belatacept.   2. Immunosuppression - now on cyclosporine 50/75 mgs.  Cont MMF 500mgs BID and pred 2.5. Cannot increase MMF as she gets nauseous. Pt feels better on cyclosporine over tacro - less fatigue and accomplishing more.  Will plan to change to belatacept.   3. HTN - controlled at home. 4. hyperkalemia/ Prophylaxis - developed high methemoglobin levels on dapsone so stopped. She is on bactrim. 5. Wt gain - likely from prednisone. Now only on 2.5mgs. Pts A1c also high, may benefit from Ozempic.  Will not initiate until belatacept switch compelted.  6. Post transplant erythrocytosis - resolved. Monitor 7. DVT - Pt following with Dr. Maza. Pt still on xarelto  Pt will f/u in 5 weeks during belatacept switch, then once completed, return to Dr. Hoff.

## 2024-02-22 NOTE — HISTORY OF PRESENT ILLNESS
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  \par  \par    [de-identified] : Pts   in 2022.  She has been grieving, good family support.  Reviewed labs. Creatinine 1.7.   Feeling better since cyclosporine reduced but still with many vague symptoms.

## 2024-02-22 NOTE — PHYSICAL EXAM
[General Appearance - Alert] : alert [General Appearance - Well Nourished] : well nourished [General Appearance - In No Acute Distress] : in no acute distress [Sclera] : the sclera and conjunctiva were normal [Extraocular Movements] : extraocular movements were intact [Nasal Cavity] : the nasal mucosa and septum were normal [Outer Ear] : the ears and nose were normal in appearance [Neck Appearance] : the appearance of the neck was normal [Respiration, Rhythm And Depth] : normal respiratory rhythm and effort [Auscultation Breath Sounds / Voice Sounds] : lungs were clear to auscultation bilaterally [Exaggerated Use Of Accessory Muscles For Inspiration] : no accessory muscle use [Heart Rate And Rhythm] : heart rate was normal and rhythm regular [Heart Sounds] : normal S1 and S2 [Heart Sounds Gallop] : no gallops [Murmurs] : no murmurs [Bowel Sounds] : normal bowel sounds [Abdomen Soft] : soft [Abdomen Tenderness] : non-tender [Cervical Lymph Nodes Enlarged Posterior Bilaterally] : posterior cervical [Cervical Lymph Nodes Enlarged Anterior Bilaterally] : anterior cervical [Supraclavicular Lymph Nodes Enlarged Bilaterally] : supraclavicular [Abnormal Walk] : normal gait [Nail Clubbing] : no clubbing  or cyanosis of the fingernails [Musculoskeletal - Swelling] : no joint swelling seen [Skin Color & Pigmentation] : normal skin color and pigmentation [Skin Turgor] : normal skin turgor [] : no rash [Oriented To Time, Place, And Person] : oriented to person, place, and time [Impaired Insight] : insight and judgment were intact [Affect] : the affect was normal

## 2024-03-11 ENCOUNTER — APPOINTMENT (OUTPATIENT)
Dept: RHEUMATOLOGY | Facility: CLINIC | Age: 64
End: 2024-03-11
Payer: MEDICARE

## 2024-03-11 VITALS
WEIGHT: 151 LBS | RESPIRATION RATE: 16 BRPM | SYSTOLIC BLOOD PRESSURE: 138 MMHG | OXYGEN SATURATION: 97 % | DIASTOLIC BLOOD PRESSURE: 84 MMHG | BODY MASS INDEX: 25.92 KG/M2 | HEART RATE: 86 BPM | TEMPERATURE: 98 F

## 2024-03-11 VITALS — DIASTOLIC BLOOD PRESSURE: 71 MMHG | OXYGEN SATURATION: 98 % | SYSTOLIC BLOOD PRESSURE: 120 MMHG | HEART RATE: 84 BPM

## 2024-03-11 PROCEDURE — 96365 THER/PROPH/DIAG IV INF INIT: CPT

## 2024-03-11 RX ORDER — BELATACEPT 250 MG/1
250 INJECTION, POWDER, LYOPHILIZED, FOR SOLUTION INTRAVENOUS
Qty: 0 | Refills: 0 | Status: COMPLETED
Start: 2024-02-05

## 2024-03-13 NOTE — HISTORY OF PRESENT ILLNESS
[Denies] : Denies [No] : No [N/A] : N/A [Yes] : Yes [24g] : 24g [Start Time: ___] : Medication Start Time: [unfilled] [3] : 3 [Medication Name: ___] : Medication Name: [unfilled] [End Time: ___] : Medication End Time: [unfilled] [Total Amount Administered: ___] : Total Amount Administered: [unfilled] [IV discontinued. Intact. No signs or symptoms of IV complications noted. Time: ___] : IV discontinued. Intact. No signs or symptoms of IV complications noted. Time: [unfilled] [Patient  instructed to seek medical attention with signs and symptoms of adverse effects] : Patient  instructed to seek medical attention with signs and symptoms of adverse effects [Patient left unit in no acute distress] : Patient left unit in no acute distress [Medications administered as ordered and tolerated well.] : Medications administered as ordered and tolerated well. [de-identified] : right hip [Drug wasted: _____] : Drug wasted: [unfilled] [de-identified] : left arm cephalic vein  [de-identified] : Patient presents for week 0 Nulojix infusion, doing well overall.  Patient denies of being on any other biologics in the past. Patient reports having kidney transplant in 2017, received from family member. Patient reports no urination problems or swelling to lower extremities. Reports having pain to left hip, rated as above. Patient reports taking Prednisone 2.5mg daily. Patient denies any recent infection or antibiotic use. Patient oriented to infusion facility. Education and teaching materials provided to patient. Patient verbalized understanding. Patient consented to today's infusion. Patient medicated as per order and tolerated infusion well. No immediate AEs noted. Patient left unit ambulatory in [facility].

## 2024-03-25 ENCOUNTER — APPOINTMENT (OUTPATIENT)
Dept: RHEUMATOLOGY | Facility: CLINIC | Age: 64
End: 2024-03-25
Payer: MEDICARE

## 2024-03-25 VITALS
SYSTOLIC BLOOD PRESSURE: 112 MMHG | DIASTOLIC BLOOD PRESSURE: 77 MMHG | RESPIRATION RATE: 16 BRPM | OXYGEN SATURATION: 95 % | HEART RATE: 76 BPM

## 2024-03-25 VITALS
SYSTOLIC BLOOD PRESSURE: 113 MMHG | HEART RATE: 92 BPM | DIASTOLIC BLOOD PRESSURE: 62 MMHG | TEMPERATURE: 97.9 F | OXYGEN SATURATION: 98 % | RESPIRATION RATE: 16 BRPM

## 2024-03-25 PROCEDURE — 96365 THER/PROPH/DIAG IV INF INIT: CPT

## 2024-03-25 RX ORDER — BELATACEPT 250 MG/1
250 INJECTION, POWDER, LYOPHILIZED, FOR SOLUTION INTRAVENOUS
Qty: 0 | Refills: 0 | Status: COMPLETED
Start: 2024-02-05

## 2024-03-25 NOTE — HISTORY OF PRESENT ILLNESS
[N/A] : N/A [Denies] : Denies [No] : No [Yes] : Yes [Declined] : Declined [Left upper extremity] : Left upper extremity [24g] : 24g [Start Time: ___] : Medication Start Time: [unfilled] [End Time: ___] : Medication End Time: [unfilled] [Medication Name: ___] : Medication Name: [unfilled] [Total Amount Administered: ___] : Total Amount Administered: [unfilled] [IV discontinued. Intact. No signs or symptoms of IV complications noted. Time: ___] : IV discontinued. Intact. No signs or symptoms of IV complications noted. Time: [unfilled] [Patient  instructed to seek medical attention with signs and symptoms of adverse effects] : Patient  instructed to seek medical attention with signs and symptoms of adverse effects [Patient left unit in no acute distress] : Patient left unit in no acute distress [Medications administered as ordered and tolerated well.] : Medications administered as ordered and tolerated well. [Drug wasted: _____] : Drug wasted: [unfilled] [de-identified] : left arm cephalic vein  [de-identified] : Patient presents for week 2 Nulojix infusion, doing well overall. Patient denies any recent infection, antibiotic use or hospitalizations. Patient reports having good urine output. Patient denies any difficulty urinating or swelling to the lower extremities. Patient reports experiencing fogginess to the brain, numbness to the face, weakness and tiredness. Patient reports feeling these symptoms more after taking cyclosporine. No other symptoms or concerns noted at this time. Patient tolerated infusion well.

## 2024-03-26 ENCOUNTER — APPOINTMENT (OUTPATIENT)
Dept: TRANSPLANT | Facility: CLINIC | Age: 64
End: 2024-03-26

## 2024-03-27 ENCOUNTER — APPOINTMENT (OUTPATIENT)
Dept: NEPHROLOGY | Facility: CLINIC | Age: 64
End: 2024-03-27

## 2024-03-28 ENCOUNTER — APPOINTMENT (OUTPATIENT)
Dept: NEPHROLOGY | Facility: CLINIC | Age: 64
End: 2024-03-28
Payer: MEDICARE

## 2024-03-28 VITALS
RESPIRATION RATE: 16 BRPM | WEIGHT: 251 LBS | HEART RATE: 80 BPM | OXYGEN SATURATION: 97 % | TEMPERATURE: 98.3 F | BODY MASS INDEX: 42.85 KG/M2 | SYSTOLIC BLOOD PRESSURE: 129 MMHG | DIASTOLIC BLOOD PRESSURE: 77 MMHG | HEIGHT: 64 IN

## 2024-03-28 LAB
ALBUMIN SERPL ELPH-MCNC: 4.2 G/DL
ALP BLD-CCNC: 69 U/L
ALT SERPL-CCNC: 14 U/L
ANION GAP SERPL CALC-SCNC: 12 MMOL/L
APPEARANCE: CLEAR
AST SERPL-CCNC: 17 U/L
BACTERIA: NEGATIVE /HPF
BASOPHILS # BLD AUTO: 0.04 K/UL
BASOPHILS NFR BLD AUTO: 0.9 %
BILIRUB SERPL-MCNC: 0.7 MG/DL
BILIRUBIN URINE: NEGATIVE
BLOOD URINE: NEGATIVE
BUN SERPL-MCNC: 32 MG/DL
CALCIUM SERPL-MCNC: 9.5 MG/DL
CALCIUM SERPL-MCNC: 9.5 MG/DL
CAST: 0 /LPF
CHLORIDE SERPL-SCNC: 103 MMOL/L
CMV DNA SPEC QL NAA+PROBE: NOT DETECTED IU/ML
CMVPCR LOG: NOT DETECTED LOG10IU/ML
CO2 SERPL-SCNC: 26 MMOL/L
COLOR: YELLOW
CREAT SERPL-MCNC: 1.67 MG/DL
CREAT SPEC-SCNC: 34 MG/DL
CREAT/PROT UR: NORMAL RATIO
CYCLOSPORINE SER-MCNC: 124 NG/ML
EGFR: 34 ML/MIN/1.73M2
EOSINOPHIL # BLD AUTO: 0.09 K/UL
EOSINOPHIL NFR BLD AUTO: 1.9 %
EPITHELIAL CELLS: 2 /HPF
GLUCOSE QUALITATIVE U: NEGATIVE MG/DL
GLUCOSE SERPL-MCNC: 100 MG/DL
HCT VFR BLD CALC: 41.1 %
HGB BLD-MCNC: 12.9 G/DL
IMM GRANULOCYTES NFR BLD AUTO: 0.2 %
KETONES URINE: NEGATIVE MG/DL
LEUKOCYTE ESTERASE URINE: NEGATIVE
LYMPHOCYTES # BLD AUTO: 1.07 K/UL
LYMPHOCYTES NFR BLD AUTO: 23.1 %
MAGNESIUM SERPL-MCNC: 1.8 MG/DL
MAN DIFF?: NORMAL
MCHC RBC-ENTMCNC: 29.3 PG
MCHC RBC-ENTMCNC: 31.4 GM/DL
MCV RBC AUTO: 93.2 FL
MICROSCOPIC-UA: NORMAL
MONOCYTES # BLD AUTO: 0.4 K/UL
MONOCYTES NFR BLD AUTO: 8.6 %
NEUTROPHILS # BLD AUTO: 3.02 K/UL
NEUTROPHILS NFR BLD AUTO: 65.3 %
NITRITE URINE: NEGATIVE
PARATHYROID HORMONE INTACT: 74 PG/ML
PH URINE: 6
PHOSPHATE SERPL-MCNC: 3.9 MG/DL
PLATELET # BLD AUTO: 223 K/UL
POTASSIUM SERPL-SCNC: 5 MMOL/L
PROT SERPL-MCNC: 6.7 G/DL
PROT UR-MCNC: <4 MG/DL
PROTEIN URINE: NEGATIVE MG/DL
RBC # BLD: 4.41 M/UL
RBC # FLD: 13.2 %
RED BLOOD CELLS URINE: 0 /HPF
SODIUM SERPL-SCNC: 142 MMOL/L
SPECIFIC GRAVITY URINE: 1.01
UROBILINOGEN URINE: 0.2 MG/DL
WBC # FLD AUTO: 4.63 K/UL
WHITE BLOOD CELLS URINE: 0 /HPF

## 2024-03-28 PROCEDURE — 99214 OFFICE O/P EST MOD 30 MIN: CPT

## 2024-03-28 RX ORDER — CYCLOSPORINE 25 MG/1
25 CAPSULE, LIQUID FILLED ORAL
Qty: 30 | Refills: 3 | Status: DISCONTINUED | COMMUNITY
Start: 2021-04-23 | End: 2024-03-28

## 2024-03-28 NOTE — PLAN
[FreeTextEntry1] : 1. Renal transplant - Baseline creatinine about 1.5. DSA drawn in December 2020 was negative. Pt with PRA of 49%. Renal sonograms have been normal.  Creatinine has been slowly rising with increase in cyclo troughs although now better at 1.6.  May have CNI toxicity and currently changing to belatacept.   2. Immunosuppression - now on cyclosporine 50/75 mgs.  Cont MMF 500mgs BID and pred 2.5. Cannot increase MMF as she gets nauseous. Pt feels better on cyclosporine over tacro - less fatigue and accomplishing more.  Pt s/p 2 doses belatacept, will reduce cyclo to 50mgs BID.  Will further reduce in 1 month with plans to stop cyclosporine.   3. HTN - controlled at home. 4. hyperkalemia/ Prophylaxis - developed high methemoglobin levels on dapsone so stopped. She is on bactrim. 5. Wt gain - likely from prednisone. Now only on 2.5mgs. Pts A1c also high, may benefit from Ozempic.  Will not initiate until belatacept switch completed.  6. Post transplant erythrocytosis - resolved. Monitor 7. DVT - Pt following with Dr. Maza. Pt still on xarelto  Pt will f/u in 6 weeks during belatacept switch, then once completed, return to Dr. Hoff.

## 2024-03-28 NOTE — HISTORY OF PRESENT ILLNESS
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  \par  \par    [de-identified] : Reviewed labs. Creatinine 1.6.   Feeling better since cyclosporine reduced but still with many vague symptoms.  Received 2 doses of belatacept.  Feels tired after infusion but then gets better.  Has good days and bad days.

## 2024-03-28 NOTE — PHYSICAL EXAM
[General Appearance - Alert] : alert [General Appearance - In No Acute Distress] : in no acute distress [General Appearance - Well Nourished] : well nourished [Extraocular Movements] : extraocular movements were intact [Sclera] : the sclera and conjunctiva were normal [Outer Ear] : the ears and nose were normal in appearance [Nasal Cavity] : the nasal mucosa and septum were normal [Neck Appearance] : the appearance of the neck was normal [Respiration, Rhythm And Depth] : normal respiratory rhythm and effort [Exaggerated Use Of Accessory Muscles For Inspiration] : no accessory muscle use [Auscultation Breath Sounds / Voice Sounds] : lungs were clear to auscultation bilaterally [Heart Rate And Rhythm] : heart rate was normal and rhythm regular [Heart Sounds] : normal S1 and S2 [Heart Sounds Gallop] : no gallops [Murmurs] : no murmurs [Abdomen Soft] : soft [Bowel Sounds] : normal bowel sounds [Cervical Lymph Nodes Enlarged Posterior Bilaterally] : posterior cervical [Abdomen Tenderness] : non-tender [Cervical Lymph Nodes Enlarged Anterior Bilaterally] : anterior cervical [Supraclavicular Lymph Nodes Enlarged Bilaterally] : supraclavicular [Nail Clubbing] : no clubbing  or cyanosis of the fingernails [Abnormal Walk] : normal gait [Musculoskeletal - Swelling] : no joint swelling seen [Skin Color & Pigmentation] : normal skin color and pigmentation [Skin Turgor] : normal skin turgor [] : no rash [Oriented To Time, Place, And Person] : oriented to person, place, and time [Impaired Insight] : insight and judgment were intact [Affect] : the affect was normal

## 2024-04-08 ENCOUNTER — APPOINTMENT (OUTPATIENT)
Dept: RHEUMATOLOGY | Facility: CLINIC | Age: 64
End: 2024-04-08
Payer: MEDICARE

## 2024-04-08 VITALS
SYSTOLIC BLOOD PRESSURE: 151 MMHG | TEMPERATURE: 98.2 F | RESPIRATION RATE: 16 BRPM | HEART RATE: 82 BPM | OXYGEN SATURATION: 98 % | DIASTOLIC BLOOD PRESSURE: 85 MMHG

## 2024-04-08 VITALS — HEART RATE: 76 BPM | DIASTOLIC BLOOD PRESSURE: 86 MMHG | SYSTOLIC BLOOD PRESSURE: 142 MMHG | OXYGEN SATURATION: 97 %

## 2024-04-08 PROCEDURE — 96365 THER/PROPH/DIAG IV INF INIT: CPT

## 2024-04-08 RX ORDER — BELATACEPT 250 MG/1
250 INJECTION, POWDER, LYOPHILIZED, FOR SOLUTION INTRAVENOUS
Qty: 0 | Refills: 0 | Status: COMPLETED
Start: 2024-02-05

## 2024-04-08 NOTE — HISTORY OF PRESENT ILLNESS
[N/A] : N/A [Denies] : Denies [No] : No [Yes] : Yes [Declined] : Declined [24g] : 24g [Start Time: ___] : Medication Start Time: [unfilled] [End Time: ___] : Medication End Time: [unfilled] [Medication Name: ___] : Medication Name: [unfilled] [Total Amount Administered: ___] : Total Amount Administered: [unfilled] [IV discontinued. Intact. No signs or symptoms of IV complications noted. Time: ___] : IV discontinued. Intact. No signs or symptoms of IV complications noted. Time: [unfilled] [Patient  instructed to seek medical attention with signs and symptoms of adverse effects] : Patient  instructed to seek medical attention with signs and symptoms of adverse effects [Patient left unit in no acute distress] : Patient left unit in no acute distress [Medications administered as ordered and tolerated well.] : Medications administered as ordered and tolerated well. [Drug wasted: _____] : Drug wasted: [unfilled] [de-identified] : right medial vein  [de-identified] : Patient presents for week4 Nulojix infusion, doing well overall. Patient denies any recent infection, antibiotic use or hospitalizations. Patient reports having good urine output. Patient denies any difficulty urinating or swelling to the lower extremities. Patient continues to report having numbness to the face and weakness after taking cyclosporine. Plans to wean off cyclosporine as per patient. No other symptoms or concerns noted at this time. Patient tolerated infusion well.

## 2024-04-22 ENCOUNTER — APPOINTMENT (OUTPATIENT)
Dept: RHEUMATOLOGY | Facility: CLINIC | Age: 64
End: 2024-04-22
Payer: MEDICARE

## 2024-04-22 VITALS
HEART RATE: 85 BPM | DIASTOLIC BLOOD PRESSURE: 70 MMHG | RESPIRATION RATE: 16 BRPM | SYSTOLIC BLOOD PRESSURE: 137 MMHG | TEMPERATURE: 98.4 F | OXYGEN SATURATION: 95 %

## 2024-04-22 VITALS — HEART RATE: 75 BPM | DIASTOLIC BLOOD PRESSURE: 77 MMHG | SYSTOLIC BLOOD PRESSURE: 123 MMHG | OXYGEN SATURATION: 98 %

## 2024-04-22 PROCEDURE — 96365 THER/PROPH/DIAG IV INF INIT: CPT

## 2024-04-22 RX ORDER — BELATACEPT 250 MG/1
250 INJECTION, POWDER, LYOPHILIZED, FOR SOLUTION INTRAVENOUS
Qty: 0 | Refills: 0 | Status: COMPLETED
Start: 2024-02-05

## 2024-04-22 NOTE — HISTORY OF PRESENT ILLNESS
[4] : 4 [N/A] : N/A [Denies] : Denies [No] : No [Yes] : Yes [Declined] : Declined [de-identified] : lower back pain, chronic as per patient [24g] : 24g [Start Time: ___] : Medication Start Time: [unfilled] [End Time: ___] : Medication End Time: [unfilled] [Medication Name: ___] : Medication Name: [unfilled] [Total Amount Administered: ___] : Total Amount Administered: [unfilled] [IV discontinued. Intact. No signs or symptoms of IV complications noted. Time: ___] : IV discontinued. Intact. No signs or symptoms of IV complications noted. Time: [unfilled] [Patient  instructed to seek medical attention with signs and symptoms of adverse effects] : Patient  instructed to seek medical attention with signs and symptoms of adverse effects [Patient left unit in no acute distress] : Patient left unit in no acute distress [Medications administered as ordered and tolerated well.] : Medications administered as ordered and tolerated well. [Drug wasted: _____] : Drug wasted: [unfilled] [de-identified] : right medial cephalic [de-identified] : Patient presents for week4 Nulojix infusion, doing well overall. Patient denies any recent infection, antibiotic use or hospitalizations. Patient reports having good urine output. Patient denies any difficulty urinating or swelling to the lower extremities. Patient continues to report having numbness to the face and weakness after taking cyclosporine. Plans to wean off cyclosporine as per patient. No other symptoms or concerns noted at this time. Patient tolerated infusion well.

## 2024-04-24 ENCOUNTER — INPATIENT (INPATIENT)
Facility: HOSPITAL | Age: 64
LOS: 2 days | Discharge: HOME | DRG: 864 | End: 2024-04-27
Attending: HOSPITALIST | Admitting: STUDENT IN AN ORGANIZED HEALTH CARE EDUCATION/TRAINING PROGRAM
Payer: MEDICARE

## 2024-04-24 VITALS
OXYGEN SATURATION: 95 % | RESPIRATION RATE: 28 BRPM | HEART RATE: 126 BPM | DIASTOLIC BLOOD PRESSURE: 64 MMHG | TEMPERATURE: 99 F | SYSTOLIC BLOOD PRESSURE: 187 MMHG

## 2024-04-24 DIAGNOSIS — Z94.0 KIDNEY TRANSPLANT STATUS: Chronic | ICD-10-CM

## 2024-04-24 DIAGNOSIS — E78.5 HYPERLIPIDEMIA, UNSPECIFIED: ICD-10-CM

## 2024-04-24 DIAGNOSIS — Z86.718 PERSONAL HISTORY OF OTHER VENOUS THROMBOSIS AND EMBOLISM: ICD-10-CM

## 2024-04-24 DIAGNOSIS — R06.00 DYSPNEA, UNSPECIFIED: ICD-10-CM

## 2024-04-24 DIAGNOSIS — Z94.0 KIDNEY TRANSPLANT STATUS: ICD-10-CM

## 2024-04-24 DIAGNOSIS — R50.9 FEVER, UNSPECIFIED: ICD-10-CM

## 2024-04-24 DIAGNOSIS — Z29.9 ENCOUNTER FOR PROPHYLACTIC MEASURES, UNSPECIFIED: ICD-10-CM

## 2024-04-24 DIAGNOSIS — I10 ESSENTIAL (PRIMARY) HYPERTENSION: ICD-10-CM

## 2024-04-24 DIAGNOSIS — Z98.891 HISTORY OF UTERINE SCAR FROM PREVIOUS SURGERY: Chronic | ICD-10-CM

## 2024-04-24 DIAGNOSIS — A41.9 SEPSIS, UNSPECIFIED ORGANISM: ICD-10-CM

## 2024-04-24 LAB
ALBUMIN SERPL ELPH-MCNC: 4.1 G/DL — SIGNIFICANT CHANGE UP (ref 3.3–5)
ALP SERPL-CCNC: 84 U/L — SIGNIFICANT CHANGE UP (ref 40–120)
ALT FLD-CCNC: 17 U/L — SIGNIFICANT CHANGE UP (ref 10–45)
ANION GAP SERPL CALC-SCNC: 18 MMOL/L — HIGH (ref 5–17)
APPEARANCE UR: CLEAR — SIGNIFICANT CHANGE UP
APTT BLD: 38.4 SEC — HIGH (ref 24.5–35.6)
AST SERPL-CCNC: 24 U/L — SIGNIFICANT CHANGE UP (ref 10–40)
BASE EXCESS BLDV CALC-SCNC: -2.4 MMOL/L — LOW (ref -2–3)
BASOPHILS # BLD AUTO: 0 K/UL — SIGNIFICANT CHANGE UP (ref 0–0.2)
BASOPHILS NFR BLD AUTO: 0 % — SIGNIFICANT CHANGE UP (ref 0–2)
BILIRUB SERPL-MCNC: 0.6 MG/DL — SIGNIFICANT CHANGE UP (ref 0.2–1.2)
BILIRUB UR-MCNC: NEGATIVE — SIGNIFICANT CHANGE UP
BUN SERPL-MCNC: 37 MG/DL — HIGH (ref 7–23)
CA-I SERPL-SCNC: 1.13 MMOL/L — LOW (ref 1.15–1.33)
CALCIUM SERPL-MCNC: 9.4 MG/DL — SIGNIFICANT CHANGE UP (ref 8.4–10.5)
CHLORIDE BLDV-SCNC: 103 MMOL/L — SIGNIFICANT CHANGE UP (ref 96–108)
CHLORIDE SERPL-SCNC: 101 MMOL/L — SIGNIFICANT CHANGE UP (ref 96–108)
CO2 BLDV-SCNC: 25 MMOL/L — SIGNIFICANT CHANGE UP (ref 22–26)
CO2 SERPL-SCNC: 21 MMOL/L — LOW (ref 22–31)
COLOR SPEC: YELLOW — SIGNIFICANT CHANGE UP
CREAT SERPL-MCNC: 1.68 MG/DL — HIGH (ref 0.5–1.3)
DIFF PNL FLD: NEGATIVE — SIGNIFICANT CHANGE UP
EGFR: 34 ML/MIN/1.73M2 — LOW
EOSINOPHIL # BLD AUTO: 0 K/UL — SIGNIFICANT CHANGE UP (ref 0–0.5)
EOSINOPHIL NFR BLD AUTO: 0 % — SIGNIFICANT CHANGE UP (ref 0–6)
FLUAV AG NPH QL: SIGNIFICANT CHANGE UP
FLUBV AG NPH QL: SIGNIFICANT CHANGE UP
GAS PNL BLDV: 134 MMOL/L — LOW (ref 136–145)
GAS PNL BLDV: SIGNIFICANT CHANGE UP
GAS PNL BLDV: SIGNIFICANT CHANGE UP
GIANT PLATELETS BLD QL SMEAR: PRESENT — SIGNIFICANT CHANGE UP
GLUCOSE BLDC GLUCOMTR-MCNC: 100 MG/DL — HIGH (ref 70–99)
GLUCOSE BLDV-MCNC: 119 MG/DL — HIGH (ref 70–99)
GLUCOSE SERPL-MCNC: 116 MG/DL — HIGH (ref 70–99)
GLUCOSE UR QL: NEGATIVE MG/DL — SIGNIFICANT CHANGE UP
HCO3 BLDV-SCNC: 24 MMOL/L — SIGNIFICANT CHANGE UP (ref 22–29)
HCT VFR BLD CALC: 42.2 % — SIGNIFICANT CHANGE UP (ref 34.5–45)
HCT VFR BLDA CALC: 39 % — SIGNIFICANT CHANGE UP (ref 34.5–46.5)
HGB BLD CALC-MCNC: 13 G/DL — SIGNIFICANT CHANGE UP (ref 11.7–16.1)
HGB BLD-MCNC: 12.8 G/DL — SIGNIFICANT CHANGE UP (ref 11.5–15.5)
INR BLD: 2.15 RATIO — HIGH (ref 0.85–1.18)
KETONES UR-MCNC: NEGATIVE MG/DL — SIGNIFICANT CHANGE UP
LACTATE BLDV-MCNC: 2.9 MMOL/L — HIGH (ref 0.5–2)
LEUKOCYTE ESTERASE UR-ACNC: NEGATIVE — SIGNIFICANT CHANGE UP
LYMPHOCYTES # BLD AUTO: 0.21 K/UL — LOW (ref 1–3.3)
LYMPHOCYTES # BLD AUTO: 5 % — LOW (ref 13–44)
MANUAL SMEAR VERIFICATION: SIGNIFICANT CHANGE UP
MCHC RBC-ENTMCNC: 28.6 PG — SIGNIFICANT CHANGE UP (ref 27–34)
MCHC RBC-ENTMCNC: 30.3 GM/DL — LOW (ref 32–36)
MCV RBC AUTO: 94.4 FL — SIGNIFICANT CHANGE UP (ref 80–100)
MONOCYTES # BLD AUTO: 0.14 K/UL — SIGNIFICANT CHANGE UP (ref 0–0.9)
MONOCYTES NFR BLD AUTO: 3.3 % — SIGNIFICANT CHANGE UP (ref 2–14)
NEUTROPHILS # BLD AUTO: 3.84 K/UL — SIGNIFICANT CHANGE UP (ref 1.8–7.4)
NEUTROPHILS NFR BLD AUTO: 90 % — HIGH (ref 43–77)
NEUTS BAND # BLD: 1.7 % — SIGNIFICANT CHANGE UP (ref 0–8)
NITRITE UR-MCNC: NEGATIVE — SIGNIFICANT CHANGE UP
NT-PROBNP SERPL-SCNC: 240 PG/ML — SIGNIFICANT CHANGE UP (ref 0–300)
PCO2 BLDV: 45 MMHG — HIGH (ref 39–42)
PH BLDV: 7.33 — SIGNIFICANT CHANGE UP (ref 7.32–7.43)
PH UR: 6 — SIGNIFICANT CHANGE UP (ref 5–8)
PLAT MORPH BLD: NORMAL — SIGNIFICANT CHANGE UP
PLATELET # BLD AUTO: 179 K/UL — SIGNIFICANT CHANGE UP (ref 150–400)
PO2 BLDV: 46 MMHG — HIGH (ref 25–45)
POTASSIUM BLDV-SCNC: 6.6 MMOL/L — CRITICAL HIGH (ref 3.5–5.1)
POTASSIUM SERPL-MCNC: 5.1 MMOL/L — SIGNIFICANT CHANGE UP (ref 3.5–5.3)
POTASSIUM SERPL-SCNC: 5.1 MMOL/L — SIGNIFICANT CHANGE UP (ref 3.5–5.3)
PROT SERPL-MCNC: 6.9 G/DL — SIGNIFICANT CHANGE UP (ref 6–8.3)
PROT UR-MCNC: NEGATIVE MG/DL — SIGNIFICANT CHANGE UP
PROTHROM AB SERPL-ACNC: 23.1 SEC — HIGH (ref 9.5–13)
RAPID RVP RESULT: SIGNIFICANT CHANGE UP
RBC # BLD: 4.47 M/UL — SIGNIFICANT CHANGE UP (ref 3.8–5.2)
RBC # FLD: 13.3 % — SIGNIFICANT CHANGE UP (ref 10.3–14.5)
RBC BLD AUTO: NORMAL — SIGNIFICANT CHANGE UP
RSV RNA NPH QL NAA+NON-PROBE: SIGNIFICANT CHANGE UP
SAO2 % BLDV: 73 % — SIGNIFICANT CHANGE UP (ref 67–88)
SARS-COV-2 RNA SPEC QL NAA+PROBE: SIGNIFICANT CHANGE UP
SARS-COV-2 RNA SPEC QL NAA+PROBE: SIGNIFICANT CHANGE UP
SODIUM SERPL-SCNC: 140 MMOL/L — SIGNIFICANT CHANGE UP (ref 135–145)
SP GR SPEC: 1.01 — SIGNIFICANT CHANGE UP (ref 1–1.03)
TROPONIN T, HIGH SENSITIVITY RESULT: 25 NG/L — SIGNIFICANT CHANGE UP (ref 0–51)
UROBILINOGEN FLD QL: 0.2 MG/DL — SIGNIFICANT CHANGE UP (ref 0.2–1)
WBC # BLD: 4.19 K/UL — SIGNIFICANT CHANGE UP (ref 3.8–10.5)
WBC # FLD AUTO: 4.19 K/UL — SIGNIFICANT CHANGE UP (ref 3.8–10.5)

## 2024-04-24 PROCEDURE — 99223 1ST HOSP IP/OBS HIGH 75: CPT

## 2024-04-24 PROCEDURE — 78582 LUNG VENTILAT&PERFUS IMAGING: CPT | Mod: 26

## 2024-04-24 PROCEDURE — 93010 ELECTROCARDIOGRAM REPORT: CPT

## 2024-04-24 PROCEDURE — 99285 EMERGENCY DEPT VISIT HI MDM: CPT

## 2024-04-24 PROCEDURE — 71046 X-RAY EXAM CHEST 2 VIEWS: CPT | Mod: 26

## 2024-04-24 PROCEDURE — 99223 1ST HOSP IP/OBS HIGH 75: CPT | Mod: GC,AI

## 2024-04-24 PROCEDURE — 71250 CT THORAX DX C-: CPT | Mod: 26

## 2024-04-24 PROCEDURE — 71045 X-RAY EXAM CHEST 1 VIEW: CPT | Mod: 26,59

## 2024-04-24 PROCEDURE — 74176 CT ABD & PELVIS W/O CONTRAST: CPT | Mod: 26

## 2024-04-24 RX ORDER — SODIUM CHLORIDE 9 MG/ML
1000 INJECTION INTRAMUSCULAR; INTRAVENOUS; SUBCUTANEOUS ONCE
Refills: 0 | Status: COMPLETED | OUTPATIENT
Start: 2024-04-24 | End: 2024-04-24

## 2024-04-24 RX ORDER — CHOLECALCIFEROL (VITAMIN D3) 125 MCG
5000 CAPSULE ORAL AT BEDTIME
Refills: 0 | Status: DISCONTINUED | OUTPATIENT
Start: 2024-04-24 | End: 2024-04-27

## 2024-04-24 RX ORDER — PIPERACILLIN AND TAZOBACTAM 4; .5 G/20ML; G/20ML
3.38 INJECTION, POWDER, LYOPHILIZED, FOR SOLUTION INTRAVENOUS ONCE
Refills: 0 | Status: COMPLETED | OUTPATIENT
Start: 2024-04-24 | End: 2024-04-24

## 2024-04-24 RX ORDER — RIVAROXABAN 15 MG-20MG
10 KIT ORAL
Refills: 0 | Status: DISCONTINUED | OUTPATIENT
Start: 2024-04-24 | End: 2024-04-27

## 2024-04-24 RX ORDER — CYCLOSPORINE 100 MG/1
75 CAPSULE ORAL AT BEDTIME
Refills: 0 | Status: DISCONTINUED | OUTPATIENT
Start: 2024-04-24 | End: 2024-04-24

## 2024-04-24 RX ORDER — PIPERACILLIN AND TAZOBACTAM 4; .5 G/20ML; G/20ML
3.38 INJECTION, POWDER, LYOPHILIZED, FOR SOLUTION INTRAVENOUS ONCE
Refills: 0 | Status: COMPLETED | OUTPATIENT
Start: 2024-04-25 | End: 2024-04-25

## 2024-04-24 RX ORDER — IPRATROPIUM/ALBUTEROL SULFATE 18-103MCG
3 AEROSOL WITH ADAPTER (GRAM) INHALATION EVERY 6 HOURS
Refills: 0 | Status: DISCONTINUED | OUTPATIENT
Start: 2024-04-24 | End: 2024-04-27

## 2024-04-24 RX ORDER — ACETAMINOPHEN 500 MG
650 TABLET ORAL ONCE
Refills: 0 | Status: COMPLETED | OUTPATIENT
Start: 2024-04-24 | End: 2024-04-24

## 2024-04-24 RX ORDER — ONDANSETRON 8 MG/1
4 TABLET, FILM COATED ORAL ONCE
Refills: 0 | Status: COMPLETED | OUTPATIENT
Start: 2024-04-24 | End: 2024-04-25

## 2024-04-24 RX ORDER — ATORVASTATIN CALCIUM 80 MG/1
20 TABLET, FILM COATED ORAL
Refills: 0 | Status: DISCONTINUED | OUTPATIENT
Start: 2024-04-24 | End: 2024-04-27

## 2024-04-24 RX ORDER — ACETAMINOPHEN 500 MG
1000 TABLET ORAL ONCE
Refills: 0 | Status: COMPLETED | OUTPATIENT
Start: 2024-04-24 | End: 2024-04-24

## 2024-04-24 RX ORDER — PIPERACILLIN AND TAZOBACTAM 4; .5 G/20ML; G/20ML
3.38 INJECTION, POWDER, LYOPHILIZED, FOR SOLUTION INTRAVENOUS EVERY 8 HOURS
Refills: 0 | Status: DISCONTINUED | OUTPATIENT
Start: 2024-04-24 | End: 2024-04-24

## 2024-04-24 RX ORDER — ONDANSETRON 8 MG/1
4 TABLET, FILM COATED ORAL ONCE
Refills: 0 | Status: COMPLETED | OUTPATIENT
Start: 2024-04-24 | End: 2024-04-24

## 2024-04-24 RX ORDER — CYCLOSPORINE 100 MG/1
50 CAPSULE ORAL AT BEDTIME
Refills: 0 | Status: DISCONTINUED | OUTPATIENT
Start: 2024-04-24 | End: 2024-04-27

## 2024-04-24 RX ORDER — ONDANSETRON 8 MG/1
4 TABLET, FILM COATED ORAL ONCE
Refills: 0 | Status: DISCONTINUED | OUTPATIENT
Start: 2024-04-24 | End: 2024-04-24

## 2024-04-24 RX ORDER — CYCLOSPORINE 100 MG/1
50 CAPSULE ORAL EVERY 12 HOURS
Refills: 0 | Status: DISCONTINUED | OUTPATIENT
Start: 2024-04-24 | End: 2024-04-24

## 2024-04-24 RX ORDER — PIPERACILLIN AND TAZOBACTAM 4; .5 G/20ML; G/20ML
3.38 INJECTION, POWDER, LYOPHILIZED, FOR SOLUTION INTRAVENOUS EVERY 8 HOURS
Refills: 0 | Status: DISCONTINUED | OUTPATIENT
Start: 2024-04-25 | End: 2024-04-26

## 2024-04-24 RX ORDER — HYDROXYZINE HCL 10 MG
25 TABLET ORAL ONCE
Refills: 0 | Status: COMPLETED | OUTPATIENT
Start: 2024-04-24 | End: 2024-04-24

## 2024-04-24 RX ORDER — SODIUM CHLORIDE 9 MG/ML
1000 INJECTION, SOLUTION INTRAVENOUS ONCE
Refills: 0 | Status: COMPLETED | OUTPATIENT
Start: 2024-04-24 | End: 2024-04-24

## 2024-04-24 RX ORDER — CYCLOSPORINE 100 MG/1
50 CAPSULE ORAL
Refills: 0 | Status: DISCONTINUED | OUTPATIENT
Start: 2024-04-24 | End: 2024-04-25

## 2024-04-24 RX ORDER — FAMOTIDINE 10 MG/ML
20 INJECTION INTRAVENOUS
Refills: 0 | Status: DISCONTINUED | OUTPATIENT
Start: 2024-04-24 | End: 2024-04-26

## 2024-04-24 RX ADMIN — RIVAROXABAN 10 MILLIGRAM(S): KIT at 18:38

## 2024-04-24 RX ADMIN — Medication 400 MILLIGRAM(S): at 15:27

## 2024-04-24 RX ADMIN — CYCLOSPORINE 50 MILLIGRAM(S): 100 CAPSULE ORAL at 21:25

## 2024-04-24 RX ADMIN — SODIUM CHLORIDE 1000 MILLILITER(S): 9 INJECTION INTRAMUSCULAR; INTRAVENOUS; SUBCUTANEOUS at 03:04

## 2024-04-24 RX ADMIN — Medication 5000 UNIT(S): at 21:25

## 2024-04-24 RX ADMIN — Medication 1000 MILLIGRAM(S): at 17:25

## 2024-04-24 RX ADMIN — Medication 25 MILLIGRAM(S): at 15:33

## 2024-04-24 RX ADMIN — PIPERACILLIN AND TAZOBACTAM 200 GRAM(S): 4; .5 INJECTION, POWDER, LYOPHILIZED, FOR SOLUTION INTRAVENOUS at 15:45

## 2024-04-24 RX ADMIN — SODIUM CHLORIDE 1000 MILLILITER(S): 9 INJECTION, SOLUTION INTRAVENOUS at 16:11

## 2024-04-24 RX ADMIN — Medication 2.5 MILLIGRAM(S): at 18:38

## 2024-04-24 RX ADMIN — PIPERACILLIN AND TAZOBACTAM 200 GRAM(S): 4; .5 INJECTION, POWDER, LYOPHILIZED, FOR SOLUTION INTRAVENOUS at 03:04

## 2024-04-24 RX ADMIN — Medication 1 TABLET(S): at 18:38

## 2024-04-24 RX ADMIN — PIPERACILLIN AND TAZOBACTAM 25 GRAM(S): 4; .5 INJECTION, POWDER, LYOPHILIZED, FOR SOLUTION INTRAVENOUS at 20:00

## 2024-04-24 RX ADMIN — ATORVASTATIN CALCIUM 20 MILLIGRAM(S): 80 TABLET, FILM COATED ORAL at 21:25

## 2024-04-24 RX ADMIN — Medication 400 MILLIGRAM(S): at 03:06

## 2024-04-24 RX ADMIN — FAMOTIDINE 20 MILLIGRAM(S): 10 INJECTION INTRAVENOUS at 18:38

## 2024-04-24 RX ADMIN — Medication 3 MILLILITER(S): at 15:27

## 2024-04-24 RX ADMIN — Medication 1000 MILLIGRAM(S): at 04:45

## 2024-04-24 NOTE — PATIENT PROFILE ADULT - FALL HARM RISK - RISK INTERVENTIONS
Assistance OOB with selected safe patient handling equipment/Assistance with ambulation/Communicate Fall Risk and Risk Factors to all staff, patient, and family/Monitor gait and stability/Reinforce activity limits and safety measures with patient and family/Visual Cue: Yellow wristband/Bed in lowest position, wheels locked, appropriate side rails in place/Call bell, personal items and telephone in reach/Instruct patient to call for assistance before getting out of bed or chair/Non-slip footwear when patient is out of bed/[place] to call system/Physically safe environment - no spills, clutter or unnecessary equipment/Purposeful Proactive Rounding/Room/bathroom lighting operational, light cord in reach

## 2024-04-24 NOTE — H&P ADULT - NSHPREVIEWOFSYSTEMS_GEN_ALL_CORE
CONSTITUTIONAL: +chills, fatigue, weight gain  EYES: No double vision, blurry vision  ENT: No ear pain, nasal congestion, runny nose, sore throat  CV: No chest pain, palpitations  PULM: No cough, +shortness of breath  GI: No abdominal pain, nausea, vomiting, constipation. +Diarrhea  : No dysuria, polyuria, hematuria  SKIN: No rashes, swelling  MSK: No muscle aches  NEURO: No headache, paresthesias  PSYCHIATRIC: Denies suicidal, homicidal ideations. No auditory, visual, tactile hallucinations. +anxiety

## 2024-04-24 NOTE — ED PROVIDER NOTE - PHYSICAL EXAMINATION
Vitals: I have reviewed the patients vital signs  General: appears short of breath   HEENT: Atraumatic, normocephalic, airway patent  Eyes: EOMI, tracking appropriately  Neck: no tracheal deviation, no JVD  Chest/Lungs: clear lung sounds bilaterally, tachypnea  Heart: skin and extremities well perfused, tachycardia   Abdomen: soft, nontender and nondistended   Neuro: A+Ox3, ambulating without difficulty, CN grossly intact  MSK: strength at baseline in all extremities, no muscle wasting or atrophy  Skin: no cyanosis, no jaundice, no new emergent lesions

## 2024-04-24 NOTE — CONSULT NOTE ADULT - ASSESSMENT
1. Renal transplant - Baseline creatinine about 1.5. DSA drawn in December 2020 was negative. Pt with PRA of 49%. Renal sonograms have been normal. Creatinine has been slowly rising with increase in cyclo troughs although now better at 1.6. May have CNI toxicity and currently changing to belatacept.   2. Immunosuppression - now on cyclosporine 50/75 mgs. Cont MMF 500mgs BID and pred 2.5. Cannot increase MMF as she gets nauseous. Pt feels better on cyclosporine over tacro - less fatigue and accomplishing more. Pt s/p 2 doses belatacept, will reduce cyclo to 50mgs BID. Will further reduce in 1 month with plans to stop cyclosporine.   3. HTN - controlled at home.  4. hyperkalemia/ Prophylaxis - developed high methemoglobin levels on dapsone so stopped. She is on bactrim.  5. Wt gain - likely from prednisone. Now only on 2.5mgs. Pts A1c also high, may benefit from Ozempic. Will not initiate until belatacept switch completed.   6. Post transplant erythrocytosis - resolved. Monitor  7. DVT - Pt following with Dr. Maza. Pt still on xarelto    Pt will f/u in 6 weeks during belatacept switch, then once completed, return to Dr. Hoff.  63F with hx CKD stage 5 from PCKD now s/p LURT via kidney paired exchange on 11/7/17 presents with chills, shortness of breath that suddenly came on one day prior to admission. Transplant nephrology service consulted for renal graft / immunosuppressive med management.    1. Chills/SOB/hx or COPD - CT chest/A/P non-con pending, Transplant kidney US pending, BCx/UCx pending. Agree with nebs and broad spec abx. Transplant ID following.  2. Renal transplant - Baseline creatinine about 1.5. DSA drawn in December 2020 was negative. Pt with PRA of 49%. Renal sonograms have been normal previously, currently pending. Creatinine has been slowly rising with increase in cyclo troughs although now better at 1.6. May have CNI toxicity and currently changing to belatacept, third infusion on 4/22. Next infusion will be May 7th.  3. Immunosuppression - Dr. Delgado reduced cyclosporine to 50/50 mg in March with plan to further reduce in 1 month to stop cyclosporine and switch to Leni. Continue Pred 2.5mg. Hold MMF 500mg BID iso possible infection. Of note, higher dose MMF had made her nauseous in the past.   4. HTN - controlled at home.  5. Hyperkalemia/ Prophylaxis - K 6.6 on VBG, but 5.1 in serum. In the past, developed high methemoglobin levels on dapsone so stopped. She is on bactrim.  6. DVT - Pt following with Dr. Maza. Pt still on xarelto 63F with hx CKD stage 5 from PCKD now s/p LURT via kidney paired exchange on 11/7/17 presents with chills, shortness of breath that suddenly came on one day prior to admission. Transplant nephrology service consulted for renal graft / immunosuppressive med management.    1. Chills/SOB/hx or COPD - CT chest/A/P non-con pending, Transplant kidney US pending, BCx/UCx pending. Agree with nebs and broad spec abx. Transplant ID following.    2. Renal transplant - Baseline creatinine about 1.5. DSA drawn in December 2020 was negative. Pt with PRA of 49%. Renal sonograms have been normal previously, currently pending. Creatinine has been slowly rising with increase in cyclo troughs although now better at 1.6. May have CNI toxicity and currently changing to belatacept, third infusion on 4/22. Next infusion will be May 7th.    3. Immunosuppression - Dr. Delgado reduced cyclosporine to 50/50 mg in March with plan to further reduce in 1 month to stop cyclosporine and switch to Leni. Check cyclosporine level tomorrow AM. Continue Pred 2.5mg. Hold MMF 500mg BID iso possible infection. Of note, higher dose MMF had made her nauseous in the past.     4. HTN - controlled at home.    5. Hyperkalemia/ Prophylaxis - K 6.6 on VBG, but 5.1 in serum. In the past, developed high methemoglobin levels on dapsone so stopped. She is on bactrim.    6. DVT - Pt still on xarelto 63F with hx CKD stage 5 from PCKD now s/p LURT via kidney paired exchange on 11/7/17 presents with chills, shortness of breath that suddenly came on one day prior to admission. Transplant nephrology service consulted for renal graft / immunosuppressive med management.    1. Chills/SOB/hx or COPD - CT chest/A/P non-con pending, Transplant kidney US pending, BCx/UCx pending. V/Q scan low probability of PE. Pt on Xarelto. Agree with nebs and broad spec abx. Transplant ID following.    2. Renal transplant - Baseline creatinine about 1.5. DSA drawn in December 2020 was negative. Pt with PRA of 49%. Renal sonograms have been normal previously, currently pending. Creatinine has been slowly rising with increase in cyclo troughs although now better at 1.6. May have CNI toxicity and currently changing to belatacept, third infusion on 4/22. Next infusion will be May 7th.    3. Immunosuppression - Dr. Delgado reduced cyclosporine to 50/50 mg in March with plan to further reduce in 1 month to stop cyclosporine and switch to Leni. Check cyclosporine level tomorrow AM. Continue Pred 2.5mg. Hold MMF 500mg BID iso possible infection. Of note, higher dose MMF had made her nauseous in the past.     4. HTN - controlled at home.    5. Hyperkalemia/ Prophylaxis - K 6.6 on VBG, but 5.1 in serum. In the past, developed high methemoglobin levels on dapsone so stopped. She is on bactrim.    6. DVT - Pt still on xarelto

## 2024-04-24 NOTE — H&P ADULT - ASSESSMENT
Ms. Bartholomew is a 64 YO woman with PMH of R-sided renal transplant iso CKD5 2/2 AD PCKD, HTN HLD, DVT on Xarelto, anxiety/depression, emphysema, cerebral aneurysm, arthritis, who presents for chills, shortness of breath that suddenly came on one day prior to admission. Vitals concerning for sepsis with temp 101.2 and tachycardia. CXR, V/Q scan, RVP negative.

## 2024-04-24 NOTE — H&P ADULT - ATTENDING COMMENTS
The patient is a 63f with h/o R-sided renal transplant in 2017 due to CKD5 with AD PCKD, HTN, DVT on Xarelto, anxiety/depression, emphysema, cerebral aneurysm, arthritis, who presents for chills, shortness of breath for a day. No cough, urinary s/s, recent travel sick contact. Had some loose stool but no N/V or abdominal pan. Received IV Belatacept infusion for Transplant 2 days ago.    In ED O2 sat 89% RA, Temp 101.2F, CXR- basal atelectasis, RVP neg, UA neg, WBC wnl, VQ- low probability, BP is stable    1. Fever with acute hypoxic respiratory failure, p[possible sepsis, r/o PNA  2. Renal transplant recipient with CKD 3  3. COPD  4. DVT on Xarelto    - Feels chills since she received IV infusion Belatacept, VSS, Tmax 101.2F, O2 sat improved with O2, nebs, IV hydration  - reviewed labs- WBC wnl, RVP neg. CXR- basal atelectasis.   - c/w IV Zosyn, f/u blood c/s, will ordered CT chest- noncon, TIA, BL virals serologies, GI pcr and CMV  - called for Transplant ID and Transplant renal consult- hold MMF, c/w cyclosporin and prednisone. Cyclosporin level. Scr 1.6 at baseline  - AC The patient is a 63f with h/o R-sided renal transplant in 2017 due to CKD5 with AD PCKD, HTN, DVT on Xarelto, anxiety/depression, emphysema, cerebral aneurysm, arthritis, who presents for chills, shortness of breath for a day. No cough, urinary s/s, recent travel sick contact. Had some loose stool but no N/V or abdominal pan. Received IV Belatacept infusion for Transplant 2 days ago.    In ED O2 sat 89% RA, Temp 101.2F, CXR- basal atelectasis, RVP neg, UA neg, WBC wnl, VQ- low probability, BP is stable    1. Fever with acute hypoxic respiratory failure, possible sepsis, r/o PNA  2. Renal transplant recipient with CKD 3  3. COPD  4. DVT on Xarelto    - Feels chills since she received IV infusion Belatacept, VSS, Tmax 101.2F, O2 sat improved with O2, nebs, IV hydration  - reviewed labs- WBC wnl, RVP neg. CXR- basal atelectasis.   - c/w IV Zosyn, f/u blood c/s, will ordered CT chest- noncon, TIA, BL virals serologies, GI pcr and CMV  - called for Transplant ID and Transplant renal consult- hold MMF, c/w cyclosporin and prednisone. Cyclosporin level. Scr 1.6 at baseline  - AC

## 2024-04-24 NOTE — ED PROVIDER NOTE - OBJECTIVE STATEMENT
Patient is a 63 year-old-female with history of renal transplant, HTn, HLD and DVT on Xarelto presents with difficulty breathing. Reports that she was feeling okay ar around 7pm, however she suddenly developed difficult breathing and called EMS. Denies fever, chills, vomiting, chest pain, abdominal pain.

## 2024-04-24 NOTE — H&P ADULT - PROBLEM SELECTOR PLAN 2
Pneumonia vs. emphysema vs. anxiety vs. deconditioning  - CT chest/infectious work up as above  - Duonebs q6 PRN wheezing  - PT evaluation

## 2024-04-24 NOTE — H&P ADULT - PROBLEM SELECTOR PLAN 1
Vitals concerning for sepsis with temp 101.2 and tachycardia. CXR, V/Q scan, RVP negative.   - Infectious workup: blood cultures x2, urine culture sent. Follow up MRSA swab, urine legionella, urine strep  - Antibiotics: continue zosyn  - Resuscitation: received 1L NS in the ED, does not appear volume down  - Obtain CT chest w/o contrast  - Continue bactrim ppx

## 2024-04-24 NOTE — ED PROVIDER NOTE - ATTENDING CONTRIBUTION TO CARE
Patient with history of kidney transplant on immune modulating drugs, here with shortness of breath and chills.   patient has a history of DVT on Eliquis. On exam patient is very anxious, tachycardic, tachypneic, but with clear breath sounds, no peripheral edema.  Patient initially afebrile but subsequently spiked a fever while in the ED.  Patient will get an infectious workup including antibiotics, as well as a VQ scan due to tachycardia and shortness of breath history of DVT and borderline GFR.  she will likely require admission for follow-up of cultures, IV antibiotics, treatment of her suspected infection.

## 2024-04-24 NOTE — PATIENT PROFILE ADULT - CENTRAL VENOUS CATHETER/PICC LINE
Subjective  [place] Spore, 79 y.o. male presents today with:  Chief Complaint   Patient presents with    Other     Ear flushing       HPI  Presents to [facility] for cerumen impaction   Muffled hearing. Seeing Audiologist   Congested ears   Has had ears irrigated prior   Denies otalgia   Denies feeling dizzy/lightheaded with ear concern  Denies fever or chills   Denies nausea   Eating and drinking   Sleep unchanged               Past Medical History:   Diagnosis Date    Allergic rhinitis     Anxiety     Cancer ([address])     basal cell carcinoma    Hyperlipidemia       Past Surgical History:   Procedure Laterality Date    COLONOSCOPY      COLONOSCOPY N/A 10/20/2022    COLONOSCOPY WITH POLYPECTOMY performed by Pamela Patel MD at [address]      left side of nose      Family History   Problem Relation Age of Onset    Colon Cancer Other     Cancer Other     Heart Disease Other            Review of Systems   Constitutional:  Negative for activity change, appetite change, chills, diaphoresis, fatigue and fever. HENT:  Positive for hearing loss. Negative for ear discharge, rhinorrhea, sore throat and trouble swallowing. Ear pain: congested/full feeling. Respiratory:  Negative for cough. Gastrointestinal:  Negative for nausea. Musculoskeletal:  Negative for neck stiffness. Neurological:  Negative for dizziness, light-headedness and headaches. Psychiatric/Behavioral:  Negative for sleep disturbance. Objective  Vitals:    07/10/23 1103   BP: 130/70   Pulse: 62   SpO2: 98%   Weight: 170 lb (77.1 kg)   Height: 5' 10\" (1.778 m)     BP Readings from Last 3 Encounters:   07/10/23 130/70   06/01/23 130/82   05/04/23 (!) 160/80     Wt Readings from Last 3 Encounters:   07/10/23 170 lb (77.1 kg)   06/01/23 171 lb (77.6 kg)   05/04/23 173 lb (78.5 kg)             Physical Exam  Vitals reviewed. Constitutional:       Appearance: Normal appearance.    HENT:      Right Ear: External ear no

## 2024-04-24 NOTE — H&P ADULT - PROBLEM SELECTOR PLAN 3
Home medications: cyclosporine 50/50,  BID, nulojox 250 q2 weeks, bactrim 400-80  - Transplant nephrology Home medications: cyclosporine 50/50,  BID, nulojox 250 q2 weeks, bactrim 400-80  - Transplant nephrology consult  - Will restart, hold MMF iso possible infection

## 2024-04-24 NOTE — H&P ADULT - NSICDXPASTMEDICALHX_GEN_ALL_CORE_FT
PAST MEDICAL HISTORY:  Cerebrovascular aneurysm     DVT (deep venous thrombosis)     HLD (hyperlipidemia)     HTN (hypertension)     Mild emphysema     Polycystic kidney disease

## 2024-04-24 NOTE — H&P ADULT - NSHPLABSRESULTS_GEN_ALL_CORE
LABS: When present labs, imaging, and ECG were personally reviewed                          12.8   4.19  )-----------( 179      ( 2024 00:57 )             42.2           140  |  101  |  37<H>  ----------------------------<  116<H>  5.1   |  21<L>  |  1.68<H>    Ca    9.4      2024 00:57    TPro  6.9  /  Alb  4.1  /  TBili  0.6  /  DBili  x   /  AST  24  /  ALT  17  /  AlkPhos  84  24       LIVER FUNCTIONS - ( 2024 00:57 )  Alb: 4.1 g/dL / Pro: 6.9 g/dL / ALK PHOS: 84 U/L / ALT: 17 U/L / AST: 24 U/L / GGT: x                    Urinalysis Basic - ( 2024 01:41 )    Color: Yellow / Appearance: Clear / S.013 / pH: x  Gluc: x / Ketone: Negative mg/dL  / Bili: Negative / Urobili: 0.2 mg/dL   Blood: x / Protein: Negative mg/dL / Nitrite: Negative   Leuk Esterase: Negative / RBC: x / WBC x   Sq Epi: x / Non Sq Epi: x / Bacteria: x        PT/INR - ( 2024 00:57 )   PT: 23.1 sec;   INR: 2.15 ratio         PTT - ( 2024 00:57 )  PTT:38.4 sec    Lactate Trend   @ 04:27 Lactate:1.2             CAPILLARY BLOOD GLUCOSE                RADIOLOGY & ADDITIONAL TESTS:   < from: NM Pulmonary Ventilation/Perfusion Scan (24 @ 08:47) >    IMPRESSION:    Very low probability of pulmonary embolus.    < end of copied text >    < from: Xray Chest 2 Views PA/Lat (24 @ 01:17) >    IMPRESSION:    Left basilar atelectasis but otherwise clear lungs.        ******PRELIMINARY REPORT******      < end of copied text >

## 2024-04-24 NOTE — ED ADULT NURSE NOTE - NSFALLHARMRISKINTERV_ED_ALL_ED

## 2024-04-24 NOTE — CONSULT NOTE ADULT - SUBJECTIVE AND OBJECTIVE BOX
Patient is a 63y old  Female who presents with a chief complaint of Shortness of breath (2024 11:12)    HPI:    63 year old female PMH of R-sided renal transplant iso CKD5 2/2 AD PCKD, HTN HLD, DVT on Xarelto, anxiety/depression, emphysema, cerebral aneurysm, arthritis, who presents for chills, shortness of breath that suddenly came on one day prior to admission. She has been transitioning from cyclosporine to belatacept, last infusion on Monday. She noted chills and shaking yesterday, but did not note a temperature. She took two extra-strength Tylenol and tried to go to sleep, however developed labored breathing and called EMS. She does not use any inhalers. She notes chronic headaches. No chest pain, cough, congestion, abdominal pain, changes in urination, swelling or rashes. She has had intermittent diarrhea. She's gained about 50 lbs since her transplant, which she feels has made it difficult for her to breathe chronically. She also notes that she is anxious at baseline.  (2024 11:12)     COVID19/FLU/RSV PCR () Negative  UA () Negative Leukocyte Esterase  CXR () Left basilar patchy opacity, likely atelectasis but otherwise clear lungs.  V/Q Scan () Very low probability of pulmonary embolus.      prior hospital charts reviewed [  ]  primary team notes reviewed [  ]  other consultant notes reviewed [  ]    PAST MEDICAL & SURGICAL HISTORY:  Polycystic kidney disease      HTN (hypertension)      HLD (hyperlipidemia)      DVT (deep venous thrombosis)      Mild emphysema      Cerebrovascular aneurysm      Renal transplant recipient      S/P           Allergies  No Known Allergies    ANTIMICROBIALS (past 90 days)  MEDICATIONS  (STANDING):  piperacillin/tazobactam IVPB...   200 mL/Hr IV Intermittent (24 @ 03:04)      ANTIMICROBIALS:    trimethoprim   80 mG/sulfamethoxazole 400 mG 1 daily    OTHER MEDS: MEDICATIONS  (STANDING):  albuterol/ipratropium for Nebulization 3 every 6 hours PRN  atorvastatin 20 <User Schedule>  cycloSPORINE  (SandIMMUNE) 50 every 12 hours  famotidine    Tablet 20 two times a day  hydrochlorothiazide 12.5 daily  predniSONE   Tablet 2.5 daily  rivaroxaban 10 with dinner    SOCIAL HISTORY:   hx smoking  non-smoker    FAMILY HISTORY:  Family history of polycystic kidney disease (Mother, Sibling, Child)    Family history of CVA (Mother)    FH: COPD (chronic obstructive pulmonary disease) (Father)      REVIEW OF SYSTEMS  [  ] ROS unobtainable because:    [  ] All other systems negative except as noted below:	    Constitutional:  [ ] fever [ ] chills  [ ] weight loss  [ ] weakness  Skin:  [ ] rash [ ] phlebitis	  Eyes: [ ] icterus [ ] pain  [ ] discharge	  ENMT: [ ] sore throat  [ ] thrush [ ] ulcers [ ] exudates  Respiratory: [ ] dyspnea [ ] hemoptysis [ ] cough [ ] sputum	  Cardiovascular:  [ ] chest pain [ ] palpitations [ ] edema	  Gastrointestinal:  [ ] nausea [ ] vomiting [ ] diarrhea [ ] constipation [ ] pain	  Genitourinary:  [ ] dysuria [ ] frequency [ ] hematuria [ ] discharge [ ] flank pain  [ ] incontinence  Musculoskeletal:  [ ] myalgias [ ] arthralgias [ ] arthritis  [ ] back pain  Neurological:  [ ] headache [ ] seizures  [ ] confusion/altered mental status  Psychiatric:  [ ] anxiety [ ] depression	  Hematology/Lymphatics:  [ ] lymphadenopathy  Endocrine:  [ ] adrenal [ ] thyroid  Allergic/Immunologic:	 [ ] transplant [ ] seasonal    Vital Signs Last 24 Hrs  T(F): 98.5 (24 @ 06:50), Max: 101.2 (24 @ 02:48)  Vital Signs Last 24 Hrs  HR: 97 (24 @ 06:50) (97 - 126)  BP: 136/84 (24-24 @ 06:50) (136/84 - 187/64)  RR: 18 (24-24 @ 06:50)  SpO2: 96% (24 @ 06:50) (95% - 97%)  Wt(kg): --    PHYSICAL EXAMINATION:  General: Alert and Awake, NAD  HEENT: PERRL, EOMI, No subconjunctival hemorrhages, Oropharynx Clear, MMM  Neck: Supple, No SHEFALI  Cardiac: RRR, No M/R/G  Resp: CTAB, No Wh/Rh/Ra  Abdomen: NBS, NT/ND, No HSM, No rigidity or guarding  MSK: No LE edema. No stigmata of IE. No evidence of phlebitis. No evidence of synovitis.  : No serrano  Skin: No rashes or lesions. Skin is warm and dry to the touch.   Neuro: Alert and Awake. CN 2-12 Grossly intact. Moves all four extremities spontaneously.  Psych: Calm, Pleasant, Cooperative                          12.8   4.19  )-----------( 179      ( 2024 00:57 )             42.2         140  |  101  |  37<H>  ----------------------------<  116<H>  5.1   |  21<L>  |  1.68<H>    Ca    9.4      2024 00:57    TPro  6.9  /  Alb  4.1  /  TBili  0.6  /  DBili  x   /  AST  24  /  ALT  17  /  AlkPhos  84      Urinalysis Basic - ( 2024 01:41 )    Color: Yellow / Appearance: Clear / S.013 / pH: x  Gluc: x / Ketone: Negative mg/dL  / Bili: Negative / Urobili: 0.2 mg/dL   Blood: x / Protein: Negative mg/dL / Nitrite: Negative   Leuk Esterase: Negative / RBC: x / WBC x   Sq Epi: x / Non Sq Epi: x / Bacteria: x    MICROBIOLOGY:                RADIOLOGY:    <The imaging below has been reviewed and visualized by me independently. Findings as detailed in report below>     Patient is a 63y old  Female who presents with a chief complaint of Shortness of breath (2024 11:12)    HPI:    63 year old female PMH of R-sided renal transplant iso CKD5 2/2 AD PCKD, HTN HLD, DVT on Xarelto, anxiety/depression, emphysema, cerebral aneurysm, arthritis, who presents for chills, shortness of breath that suddenly came on one day prior to admission. She has been transitioning from cyclosporine to belatacept, last infusion on Monday. She noted chills and shaking yesterday, but did not note a temperature. She took two extra-strength Tylenol and tried to go to sleep, however developed labored breathing and called EMS. She does not use any inhalers. She notes chronic headaches. No chest pain, cough, congestion, abdominal pain, changes in urination, swelling or rashes. She has had intermittent diarrhea. She's gained about 50 lbs since her transplant, which she feels has made it difficult for her to breathe chronically. She also notes that she is anxious at baseline.  (2024 11:12)     COVID19/FLU/RSV PCR () Negative  UA () Negative Leukocyte Esterase  CXR () Left basilar patchy opacity, likely atelectasis but otherwise clear lungs.  V/Q Scan () Very low probability of pulmonary embolus.    prior hospital charts reviewed [  ]  primary team notes reviewed [ x ]  other consultant notes reviewed [ x ]    PAST MEDICAL & SURGICAL HISTORY:  Polycystic kidney disease      HTN (hypertension)      HLD (hyperlipidemia)      DVT (deep venous thrombosis)      Mild emphysema      Cerebrovascular aneurysm      Renal transplant recipient      S/P           Allergies  No Known Allergies    ANTIMICROBIALS (past 90 days)  MEDICATIONS  (STANDING):  piperacillin/tazobactam IVPB...   200 mL/Hr IV Intermittent (24 @ 03:04)      ANTIMICROBIALS:    trimethoprim   80 mG/sulfamethoxazole 400 mG 1 daily    OTHER MEDS: MEDICATIONS  (STANDING):  albuterol/ipratropium for Nebulization 3 every 6 hours PRN  atorvastatin 20 <User Schedule>  cycloSPORINE  (SandIMMUNE) 50 every 12 hours  famotidine    Tablet 20 two times a day  hydrochlorothiazide 12.5 daily  predniSONE   Tablet 2.5 daily  rivaroxaban 10 with dinner    SOCIAL HISTORY: no smoking or EtOH use.     FAMILY HISTORY:  Family history of polycystic kidney disease (Mother, Sibling, Child)    Family history of CVA (Mother)    FH: COPD (chronic obstructive pulmonary disease) (Father)      REVIEW OF SYSTEMS  [  ] ROS unobtainable because:    [ x ] All other systems negative except as noted below:	    Constitutional:  [x ] fever [x ] chills  [ ] weight loss  [ ] weakness  Skin:  [ ] rash [ ] phlebitis	  Eyes: [ ] icterus [ ] pain  [ ] discharge	  ENMT: [ ] sore throat  [ ] thrush [ ] ulcers [ ] exudates  Respiratory: [ ] dyspnea [ ] hemoptysis [ ] cough [ ] sputum	  Cardiovascular:  [ ] chest pain [ ] palpitations [ ] edema	  Gastrointestinal:  [ ] nausea [ ] vomiting [ x] diarrhea [ ] constipation [ ] pain	  Genitourinary:  [ ] dysuria [ ] frequency [ ] hematuria [ ] discharge [ ] flank pain  [ ] incontinence  Musculoskeletal:  [ ] myalgias [ ] arthralgias [ ] arthritis  [ ] back pain  Neurological:  [ ] headache [ ] seizures  [ ] confusion/altered mental status  Psychiatric:  [ ] anxiety [ ] depression	  Hematology/Lymphatics:  [ ] lymphadenopathy  Endocrine:  [ ] adrenal [ ] thyroid  Allergic/Immunologic:	 [ ] transplant [ ] seasonal    Vital Signs Last 24 Hrs  T(F): 98.5 (24 @ 06:50), Max: 101.2 (24 @ 02:48)  Vital Signs Last 24 Hrs  HR: 97 (24 @ 06:50) (97 - 126)  BP: 136/84 (-24 @ 06:50) (136/84 - 187/64)  RR: 18 (24 @ 06:50)  SpO2: 96% (24 @ 06:50) (95% - 97%)  Wt(kg): --    PHYSICAL EXAMINATION:  General: Alert and Awake, NAD  HEENT: PERRL, EOMI, No subconjunctival hemorrhages, Oropharynx Clear, MMM  Neck: Supple, No SHEFALI  Cardiac: RRR, No M/R/G  Resp: Faint expiratory wheezes anteriorly. No rales or rhonchi  Abdomen: NBS, NT/ND, No HSM, No rigidity or guarding  MSK: No LE edema. No stigmata of IE. No evidence of phlebitis. No evidence of synovitis.  : No serrano  Skin: No rashes or lesions. Skin is warm and dry to the touch.   Neuro: Alert and Awake. CN 2-12 Grossly intact. Moves all four extremities spontaneously.  Psych: Calm, Pleasant, Cooperative                          12.8   4.19  )-----------( 179      ( 2024 00:57 )             42.2         140  |  101  |  37<H>  ----------------------------<  116<H>  5.1   |  21<L>  |  1.68<H>    Ca    9.4      2024 00:57    TPro  6.9  /  Alb  4.1  /  TBili  0.6  /  DBili  x   /  AST  24  /  ALT  17  /  AlkPhos  84      Urinalysis Basic - ( 2024 01:41 )    Color: Yellow / Appearance: Clear / S.013 / pH: x  Gluc: x / Ketone: Negative mg/dL  / Bili: Negative / Urobili: 0.2 mg/dL   Blood: x / Protein: Negative mg/dL / Nitrite: Negative   Leuk Esterase: Negative / RBC: x / WBC x   Sq Epi: x / Non Sq Epi: x / Bacteria: x    RADIOLOGY:    <The imaging below has been reviewed and visualized by me independently. Findings as detailed in report below>    < from: NM Pulmonary Ventilation/Perfusion Scan (24 @ 08:47) >  IMPRESSION:    Very low probability of pulmonary embolus.    < end of copied text >

## 2024-04-24 NOTE — H&P ADULT - NSICDXFAMILYHX_GEN_ALL_CORE_FT
FAMILY HISTORY:  Father  Still living? Unknown  FH: COPD (chronic obstructive pulmonary disease), Age at diagnosis: Age Unknown    Mother  Still living? Unknown  Family history of CVA, Age at diagnosis: Age Unknown  Family history of polycystic kidney disease, Age at diagnosis: Age Unknown    Sibling  Still living? Unknown  Family history of polycystic kidney disease, Age at diagnosis: Age Unknown    Child  Still living? Unknown  Family history of polycystic kidney disease, Age at diagnosis: Age Unknown

## 2024-04-24 NOTE — ED ADULT NURSE NOTE - OBJECTIVE STATEMENT
63Y F AXO3 PMH of polycystic kidney disease, HTN, anxiety, DVT on AC and PSH of kidney transplant 2017 presented to the ED from EMS c/o difficulty breathing sudden onset at 21:00. Pt reports "shaking and feeling like having a fever and took 2 extra strength Tylenol prior to arrival." Upon arrival to the ED, the pt is well appearing, has bilateral even and labored chest rise, and ambulatory with minimal assistance. Upon assessment, pt has even and bilateral peripheral pulses, ROM, PERRLA, gross neuro intact, and soft, non-tender, non-distended abdomen. Pt denies chest pain, n/v/d, headaches, dizziness, numbness or tingling of extremities, urinary symptoms, sick contacts, recent travel, and black or bloody stools. IV access obtained, bed in lowest position, side rails up and call bell within reach. Comfort and safety provided.

## 2024-04-24 NOTE — CONSULT NOTE ADULT - SUBJECTIVE AND OBJECTIVE BOX
[facility] DIVISION OF KIDNEY DISEASES AND HYPERTENSION -- [phone]  -- INITIAL CONSULT NOTE  --------------------------------------------------------------------------------  HPI: 63F with hx CKD stage 5 from PCKD now s/p LURT via kidney paired exchange on 17 presents with chills, shortness of breath that suddenly came on one day prior to admission. Transplant nephrology service consulted for renal graft / immunosuppressive med management.    She last saw her transplant nephrologist, Dr. Shiva Delgado, on 3/28/24. During the visit, she reported feeling better since cyclosporine reduced to 50mg/75mg, but still with many vague symptoms per note. She had received 2 doses of belatacept by the time of that visit and received a third dose on 24 with plan to fully transition to Belatacept. Feels tired after infusion but then gets better. Baseline Cr ~1.5.    Patient was seen and examined at bedside. She suddenly became dyspneic and tachypneic, tachycardic, with SpO2 88-93% on RA. She also reported HA and had rigors, but was afebrile. She has chronic HA. She denied chest pain or abdominal pain or pain/swelling over graft site, but was nauseous and trying to vomit during exam. She was placed on supplemental O2. She did report mild COPD but not on inhalers at home and does have anxiety but not on meds. She also reported gaining about 50 lbs since her transplant, which she feels has made it difficult for her to breathe chronically. BP has been stable. Labs showed normal WBC count, Cr 1.68, pH 7.33/lactate 2.9. UA bland. Nasal swab for flu/covid/rsv negative. CXR showed left basilar opacity possibly atelectasis. V/Q scan low probability for PE. Received Zosyn.      PAST HISTORY  --------------------------------------------------------------------------------  PAST MEDICAL & SURGICAL HISTORY:  Polycystic kidney disease  HTN (hypertension)  HLD (hyperlipidemia)  DVT (deep venous thrombosis)  Mild emphysema  Cerebrovascular aneurysm  Renal transplant recipient  S/P       FAMILY HISTORY:  Family history of polycystic kidney disease (Mother, Sibling, Child)    Family history of CVA (Mother)    FH: COPD (chronic obstructive pulmonary disease) (Father)      PAST SOCIAL HISTORY:    ALLERGIES & MEDICATIONS  --------------------------------------------------------------------------------  Allergies    No Known Allergies      Standing Inpatient Medications  atorvastatin 20 milliGRAM(s) Oral <User Schedule>  cholecalciferol 5000 Unit(s) Oral at bedtime  cycloSPORINE  , modified (NEORAL) 50 milliGRAM(s) Oral daily  cycloSPORINE  , modified (NEORAL) 75 milliGRAM(s) Oral at bedtime  famotidine    Tablet 20 milliGRAM(s) Oral two times a day  hydrochlorothiazide 12.5 milliGRAM(s) Oral daily  lactated ringers Bolus 1000 milliLiter(s) IV Bolus once  ondansetron Injectable 4 milliGRAM(s) IV Push once  piperacillin/tazobactam IVPB.- 3.375 Gram(s) IV Intermittent once  predniSONE   Tablet 2.5 milliGRAM(s) Oral daily  rivaroxaban 10 milliGRAM(s) Oral with dinner  trimethoprim   80 mG/sulfamethoxazole 400 mG 1 Tablet(s) Oral daily    PRN Inpatient Medications  albuterol/ipratropium for Nebulization 3 milliLiter(s) Nebulizer every 6 hours PRN      REVIEW OF SYSTEMS  --------------------------------------------------------------------------------  Constitutional: +chills  HEENT: +HA  Respiratory: +dyspnea  Cardiovascular: No chest pain  Gastrointestinal: No abdominal pain, diarrhea, +nausea/vomiting  Genitourinary: No dysuria, hematuria, urgency  Extremities: No edema  Skin: No rashes    All other systems were reviewed and are negative, except as noted.    VITALS  --------------------------------------------------------------------------------  T(C): 36.5 (24 @ 14:35), Max: 38.4 (24 @ 02:48)  HR: 86 (24 @ 14:35) (86 - 126)  BP: 159/86 (24 @ 14:35) (136/84 - 187/64)  RR: 18 (24 @ 14:35) (17 - 28)  SpO2: 97% (24 @ 14:35) (95% - 97%)  Wt(kg): --  Height (cm): 162.6 (24 @ 14:35)  Weight (kg): 119 (24 @ 14:35)  BMI (kg/m2): 45 (24 @ 14:35)  BSA (m2): 2.2 (24 @ 14:35)    PHYSICAL EXAM:  General: Appeared anxious and in mild respiratory distress, tachypneic  Neuro: non-focal  HEENT: NC/AT, anicteric  Pulmonary: scattered wheezing  Cardiovascular/Chest: +S1S2, tachycardic  GI/Abdomen: soft, NT/ND, +bowel sounds        Transplant site: RLQ non-tender  Extremities: No edema  Skin: Warm and dry    LABS/STUDIES  --------------------------------------------------------------------------------              12.8   4.19  >-----------<  179      [24 00:57]              42.2     140  |  101  |  37  ----------------------------<  116      [24 @ 00:57]  5.1   |  21  |  1.68        Ca     9.4     [24 @ 00:57]    TPro  6.9  /  Alb  4.1  /  TBili  0.6  /  DBili  x   /  AST  24  /  ALT  17  /  AlkPhos  84  [24 @ 00:57]    PT/INR: PT 23.1 , INR 2.15       [24 @ 00:57]  PTT: 38.4       [24 @ 00:57]      Creatinine Trend:  SCr 1.68 [ 00:57]    Urinalysis - [24 @ 01:41]      Color Yellow / Appearance Clear / SG 1.013 / pH 6.0      Gluc Negative / Ketone Negative  / Bili Negative / Urobili 0.2       Blood Negative / Protein Negative / Leuk Est Negative / Nitrite Negative      RBC  / WBC  / Hyaline  / Gran  / Sq Epi  / Non Sq Epi  / Bacteria  [facility] DIVISION OF KIDNEY DISEASES AND HYPERTENSION -- [phone]  -- INITIAL CONSULT NOTE  --------------------------------------------------------------------------------  HPI: 63F with hx CKD stage 5 from PCKD now s/p LURT via kidney paired exchange on 17 presents with chills, shortness of breath that suddenly came on one day prior to admission. Transplant nephrology service consulted for renal graft / immunosuppressive med management.    She last saw her transplant nephrologist, Dr. Shiva Delgado, on 3/28/24. During the visit, she reported feeling better since cyclosporine reduced to 50mg/75mg, but still with many vague symptoms per note. She had received 2 doses of belatacept by the time of that visit and received a third dose on 24 with plan to fully transition to Belatacept. Feels tired after infusion but then gets better. Baseline Cr ~1.5.    Patient was seen and examined at bedside. She suddenly became dyspneic and tachypneic, tachycardic, with SpO2 88-93% on RA. She also reported HA and had rigors, but was afebrile. She has chronic HA. She denied chest pain or abdominal pain or pain/swelling over graft site, but was nauseous and trying to vomit during exam. She was placed on supplemental O2. She did report mild COPD but not on inhalers at home and does have anxiety but not on meds. She also reported gaining about 50 lbs since her transplant, which she feels has made it difficult for her to breathe chronically. BP has been stable. Labs showed normal WBC count, Cr 1.68, pH 7.33/lactate 2.9. UA bland. Nasal swab for flu/covid/rsv negative. CXR showed left basilar opacity possibly atelectasis. V/Q scan low probability for PE. Received Zosyn.      PAST HISTORY  --------------------------------------------------------------------------------  PAST MEDICAL & SURGICAL HISTORY:  Polycystic kidney disease  HTN (hypertension)  HLD (hyperlipidemia)  DVT (deep venous thrombosis)  Mild emphysema  Cerebrovascular aneurysm  Renal transplant recipient  S/P       FAMILY HISTORY:  Family history of polycystic kidney disease (Mother, Sibling, Child)    Family history of CVA (Mother)    FH: COPD (chronic obstructive pulmonary disease) (Father)      PAST SOCIAL HISTORY:    ALLERGIES & MEDICATIONS  --------------------------------------------------------------------------------  Allergies    No Known Allergies      Standing Inpatient Medications  atorvastatin 20 milliGRAM(s) Oral <User Schedule>  cholecalciferol 5000 Unit(s) Oral at bedtime  cycloSPORINE  , modified (NEORAL) 50 milliGRAM(s) Oral daily  cycloSPORINE  , modified (NEORAL) 50 milliGRAM(s) Oral at bedtime  famotidine    Tablet 20 milliGRAM(s) Oral two times a day  hydrochlorothiazide 12.5 milliGRAM(s) Oral daily  lactated ringers Bolus 1000 milliLiter(s) IV Bolus once  ondansetron Injectable 4 milliGRAM(s) IV Push once  piperacillin/tazobactam IVPB.- 3.375 Gram(s) IV Intermittent once  predniSONE   Tablet 2.5 milliGRAM(s) Oral daily  rivaroxaban 10 milliGRAM(s) Oral with dinner  trimethoprim   80 mG/sulfamethoxazole 400 mG 1 Tablet(s) Oral daily    PRN Inpatient Medications  albuterol/ipratropium for Nebulization 3 milliLiter(s) Nebulizer every 6 hours PRN      REVIEW OF SYSTEMS  --------------------------------------------------------------------------------  Constitutional: +chills  HEENT: +HA  Respiratory: +dyspnea  Cardiovascular: No chest pain  Gastrointestinal: No abdominal pain, diarrhea, +nausea/vomiting  Genitourinary: No dysuria, hematuria, urgency  Extremities: No edema  Skin: No rashes    All other systems were reviewed and are negative, except as noted.    VITALS  --------------------------------------------------------------------------------  T(C): 36.5 (24 @ 14:35), Max: 38.4 (24 @ 02:48)  HR: 86 (24 @ 14:35) (86 - 126)  BP: 159/86 (24 @ 14:35) (136/84 - 187/64)  RR: 18 (24 @ 14:35) (17 - 28)  SpO2: 97% (24 @ 14:35) (95% - 97%)  Wt(kg): --  Height (cm): 162.6 (24 @ 14:35)  Weight (kg): 119 (24 @ 14:35)  BMI (kg/m2): 45 (24 @ 14:35)  BSA (m2): 2.2 (24 @ 14:35)    PHYSICAL EXAM:  General: Appeared anxious and in mild respiratory distress, tachypneic  Neuro: non-focal  HEENT: NC/AT, anicteric  Pulmonary: scattered wheezing  Cardiovascular/Chest: +S1S2, tachycardic  GI/Abdomen: soft, NT/ND, +bowel sounds        Transplant site: RLQ non-tender  Extremities: No edema  Skin: Warm and dry    LABS/STUDIES  --------------------------------------------------------------------------------              12.8   4.19  >-----------<  179      [24 00:57]              42.2     140  |  101  |  37  ----------------------------<  116      [24 @ 00:57]  5.1   |  21  |  1.68        Ca     9.4     [24 @ 00:57]    TPro  6.9  /  Alb  4.1  /  TBili  0.6  /  DBili  x   /  AST  24  /  ALT  17  /  AlkPhos  84  [24 @ 00:57]    PT/INR: PT 23.1 , INR 2.15       [24 @ 00:57]  PTT: 38.4       [24 @ 00:57]      Creatinine Trend:  SCr 1.68 [ 00:57]    Urinalysis - [24 @ 01:41]      Color Yellow / Appearance Clear / SG 1.013 / pH 6.0      Gluc Negative / Ketone Negative  / Bili Negative / Urobili 0.2       Blood Negative / Protein Negative / Leuk Est Negative / Nitrite Negative      RBC  / WBC  / Hyaline  / Gran  / Sq Epi  / Non Sq Epi  / Bacteria

## 2024-04-24 NOTE — PATIENT PROFILE ADULT - FUNCTIONAL ASSESSMENT - BASIC MOBILITY 6.
3-calculated by average/Not able to assess (calculate score using [facility] averaging method)

## 2024-04-24 NOTE — H&P ADULT - ATTENDING SUPERVISION STATEMENT
Burow's Advancement Flap Text: The defect edges were debeveled with a #15 scalpel blade.  Given the location of the defect and the proximity to free margins a Burow's advancement flap was deemed most appropriate.  Using a sterile surgical marker, the appropriate advancement flap was drawn incorporating the defect and placing the expected incisions within the relaxed skin tension lines where possible.    The area thus outlined was incised deep to adipose tissue with a #15 scalpel blade.  The skin margins were undermined to an appropriate distance in all directions utilizing iris scissors. Resident

## 2024-04-24 NOTE — ED PROVIDER NOTE - CLINICAL SUMMARY MEDICAL DECISION MAKING FREE TEXT BOX
Patient is a 63 year-old-female with history of renal transplant, HTn, HLD and DVT on Xarelto presents with difficulty breathing. Reports that she was feeling okay ar around 7pm, however she suddenly developed difficult breathing and called EMS. Denies fever, chills, vomiting, chest pain, abdominal pain. On exam, patient appears tachypneic and short of breath with tachycardia on monitor. Found to have oral temp of 99.9F. Concerning for PNA vs PE. Low suspicion for ACS at this time but will obtain cardiac enzymes and ECG. Will obtain sepsis workup. will hold empiric antibiotics at this time.

## 2024-04-24 NOTE — CONSULT NOTE ADULT - ASSESSMENT
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     COVID19/FLU/RSV PCR (4/24) Negative  UA (4/24) Negative Leukocyte Esterase  CXR (4/24) Left basilar patchy opacity, likely atelectasis but otherwise clear lungs.  V/Q Scan (4/24) Very low probability of pulmonary embolus.    Possible respiratory or GI source for fever given symptoms  Pending CT Chest    #Fever, Diarrhea, Renal Transplant Recipient,   --Will order GI PCR, Serum CMV PCR, Serum Adenovirus PCR  --Agree with CT Chest noncontrast  --Continue Zosyn pending culture data and imaging  --Follow up on renal transplant US  --Continue to follow CBC with diff  --Continue to follow temperature curve  --Follow up on preliminary blood cultures    I will continue to follow. Please feel free to contact me with any further questions.    Sarbjit Guzman M.D.  [facility] Division of Infectious Disease  8AM-5PM Monday - Friday: Available on [employer] Teams  After Hours and Holidays (or if no response on [employer] Teams): Please contact the Infectious Diseases Office at [phone]    The above assessment and plan were discussed with medicine resident

## 2024-04-24 NOTE — ED ADULT NURSE REASSESSMENT NOTE - NS ED NURSE REASSESS COMMENT FT1
Pt is awake, alert, and speaking in full coherent sentences. VS stable. NAD noted. Pt is resting comfortably in stretcher, side rails up and bed in lowest position. Pt is admitted and awaiting bed.

## 2024-04-24 NOTE — H&P ADULT - HISTORY OF PRESENT ILLNESS
Ms. 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 She has been transitioning from cyclosporine to belatacept, last infusion on Monday. She noted chills and shaking yesterday, but did not note a temperature. She took two extra-strength Tylenol and tried to go to sleep, however developed labored breathing and called EMS. She does not use any inhalers. She notes chronic headaches. No chest pain, cough, congestion, abdominal pain, changes in urination, swelling or rashes. She has had intermittent diarrhea. She's gained about 50 lbs since her transplant, which she feels has made it difficult for her to breathe chronically. She also notes that she is anxious at baseline.

## 2024-04-24 NOTE — H&P ADULT - NSHPPHYSICALEXAM_GEN_ALL_CORE
GENERAL: Pleasant, sitting in bed in no acute distress  EYES: Conjunctiva noninjected or pale, sclera anicteric  HENT: NC/AT, moist mucous membranes  NECK: Supple, trachea midline  LUNG: Nonlabored respirations, +wheezes in GREER anteriorly  CV: RRR, Pulses- Radial: 2+ b/l  ABDOMEN: Nondistended, nontender  MSK: No visible deformities, tender extremities - per patient chronic  SKIN: No rashes, bruises  NEURO: AAOx4 (to person, place, time, event), no tremor  PSYCH: Normal mood and affect - somewhat anxious

## 2024-04-24 NOTE — ED PROVIDER NOTE - PROGRESS NOTE DETAILS
[place] PGY3   Patient found to be febrile here with 101.2F, will give broad antibiotic coverage. Vasile PGY3  Discussed with hospitalist and admit for further management.

## 2024-04-25 LAB
ANION GAP SERPL CALC-SCNC: 14 MMOL/L — SIGNIFICANT CHANGE UP (ref 5–17)
BUN SERPL-MCNC: 25 MG/DL — HIGH (ref 7–23)
CALCIUM SERPL-MCNC: 8.8 MG/DL — SIGNIFICANT CHANGE UP (ref 8.4–10.5)
CHLORIDE SERPL-SCNC: 103 MMOL/L — SIGNIFICANT CHANGE UP (ref 96–108)
CO2 SERPL-SCNC: 21 MMOL/L — LOW (ref 22–31)
CREAT SERPL-MCNC: 1.58 MG/DL — HIGH (ref 0.5–1.3)
EGFR: 36 ML/MIN/1.73M2 — LOW
GLUCOSE SERPL-MCNC: 125 MG/DL — HIGH (ref 70–99)
HCT VFR BLD CALC: 35.3 % — SIGNIFICANT CHANGE UP (ref 34.5–45)
HGB BLD-MCNC: 10.9 G/DL — LOW (ref 11.5–15.5)
MAGNESIUM SERPL-MCNC: 1.8 MG/DL — SIGNIFICANT CHANGE UP (ref 1.6–2.6)
MCHC RBC-ENTMCNC: 29 PG — SIGNIFICANT CHANGE UP (ref 27–34)
MCHC RBC-ENTMCNC: 30.9 GM/DL — LOW (ref 32–36)
MCV RBC AUTO: 93.9 FL — SIGNIFICANT CHANGE UP (ref 80–100)
NRBC # BLD: 0 /100 WBCS — SIGNIFICANT CHANGE UP (ref 0–0)
PHOSPHATE SERPL-MCNC: 3.6 MG/DL — SIGNIFICANT CHANGE UP (ref 2.5–4.5)
PLATELET # BLD AUTO: 147 K/UL — LOW (ref 150–400)
POTASSIUM SERPL-MCNC: 4 MMOL/L — SIGNIFICANT CHANGE UP (ref 3.5–5.3)
POTASSIUM SERPL-SCNC: 4 MMOL/L — SIGNIFICANT CHANGE UP (ref 3.5–5.3)
PROCALCITONIN SERPL-MCNC: 18.04 NG/ML — HIGH (ref 0.02–0.1)
RBC # BLD: 3.76 M/UL — LOW (ref 3.8–5.2)
RBC # FLD: 13.6 % — SIGNIFICANT CHANGE UP (ref 10.3–14.5)
SODIUM SERPL-SCNC: 138 MMOL/L — SIGNIFICANT CHANGE UP (ref 135–145)
WBC # BLD: 5.09 K/UL — SIGNIFICANT CHANGE UP (ref 3.8–10.5)
WBC # FLD AUTO: 5.09 K/UL — SIGNIFICANT CHANGE UP (ref 3.8–10.5)

## 2024-04-25 PROCEDURE — 99223 1ST HOSP IP/OBS HIGH 75: CPT | Mod: GC

## 2024-04-25 PROCEDURE — 99233 SBSQ HOSP IP/OBS HIGH 50: CPT | Mod: GC

## 2024-04-25 PROCEDURE — 99233 SBSQ HOSP IP/OBS HIGH 50: CPT

## 2024-04-25 PROCEDURE — 76776 US EXAM K TRANSPL W/DOPPLER: CPT | Mod: 26,RT

## 2024-04-25 RX ORDER — ACETAMINOPHEN 500 MG
650 TABLET ORAL ONCE
Refills: 0 | Status: COMPLETED | OUTPATIENT
Start: 2024-04-25 | End: 2024-04-25

## 2024-04-25 RX ORDER — CYCLOSPORINE 100 MG/1
50 CAPSULE ORAL
Refills: 0 | Status: DISCONTINUED | OUTPATIENT
Start: 2024-04-25 | End: 2024-04-27

## 2024-04-25 RX ORDER — VANCOMYCIN HCL 1 G
1000 VIAL (EA) INTRAVENOUS ONCE
Refills: 0 | Status: COMPLETED | OUTPATIENT
Start: 2024-04-25 | End: 2024-04-25

## 2024-04-25 RX ORDER — CHLORHEXIDINE GLUCONATE 213 G/1000ML
1 SOLUTION TOPICAL DAILY
Refills: 0 | Status: DISCONTINUED | OUTPATIENT
Start: 2024-04-25 | End: 2024-04-27

## 2024-04-25 RX ADMIN — PIPERACILLIN AND TAZOBACTAM 25 GRAM(S): 4; .5 INJECTION, POWDER, LYOPHILIZED, FOR SOLUTION INTRAVENOUS at 10:09

## 2024-04-25 RX ADMIN — Medication 250 MILLIGRAM(S): at 19:03

## 2024-04-25 RX ADMIN — FAMOTIDINE 20 MILLIGRAM(S): 10 INJECTION INTRAVENOUS at 05:55

## 2024-04-25 RX ADMIN — Medication 650 MILLIGRAM(S): at 13:06

## 2024-04-25 RX ADMIN — PIPERACILLIN AND TAZOBACTAM 25 GRAM(S): 4; .5 INJECTION, POWDER, LYOPHILIZED, FOR SOLUTION INTRAVENOUS at 13:02

## 2024-04-25 RX ADMIN — Medication 650 MILLIGRAM(S): at 13:36

## 2024-04-25 RX ADMIN — CYCLOSPORINE 50 MILLIGRAM(S): 100 CAPSULE ORAL at 10:09

## 2024-04-25 RX ADMIN — PIPERACILLIN AND TAZOBACTAM 25 GRAM(S): 4; .5 INJECTION, POWDER, LYOPHILIZED, FOR SOLUTION INTRAVENOUS at 21:36

## 2024-04-25 RX ADMIN — Medication 5000 UNIT(S): at 21:35

## 2024-04-25 RX ADMIN — RIVAROXABAN 10 MILLIGRAM(S): KIT at 21:35

## 2024-04-25 RX ADMIN — CYCLOSPORINE 50 MILLIGRAM(S): 100 CAPSULE ORAL at 21:36

## 2024-04-25 RX ADMIN — ONDANSETRON 4 MILLIGRAM(S): 8 TABLET, FILM COATED ORAL at 00:13

## 2024-04-25 RX ADMIN — Medication 2.5 MILLIGRAM(S): at 05:55

## 2024-04-25 RX ADMIN — Medication 650 MILLIGRAM(S): at 00:43

## 2024-04-25 RX ADMIN — Medication 1 TABLET(S): at 13:02

## 2024-04-25 RX ADMIN — Medication 650 MILLIGRAM(S): at 00:13

## 2024-04-25 RX ADMIN — FAMOTIDINE 20 MILLIGRAM(S): 10 INJECTION INTRAVENOUS at 21:35

## 2024-04-25 RX ADMIN — PIPERACILLIN AND TAZOBACTAM 25 GRAM(S): 4; .5 INJECTION, POWDER, LYOPHILIZED, FOR SOLUTION INTRAVENOUS at 02:18

## 2024-04-25 NOTE — PHYSICAL THERAPY INITIAL EVALUATION ADULT - PERTINENT HX OF CURRENT PROBLEM, REHAB EVAL
63 y.o. F PMH R-sided renal transplant iso CKD5 2/2 AD PCKD, HTN HLD, DVT on Xarelto, anxiety/depression, emphysema, cerebral aneurysm, arthritis, who presents for chills, shortness of breath that suddenly came on one day prior to admission. She has been transitioning from cyclosporine to belatacept, last infusion on Monday. She noted chills and shaking yesterday, but did not note a temperature. She took two extra-strength Tylenol and tried to go to sleep, however developed labored breathing and called EMS. She does not use any inhalers. She notes chronic headaches. No chest pain, cough, congestion, abdominal pain, changes in urination, swelling or rashes. She has had intermittent diarrhea. She's gained about 50 lbs since her transplant, which she feels has made it difficult for her to breathe chronically. She also notes that she is anxious at baseline.

## 2024-04-25 NOTE — PROGRESS NOTE ADULT - ASSESSMENT
Ms. 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 Vitals concerning for sepsis with temp 101.2 and tachycardia. CXR, V/Q scan, RVP negative.  Ms. Bartholomew is a 64 YO woman with PMH of R-sided renal transplant iso CKD5 2/2 AD PCKD, HTN HLD, DVT on Xarelto, anxiety/depression, emphysema, cerebral aneurysm, arthritis, who presents for chills, shortness of breath that suddenly came on one day prior to admission. Vitals concerning for sepsis with temp 101.2 and tachycardia. CXR, V/Q scan, RVP negative.

## 2024-04-25 NOTE — PHYSICAL THERAPY INITIAL EVALUATION ADULT - ADDITIONAL COMMENTS
Addended by: GIFTY WARE on: 10/28/2020 10:32 AM     Modules accepted: Orders     Pt resides alone in a pvt home, 4 steps to enter (+HR), one flight to negotiate inside. PTA pt was independent w/ all mobility & ADL's. Did not use an AD for ambulation.

## 2024-04-25 NOTE — PROGRESS NOTE ADULT - SUBJECTIVE AND OBJECTIVE BOX
Patient is a 63y old  Female who presents with a chief complaint of Shortness of breath (2024 15:55)      SUBJECTIVE / OVERNIGHT EVENTS:    MEDICATIONS  (STANDING):  atorvastatin 20 milliGRAM(s) Oral <User Schedule>  cholecalciferol 5000 Unit(s) Oral at bedtime  cycloSPORINE  , modified (NEORAL) 50 milliGRAM(s) Oral <User Schedule>  cycloSPORINE  , modified (NEORAL) 50 milliGRAM(s) Oral at bedtime  famotidine    Tablet 20 milliGRAM(s) Oral two times a day  hydrochlorothiazide 12.5 milliGRAM(s) Oral daily  piperacillin/tazobactam IVPB.- 3.375 Gram(s) IV Intermittent once  piperacillin/tazobactam IVPB.. 3.375 Gram(s) IV Intermittent every 8 hours  predniSONE   Tablet 2.5 milliGRAM(s) Oral daily  rivaroxaban 10 milliGRAM(s) Oral with dinner  trimethoprim   80 mG/sulfamethoxazole 400 mG 1 Tablet(s) Oral daily    MEDICATIONS  (PRN):  acetaminophen     Tablet .. 650 milliGRAM(s) Oral once PRN Mild Pain (1 - 3), Moderate Pain (4 - 6), Severe Pain (7 - 10)  albuterol/ipratropium for Nebulization 3 milliLiter(s) Nebulizer every 6 hours PRN Shortness of Breath and/or Wheezing      CAPILLARY BLOOD GLUCOSE      POCT Blood Glucose.: 100 mg/dL (2024 16:04)    I&O's Summary    2024 07:01  -  2024 07:00  --------------------------------------------------------  IN: 240 mL / OUT: 0 mL / NET: 240 mL        Vital Signs Last 24 Hrs  T(C): 36.9 (2024 04:35), Max: 38.4 (2024 16:03)  T(F): 98.5 (2024 04:35), Max: 101.2 (2024 16:03)  HR: 64 (2024 04:35) (64 - 125)  BP: 138/80 (2024 04:35) (122/96 - 159/86)  BP(mean): --  RR: 18 (2024 04:35) (17 - 18)  SpO2: 90% (2024 04:35) (90% - 97%)    Parameters below as of 2024 04:35  Patient On (Oxygen Delivery Method): room air        PHYSICAL EXAM:  GENERAL: NAD, well-developed, well-nourished  HEAD: Atraumatic, Normocephalic  EYES: EOMI, PERRLA, conjunctiva and sclera clear  NECK: Supple, No JVD  CHEST/LUNG: Clear to auscultation bilaterally; No wheezes or crackles  HEART: Normal S1/S2; Regular rate and rhythm; No murmurs, rubs, or gallops  ABDOMEN: Soft, Nontender, Nondistended; Bowel sounds present  EXTREMITIES: 2+ Peripheral Pulses; No clubbing, cyanosis, or edema  PSYCH: A&Ox3  NEUROLOGY: no focal neurologic deficit  SKIN: No rashes or lesions    LABS:                        10.9   5.09  )-----------( 147      ( 2024 07:18 )             35.3      04-24    140  |  101  |  37<H>  ----------------------------<  116<H>  5.1   |  21<L>  |  1.68<H>    Ca    9.4      2024 00:57    TPro  6.9  /  Alb  4.1  /  TBili  0.6  /  DBili  x   /  AST  24  /  ALT  17  /  AlkPhos  84  04-24    PT/INR - ( 2024 00:57 )   PT: 23.1 sec;   INR: 2.15 ratio         PTT - ( 2024 00:57 )  PTT:38.4 sec      Urinalysis Basic - ( 2024 01:41 )    Color: Yellow / Appearance: Clear / S.013 / pH: x  Gluc: x / Ketone: Negative mg/dL  / Bili: Negative / Urobili: 0.2 mg/dL   Blood: x / Protein: Negative mg/dL / Nitrite: Negative   Leuk Esterase: Negative / RBC: x / WBC x   Sq Epi: x / Non Sq Epi: x / Bacteria: x        RADIOLOGY & ADDITIONAL TESTS:    Imaging Personally Reviewed:    Consultant(s) Notes Reviewed:      Care Discussed with Consultants/Other Providers:   Patient is a 63y old  Female who presents with a chief complaint of Shortness of breath (2024 15:55)      SUBJECTIVE / OVERNIGHT EVENTS: No acute events overnight. Patient seen and examined at bedside, has continued nausea/vomiting. No further episodes of chills. No diarrhea, abdominal pain. Attributes nausea/vomiting to headaches, changes in medication, lack of food prior to pills. Does not feel zofran helped.     MEDICATIONS  (STANDING):  atorvastatin 20 milliGRAM(s) Oral <User Schedule>  cholecalciferol 5000 Unit(s) Oral at bedtime  cycloSPORINE  , modified (NEORAL) 50 milliGRAM(s) Oral <User Schedule>  cycloSPORINE  , modified (NEORAL) 50 milliGRAM(s) Oral at bedtime  famotidine    Tablet 20 milliGRAM(s) Oral two times a day  hydrochlorothiazide 12.5 milliGRAM(s) Oral daily  piperacillin/tazobactam IVPB.- 3.375 Gram(s) IV Intermittent once  piperacillin/tazobactam IVPB.. 3.375 Gram(s) IV Intermittent every 8 hours  predniSONE   Tablet 2.5 milliGRAM(s) Oral daily  rivaroxaban 10 milliGRAM(s) Oral with dinner  trimethoprim   80 mG/sulfamethoxazole 400 mG 1 Tablet(s) Oral daily    MEDICATIONS  (PRN):  acetaminophen     Tablet .. 650 milliGRAM(s) Oral once PRN Mild Pain (1 - 3), Moderate Pain (4 - 6), Severe Pain (7 - 10)  albuterol/ipratropium for Nebulization 3 milliLiter(s) Nebulizer every 6 hours PRN Shortness of Breath and/or Wheezing      CAPILLARY BLOOD GLUCOSE      POCT Blood Glucose.: 100 mg/dL (2024 16:04)    I&O's Summary    2024 07:01  -  2024 07:00  --------------------------------------------------------  IN: 240 mL / OUT: 0 mL / NET: 240 mL        Vital Signs Last 24 Hrs  T(C): 36.9 (2024 04:35), Max: 38.4 (2024 16:03)  T(F): 98.5 (2024 04:35), Max: 101.2 (2024 16:03)  HR: 64 (2024 04:35) (64 - 125)  BP: 138/80 (2024 04:35) (122/96 - 159/86)  BP(mean): --  RR: 18 (2024 04:35) (17 - 18)  SpO2: 90% (2024 04:35) (90% - 97%)    Parameters below as of 2024 04:35  Patient On (Oxygen Delivery Method): room air        PHYSICAL EXAM:  GENERAL: Pleasant, sitting in bed in no acute distress  EYES: Conjunctiva noninjected or pale, sclera anicteric  HENT: NC/AT, moist mucous membranes  NECK: Supple, trachea midline  LUNG: Nonlabored respirations  CV: RRR, Pulses- Radial: 2+ b/l  ABDOMEN: Nondistended, nontender, mills's sign negative  MSK: No visible deformities, tender extremities - per patient chronic  SKIN: No rashes, bruises  NEURO: AAOx4 (to person, place, time, event), no tremor  PSYCH: Normal mood and affect - somewhat anxious    LABS:                        10.9   5.09  )-----------( 147      ( 2024 07:18 )             35.3      04-24    140  |  101  |  37<H>  ----------------------------<  116<H>  5.1   |  21<L>  |  1.68<H>    Ca    9.4      2024 00:57    TPro  6.9  /  Alb  4.1  /  TBili  0.6  /  DBili  x   /  AST  24  /  ALT  17  /  AlkPhos  84  04-24    PT/INR - ( 2024 00:57 )   PT: 23.1 sec;   INR: 2.15 ratio         PTT - ( 2024 00:57 )  PTT:38.4 sec      Urinalysis Basic - ( 2024 01:41 )    Color: Yellow / Appearance: Clear / S.013 / pH: x  Gluc: x / Ketone: Negative mg/dL  / Bili: Negative / Urobili: 0.2 mg/dL   Blood: x / Protein: Negative mg/dL / Nitrite: Negative   Leuk Esterase: Negative / RBC: x / WBC x   Sq Epi: x / Non Sq Epi: x / Bacteria: x        RADIOLOGY & ADDITIONAL TESTS:    Imaging Personally Reviewed:    Consultant(s) Notes Reviewed:      Care Discussed with Consultants/Other Providers:

## 2024-04-25 NOTE — PROGRESS NOTE ADULT - PROBLEM SELECTOR PLAN 1
Vitals concerning for sepsis with temp 101.2 and tachycardia. CXR, V/Q scan, RVP negative.   - Infectious workup: blood cultures x2, urine culture sent. Follow up MRSA swab, urine legionella, urine strep  - Antibiotics: continue zosyn  - Resuscitation: received 1L NS in the ED, does not appear volume down  - Obtain CT chest w/o contrast  - Continue bactrim ppx Vitals concerning for sepsis with temp 101.2 and tachycardia. CXR, V/Q scan, RVP negative.   - Infectious workup: blood cultures x2, urine culture sent. Follow up MRSA swab, urine legionella, urine strep, additional workup as per transplant ID  - Antibiotics: continue zosyn, appreciate transplant ID recs  - Resuscitation: received 1L NS in the ED, does not appear volume down  - CT chest/abdomen/pelvis negative  - Continue bactrim ppx

## 2024-04-25 NOTE — PROVIDER CONTACT NOTE (OTHER) - RECOMMENDATIONS
MD gabbie mccormick notified via teams per provider ok to reschedle pm meds to 9 pm, provider will re order am meds for 9am

## 2024-04-25 NOTE — PROGRESS NOTE ADULT - SUBJECTIVE AND OBJECTIVE BOX
Follow Up:      Interval History:    REVIEW OF SYSTEMS  [  ] ROS unobtainable because:    [  ] All other systems negative except as noted below    Constitutional:  [ ] fever [ ] chills  [ ] weight loss  [ ] weakness  Skin:  [ ] rash [ ] phlebitis	  Eyes: [ ] icterus [ ] pain  [ ] discharge	  ENMT: [ ] sore throat  [ ] thrush [ ] ulcers [ ] exudates  Respiratory: [ ] dyspnea [ ] hemoptysis [ ] cough [ ] sputum	  Cardiovascular:  [ ] chest pain [ ] palpitations [ ] edema	  Gastrointestinal:  [ ] nausea [ ] vomiting [ ] diarrhea [ ] constipation [ ] pain	  Genitourinary:  [ ] dysuria [ ] frequency [ ] hematuria [ ] discharge [ ] flank pain  [ ] incontinence  Musculoskeletal:  [ ] myalgias [ ] arthralgias [ ] arthritis  [ ] back pain  Neurological:  [ ] headache [ ] seizures  [ ] confusion/altered mental status    Allergies  No Known Allergies        ANTIMICROBIALS:  piperacillin/tazobactam IVPB.. 3.375 every 8 hours  trimethoprim   80 mG/sulfamethoxazole 400 mG 1 daily      OTHER MEDS:  MEDICATIONS  (STANDING):  albuterol/ipratropium for Nebulization 3 every 6 hours PRN  atorvastatin 20 <User Schedule>  cycloSPORINE  , modified (NEORAL) 50 <User Schedule>  cycloSPORINE  , modified (NEORAL) 50 at bedtime  famotidine    Tablet 20 two times a day  hydrochlorothiazide 12.5 daily  predniSONE   Tablet 2.5 daily  rivaroxaban 10 with dinner      Vital Signs Last 24 Hrs  T(C): 36.5 (25 Apr 2024 12:03), Max: 38.4 (24 Apr 2024 16:03)  T(F): 97.7 (25 Apr 2024 12:03), Max: 101.2 (24 Apr 2024 16:03)  HR: 85 (25 Apr 2024 14:23) (64 - 125)  BP: 145/83 (25 Apr 2024 14:23) (122/96 - 160/88)  BP(mean): --  RR: 19 (25 Apr 2024 12:03) (17 - 19)  SpO2: 92% (25 Apr 2024 12:03) (90% - 95%)    Parameters below as of 25 Apr 2024 12:03  Patient On (Oxygen Delivery Method): room air        PHYSICAL EXAMINATION:  General: Alert and Awake, NAD  HEENT: PERRL, EOMI  Neck: Supple  Cardiac: RRR, No M/R/G  Resp: CTAB, No Wh/Rh/Ra  Abdomen: NBS, NT/ND, No HSM, No rigidity or guarding  MSK: No LE edema. No Calf tenderness  : No serrano  Skin: No rashes or lesions. Skin is warm and dry to the touch.   Neuro: Alert and Awake. CN 2-12 Grossly intact. Moves all four extremities spontaneously.  Psych: Calm, Pleasant, Cooperative                          10.9   5.09  )-----------( 147      ( 25 Apr 2024 07:18 )             35.3       04-25    138  |  103  |  25<H>  ----------------------------<  125<H>  4.0   |  21<L>  |  1.58<H>    Ca    8.8      25 Apr 2024 07:15  Phos  3.6     04-25  Mg     1.8     04-25    TPro  6.9  /  Alb  4.1  /  TBili  0.6  /  DBili  x   /  AST  24  /  ALT  17  /  AlkPhos  84  04-24      Urinalysis Basic - ( 25 Apr 2024 07:15 )    Color: x / Appearance: x / SG: x / pH: x  Gluc: 125 mg/dL / Ketone: x  / Bili: x / Urobili: x   Blood: x / Protein: x / Nitrite: x   Leuk Esterase: x / RBC: x / WBC x   Sq Epi: x / Non Sq Epi: x / Bacteria: x        MICROBIOLOGY:  v  .Blood Blood-Peripheral  04-24-24   No growth at 24 hours  --  --      .Blood Blood-Peripheral  04-24-24   No growth at 24 hours  --  --          Rapid RVP Result: NotDetec (04-24 @ 00:55)        RADIOLOGY:    <The imaging below has been reviewed and visualized by me independently. Findings as detailed in report below> Follow Up:  Fever    Interval History: febrile yesterday evening. No new symptoms.     REVIEW OF SYSTEMS  [  ] ROS unobtainable because:    [ x ] All other systems negative except as noted below    Constitutional:  [x ] fever [x ] chills  [ ] weight loss  [ ] weakness  Skin:  [ ] rash [ ] phlebitis	  Eyes: [ ] icterus [ ] pain  [ ] discharge	  ENMT: [ ] sore throat  [ ] thrush [ ] ulcers [ ] exudates  Respiratory: [ ] dyspnea [ ] hemoptysis [ ] cough [ ] sputum	  Cardiovascular:  [ ] chest pain [ ] palpitations [ ] edema	  Gastrointestinal:  [ ] nausea [ ] vomiting [ ] diarrhea [ ] constipation [ ] pain	  Genitourinary:  [ ] dysuria [ ] frequency [ ] hematuria [ ] discharge [ ] flank pain  [ ] incontinence  Musculoskeletal:  [ ] myalgias [ ] arthralgias [ ] arthritis  [ ] back pain  Neurological:  [ ] headache [ ] seizures  [ ] confusion/altered mental status    Allergies  No Known Allergies        ANTIMICROBIALS:  piperacillin/tazobactam IVPB.. 3.375 every 8 hours  trimethoprim   80 mG/sulfamethoxazole 400 mG 1 daily      OTHER MEDS:  MEDICATIONS  (STANDING):  albuterol/ipratropium for Nebulization 3 every 6 hours PRN  atorvastatin 20 <User Schedule>  cycloSPORINE  , modified (NEORAL) 50 <User Schedule>  cycloSPORINE  , modified (NEORAL) 50 at bedtime  famotidine    Tablet 20 two times a day  hydrochlorothiazide 12.5 daily  predniSONE   Tablet 2.5 daily  rivaroxaban 10 with dinner      Vital Signs Last 24 Hrs  T(C): 36.5 (25 Apr 2024 12:03), Max: 38.4 (24 Apr 2024 16:03)  T(F): 97.7 (25 Apr 2024 12:03), Max: 101.2 (24 Apr 2024 16:03)  HR: 85 (25 Apr 2024 14:23) (64 - 125)  BP: 145/83 (25 Apr 2024 14:23) (122/96 - 160/88)  BP(mean): --  RR: 19 (25 Apr 2024 12:03) (17 - 19)  SpO2: 92% (25 Apr 2024 12:03) (90% - 95%)    Parameters below as of 25 Apr 2024 12:03  Patient On (Oxygen Delivery Method): room air    PHYSICAL EXAMINATION:  General: Alert and Awake, NAD  HEENT: Normocephalic / Atraumatic  Resp: No accessory muscles of respiration utilized  Abdomen: Not distended.  MSK: No LE edema.   : No serrano  Skin: No rashes or lesions.    Neuro: Alert and Awake. CN 2-12 Grossly intact. Moves all four extremities spontaneously.  Psych: Calm, Pleasant, Cooperative                        10.9   5.09  )-----------( 147      ( 25 Apr 2024 07:18 )             35.3       04-25    138  |  103  |  25<H>  ----------------------------<  125<H>  4.0   |  21<L>  |  1.58<H>    Ca    8.8      25 Apr 2024 07:15  Phos  3.6     04-25  Mg     1.8     04-25    TPro  6.9  /  Alb  4.1  /  TBili  0.6  /  DBili  x   /  AST  24  /  ALT  17  /  AlkPhos  84  04-24      Urinalysis Basic - ( 25 Apr 2024 07:15 )    Color: x / Appearance: x / SG: x / pH: x  Gluc: 125 mg/dL / Ketone: x  / Bili: x / Urobili: x   Blood: x / Protein: x / Nitrite: x   Leuk Esterase: x / RBC: x / WBC x   Sq Epi: x / Non Sq Epi: x / Bacteria: x        MICROBIOLOGY:  v  .Blood Blood-Peripheral  04-24-24   No growth at 24 hours  --  --      .Blood Blood-Peripheral  04-24-24   No growth at 24 hours  --  --          Rapid RVP Result: NotDetec (04-24 @ 00:55)        RADIOLOGY:    <The imaging below has been reviewed and visualized by me independently. Findings as detailed in report below>    < from: US Trans Kidney w/ Doppler, Right (04.25.24 @ 12:39) >  IMPRESSION:    No evidence of hydronephrosis or perinephric collection    No evidence of a significant renal artery stenosis.    < end of copied text >

## 2024-04-25 NOTE — PHYSICAL THERAPY INITIAL EVALUATION ADULT - GAIT TRAINING, PT EVAL
2. GOAL: In 3 weeks, pt will be able to ambulate 300 feet w/ least restrictive AD or no AD independently.

## 2024-04-25 NOTE — PROGRESS NOTE ADULT - PROBLEM SELECTOR PLAN 3
Home medications: cyclosporine 50/50,  BID, nulojox 250 q2 weeks, bactrim 400-80  - Transplant nephrology consult  - Will restart, hold MMF iso possible infection Home medications: cyclosporine 50/50,  BID, nulojox 250 q2 weeks, bactrim 400-80  - Transplant nephrology recs appreciated  - Will restart, hold MMF iso possible infection  - Obtain 9 PM cyclosporine level as AM level not done

## 2024-04-25 NOTE — PROGRESS NOTE ADULT - ASSESSMENT
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     COVID19/FLU/RSV PCR (4/24) Negative  UA (4/24) Negative Leukocyte Esterase  CXR (4/24) Left basilar patchy opacity, likely atelectasis but otherwise clear lungs.  V/Q Scan (4/24) Very low probability of pulmonary embolus.    CT Chest (4/24) Groundglass opacity in the right upper lobe measuring 1.2 cm (3:37). Additional 1.3 cm subpleural groundglass opacity in the right middle lobe  CT A/P (4/24) No acute process    Urine Culture (4/24) 50-99K Gram Positive Organisms    Doubt there ground glass opacities on CT Chest are representative of a pneumonia;     #Fever, Diarrhea, Renal Transplant Recipient,   --Will administer one time dose of 1g Vancomycin pending ID of gram positive organism  --Would send GI PCR with next BM  --Continue Zosyn pending culture data  --Continue to follow CBC with diff  --Continue to follow temperature curve  --Follow up on preliminary blood cultures  --Follow up on Serum CMV PCR, Serum Adenovirus PCR    I will continue to follow. Please feel free to contact me with any further questions.    Sarbjit Guzman M.D.  [facility] Division of Infectious Disease  8AM-5PM Monday - Friday: Available on [employer] Teams  After Hours and Holidays (or if no response on [employer] Teams): Please contact the Infectious Diseases Office at [phone]

## 2024-04-26 ENCOUNTER — TRANSCRIPTION ENCOUNTER (OUTPATIENT)
Age: 64
End: 2024-04-26

## 2024-04-26 LAB
ANION GAP SERPL CALC-SCNC: 13 MMOL/L — SIGNIFICANT CHANGE UP (ref 5–17)
BUN SERPL-MCNC: 23 MG/DL — SIGNIFICANT CHANGE UP (ref 7–23)
CALCIUM SERPL-MCNC: 9.6 MG/DL — SIGNIFICANT CHANGE UP (ref 8.4–10.5)
CHLORIDE SERPL-SCNC: 102 MMOL/L — SIGNIFICANT CHANGE UP (ref 96–108)
CMV DNA CSF QL NAA+PROBE: SIGNIFICANT CHANGE UP IU/ML
CMV DNA SPEC NAA+PROBE-LOG#: SIGNIFICANT CHANGE UP LOG10IU/ML
CO2 SERPL-SCNC: 23 MMOL/L — SIGNIFICANT CHANGE UP (ref 22–31)
CREAT SERPL-MCNC: 1.63 MG/DL — HIGH (ref 0.5–1.3)
CYCLOSPORINE SER-MCNC: 485 NG/ML — HIGH (ref 150–400)
EGFR: 35 ML/MIN/1.73M2 — LOW
GLUCOSE SERPL-MCNC: 99 MG/DL — SIGNIFICANT CHANGE UP (ref 70–99)
HADV DNA FLD NAA+PROBE-LOG#: SIGNIFICANT CHANGE UP COPIES/ML
HCT VFR BLD CALC: 36.1 % — SIGNIFICANT CHANGE UP (ref 34.5–45)
HGB BLD-MCNC: 11.7 G/DL — SIGNIFICANT CHANGE UP (ref 11.5–15.5)
LEGIONELLA AG UR QL: NEGATIVE — SIGNIFICANT CHANGE UP
MAGNESIUM SERPL-MCNC: 1.8 MG/DL — SIGNIFICANT CHANGE UP (ref 1.6–2.6)
MCHC RBC-ENTMCNC: 30.2 PG — SIGNIFICANT CHANGE UP (ref 27–34)
MCHC RBC-ENTMCNC: 32.4 GM/DL — SIGNIFICANT CHANGE UP (ref 32–36)
MCV RBC AUTO: 93 FL — SIGNIFICANT CHANGE UP (ref 80–100)
MRSA PCR RESULT.: SIGNIFICANT CHANGE UP
NRBC # BLD: 0 /100 WBCS — SIGNIFICANT CHANGE UP (ref 0–0)
PHOSPHATE SERPL-MCNC: 3.4 MG/DL — SIGNIFICANT CHANGE UP (ref 2.5–4.5)
PLATELET # BLD AUTO: 163 K/UL — SIGNIFICANT CHANGE UP (ref 150–400)
POTASSIUM SERPL-MCNC: 3.9 MMOL/L — SIGNIFICANT CHANGE UP (ref 3.5–5.3)
POTASSIUM SERPL-SCNC: 3.9 MMOL/L — SIGNIFICANT CHANGE UP (ref 3.5–5.3)
RBC # BLD: 3.88 M/UL — SIGNIFICANT CHANGE UP (ref 3.8–5.2)
RBC # FLD: 13.3 % — SIGNIFICANT CHANGE UP (ref 10.3–14.5)
S AUREUS DNA NOSE QL NAA+PROBE: SIGNIFICANT CHANGE UP
S PNEUM AG UR QL: NEGATIVE — SIGNIFICANT CHANGE UP
SODIUM SERPL-SCNC: 138 MMOL/L — SIGNIFICANT CHANGE UP (ref 135–145)
WBC # BLD: 4.19 K/UL — SIGNIFICANT CHANGE UP (ref 3.8–10.5)
WBC # FLD AUTO: 4.19 K/UL — SIGNIFICANT CHANGE UP (ref 3.8–10.5)

## 2024-04-26 PROCEDURE — 99232 SBSQ HOSP IP/OBS MODERATE 35: CPT

## 2024-04-26 PROCEDURE — 99232 SBSQ HOSP IP/OBS MODERATE 35: CPT | Mod: GC

## 2024-04-26 PROCEDURE — 99233 SBSQ HOSP IP/OBS HIGH 50: CPT

## 2024-04-26 RX ORDER — FAMOTIDINE 10 MG/ML
20 INJECTION INTRAVENOUS
Refills: 0 | Status: DISCONTINUED | OUTPATIENT
Start: 2024-04-26 | End: 2024-04-27

## 2024-04-26 RX ORDER — ACETAMINOPHEN 500 MG
650 TABLET ORAL ONCE
Refills: 0 | Status: COMPLETED | OUTPATIENT
Start: 2024-04-26 | End: 2024-04-26

## 2024-04-26 RX ORDER — AMPICILLIN SODIUM AND SULBACTAM SODIUM 250; 125 MG/ML; MG/ML
3 INJECTION, POWDER, FOR SUSPENSION INTRAMUSCULAR; INTRAVENOUS EVERY 6 HOURS
Refills: 0 | Status: DISCONTINUED | OUTPATIENT
Start: 2024-04-26 | End: 2024-04-27

## 2024-04-26 RX ADMIN — ATORVASTATIN CALCIUM 20 MILLIGRAM(S): 80 TABLET, FILM COATED ORAL at 21:55

## 2024-04-26 RX ADMIN — RIVAROXABAN 10 MILLIGRAM(S): KIT at 17:20

## 2024-04-26 RX ADMIN — PIPERACILLIN AND TAZOBACTAM 25 GRAM(S): 4; .5 INJECTION, POWDER, LYOPHILIZED, FOR SOLUTION INTRAVENOUS at 14:43

## 2024-04-26 RX ADMIN — Medication 650 MILLIGRAM(S): at 07:56

## 2024-04-26 RX ADMIN — Medication 1 TABLET(S): at 12:05

## 2024-04-26 RX ADMIN — Medication 650 MILLIGRAM(S): at 07:26

## 2024-04-26 RX ADMIN — FAMOTIDINE 20 MILLIGRAM(S): 10 INJECTION INTRAVENOUS at 21:48

## 2024-04-26 RX ADMIN — Medication 5000 UNIT(S): at 21:49

## 2024-04-26 RX ADMIN — FAMOTIDINE 20 MILLIGRAM(S): 10 INJECTION INTRAVENOUS at 08:57

## 2024-04-26 RX ADMIN — CHLORHEXIDINE GLUCONATE 1 APPLICATION(S): 213 SOLUTION TOPICAL at 12:05

## 2024-04-26 RX ADMIN — Medication 2.5 MILLIGRAM(S): at 08:58

## 2024-04-26 RX ADMIN — CYCLOSPORINE 50 MILLIGRAM(S): 100 CAPSULE ORAL at 21:48

## 2024-04-26 RX ADMIN — CYCLOSPORINE 50 MILLIGRAM(S): 100 CAPSULE ORAL at 08:57

## 2024-04-26 RX ADMIN — AMPICILLIN SODIUM AND SULBACTAM SODIUM 200 GRAM(S): 250; 125 INJECTION, POWDER, FOR SUSPENSION INTRAMUSCULAR; INTRAVENOUS at 21:49

## 2024-04-26 RX ADMIN — PIPERACILLIN AND TAZOBACTAM 25 GRAM(S): 4; .5 INJECTION, POWDER, LYOPHILIZED, FOR SOLUTION INTRAVENOUS at 06:34

## 2024-04-26 NOTE — DISCHARGE NOTE PROVIDER - NSDCCAREPROVSEEN_GEN_ALL_CORE_FT
[facility] Staff Team 6 - Attending Dr. Mohsinuzzaman Khan [facility] Staff Team 6 - Attending Dr. Mohsinuzzaman Khan [place], [place]

## 2024-04-26 NOTE — DISCHARGE NOTE PROVIDER - CARE PROVIDERS DIRECT ADDRESSES
[email or web address]

## 2024-04-26 NOTE — DISCHARGE NOTE PROVIDER - NSDCFUADDAPPT_GEN_ALL_CORE_FT
APPTS ARE READY TO BE MADE: [x] YES    Best Family or Patient Contact (if needed):    Additional Information about above appointments (if needed):    1: Transplant Nephrology - Dr. Shiva Delgado as soon as possible   2: Transplant ID - Dr. Guzman within 4 weeks  3:     Other comments or requests:    APPTS ARE READY TO BE MADE: [x] YES    Best Family or Patient Contact (if needed):    Additional Information about above appointments (if needed):    1: Transplant Nephrology - Dr. Shiva Delgado as soon as possible   2: Transplant ID - Dr. Guzman within 4 weeks  3:     Other comments or requests:   Prior to outreaching the patient, it was visible that the patient has secured a follow up appointment which was not scheduled by our team. 05/15 11:30am with Dr. Delgado 06/05 10:00am with Dr. Guzman

## 2024-04-26 NOTE — DISCHARGE NOTE PROVIDER - CARE PROVIDER_API CALL
Shiva Delgado  Nephrology  [address] [place] [id number]  Phone: [phone]  Fax: [phone]  Established Patient  Follow Up Time: 1 week   Shiva Delgado  Nephrology  [address] [place] [address]  Phone: [phone]  Fax: [phone]  Established Patient  Follow Up Time: 1 week    Sarbjit Guzman  Infectious Disease  [address] [place] [address]  Phone: [phone]  Fax: [phone]  Follow Up Time: 1 month

## 2024-04-26 NOTE — PROGRESS NOTE ADULT - ASSESSMENT
63F with hx CKD stage 5 from PCKD now s/p LURT via kidney paired exchange on 11/7/17 presents with chills, shortness of breath that suddenly came on one day prior to admission. Transplant nephrology service consulted for renal graft / immunosuppressive med management.    1. Chills: UCx growing gram pos organisms. Received Vanc and has been on Zosyn since admission. Transplant ID following. Symptoms improving.    2. SOB: improved with nebs, may have been COPD-related    3. Renal transplant - Baseline creatinine about 1.5-6. Cr currently at baseline, 1.63 today. DSA drawn in December 2020 was negative. Pt with PRA of 49%. Renal sonograms have been normal previously, currently pending. Creatinine had been slowly rising with increase in cyclo troughs outpatient, although now better at ~1.6. May have had CNI toxicity and currently changing to belatacept, third infusion received on 4/22. Next infusion will be May 7th.    3. Immunosuppression - Dr. Delgado reduced cyclosporine to 50/50 mg in March with plan to further reduce in 1 month to stop cyclosporine and switch to Leni. Check cyclosporine level tomorrow AM (please ensure it is drawn 30 mins prior to AM dose). Continue Pred 2.5mg. Hold MMF 500mg BID iso possible infection. Of note, higher dose MMF had made her nauseous in the past.     4. HTN - controlled at home.    5. Hyperkalemia/ Prophylaxis - On admission, K 6.6 on VBG, but 5.1 in serum. K has been wnl now. In the past, developed high methemoglobin levels on dapsone so stopped. She is on bactrim.     6. DVT - Pt still on xarelto

## 2024-04-26 NOTE — DISCHARGE NOTE PROVIDER - HOSPITAL COURSE
HPI:  Ms. 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 She has been transitioning from cyclosporine to belatacept, last infusion on Monday. She noted chills and shaking yesterday, but did not note a temperature. She took two extra-strength Tylenol and tried to go to sleep, however developed labored breathing and called EMS. She does not use any inhalers. She notes chronic headaches. No chest pain, cough, congestion, abdominal pain, changes in urination, swelling or rashes. She has had intermittent diarrhea. She's gained about 50 lbs since her transplant, which she feels has made it difficult for her to breathe chronically. She also notes that she is anxious at baseline.  (24 Apr 2024 11:12)    Hospital Course:  She was evaluated by transplant nephrology and transplant ID. Her MMF was held. She was treated with Zosyn, and urine cultures were positive for Enterococcus faecalis. CT abdomen/pelvis was negative for intra-abdominal source. US Duplex of transplanted kidney was unrevealing. She was transitioned to [   ] per sensitivities.     On the day of discharge her symptoms have improved and she is hemodynamically stable. She is medically optimized for discharge home to follow up with transplant nephrology.     Important Medication Changes and Reason:  Continue [abx]     Active or Pending Issues Requiring Follow-up:  Follow up with transplant nephrology.     Advanced Directives:   [X] Full code  [ ] DNR  [ ] Hospice    Discharge Diagnoses:  Sepsis 2/2 UTI       HPI:  Ms. 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 She has been transitioning from cyclosporine to belatacept, last infusion on Monday. She noted chills and shaking yesterday, but did not note a temperature. She took two extra-strength Tylenol and tried to go to sleep, however developed labored breathing and called EMS. She does not use any inhalers. She notes chronic headaches. No chest pain, cough, congestion, abdominal pain, changes in urination, swelling or rashes. She has had intermittent diarrhea. She's gained about 50 lbs since her transplant, which she feels has made it difficult for her to breathe chronically. She also notes that she is anxious at baseline.  (24 Apr 2024 11:12)    Hospital Course:  She was evaluated by transplant nephrology and transplant ID. Her MMF was held. She was treated with Zosyn, and urine cultures were positive for Enterococcus faecalis. CT abdomen/pelvis was negative for intra-abdominal source. US Duplex of transplanted kidney was unrevealing. She was transitioned to Augmentin 875 BID per sensitivities.     On the day of discharge her symptoms have improved and she is hemodynamically stable. She is medically optimized for discharge home to follow up with transplant nephrology.     Important Medication Changes and Reason:  Continue Augmentin 875 twice a day until 5/3/2024  Discontinue Bactrim  Discontinue Mycophenolate until follow up with Dr. Delgado    Active or Pending Issues Requiring Follow-up:  Follow up with transplant nephrology as soon as possible  Followup in transplant ID office to order repeat CT Chest in 4-6 weeks    Advanced Directives:   [X] Full code  [ ] DNR  [ ] Hospice    Discharge Diagnoses:  Sepsis 2/2 UTI

## 2024-04-26 NOTE — DISCHARGE NOTE PROVIDER - PROVIDER TOKENS
PROVIDER:[TOKEN:[[id number]],FOLLOWUP:[1 week],ESTABLISHEDPATIENT:[T]] PROVIDER:[TOKEN:[[id number]],FOLLOWUP:[1 week],ESTABLISHEDPATIENT:[T]],PROVIDER:[TOKEN:[[id number]],FOLLOWUP:[1 month]]

## 2024-04-26 NOTE — PROGRESS NOTE ADULT - SUBJECTIVE AND OBJECTIVE BOX
[facility] DIVISION OF KIDNEY DISEASES AND HYPERTENSION   FOLLOW UP NOTE    --------------------------------------------------------------------------------    SUBJECTIVE / ROS / INTERVAL EVENTS:  - Patient seen and examined at bedside  - No acute complaints or overnight events, feels significantly better      PAST HISTORY  --------------------------------------------------------------------------------  No significant changes to PMH, PSH, FHx, SHx, unless otherwise noted    ALLERGIES & MEDICATIONS  --------------------------------------------------------------------------------  Allergies    No Known Allergies      Standing Inpatient Medications  atorvastatin 20 milliGRAM(s) Oral <User Schedule>  chlorhexidine 4% Liquid 1 Application(s) Topical daily  cholecalciferol 5000 Unit(s) Oral at bedtime  cycloSPORINE  , modified (NEORAL) 50 milliGRAM(s) Oral <User Schedule>  cycloSPORINE  , modified (NEORAL) 50 milliGRAM(s) Oral at bedtime  famotidine    Tablet 20 milliGRAM(s) Oral two times a day  hydrochlorothiazide 12.5 milliGRAM(s) Oral daily  piperacillin/tazobactam IVPB.. 3.375 Gram(s) IV Intermittent every 8 hours  predniSONE   Tablet 2.5 milliGRAM(s) Oral daily  rivaroxaban 10 milliGRAM(s) Oral with dinner  trimethoprim   80 mG/sulfamethoxazole 400 mG 1 Tablet(s) Oral daily    PRN Inpatient Medications  albuterol/ipratropium for Nebulization 3 milliLiter(s) Nebulizer every 6 hours PRN      VITALS  --------------------------------------------------------------------------------  T(C): 36.7 (04-26-24 @ 05:21), Max: 36.7 (04-26-24 @ 05:21)  HR: 82 (04-26-24 @ 09:00) (77 - 86)  BP: 128/72 (04-26-24 @ 09:00) (128/72 - 148/69)  RR: 18 (04-26-24 @ 05:21) (18 - 18)  SpO2: 96% (04-26-24 @ 05:21) (96% - 97%)  Wt(kg): --  Height (cm): 162.6 (04-24-24 @ 14:35)  Weight (kg): 119 (04-24-24 @ 14:35)  BMI (kg/m2): 45 (04-24-24 @ 14:35)  BSA (m2): 2.2 (04-24-24 @ 14:35)    PHYSICAL EXAM:  General: NAD  Neuro: non-focal  HEENT: NC/AT, anicteric  Pulmonary: CTA B/L  Cardiovascular/Chest: +S1S2, RRR  GI/Abdomen: soft, NT/ND, +bowel sounds        Transplant site: RLQ non-tender  Extremities: No edema  Skin: Warm and dry    LABS/STUDIES  --------------------------------------------------------------------------------              11.7   4.19  >-----------<  163      [04-26-24 @ 07:19]              36.1     138  |  102  |  23  ----------------------------<  99      [04-26-24 @ 07:18]  3.9   |  23  |  1.63        Ca     9.6     [04-26-24 @ 07:18]      Mg     1.8     [04-26-24 @ 07:18]      Phos  3.4     [04-26-24 @ 07:18]      Creatinine Trend:  SCr 1.63 [04-26 @ 07:18]  SCr 1.58 [04-25 @ 07:15]  SCr 1.68 [04-24 @ 00:57]    Urinalysis - [04-26-24 @ 07:18]      Color  / Appearance  / SG  / pH       Gluc 99 / Ketone   / Bili  / Urobili        Blood  / Protein  / Leuk Est  / Nitrite       RBC  / WBC  / Hyaline  / Gran  / Sq Epi  / Non Sq Epi  / Bacteria     CMV PCRCMVPCR Log: NotDetec Yci59DM/mL (04-25 @ 07:18)

## 2024-04-26 NOTE — PROGRESS NOTE ADULT - ATTENDING COMMENTS
63 year old woman with polycystic liver and kidney disease leading to advanced CKD. She received LURT via kidney paired exchange on 11/7/17. Baseline creatinine 1.5mg/dL.  On immunosuppression with Cyclosporine 50mg po bid, MMF 500mg po bid, Prednisone 2.5mg daily and Belatacept infusion recently started s/p 3rd dose on 4/22/24. She was previously on tacrolimus but had hair loss, also has adverse reaction ?numbness with cyclosporine which prompted change to belatacept with plans to taper cyclosporine slowly.  Other PMH includes HTN, DVT on xarelto  She presents with acute chills, shortness of breath and fever x 1 day.   On presentation fever 101. RVP negative. CT chest and abdomen unremarkable except for scattered GGO, UA negative, no leukocytosis. Procal elevated at 18.  , CT abdomen unremarkable. Received IV Vanc and zosyn. Urine culture on admission grew gram positive organism.     She feels better today. Pain has subsided, no fever. Dyspnea resolved.     - Fever, elevated procal and gram positive organism on urine culture on admission - Received Vanc/zosyn, ID follow up   - S/p LURD via paired exchange 11/2017, stable graft function   - IS: continue cyclosporine 50mg po bid, prednisone 2.5mg daily, 4th Leni infusion is scheduled for May 7th,  Hold MMF in the setting of infection   - D/c bactrim due to hyperkalemia on admission and low risk of PCP in pt transplanted in 2017
The patient is a 63f with h/o R-sided renal transplant in 2017 due to CKD5 with AD PCKD, HTN, DVT on Xarelto, anxiety/depression, emphysema, cerebral aneurysm, arthritis, who presents for chills, shortness of breath for a day. No cough, urinary s/s, recent travel sick contact. Had some loose stool but no N/V or abdominal pan. Received IV Belatacept infusion for Transplant 2 days ago.    In ED O2 sat 89% RA, Temp 101.2F, CXR- basal atelectasis, RVP neg, UA neg, WBC wnl, VQ- low probability, BP is stable    1. Sepsis due to possibly UTI with Enterococci  2. Renal transplant recipient with CKD 3  3. COPD- stable  4. DVT on Xarelto    - Afebrile, no more chills, no abdominal pain or diarrhea, she received IV infusion Belatacept few days prior to admission, VSS,  - reviewed labs- WBC wnl, RVP neg. CXR- basal atelectasis. CT c/a/p- unremarkable for infection, Urine c/s grew Enterococci  - spoke to Transplant ID, s/p 1 dose IV Vancomycin for now. c/w IV Zosyn and awaiting on sensitivity   - Transplant renal plans- held MMF, c/w cyclosporin and prednisone. Cyclosporin 50mg bid for now. Scr 1.6 at baseline  - c/w AC  - Total time spent- 55 min  - d/c planing with ID clearance
The patient is a 63f with h/o R-sided renal transplant in 2017 due to CKD5 with AD PCKD, HTN, DVT on Xarelto, anxiety/depression, emphysema, cerebral aneurysm, arthritis, who presents for chills, shortness of breath for a day. No cough, urinary s/s, recent travel sick contact. Had some loose stool but no N/V or abdominal pan. Received IV Belatacept infusion for Transplant 2 days ago.    In ED O2 sat 89% RA, Temp 101.2F, CXR- basal atelectasis, RVP neg, UA neg, WBC wnl, VQ- low probability, BP is stable    1. Fever with acute hypoxic respiratory failure, possible sepsis, r/o PNA  2. Renal transplant recipient with CKD 3  3. COPD  4. DVT on Xarelto    - No more chills, fever, sitting on a chair, no abdominal pain but vomiting x 2- no blood, she received IV infusion Belatacept, VSS,  - reviewed labs- WBC wnl, RVP neg. CXR- basal atelectasis. CT c/a/p- unremarkable for infection  - c/w IV Zosyn for now, f/u blood c/s  - Appreciated Transplant ID and Transplant renal plans- hold MMF, c/w cyclosporin and prednisone. Cyclosporin 50mg bid for now. Scr 1.6 at baseline  - AC  - Total time spent- 57in

## 2024-04-26 NOTE — DISCHARGE NOTE PROVIDER - NSDCCPCAREPLAN_GEN_ALL_CORE_FT
PRINCIPAL DISCHARGE DIAGNOSIS  Diagnosis: Sepsis  Assessment and Plan of Treatment: You came to the hospital with chills and labored breathing. We did some blood and urine tests and your urine tested positive for a bacteria called "Enterococcus faecalis." You were treated with IV antibiotics until we found which antibiotics this bacteria is sensitive to. You are being discharged with oral antibiotics to finish your course. Please follow the directions on the bottle until the bottle is empty. Please also follow up with your transplant nephrologist.      SECONDARY DISCHARGE DIAGNOSES  Diagnosis: SOB (shortness of breath)  Assessment and Plan of Treatment:      PRINCIPAL DISCHARGE DIAGNOSIS  Diagnosis: Sepsis  Assessment and Plan of Treatment: You came to the hospital with chills and labored breathing. We did some blood and urine tests and your urine tested positive for a bacteria called "Enterococcus faecalis." You were treated with IV antibiotics until we found which antibiotics this bacteria is sensitive to. You are being discharged with oral antibiotics to finish your course. Please follow the directions on the bottle until the bottle is empty. Please also follow up with your transplant nephrologist.

## 2024-04-26 NOTE — DISCHARGE NOTE PROVIDER - ATTENDING DISCHARGE PHYSICAL EXAMINATION:
.  VITAL SIGNS:  T(C): 36.7 (04-27-24 @ 04:20), Max: 36.8 (04-26-24 @ 14:12)  T(F): 98 (04-27-24 @ 04:20), Max: 98.3 (04-26-24 @ 14:12)  HR: 77 (04-27-24 @ 04:20) (77 - 89)  BP: 143/81 (04-27-24 @ 04:20) (143/81 - 165/79)  BP(mean): --  RR: 18 (04-27-24 @ 04:20) (16 - 18)  SpO2: 96% (04-27-24 @ 04:20) (96% - 98%)  Wt(kg): --    PHYSICAL EXAM:    Constitutional: WDWN resting comfortably in bed; NAD  Head: NC/AT  Eyes: PERRL, EOMI, anicteric sclera  ENT: no nasal discharge; uvula midline, no oropharyngeal erythema or exudates; MMM  Neck: supple; no JVD or thyromegaly  Respiratory: CTA B/L; no W/R/R, no retractions  Cardiac: +S1/S2; RRR; no M/R/G; PMI non-displaced  Gastrointestinal: abdomen soft, NT/ND; no rebound or guarding; +BSx4  Back: spine midline, no bony tenderness or step-offs; no CVAT B/L  Extremities: WWP, no clubbing or cyanosis; no peripheral edema  Musculoskeletal: NROM x4; no joint swelling, tenderness or erythema  Vascular: 2+ radial, femoral, DP/PT pulses B/L  Dermatologic: skin warm, dry and intact; no rashes, wounds, or scars  Lymphatic: no submandibular or cervical LAD  Neurologic: AAOx3; CNII-XII grossly intact; no focal deficits  Psychiatric: affect and characteristics of appearance, verbalizations, behaviors are appropriate

## 2024-04-26 NOTE — DISCHARGE NOTE PROVIDER - NS AS DC PROVIDER CONTACT Y/N MULTI
Stroke risk facotr  SBP <220   Holding home antihypertensives due to concern for acute stroke  Prn labetalol   Yes

## 2024-04-26 NOTE — PROGRESS NOTE ADULT - SUBJECTIVE AND OBJECTIVE BOX
Follow Up:  UTI    Interval History: UCx with enterococcus faecalis. afebrile    REVIEW OF SYSTEMS  [  ] ROS unobtainable because:    [ x ] All other systems negative except as noted below    Constitutional:  [ ] fever [ ] chills  [ ] weight loss  [ ] weakness  Skin:  [ ] rash [ ] phlebitis	  Eyes: [ ] icterus [ ] pain  [ ] discharge	  ENMT: [ ] sore throat  [ ] thrush [ ] ulcers [ ] exudates  Respiratory: [ ] dyspnea [ ] hemoptysis [ ] cough [ ] sputum	  Cardiovascular:  [ ] chest pain [ ] palpitations [ ] edema	  Gastrointestinal:  [ ] nausea [ ] vomiting [ ] diarrhea [ ] constipation [ ] pain	  Genitourinary:  [ ] dysuria [ ] frequency [ ] hematuria [ ] discharge [ ] flank pain  [ ] incontinence  Musculoskeletal:  [ ] myalgias [ ] arthralgias [ ] arthritis  [ ] back pain  Neurological:  [ ] headache [ ] seizures  [ ] confusion/altered mental status    Allergies  No Known Allergies        ANTIMICROBIALS:  piperacillin/tazobactam IVPB.. 3.375 every 8 hours  trimethoprim   80 mG/sulfamethoxazole 400 mG 1 daily      OTHER MEDS:  MEDICATIONS  (STANDING):  albuterol/ipratropium for Nebulization 3 every 6 hours PRN  atorvastatin 20 <User Schedule>  cycloSPORINE  , modified (NEORAL) 50 <User Schedule>  cycloSPORINE  , modified (NEORAL) 50 at bedtime  famotidine    Tablet 20 <User Schedule>  hydrochlorothiazide 12.5 daily  predniSONE   Tablet 2.5 daily  rivaroxaban 10 with dinner      Vital Signs Last 24 Hrs  T(C): 36.8 (26 Apr 2024 14:12), Max: 36.8 (26 Apr 2024 14:12)  T(F): 98.3 (26 Apr 2024 14:12), Max: 98.3 (26 Apr 2024 14:12)  HR: 82 (26 Apr 2024 14:12) (77 - 86)  BP: 165/79 (26 Apr 2024 14:12) (128/72 - 165/79)  BP(mean): --  RR: 16 (26 Apr 2024 14:12) (16 - 18)  SpO2: 98% (26 Apr 2024 14:12) (96% - 98%)    Parameters below as of 26 Apr 2024 14:12  Patient On (Oxygen Delivery Method): room air    PHYSICAL EXAMINATION:  General: Alert and Awake, NAD  HEENT: Normocephalic / Atraumatic  Resp: No accessory muscles of respiration utilized  Abdomen: Not distended.  MSK: No LE edema.   : No serrano  Skin: No rashes or lesions.    Neuro: Alert and Awake. CN 2-12 Grossly intact. Moves all four extremities spontaneously.  Psych: Calm, Pleasant, Cooperative                        11.7   4.19  )-----------( 163      ( 26 Apr 2024 07:19 )             36.1       04-26    138  |  102  |  23  ----------------------------<  99  3.9   |  23  |  1.63<H>    Ca    9.6      26 Apr 2024 07:18  Phos  3.4     04-26  Mg     1.8     04-26        Urinalysis Basic - ( 26 Apr 2024 07:18 )    Color: x / Appearance: x / SG: x / pH: x  Gluc: 99 mg/dL / Ketone: x  / Bili: x / Urobili: x   Blood: x / Protein: x / Nitrite: x   Leuk Esterase: x / RBC: x / WBC x   Sq Epi: x / Non Sq Epi: x / Bacteria: x        MICROBIOLOGY:  v  Clean Catch Clean Catch (Midstream)  04-24-24   50,000 - 99,000 CFU/mL Enterococcus faecalis  --  --      .Blood Blood-Peripheral  04-24-24   No growth at 48 Hours  --  --      .Blood Blood-Peripheral  04-24-24   No growth at 48 Hours  --  --          CMVPCR Log: NotDetec Aqo51PO/mL (04-25 @ 07:18)  Rapid RVP Result: NotDetec (04-24 @ 00:55)        RADIOLOGY:    <The imaging below has been reviewed and visualized by me independently. Findings as detailed in report below>    < from: US Trans Kidney w/ Doppler, Right (04.25.24 @ 12:39) >  IMPRESSION:    No evidence of hydronephrosis or perinephric collection    No evidence of a significant renal artery stenosis.    < end of copied text >

## 2024-04-26 NOTE — PROGRESS NOTE ADULT - PROBLEM SELECTOR PLAN 3
Home medications: cyclosporine 50/50,  BID, nulojox 250 q2 weeks, bactrim 400-80  - Transplant nephrology recs appreciated  - Will restart, hold MMF iso possible infection  - Obtain 9 PM cyclosporine level as AM level not done

## 2024-04-26 NOTE — PROGRESS NOTE ADULT - SUBJECTIVE AND OBJECTIVE BOX
Patient is a 63y old  Female who presents with a chief complaint of Shortness of breath (25 Apr 2024 15:38)      SUBJECTIVE / OVERNIGHT EVENTS: No acute events overnight. Patient seen and examined at bedside, denies complaints. Has not had a BM. Nausea improved.     MEDICATIONS  (STANDING):  atorvastatin 20 milliGRAM(s) Oral <User Schedule>  chlorhexidine 4% Liquid 1 Application(s) Topical daily  cholecalciferol 5000 Unit(s) Oral at bedtime  cycloSPORINE  , modified (NEORAL) 50 milliGRAM(s) Oral <User Schedule>  cycloSPORINE  , modified (NEORAL) 50 milliGRAM(s) Oral at bedtime  famotidine    Tablet 20 milliGRAM(s) Oral two times a day  hydrochlorothiazide 12.5 milliGRAM(s) Oral daily  piperacillin/tazobactam IVPB.. 3.375 Gram(s) IV Intermittent every 8 hours  predniSONE   Tablet 2.5 milliGRAM(s) Oral daily  rivaroxaban 10 milliGRAM(s) Oral with dinner  trimethoprim   80 mG/sulfamethoxazole 400 mG 1 Tablet(s) Oral daily    MEDICATIONS  (PRN):  albuterol/ipratropium for Nebulization 3 milliLiter(s) Nebulizer every 6 hours PRN Shortness of Breath and/or Wheezing      CAPILLARY BLOOD GLUCOSE        I&O's Summary      Vital Signs Last 24 Hrs  T(C): 36.7 (26 Apr 2024 05:21), Max: 36.7 (26 Apr 2024 05:21)  T(F): 98 (26 Apr 2024 05:21), Max: 98 (26 Apr 2024 05:21)  HR: 77 (26 Apr 2024 05:21) (77 - 86)  BP: 148/69 (26 Apr 2024 05:21) (130/75 - 160/88)  BP(mean): --  RR: 18 (26 Apr 2024 05:21) (18 - 19)  SpO2: 96% (26 Apr 2024 05:21) (92% - 97%)    Parameters below as of 26 Apr 2024 05:21  Patient On (Oxygen Delivery Method): room air        PHYSICAL EXAM:  GENERAL: Pleasant, sitting in bed in no acute distress  EYES: Conjunctiva noninjected or pale, sclera anicteric  HENT: NC/AT, moist mucous membranes  NECK: Supple, trachea midline  LUNG: Nonlabored respirations  CV: RRR, Pulses- Radial: 2+ b/l  ABDOMEN: Nondistended, nontender, mills's sign negative  MSK: No visible deformities, tender extremities - per patient chronic  SKIN: No rashes, bruises  NEURO: AAOx4 (to person, place, time, event), no tremor  PSYCH: Normal mood and affect - somewhat anxious    LABS:                        11.7   4.19  )-----------( 163      ( 26 Apr 2024 07:19 )             36.1      04-26    138  |  102  |  23  ----------------------------<  99  3.9   |  23  |  1.63<H>    Ca    9.6      26 Apr 2024 07:18  Phos  3.4     04-26  Mg     1.8     04-26            Urinalysis Basic - ( 26 Apr 2024 07:18 )    Color: x / Appearance: x / SG: x / pH: x  Gluc: 99 mg/dL / Ketone: x  / Bili: x / Urobili: x   Blood: x / Protein: x / Nitrite: x   Leuk Esterase: x / RBC: x / WBC x   Sq Epi: x / Non Sq Epi: x / Bacteria: x        RADIOLOGY & ADDITIONAL TESTS:    Imaging Personally Reviewed:    Consultant(s) Notes Reviewed:      Care Discussed with Consultants/Other Providers:

## 2024-04-26 NOTE — DISCHARGE NOTE PROVIDER - NSDCCPTREATMENT_GEN_ALL_CORE_FT
PRINCIPAL PROCEDURE  Procedure: CT chest, abdomen and pelvis  Findings and Treatment: IMPRESSION:  *  No radiologic source of infection within the chest, abdomen or pelvis.  *  Few groundglass opacities within the right lung, largest of which   measures 1.3 cm, recommend dedicated noncontrast chest CT for further in   3-6 months for further evaluation as per the 2017 Fleischner Society   pulmonary nodule recommendations.  *  Polycystic disease of liver and kidneys.  --- End of Report ---      SECONDARY PROCEDURE  Procedure: US of kidney transplant  Findings and Treatment: IMPRESSION:  No evidence of hydronephrosis or perinephric collection  No evidence of a significant renal artery stenosis.  --- End of Report ---

## 2024-04-26 NOTE — DISCHARGE NOTE PROVIDER - NSDCMRMEDTOKEN_GEN_ALL_CORE_FT
atorvastatin 20 mg oral tablet: 1 tab(s) orally Monday, Wednesday, and Friday  Bactrim 400 mg-80 mg oral tablet: 1 tab(s) orally once a day  belatacept 250 mg intravenous injection: 5 mg/kg intravenously every 2 weeks  cholecalciferol 125 mcg (5000 intl units) oral capsule: 1 cap(s) orally once a day (at bedtime)  cycloSPORINE 50 mg oral capsule: 1 cap(s) orally 2 times a day  famotidine 20 mg oral tablet: 1 tab(s) orally 2 times a day  hydroCHLOROthiazide 12.5 mg oral capsule: 1 cap(s) orally once a day  mycophenolate mofetil 500 mg oral tablet: 1 tab(s) orally 2 times a day  predniSONE 2.5 mg oral tablet: 1 tab(s) orally once a day  Xarelto 10 mg oral tablet: 1 tab(s) orally once a day (at bedtime)   amoxicillin-clavulanate 875 mg-125 mg oral tablet: 875 milligram(s) orally 2 times a day until 5/3  atorvastatin 20 mg oral tablet: 1 tab(s) orally Monday, Wednesday, and Friday  belatacept 250 mg intravenous injection: 5 mg/kg intravenously every 2 weeks  cholecalciferol 125 mcg (5000 intl units) oral capsule: 1 cap(s) orally once a day (at bedtime)  cycloSPORINE 50 mg oral capsule: 1 cap(s) orally 2 times a day  famotidine 20 mg oral tablet: 1 tab(s) orally 2 times a day  hydroCHLOROthiazide 12.5 mg oral capsule: 1 cap(s) orally once a day  predniSONE 2.5 mg oral tablet: 1 tab(s) orally once a day  Xarelto 10 mg oral tablet: 1 tab(s) orally once a day (at bedtime)

## 2024-04-26 NOTE — PROGRESS NOTE ADULT - PROBLEM SELECTOR PLAN 1
Vitals concerning for sepsis with temp 101.2 and tachycardia. CXR, V/Q scan, RVP negative.   - Infectious workup: blood cultures x2, urine culture sent. Follow up MRSA swab, urine legionella, urine strep, additional workup as per transplant ID  - Antibiotics: continue zosyn, appreciate transplant ID recs  - Resuscitation: received 1L NS in the ED, does not appear volume down  - CT chest/abdomen/pelvis negative  - Continue bactrim ppx Vitals concerning for sepsis with temp 101.2 and tachycardia. CXR, V/Q scan, RVP negative.   - Infectious workup: blood cultures x2, urine culture sent. Follow up MRSA swab, urine legionella, urine strep, additional workup as per transplant ID  - Antibiotics: continue zosyn, appreciate transplant ID recs. Transplant ID started vancomycin for GPCs in urine, MRSA swab sent  - Resuscitation: received 1L NS in the ED, does not appear volume down  - CT chest/abdomen/pelvis negative  - Continue bactrim ppx

## 2024-04-26 NOTE — PROGRESS NOTE ADULT - ASSESSMENT
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     COVID19/FLU/RSV PCR (4/24) Negative  UA (4/24) Negative Leukocyte Esterase  CXR (4/24) Left basilar patchy opacity, likely atelectasis but otherwise clear lungs.  V/Q Scan (4/24) Very low probability of pulmonary embolus.    CMV PCR (4/25) Negative    CT Chest (4/24) Groundglass opacity in the right upper lobe measuring 1.2 cm (3:37). Additional 1.3 cm subpleural groundglass opacity in the right middle lobe  CT A/P (4/24) No acute process    Urine Culture (4/24) 50-99K Enterococcus faecalis    Doubt there ground glass opacities on CT Chest are representative of a pneumonia;     #Fever, Diarrhea, Renal Transplant Recipient,   --Will deescalate Zosyn to Unasyn 3g IV Q6H. If afebrile going forward and BCx with NGTD can discharge on Augmentin 875 BID through 5/3  --Will need followup in transplant ID office to order repeat CT Chest in 4-6 weeks  --Continue to follow CBC with diff  --Continue to follow temperature curve  --Follow up on preliminary blood cultures  --Follow up on Serum Adenovirus PCR    I will continue to follow. Please feel free to contact me with any further questions.    Sarbjit Guzman M.D.  [facility] Division of Infectious Disease  8AM-5PM Monday - Friday: Available on [employer] Teams  After Hours and Holidays (or if no response on [employer] Teams): Please contact the Infectious Diseases Office at [phone]    The above assessment and plan were discussed with Dr Farah (medicine attending)

## 2024-04-27 ENCOUNTER — TRANSCRIPTION ENCOUNTER (OUTPATIENT)
Age: 64
End: 2024-04-27

## 2024-04-27 VITALS
OXYGEN SATURATION: 100 % | SYSTOLIC BLOOD PRESSURE: 159 MMHG | TEMPERATURE: 98 F | DIASTOLIC BLOOD PRESSURE: 67 MMHG | RESPIRATION RATE: 18 BRPM | HEART RATE: 87 BPM

## 2024-04-27 LAB
-  AMPICILLIN: SIGNIFICANT CHANGE UP
-  CIPROFLOXACIN: SIGNIFICANT CHANGE UP
-  LEVOFLOXACIN: SIGNIFICANT CHANGE UP
-  NITROFURANTOIN: SIGNIFICANT CHANGE UP
-  TETRACYCLINE: SIGNIFICANT CHANGE UP
-  VANCOMYCIN: SIGNIFICANT CHANGE UP
ANION GAP SERPL CALC-SCNC: 15 MMOL/L — SIGNIFICANT CHANGE UP (ref 5–17)
BUN SERPL-MCNC: 28 MG/DL — HIGH (ref 7–23)
CALCIUM SERPL-MCNC: 9.6 MG/DL — SIGNIFICANT CHANGE UP (ref 8.4–10.5)
CHLORIDE SERPL-SCNC: 102 MMOL/L — SIGNIFICANT CHANGE UP (ref 96–108)
CO2 SERPL-SCNC: 22 MMOL/L — SIGNIFICANT CHANGE UP (ref 22–31)
CREAT SERPL-MCNC: 1.79 MG/DL — HIGH (ref 0.5–1.3)
CRYPTOC AG FLD QL: NEGATIVE — SIGNIFICANT CHANGE UP
CULTURE RESULTS: ABNORMAL
CYCLOSPORINE SER-MCNC: 66 NG/ML — LOW (ref 150–400)
EGFR: 31 ML/MIN/1.73M2 — LOW
GLUCOSE SERPL-MCNC: 97 MG/DL — SIGNIFICANT CHANGE UP (ref 70–99)
HCT VFR BLD CALC: 35.4 % — SIGNIFICANT CHANGE UP (ref 34.5–45)
HGB BLD-MCNC: 11.5 G/DL — SIGNIFICANT CHANGE UP (ref 11.5–15.5)
MAGNESIUM SERPL-MCNC: 1.8 MG/DL — SIGNIFICANT CHANGE UP (ref 1.6–2.6)
MCHC RBC-ENTMCNC: 29.8 PG — SIGNIFICANT CHANGE UP (ref 27–34)
MCHC RBC-ENTMCNC: 32.5 GM/DL — SIGNIFICANT CHANGE UP (ref 32–36)
MCV RBC AUTO: 91.7 FL — SIGNIFICANT CHANGE UP (ref 80–100)
METHOD TYPE: SIGNIFICANT CHANGE UP
NRBC # BLD: 0 /100 WBCS — SIGNIFICANT CHANGE UP (ref 0–0)
ORGANISM # SPEC MICROSCOPIC CNT: ABNORMAL
ORGANISM # SPEC MICROSCOPIC CNT: ABNORMAL
PHOSPHATE SERPL-MCNC: 4.1 MG/DL — SIGNIFICANT CHANGE UP (ref 2.5–4.5)
PLATELET # BLD AUTO: 170 K/UL — SIGNIFICANT CHANGE UP (ref 150–400)
POTASSIUM SERPL-MCNC: 3.8 MMOL/L — SIGNIFICANT CHANGE UP (ref 3.5–5.3)
POTASSIUM SERPL-SCNC: 3.8 MMOL/L — SIGNIFICANT CHANGE UP (ref 3.5–5.3)
RBC # BLD: 3.86 M/UL — SIGNIFICANT CHANGE UP (ref 3.8–5.2)
RBC # FLD: 13.1 % — SIGNIFICANT CHANGE UP (ref 10.3–14.5)
SODIUM SERPL-SCNC: 139 MMOL/L — SIGNIFICANT CHANGE UP (ref 135–145)
SPECIMEN SOURCE: SIGNIFICANT CHANGE UP
WBC # BLD: 3.93 K/UL — SIGNIFICANT CHANGE UP (ref 3.8–10.5)
WBC # FLD AUTO: 3.93 K/UL — SIGNIFICANT CHANGE UP (ref 3.8–10.5)

## 2024-04-27 PROCEDURE — 99239 HOSP IP/OBS DSCHRG MGMT >30: CPT

## 2024-04-27 PROCEDURE — 99232 SBSQ HOSP IP/OBS MODERATE 35: CPT

## 2024-04-27 RX ADMIN — AMPICILLIN SODIUM AND SULBACTAM SODIUM 200 GRAM(S): 250; 125 INJECTION, POWDER, FOR SUSPENSION INTRAMUSCULAR; INTRAVENOUS at 02:03

## 2024-04-27 RX ADMIN — CYCLOSPORINE 50 MILLIGRAM(S): 100 CAPSULE ORAL at 10:25

## 2024-04-27 RX ADMIN — AMPICILLIN SODIUM AND SULBACTAM SODIUM 200 GRAM(S): 250; 125 INJECTION, POWDER, FOR SUSPENSION INTRAMUSCULAR; INTRAVENOUS at 10:12

## 2024-04-27 RX ADMIN — Medication 2.5 MILLIGRAM(S): at 10:25

## 2024-04-27 RX ADMIN — FAMOTIDINE 20 MILLIGRAM(S): 10 INJECTION INTRAVENOUS at 10:25

## 2024-04-27 NOTE — PROGRESS NOTE ADULT - SUBJECTIVE AND OBJECTIVE BOX
Follow Up:  UTI    Interval History: afebrile. no acute events.     REVIEW OF SYSTEMS  [  ] ROS unobtainable because:    [ x ] All other systems negative except as noted below    Constitutional:  [ ] fever [ ] chills  [ ] weight loss  [ ] weakness  Skin:  [ ] rash [ ] phlebitis	  Eyes: [ ] icterus [ ] pain  [ ] discharge	  ENMT: [ ] sore throat  [ ] thrush [ ] ulcers [ ] exudates  Respiratory: [ ] dyspnea [ ] hemoptysis [ ] cough [ ] sputum	  Cardiovascular:  [ ] chest pain [ ] palpitations [ ] edema	  Gastrointestinal:  [ ] nausea [ ] vomiting [ ] diarrhea [ ] constipation [ ] pain	  Genitourinary:  [ ] dysuria [ ] frequency [ ] hematuria [ ] discharge [ ] flank pain  [ ] incontinence  Musculoskeletal:  [ ] myalgias [ ] arthralgias [ ] arthritis  [ ] back pain  Neurological:  [ ] headache [ ] seizures  [ ] confusion/altered mental status    Allergies  No Known Allergies        ANTIMICROBIALS:  ampicillin/sulbactam  IVPB 3 every 6 hours      OTHER MEDS:  MEDICATIONS  (STANDING):  albuterol/ipratropium for Nebulization 3 every 6 hours PRN  atorvastatin 20 <User Schedule>  cycloSPORINE  , modified (NEORAL) 50 <User Schedule>  cycloSPORINE  , modified (NEORAL) 50 at bedtime  famotidine    Tablet 20 <User Schedule>  hydrochlorothiazide 12.5 daily  predniSONE   Tablet 2.5 daily  rivaroxaban 10 with dinner      Vital Signs Last 24 Hrs  T(C): 36.4 (27 Apr 2024 14:11), Max: 36.7 (27 Apr 2024 04:20)  T(F): 97.5 (27 Apr 2024 14:11), Max: 98 (27 Apr 2024 04:20)  HR: 87 (27 Apr 2024 14:11) (77 - 89)  BP: 159/67 (27 Apr 2024 14:11) (143/81 - 159/67)  BP(mean): --  RR: 18 (27 Apr 2024 14:11) (18 - 18)  SpO2: 100% (27 Apr 2024 14:11) (96% - 100%)    Parameters below as of 27 Apr 2024 14:11  Patient On (Oxygen Delivery Method): room air      PHYSICAL EXAMINATION:  General: Alert and Awake, NAD  HEENT: Normocephalic / Atraumatic  Resp: No accessory muscles of respiration utilized  Abdomen: Not distended.  MSK: No LE edema.   : No serrano  Skin: No rashes or lesions.    Neuro: Alert and Awake. CN 2-12 Grossly intact. Moves all four extremities spontaneously.  Psych: Calm, Pleasant, Cooperative                          11.5   3.93  )-----------( 170      ( 27 Apr 2024 06:56 )             35.4       04-27    139  |  102  |  28<H>  ----------------------------<  97  3.8   |  22  |  1.79<H>    Ca    9.6      27 Apr 2024 06:56  Phos  4.1     04-27  Mg     1.8     04-27        Urinalysis Basic - ( 27 Apr 2024 06:56 )    Color: x / Appearance: x / SG: x / pH: x  Gluc: 97 mg/dL / Ketone: x  / Bili: x / Urobili: x   Blood: x / Protein: x / Nitrite: x   Leuk Esterase: x / RBC: x / WBC x   Sq Epi: x / Non Sq Epi: x / Bacteria: x        MICROBIOLOGY:  v  Clean Catch Clean Catch (Midstream)  04-24-24   50,000 - 99,000 CFU/mL Enterococcus faecalis  --  Enterococcus faecalis      .Blood Blood-Peripheral  04-24-24   No growth at 72 Hours  --  --      .Blood Blood-Peripheral  04-24-24   No growth at 72 Hours  --  --          CMVPCR Log: NotDetec Fvs36EY/mL (04-25 @ 07:18)  Rapid RVP Result: NotDetec (04-24 @ 00:55)        RADIOLOGY:    <The imaging below has been reviewed and visualized by me independently. Findings as detailed in report below>    < from: US Trans Kidney w/ Doppler, Right (04.25.24 @ 12:39) >  IMPRESSION:    No evidence of hydronephrosis or perinephric collection    No evidence of a significant renal artery stenosis.      < end of copied text >

## 2024-04-27 NOTE — DISCHARGE NOTE NURSING/CASE MANAGEMENT/SOCIAL WORK - PATIENT PORTAL LINK FT
You can access the FollowMyHealth Patient Portal offered by [facility] by registering at the following website: [email or web address]. By joining [facility]’s FollowMyHealth portal, you will also be able to view your health information using other applications (apps) compatible with our system.

## 2024-04-27 NOTE — PROGRESS NOTE ADULT - PROBLEM SELECTOR PLAN 1
Vitals concerning for sepsis with temp 101.2 and tachycardia. CXR, V/Q scan, RVP negative.   - Infectious workup: blood cultures x2, urine culture sent. Follow up MRSA swab, urine legionella, urine strep, additional workup as per transplant ID  - Antibiotics: continue zosyn, appreciate transplant ID recs. Transplant ID started vancomycin for GPCs in urine, MRSA swab sent  - Resuscitation: received 1L NS in the ED, does not appear volume down  - CT chest/abdomen/pelvis negative  - Continue bactrim ppx

## 2024-04-27 NOTE — PROGRESS NOTE ADULT - SUBJECTIVE AND OBJECTIVE BOX
[facility] DIVISION OF KIDNEY DISEASES AND HYPERTENSION   FOLLOW UP NOTE    --------------------------------------------------------------------------------    SUBJECTIVE / ROS / INTERVAL EVENTS:  - Patient seen and examined at bedside  - No acute complaints or overnight events, creatinine slightly elevated but pt reports not drinking as much fluids as usual.        PAST HISTORY  --------------------------------------------------------------------------------  No significant changes to PMH, PSH, FHx, SHx, unless otherwise noted    ALLERGIES & MEDICATIONS  --------------------------------------------------------------------------------  Allergies    No Known Allergies    MEDICATIONS  (STANDING):  ampicillin/sulbactam  IVPB 3 Gram(s) IV Intermittent every 6 hours  atorvastatin 20 milliGRAM(s) Oral <User Schedule>  chlorhexidine 4% Liquid 1 Application(s) Topical daily  cholecalciferol 5000 Unit(s) Oral at bedtime  cycloSPORINE  , modified (NEORAL) 50 milliGRAM(s) Oral <User Schedule>  cycloSPORINE  , modified (NEORAL) 50 milliGRAM(s) Oral at bedtime  famotidine    Tablet 20 milliGRAM(s) Oral <User Schedule>  hydrochlorothiazide 12.5 milliGRAM(s) Oral daily  predniSONE   Tablet 2.5 milliGRAM(s) Oral daily  rivaroxaban 10 milliGRAM(s) Oral with dinner    MEDICATIONS  (PRN):  albuterol/ipratropium for Nebulization 3 milliLiter(s) Nebulizer every 6 hours PRN Shortness of Breath and/or Wheezing      Vital Signs Last 24 Hrs  T(C): 36.7 (27 Apr 2024 04:20), Max: 36.8 (26 Apr 2024 14:12)  T(F): 98 (27 Apr 2024 04:20), Max: 98.3 (26 Apr 2024 14:12)  HR: 77 (27 Apr 2024 04:20) (77 - 89)  BP: 143/81 (27 Apr 2024 04:20) (143/81 - 165/79)  BP(mean): --  RR: 18 (27 Apr 2024 04:20) (16 - 18)  SpO2: 96% (27 Apr 2024 04:20) (96% - 98%)    Parameters below as of 27 Apr 2024 04:20  Patient On (Oxygen Delivery Method): room air        I&O's Summary    26 Apr 2024 07:01  -  27 Apr 2024 07:00  --------------------------------------------------------  IN: 1012 mL / OUT: 0 mL / NET: 1012 mL    PHYSICAL EXAM:  General: NAD  Neuro: non-focal  HEENT: NC/AT, anicteric  Pulmonary: CTA B/L  Cardiovascular/Chest: +S1S2, RRR  GI/Abdomen: soft, NT/ND, +bowel sounds        Transplant site: RLQ non-tender  Extremities: No edema  Skin: Warm and dry    LABS/STUDIES  --------------------------------------------------------------------------------                                11.5   3.93  )-----------( 170      ( 27 Apr 2024 06:56 )             35.4     04-27    139  |  102  |  28<H>  ----------------------------<  97  3.8   |  22  |  1.79<H>    Ca    9.6      27 Apr 2024 06:56  Phos  4.1     04-27  Mg     1.8     04-27            Culture - Urine (collected 04-24-24 @ 01:41)  Source: Clean Catch Clean Catch (Midstream)  Final Report (04-27-24 @ 13:10):    50,000 - 99,000 CFU/mL Enterococcus faecalis  Organism: Enterococcus faecalis (04-27-24 @ 13:10)  Organism: Enterococcus faecalis (04-27-24 @ 13:10)    Culture - Blood (collected 04-24-24 @ 00:45)  Source: .Blood Blood-Peripheral  Preliminary Report (04-27-24 @ 04:01):    No growth at 72 Hours    Culture - Blood (collected 04-24-24 @ 00:30)  Source: .Blood Blood-Peripheral  Preliminary Report (04-27-24 @ 04:01):    No growth at 72 Hours

## 2024-04-27 NOTE — PROGRESS NOTE ADULT - SUBJECTIVE AND OBJECTIVE BOX
PROGRESS NOTE:     Patient is a 63y old  Female who presents with a chief complaint of Shortness of breath (26 Apr 2024 18:18)      ---------------------------------------------------  Bri Collado  PGY-2, Internal Medicine  Available on [employer] Teams  Pager: [phone] (LIMILAGROS), or [phone] (NS)  ---------------------------------------------------    INTERVAL / OVERNIGHT EVENTS:  - No acute events overnight; cyclosporine level drawn at 8 pm  - BCx NGTD 72 hrs    SUBJECTIVE:  Patient examined at bedside with no acute complaints.     BRIEF DAILY PLAN:  - Monitor fever curve  - DC planning if stable    MEDICATIONS  (STANDING):  ampicillin/sulbactam  IVPB 3 Gram(s) IV Intermittent every 6 hours  atorvastatin 20 milliGRAM(s) Oral <User Schedule>  chlorhexidine 4% Liquid 1 Application(s) Topical daily  cholecalciferol 5000 Unit(s) Oral at bedtime  cycloSPORINE  , modified (NEORAL) 50 milliGRAM(s) Oral <User Schedule>  cycloSPORINE  , modified (NEORAL) 50 milliGRAM(s) Oral at bedtime  famotidine    Tablet 20 milliGRAM(s) Oral <User Schedule>  hydrochlorothiazide 12.5 milliGRAM(s) Oral daily  predniSONE   Tablet 2.5 milliGRAM(s) Oral daily  rivaroxaban 10 milliGRAM(s) Oral with dinner  trimethoprim   80 mG/sulfamethoxazole 400 mG 1 Tablet(s) Oral daily    MEDICATIONS  (PRN):  albuterol/ipratropium for Nebulization 3 milliLiter(s) Nebulizer every 6 hours PRN Shortness of Breath and/or Wheezing      CAPILLARY BLOOD GLUCOSE        I&O's Summary    26 Apr 2024 07:01  -  27 Apr 2024 07:00  --------------------------------------------------------  IN: 1012 mL / OUT: 0 mL / NET: 1012 mL        VITALS:   T(C): 36.7 (04-27-24 @ 04:20), Max: 36.8 (04-26-24 @ 14:12)  HR: 77 (04-27-24 @ 04:20) (77 - 89)  BP: 143/81 (04-27-24 @ 04:20) (128/72 - 165/79)  RR: 18 (04-27-24 @ 04:20) (16 - 18)  SpO2: 96% (04-27-24 @ 04:20) (96% - 98%)    GENERAL: Pleasant, sitting in bed in no acute distress  EYES: Conjunctiva noninjected or pale, sclera anicteric  HENT: NC/AT, moist mucous membranes  NECK: Supple, trachea midline  LUNG: Nonlabored respirations  CV: RRR, Pulses- Radial: 2+ b/l  ABDOMEN: Nondistended, nontender, mills's sign negative  MSK: No visible deformities, tender extremities - per patient chronic  SKIN: No rashes, bruises  NEURO: AAOx4 (to person, place, time, event), no tremor  PSYCH: Normal mood and affect - somewhat anxious    LABS:                        11.7   4.19  )-----------( 163      ( 26 Apr 2024 07:19 )             36.1     04-26    138  |  102  |  23  ----------------------------<  99  3.9   |  23  |  1.63<H>    Ca    9.6      26 Apr 2024 07:18  Phos  3.4     04-26  Mg     1.8     04-26            Urinalysis Basic - ( 26 Apr 2024 07:18 )    Color: x / Appearance: x / SG: x / pH: x  Gluc: 99 mg/dL / Ketone: x  / Bili: x / Urobili: x   Blood: x / Protein: x / Nitrite: x   Leuk Esterase: x / RBC: x / WBC x   Sq Epi: x / Non Sq Epi: x / Bacteria: x

## 2024-04-27 NOTE — PROGRESS NOTE ADULT - ASSESSMENT
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     COVID19/FLU/RSV PCR (4/24) Negative  UA (4/24) Negative Leukocyte Esterase  CXR (4/24) Left basilar patchy opacity, likely atelectasis but otherwise clear lungs.  V/Q Scan (4/24) Very low probability of pulmonary embolus.    CMV PCR (4/25) Negative    CT Chest (4/24) Groundglass opacity in the right upper lobe measuring 1.2 cm (3:37). Additional 1.3 cm subpleural groundglass opacity in the right middle lobe  CT A/P (4/24) No acute process    Urine Culture (4/24) 50-99K Enterococcus faecalis    Doubt there ground glass opacities on CT Chest are representative of a pneumonia;     #Fever, Diarrhea, Renal Transplant Recipient,   --can discharge on Augmentin 875 BID through 5/3  --Will need followup in transplant ID office to order repeat CT Chest in 4-6 weeks  --Continue to follow CBC with diff  --Continue to follow temperature curve  --Follow up on preliminary blood cultures  --Follow up on cryptococcal serum Ag    I will sign off at this time. Please feel free to contact me with any further questions or concerns.    Sarbjit Guzman M.D.  [facility] Division of Infectious Disease  8AM-5PM Monday - Friday: Available on [employer] Teams  After Hours and Holidays (or if no response on [employer] Teams): Please contact the Infectious Diseases Office at [phone]     Discussed with Dr. Shiva Delgado and with medicine team

## 2024-04-27 NOTE — PROGRESS NOTE ADULT - ASSESSMENT
63F with hx CKD stage 5 from PCKD now s/p LURT via kidney paired exchange on 11/7/17 presents with chills, shortness of breath that suddenly came on one day prior to admission. Transplant nephrology service consulted for renal graft / immunosuppressive med management.    1. Renal transplant - Baseline creatinine about 1.5-6. Cr slightly above baseline but ok for discharge.  Likely due to infection or dietary changes.  DSA drawn in December 2020 was negative. Pt with PRA of 49%. Renal sonograms have been normal previously, currently pending. Creatinine had been slowly rising with increase in cyclo troughs outpatient, although now better at ~1.6. May have had CNI toxicity and currently changing to belatacept, third infusion received on 4/22. Next infusion will be May 7th.  2. Immunosuppression - continue cyclosporine and prednisone.  Continue to hold MMF.    3.  UTI - Transplant ID following.  Plan to d/c home on augmentin.  4. SOB: improved with nebs, may have been COPD-related.  Has groundglass nodule which will need outpatient fu  5. HTN - controlled at home.  No changes to meds needed.   6.  DVT - Pt still on xarelto    Ok for discharge for kidney transplant standpoint.  Will need f/u in 1-2 weeks - discussed with pt and ID.

## 2024-04-27 NOTE — PROGRESS NOTE ADULT - PROVIDER SPECIALTY LIST ADULT
Transplant ID
Transplant Nephrology
Internal Medicine
Internal Medicine
Transplant Nephrology
Internal Medicine

## 2024-04-27 NOTE — PROGRESS NOTE ADULT - ASSESSMENT
Ms. 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 Vitals concerning for sepsis with temp 101.2 and tachycardia. CXR, V/Q scan, RVP negative.

## 2024-04-27 NOTE — DISCHARGE NOTE NURSING/CASE MANAGEMENT/SOCIAL WORK - NSDCFUADDAPPT_GEN_ALL_CORE_FT
APPTS ARE READY TO BE MADE: [x] YES    Best Family or Patient Contact (if needed):    Additional Information about above appointments (if needed):    1: Transplant Nephrology - Dr. Shiva Delgado as soon as possible   2: Transplant ID - Dr. Guzman within 4 weeks  3:     Other comments or requests:

## 2024-04-27 NOTE — PROGRESS NOTE ADULT - PROBLEM SELECTOR PLAN 7
DVT: Xarelto as above  Diet: Renal diet  Dispo: PT referral

## 2024-04-29 ENCOUNTER — NON-APPOINTMENT (OUTPATIENT)
Age: 64
End: 2024-04-29

## 2024-04-29 LAB
CULTURE RESULTS: SIGNIFICANT CHANGE UP
CULTURE RESULTS: SIGNIFICANT CHANGE UP
SPECIMEN SOURCE: SIGNIFICANT CHANGE UP
SPECIMEN SOURCE: SIGNIFICANT CHANGE UP

## 2024-05-01 ENCOUNTER — NON-APPOINTMENT (OUTPATIENT)
Age: 64
End: 2024-05-01

## 2024-05-07 ENCOUNTER — APPOINTMENT (OUTPATIENT)
Dept: RHEUMATOLOGY | Facility: CLINIC | Age: 64
End: 2024-05-07
Payer: MEDICARE

## 2024-05-07 VITALS
DIASTOLIC BLOOD PRESSURE: 80 MMHG | RESPIRATION RATE: 16 BRPM | TEMPERATURE: 98.2 F | SYSTOLIC BLOOD PRESSURE: 128 MMHG | OXYGEN SATURATION: 97 % | HEART RATE: 87 BPM

## 2024-05-07 VITALS
OXYGEN SATURATION: 96 % | HEART RATE: 73 BPM | DIASTOLIC BLOOD PRESSURE: 78 MMHG | SYSTOLIC BLOOD PRESSURE: 141 MMHG | RESPIRATION RATE: 16 BRPM

## 2024-05-07 PROCEDURE — 96365 THER/PROPH/DIAG IV INF INIT: CPT

## 2024-05-07 RX ORDER — BELATACEPT 250 MG/1
250 INJECTION, POWDER, LYOPHILIZED, FOR SOLUTION INTRAVENOUS
Qty: 0 | Refills: 0 | Status: COMPLETED
Start: 2024-02-05

## 2024-05-07 NOTE — HISTORY OF PRESENT ILLNESS
[4] : 4 [N/A] : N/A [Denies] : Denies [No] : No [Yes] : Yes [Declined] : Declined [24g] : 24g [Start Time: ___] : Medication Start Time: [unfilled] [End Time: ___] : Medication End Time: [unfilled] [Medication Name: ___] : Medication Name: [unfilled] [Total Amount Administered: ___] : Total Amount Administered: [unfilled] [IV discontinued. Intact. No signs or symptoms of IV complications noted. Time: ___] : IV discontinued. Intact. No signs or symptoms of IV complications noted. Time: [unfilled] [Patient  instructed to seek medical attention with signs and symptoms of adverse effects] : Patient  instructed to seek medical attention with signs and symptoms of adverse effects [Patient left unit in no acute distress] : Patient left unit in no acute distress [Medications administered as ordered and tolerated well.] : Medications administered as ordered and tolerated well. [Drug wasted: _____] : Drug wasted: [unfilled] [de-identified] : lower back pain, chronic as per patient [de-identified] : pt admitted to [facility] 4/24 for 3.5 days- Sandy MICHEL aware [de-identified] : right medial cephalic [de-identified] : Patient presents for Nulojix infusion, doing well overall. Patient states she was admitted to the hospital on 4/24 and was there for three days admitted for a UTI - she was on multiple ABX and was discharged on ABX but she finished them this past Friday. Patient states she has been experiencing some diarrhea and intermittent vaginal bleeding. She has stopped Xarelto, Bactrim and Cellcept since being admitted, she states she is currently still on cyclosporine but a lower dose. Patient to follow up with her OB regarding the vaginal bleeding, and patient states Sandy MICHEL aware of her situation and okay with proceeding with infusion today.  Patient reports having good urine output. Patient denies any difficulty urinating or swelling to the lower extremities. No other symptoms or concerns noted at this time. Patient tolerated infusion well.

## 2024-05-13 ENCOUNTER — APPOINTMENT (OUTPATIENT)
Dept: TRANSPLANT | Facility: CLINIC | Age: 64
End: 2024-05-13

## 2024-05-15 ENCOUNTER — APPOINTMENT (OUTPATIENT)
Dept: NEPHROLOGY | Facility: CLINIC | Age: 64
End: 2024-05-15
Payer: MEDICARE

## 2024-05-15 VITALS
HEIGHT: 64 IN | SYSTOLIC BLOOD PRESSURE: 137 MMHG | OXYGEN SATURATION: 97 % | HEART RATE: 73 BPM | RESPIRATION RATE: 16 BRPM | DIASTOLIC BLOOD PRESSURE: 63 MMHG | TEMPERATURE: 97.5 F

## 2024-05-15 PROCEDURE — 99214 OFFICE O/P EST MOD 30 MIN: CPT

## 2024-05-15 RX ORDER — SULFAMETHOXAZOLE AND TRIMETHOPRIM 400; 80 MG/1; MG/1
400-80 TABLET ORAL
Qty: 90 | Refills: 3 | Status: DISCONTINUED | COMMUNITY
Start: 2017-11-09 | End: 2024-05-15

## 2024-05-15 RX ORDER — RIVAROXABAN 10 MG/1
10 TABLET, FILM COATED ORAL
Qty: 90 | Refills: 3 | Status: DISCONTINUED | COMMUNITY
Start: 2018-08-13 | End: 2024-05-15

## 2024-05-15 RX ORDER — AMOXICILLIN AND CLAVULANATE POTASSIUM 500; 125 MG/1; MG/1
500-125 TABLET, FILM COATED ORAL
Qty: 14 | Refills: 0 | Status: DISCONTINUED | COMMUNITY
Start: 2019-07-18 | End: 2024-05-15

## 2024-05-15 RX ORDER — CYCLOSPORINE 50 MG/1
50 CAPSULE, LIQUID FILLED ORAL
Qty: 60 | Refills: 3 | Status: DISCONTINUED | COMMUNITY
Start: 2021-04-23 | End: 2024-05-15

## 2024-05-15 NOTE — PHYSICAL EXAM
[General Appearance - Alert] : alert [General Appearance - In No Acute Distress] : in no acute distress [General Appearance - Well Nourished] : well nourished [Sclera] : the sclera and conjunctiva were normal [Extraocular Movements] : extraocular movements were intact [Outer Ear] : the ears and nose were normal in appearance [Nasal Cavity] : the nasal mucosa and septum were normal [Neck Appearance] : the appearance of the neck was normal [Respiration, Rhythm And Depth] : normal respiratory rhythm and effort [Exaggerated Use Of Accessory Muscles For Inspiration] : no accessory muscle use [Auscultation Breath Sounds / Voice Sounds] : lungs were clear to auscultation bilaterally [Heart Rate And Rhythm] : heart rate was normal and rhythm regular [Heart Sounds] : normal S1 and S2 [Heart Sounds Gallop] : no gallops [Murmurs] : no murmurs [Bowel Sounds] : normal bowel sounds [Abdomen Soft] : soft [Abdomen Tenderness] : non-tender [Cervical Lymph Nodes Enlarged Posterior Bilaterally] : posterior cervical [Cervical Lymph Nodes Enlarged Anterior Bilaterally] : anterior cervical [Supraclavicular Lymph Nodes Enlarged Bilaterally] : supraclavicular [Abnormal Walk] : normal gait [Nail Clubbing] : no clubbing  or cyanosis of the fingernails [Musculoskeletal - Swelling] : no joint swelling seen [Skin Color & Pigmentation] : normal skin color and pigmentation [Skin Turgor] : normal skin turgor [] : no rash [Oriented To Time, Place, And Person] : oriented to person, place, and time [Impaired Insight] : insight and judgment were intact [Affect] : the affect was normal

## 2024-05-15 NOTE — PLAN
[FreeTextEntry1] : 1. Renal transplant - Baseline creatinine about 1.5. DSA drawn in December 2020 was negative. Pt with PRA of 49%. Renal sonograms have been normal.  Creatinine has been slowly rising with increase in cyclo troughs.  May have CNI toxicity and currently changing to belatacept.   2. Immunosuppression - now on cyclosporine 50/50 mgs - will reduce to 25mgs daily.  Resume MMF 500mgs BID and cont pred 2.5. Cannot increase MMF as she gets nauseated. Plan to eventually stop cyclosporine.  3. HTN - controlled at home. 4. hyperkalemia/ Prophylaxis - developed high methemoglobin levels on dapsone so stopped. Stop bactrim.  5. Wt gain - likely from prednisone. Now only on 2.5mgs. Pts A1c also high, may benefit from Ozempic.  Will not initiate until belatacept switch completed.  6. Post transplant erythrocytosis - resolved. Monitor 7. DVT history - Pt following with Dr. Maza. Stopped Xarelto due to vaginal bleeding.  She will f/u with GYN and Dr. Maza.    Pt will f/u in 4 weeks during belatacept switch, then once completed, return to Dr. Hoff.

## 2024-05-15 NOTE — HISTORY OF PRESENT ILLNESS
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  \par  \par    [de-identified] : Reviewed labs. Creatinine 1.4.  Was recently hospitalized for UTI with rigors.  After discharge has some abdominal pain and vag bleeding.  Stopped xaralto and MMF has been held.

## 2024-05-16 LAB
ALBUMIN SERPL ELPH-MCNC: 4.3 G/DL
ALP BLD-CCNC: 77 U/L
ALT SERPL-CCNC: 12 U/L
ANION GAP SERPL CALC-SCNC: 13 MMOL/L
APPEARANCE: CLEAR
AST SERPL-CCNC: 14 U/L
BACTERIA: NEGATIVE /HPF
BASOPHILS # BLD AUTO: 0.02 K/UL
BASOPHILS NFR BLD AUTO: 0.5 %
BILIRUB SERPL-MCNC: 0.7 MG/DL
BILIRUBIN URINE: NEGATIVE
BKV DNA SPEC QL NAA+PROBE: NOT DETECTED IU/ML
BLOOD URINE: NEGATIVE
BUN SERPL-MCNC: 36 MG/DL
CALCIUM SERPL-MCNC: 9.4 MG/DL
CAST: 0 /LPF
CHLORIDE SERPL-SCNC: 105 MMOL/L
CMV DNA SPEC QL NAA+PROBE: NOT DETECTED IU/ML
CMVPCR LOG: NOT DETECTED LOG10IU/ML
CO2 SERPL-SCNC: 24 MMOL/L
COLOR: YELLOW
CREAT SERPL-MCNC: 1.42 MG/DL
CREAT SPEC-SCNC: 34 MG/DL
CREAT/PROT UR: NORMAL RATIO
CYCLOSPORINE SER-MCNC: 131 NG/ML
EGFR: 41 ML/MIN/1.73M2
EOSINOPHIL # BLD AUTO: 0.05 K/UL
EOSINOPHIL NFR BLD AUTO: 1.2 %
EPITHELIAL CELLS: 2 /HPF
GLUCOSE QUALITATIVE U: NEGATIVE MG/DL
GLUCOSE SERPL-MCNC: 117 MG/DL
HCT VFR BLD CALC: 39.8 %
HGB BLD-MCNC: 12.6 G/DL
IMM GRANULOCYTES NFR BLD AUTO: 0.2 %
KETONES URINE: NEGATIVE MG/DL
LEUKOCYTE ESTERASE URINE: NEGATIVE
LYMPHOCYTES # BLD AUTO: 0.96 K/UL
LYMPHOCYTES NFR BLD AUTO: 23.6 %
MAGNESIUM SERPL-MCNC: 1.7 MG/DL
MAN DIFF?: NORMAL
MCHC RBC-ENTMCNC: 29.4 PG
MCHC RBC-ENTMCNC: 31.7 GM/DL
MCV RBC AUTO: 92.8 FL
MICROSCOPIC-UA: NORMAL
MONOCYTES # BLD AUTO: 0.32 K/UL
MONOCYTES NFR BLD AUTO: 7.9 %
NEUTROPHILS # BLD AUTO: 2.7 K/UL
NEUTROPHILS NFR BLD AUTO: 66.6 %
NITRITE URINE: NEGATIVE
PH URINE: 6
PLATELET # BLD AUTO: 240 K/UL
POTASSIUM SERPL-SCNC: 4.8 MMOL/L
PROT SERPL-MCNC: 6.7 G/DL
PROT UR-MCNC: <4 MG/DL
PROTEIN URINE: NEGATIVE MG/DL
RBC # BLD: 4.29 M/UL
RBC # FLD: 13.4 %
RED BLOOD CELLS URINE: 0 /HPF
SODIUM SERPL-SCNC: 143 MMOL/L
SPECIFIC GRAVITY URINE: 1.01
UROBILINOGEN URINE: 0.2 MG/DL
WBC # FLD AUTO: 4.06 K/UL
WHITE BLOOD CELLS URINE: 0 /HPF

## 2024-05-23 RX ORDER — FAMOTIDINE 10 MG/ML
1 INJECTION INTRAVENOUS
Refills: 0 | DISCHARGE

## 2024-05-23 RX ORDER — ATORVASTATIN CALCIUM 80 MG/1
1 TABLET, FILM COATED ORAL
Refills: 0 | DISCHARGE

## 2024-05-23 RX ORDER — MYCOPHENOLATE MOFETIL 250 MG/1
1 CAPSULE ORAL
Refills: 0 | DISCHARGE

## 2024-05-23 RX ORDER — HYDROCHLOROTHIAZIDE 25 MG
1 TABLET ORAL
Refills: 0 | DISCHARGE

## 2024-05-23 RX ORDER — RIVAROXABAN 15 MG-20MG
1 KIT ORAL
Refills: 0 | DISCHARGE

## 2024-05-23 RX ORDER — BELATACEPT 250 MG/1
5 INJECTION, POWDER, LYOPHILIZED, FOR SOLUTION INTRAVENOUS
Refills: 0 | DISCHARGE

## 2024-05-23 RX ORDER — CYCLOSPORINE 100 MG/1
1 CAPSULE ORAL
Refills: 0 | DISCHARGE

## 2024-05-23 RX ORDER — CHOLECALCIFEROL (VITAMIN D3) 125 MCG
1 CAPSULE ORAL
Refills: 0 | DISCHARGE

## 2024-06-03 ENCOUNTER — APPOINTMENT (OUTPATIENT)
Dept: RHEUMATOLOGY | Facility: CLINIC | Age: 64
End: 2024-06-03
Payer: MEDICARE

## 2024-06-03 VITALS
HEART RATE: 79 BPM | SYSTOLIC BLOOD PRESSURE: 135 MMHG | DIASTOLIC BLOOD PRESSURE: 77 MMHG | OXYGEN SATURATION: 96 % | RESPIRATION RATE: 16 BRPM

## 2024-06-03 VITALS
DIASTOLIC BLOOD PRESSURE: 77 MMHG | SYSTOLIC BLOOD PRESSURE: 138 MMHG | OXYGEN SATURATION: 97 % | TEMPERATURE: 98.3 F | RESPIRATION RATE: 16 BRPM | HEART RATE: 83 BPM

## 2024-06-03 PROCEDURE — 96365 THER/PROPH/DIAG IV INF INIT: CPT

## 2024-06-03 RX ORDER — BELATACEPT 250 MG/1
250 INJECTION, POWDER, LYOPHILIZED, FOR SOLUTION INTRAVENOUS
Qty: 0 | Refills: 0 | Status: COMPLETED
Start: 2024-02-05

## 2024-06-03 NOTE — HISTORY OF PRESENT ILLNESS
[N/A] : N/A [Denies] : Denies [No] : No [Yes] : Yes [Declined] : Declined [Right upper extremity] : Right upper extremity [24g] : 24g [Start Time: ___] : Medication Start Time: [unfilled] [End Time: ___] : Medication End Time: [unfilled] [Medication Name: ___] : Medication Name: [unfilled] [Total Amount Administered: ___] : Total Amount Administered: [unfilled] [IV discontinued. Intact. No signs or symptoms of IV complications noted. Time: ___] : IV discontinued. Intact. No signs or symptoms of IV complications noted. Time: [unfilled] [Patient  instructed to seek medical attention with signs and symptoms of adverse effects] : Patient  instructed to seek medical attention with signs and symptoms of adverse effects [Patient left unit in no acute distress] : Patient left unit in no acute distress [Medications administered as ordered and tolerated well.] : Medications administered as ordered and tolerated well. [Drug wasted: _____] : Drug wasted: [unfilled] [de-identified] : right medial cephalic [de-identified] : Patient presents for Nulojix infusion, doing well overall. Patient denies any recent infection, antibiotic use or hospitalizations. Patient denies any difficulty urinating or swelling to the lower extremities.  Patient reports having good urine output. No other symptoms or concerns noted at this time. Patient tolerated infusion well.

## 2024-06-05 ENCOUNTER — APPOINTMENT (OUTPATIENT)
Dept: INFECTIOUS DISEASE | Facility: CLINIC | Age: 64
End: 2024-06-05
Payer: MEDICARE

## 2024-06-05 VITALS
HEIGHT: 64 IN | TEMPERATURE: 98 F | WEIGHT: 251 LBS | SYSTOLIC BLOOD PRESSURE: 145 MMHG | RESPIRATION RATE: 16 BRPM | OXYGEN SATURATION: 97 % | DIASTOLIC BLOOD PRESSURE: 71 MMHG | HEART RATE: 77 BPM | BODY MASS INDEX: 42.85 KG/M2

## 2024-06-05 PROCEDURE — 99214 OFFICE O/P EST MOD 30 MIN: CPT

## 2024-06-06 NOTE — HISTORY OF PRESENT ILLNESS
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  Of note patient admitted to [facility] from April 24, 2024 until April 27, 2024 for fevers and shortness of breath. CT Chest (4/24) Groundglass opacity in the right upper lobe measuring 1.2 cm (3:37). Additional 1.3 cm subpleural groundglass opacity in the right middle lobe. CT A/P (4/24) No acute process. Urine Culture (4/24) 50-99K Enterococcus faecalis.  Respiratory viral panel (April 24) negative.  Serum cryptococcal antigen (April 26) negative.  Urine Legionella antigen (April 25) negative.  MRSA/MSSA nasal PCR (April 25) negative.  CMV by PCR (April 25) negative.  Adenovirus PCR (April 25) negative.  Treated with Zosyn while admitted and discharged on Augmentin to complete course through May 3.  Patient was treated empirically for urinary and pulmonary source of infection.  Patient is compliant with her antibiotic since discharge and has completed it.  She denies any further chills or fevers.  She did notes improvement of her respiratory symptoms but does note chronic cough.  Denies any other acute symptoms.

## 2024-06-11 RX ORDER — HYDROCHLOROTHIAZIDE 12.5 MG/1
12.5 TABLET ORAL DAILY
Qty: 90 | Refills: 3 | Status: ACTIVE | COMMUNITY
Start: 2022-04-27 | End: 1900-01-01

## 2024-06-17 ENCOUNTER — APPOINTMENT (OUTPATIENT)
Dept: OBGYN | Facility: CLINIC | Age: 64
End: 2024-06-17
Payer: MEDICARE

## 2024-06-17 DIAGNOSIS — Z01.419 ENCOUNTER FOR GYNECOLOGICAL EXAMINATION (GENERAL) (ROUTINE) W/OUT ABNORMAL FINDINGS: ICD-10-CM

## 2024-06-17 DIAGNOSIS — N95.0 POSTMENOPAUSAL BLEEDING: ICD-10-CM

## 2024-06-17 PROCEDURE — G0444 DEPRESSION SCREEN ANNUAL: CPT

## 2024-06-17 PROCEDURE — G0101: CPT

## 2024-06-17 NOTE — HISTORY OF PRESENT ILLNESS
[FreeTextEntry1] : Patient is s/p renal transplant and was recently hospitalized for UTI with chills After her hospitalization patient had some PMB for 3 days She describes it a s staining  She has been on Xarelto which was stopped A similar thing happened in 2021 with a negative sonogram and no further episodes

## 2024-06-17 NOTE — PHYSICAL EXAM
[Chaperone Declined] : Patient declined chaperone [FreeTextEntry7] : kidney felt in lower abdomen  [Examination Of The Breasts] : a normal appearance [No Masses] : no breast masses were palpable [Labia Majora] : normal [Labia Minora] : normal [Normal] : normal [Uterine Adnexae] : normal

## 2024-06-18 PROBLEM — J43.9 EMPHYSEMA, UNSPECIFIED: Chronic | Status: ACTIVE | Noted: 2024-04-24

## 2024-06-18 PROBLEM — E78.5 HYPERLIPIDEMIA, UNSPECIFIED: Chronic | Status: ACTIVE | Noted: 2024-04-24

## 2024-06-18 PROBLEM — I82.409 ACUTE EMBOLISM AND THROMBOSIS OF UNSPECIFIED DEEP VEINS OF UNSPECIFIED LOWER EXTREMITY: Chronic | Status: ACTIVE | Noted: 2024-04-24

## 2024-06-18 PROBLEM — I67.1 CEREBRAL ANEURYSM, NONRUPTURED: Chronic | Status: ACTIVE | Noted: 2024-04-24

## 2024-06-18 PROBLEM — Q61.3 POLYCYSTIC KIDNEY, UNSPECIFIED: Chronic | Status: ACTIVE | Noted: 2024-04-24

## 2024-06-18 PROBLEM — I10 ESSENTIAL (PRIMARY) HYPERTENSION: Chronic | Status: ACTIVE | Noted: 2024-04-24

## 2024-06-18 LAB — HPV HIGH+LOW RISK DNA PNL CVX: NOT DETECTED

## 2024-06-24 ENCOUNTER — APPOINTMENT (OUTPATIENT)
Dept: TRANSPLANT | Facility: CLINIC | Age: 64
End: 2024-06-24

## 2024-06-24 LAB — CYTOLOGY CVX/VAG DOC THIN PREP: NORMAL

## 2024-06-25 RX ORDER — MYCOPHENOLATE MOFETIL 500 MG/1
500 TABLET ORAL TWICE DAILY
Qty: 60 | Refills: 11 | Status: ACTIVE | COMMUNITY
Start: 2018-11-23 | End: 1900-01-01

## 2024-06-25 RX ORDER — FAMOTIDINE 20 MG/1
20 TABLET, FILM COATED ORAL
Qty: 180 | Refills: 3 | Status: ACTIVE | COMMUNITY
Start: 2017-11-09 | End: 1900-01-01

## 2024-06-26 ENCOUNTER — APPOINTMENT (OUTPATIENT)
Dept: CARDIOLOGY | Facility: CLINIC | Age: 64
End: 2024-06-26
Payer: MEDICARE

## 2024-06-26 VITALS
HEART RATE: 87 BPM | OXYGEN SATURATION: 98 % | WEIGHT: 251 LBS | BODY MASS INDEX: 42.85 KG/M2 | SYSTOLIC BLOOD PRESSURE: 136 MMHG | DIASTOLIC BLOOD PRESSURE: 82 MMHG | HEIGHT: 64 IN

## 2024-06-26 DIAGNOSIS — Z82.3 FAMILY HISTORY OF STROKE: ICD-10-CM

## 2024-06-26 DIAGNOSIS — Z87.891 PERSONAL HISTORY OF NICOTINE DEPENDENCE: ICD-10-CM

## 2024-06-26 DIAGNOSIS — Z86.718 PERSONAL HISTORY OF OTHER VENOUS THROMBOSIS AND EMBOLISM: ICD-10-CM

## 2024-06-26 DIAGNOSIS — Z78.0 ASYMPTOMATIC MENOPAUSAL STATE: ICD-10-CM

## 2024-06-26 PROCEDURE — 99214 OFFICE O/P EST MOD 30 MIN: CPT

## 2024-06-26 PROCEDURE — G2211 COMPLEX E/M VISIT ADD ON: CPT

## 2024-06-27 ENCOUNTER — APPOINTMENT (OUTPATIENT)
Dept: NEPHROLOGY | Facility: CLINIC | Age: 64
End: 2024-06-27
Payer: MEDICARE

## 2024-06-27 VITALS
HEIGHT: 64 IN | TEMPERATURE: 98 F | HEART RATE: 74 BPM | BODY MASS INDEX: 42.85 KG/M2 | WEIGHT: 251 LBS | RESPIRATION RATE: 16 BRPM | DIASTOLIC BLOOD PRESSURE: 88 MMHG | OXYGEN SATURATION: 98 % | SYSTOLIC BLOOD PRESSURE: 178 MMHG

## 2024-06-27 VITALS — SYSTOLIC BLOOD PRESSURE: 150 MMHG | DIASTOLIC BLOOD PRESSURE: 90 MMHG

## 2024-06-27 DIAGNOSIS — I10 ESSENTIAL (PRIMARY) HYPERTENSION: ICD-10-CM

## 2024-06-27 DIAGNOSIS — Z94.0 KIDNEY TRANSPLANT STATUS: ICD-10-CM

## 2024-06-27 DIAGNOSIS — Z79.899 OTHER LONG TERM (CURRENT) DRUG THERAPY: ICD-10-CM

## 2024-06-27 LAB
ALBUMIN SERPL ELPH-MCNC: 4.3 G/DL
ALP BLD-CCNC: 66 U/L
ALT SERPL-CCNC: 14 U/L
ANION GAP SERPL CALC-SCNC: 14 MMOL/L
APPEARANCE: CLEAR
AST SERPL-CCNC: 17 U/L
BACTERIA: NEGATIVE /HPF
BASOPHILS # BLD AUTO: 0.04 K/UL
BASOPHILS NFR BLD AUTO: 0.8 %
BILIRUB SERPL-MCNC: 0.6 MG/DL
BILIRUBIN URINE: NEGATIVE
BKV DNA SPEC QL NAA+PROBE: NOT DETECTED IU/ML
BLOOD URINE: NEGATIVE
BUN SERPL-MCNC: 26 MG/DL
CALCIUM SERPL-MCNC: 9.7 MG/DL
CALCIUM SERPL-MCNC: 9.7 MG/DL
CAST: 0 /LPF
CHLORIDE SERPL-SCNC: 104 MMOL/L
CMV DNA SPEC QL NAA+PROBE: NOT DETECTED IU/ML
CMVPCR LOG: NOT DETECTED LOG10IU/ML
CO2 SERPL-SCNC: 24 MMOL/L
COLOR: YELLOW
CREAT SERPL-MCNC: 1.29 MG/DL
CREAT SPEC-SCNC: 32 MG/DL
CREAT/PROT UR: NORMAL RATIO
CYCLOSPORINE SER-MCNC: <30 NG/ML
EGFR: 46 ML/MIN/1.73M2
EOSINOPHIL # BLD AUTO: 0.12 K/UL
EOSINOPHIL NFR BLD AUTO: 2.5 %
EPITHELIAL CELLS: 7 /HPF
ESTIMATED AVERAGE GLUCOSE: 126 MG/DL
GLUCOSE QUALITATIVE U: NEGATIVE MG/DL
GLUCOSE SERPL-MCNC: 114 MG/DL
HBA1C MFR BLD HPLC: 6 %
HCT VFR BLD CALC: 40.8 %
HGB BLD-MCNC: 13 G/DL
IMM GRANULOCYTES NFR BLD AUTO: 0.2 %
KETONES URINE: NEGATIVE MG/DL
LEUKOCYTE ESTERASE URINE: NEGATIVE
LYMPHOCYTES # BLD AUTO: 1.01 K/UL
LYMPHOCYTES NFR BLD AUTO: 21.4 %
MAGNESIUM SERPL-MCNC: 1.8 MG/DL
MAN DIFF?: NORMAL
MCHC RBC-ENTMCNC: 29.3 PG
MCHC RBC-ENTMCNC: 31.9 GM/DL
MCV RBC AUTO: 91.9 FL
MICROSCOPIC-UA: NORMAL
MONOCYTES # BLD AUTO: 0.39 K/UL
MONOCYTES NFR BLD AUTO: 8.2 %
NEUTROPHILS # BLD AUTO: 3.16 K/UL
NEUTROPHILS NFR BLD AUTO: 66.9 %
NITRITE URINE: NEGATIVE
PARATHYROID HORMONE INTACT: 73 PG/ML
PH URINE: 6.5
PHOSPHATE SERPL-MCNC: 3.2 MG/DL
PLATELET # BLD AUTO: 221 K/UL
POTASSIUM SERPL-SCNC: 4 MMOL/L
PROT SERPL-MCNC: 6.5 G/DL
PROT UR-MCNC: <4 MG/DL
PROTEIN URINE: NEGATIVE MG/DL
RBC # BLD: 4.44 M/UL
RBC # FLD: 13.1 %
RED BLOOD CELLS URINE: 0 /HPF
SODIUM SERPL-SCNC: 142 MMOL/L
SPECIFIC GRAVITY URINE: 1.01
UROBILINOGEN URINE: 0.2 MG/DL
WBC # FLD AUTO: 4.73 K/UL
WHITE BLOOD CELLS URINE: 1 /HPF

## 2024-06-27 PROCEDURE — 99214 OFFICE O/P EST MOD 30 MIN: CPT

## 2024-06-28 PROBLEM — I10 HTN (HYPERTENSION): Status: ACTIVE | Noted: 2022-03-18

## 2024-06-28 PROBLEM — Z94.0 KIDNEY TRANSPLANT RECIPIENT: Status: ACTIVE | Noted: 2017-11-09

## 2024-06-28 PROBLEM — Z79.899 LONG TERM CURRENT USE OF IMMUNOSUPPRESSIVE DRUG: Status: ACTIVE | Noted: 2017-11-20

## 2024-07-01 ENCOUNTER — APPOINTMENT (OUTPATIENT)
Dept: RHEUMATOLOGY | Facility: CLINIC | Age: 64
End: 2024-07-01
Payer: MEDICARE

## 2024-07-01 VITALS
DIASTOLIC BLOOD PRESSURE: 74 MMHG | HEART RATE: 72 BPM | RESPIRATION RATE: 16 BRPM | SYSTOLIC BLOOD PRESSURE: 119 MMHG | OXYGEN SATURATION: 98 %

## 2024-07-01 VITALS
OXYGEN SATURATION: 97 % | SYSTOLIC BLOOD PRESSURE: 109 MMHG | HEART RATE: 80 BPM | TEMPERATURE: 98.2 F | RESPIRATION RATE: 16 BRPM | DIASTOLIC BLOOD PRESSURE: 73 MMHG

## 2024-07-01 PROCEDURE — 96365 THER/PROPH/DIAG IV INF INIT: CPT

## 2024-07-01 RX ORDER — BELATACEPT 250 MG/1
250 INJECTION, POWDER, LYOPHILIZED, FOR SOLUTION INTRAVENOUS
Qty: 0 | Refills: 0 | Status: COMPLETED
Start: 2024-02-05

## 2024-07-23 NOTE — CONSULT NOTE ADULT - ATTENDING COMMENTS
Compliance to treatment/Expressive of emotions/Intact employment/Interpersonal skills/Physically healthy
63 year old woman with polycystic liver and kidney disease leading to advanced CKD. She received LURT via kidney paired exchange on 11/7/17. Baseline creatinine 1.5mg/dL.  On immunosuppression with Cyclosporine 50mg po bid, MMF 500mg po bid, Prednisone 2.5mg daily and Belatacept infusion recently started s/p 3rd dose on 4/22/24. She was previously on tacrolimus but had hair loss, also has adverse reaction ?numbness with cyclosporine which prompted change to belatacept.  Other PMH includes HTN, DVT on AC  She presents with acute chills, shortness of breath and fever x 1 day.   On presentation fever 101. RVP negative. CT chest and abdomen unremarkable except for scattered GGO, UA negative, no leukocytosis. Procal elevated at 18.     This AM she c/o nausea and vomiting when taking medications, tolerated breakfast, no diarrhea, no abdominal pain, no cough or shortness of breath.     Vital Signs Last 24 Hrs  T(C): 36.5 (25 Apr 2024 12:03), Max: 38.4 (24 Apr 2024 16:03)  T(F): 97.7 (25 Apr 2024 12:03), Max: 101.2 (24 Apr 2024 16:03)  HR: 85 (25 Apr 2024 12:03) (64 - 125)  BP: 160/88 (25 Apr 2024 12:03) (122/96 - 160/88)  RR: 19 (25 Apr 2024 12:03) (17 - 19)  SpO2: 92% (25 Apr 2024 12:03) (90% - 97%)    On exam: comfortably sitting in chair, lungs clear, abdomen obese soft and non tender, no edema    - S/p LURD transplant with stable graft function   - IS: Continue cyclosporine 50mg po bid,  prednisone 2.5mg daily. Hold MMF due to concern for infeciton   - ID: unclear source of infection. Elevated procal but U/A negative, no respiratory symptoms, CT abdomen unremarkable.      Continue empiric zosyn, f/u cultures. Check CMV PCR, ID follow up   - Discontinue bactrim as PCP risk is low in patient transplanted in 2017 and has high K.

## 2024-07-26 ENCOUNTER — APPOINTMENT (OUTPATIENT)
Dept: CT IMAGING | Facility: CLINIC | Age: 64
End: 2024-07-26
Payer: MEDICARE

## 2024-07-26 ENCOUNTER — APPOINTMENT (OUTPATIENT)
Dept: RADIOLOGY | Facility: CLINIC | Age: 64
End: 2024-07-26
Payer: MEDICARE

## 2024-07-26 ENCOUNTER — APPOINTMENT (OUTPATIENT)
Dept: MAMMOGRAPHY | Facility: CLINIC | Age: 64
End: 2024-07-26
Payer: MEDICARE

## 2024-07-26 ENCOUNTER — RESULT REVIEW (OUTPATIENT)
Age: 64
End: 2024-07-26

## 2024-07-26 PROCEDURE — 77067 SCR MAMMO BI INCL CAD: CPT | Mod: 26

## 2024-07-26 PROCEDURE — 77063 BREAST TOMOSYNTHESIS BI: CPT | Mod: 26

## 2024-07-26 PROCEDURE — 77085 DXA BONE DENSITY AXL VRT FX: CPT | Mod: 26

## 2024-07-26 PROCEDURE — 71250 CT THORAX DX C-: CPT | Mod: 26,MH

## 2024-07-29 ENCOUNTER — APPOINTMENT (OUTPATIENT)
Dept: RHEUMATOLOGY | Facility: CLINIC | Age: 64
End: 2024-07-29
Payer: MEDICARE

## 2024-07-29 ENCOUNTER — TRANSCRIPTION ENCOUNTER (OUTPATIENT)
Age: 64
End: 2024-07-29

## 2024-07-29 VITALS — DIASTOLIC BLOOD PRESSURE: 75 MMHG | SYSTOLIC BLOOD PRESSURE: 114 MMHG | HEART RATE: 76 BPM | OXYGEN SATURATION: 97 %

## 2024-07-29 VITALS
RESPIRATION RATE: 16 BRPM | TEMPERATURE: 98.2 F | HEART RATE: 82 BPM | OXYGEN SATURATION: 97 % | DIASTOLIC BLOOD PRESSURE: 75 MMHG | SYSTOLIC BLOOD PRESSURE: 114 MMHG

## 2024-07-29 PROCEDURE — 96365 THER/PROPH/DIAG IV INF INIT: CPT

## 2024-07-29 RX ORDER — BELATACEPT 250 MG/1
250 INJECTION, POWDER, LYOPHILIZED, FOR SOLUTION INTRAVENOUS
Qty: 0 | Refills: 0 | Status: COMPLETED
Start: 2024-02-05

## 2024-07-29 NOTE — HISTORY OF PRESENT ILLNESS
[N/A] : N/A [Denies] : Denies [No] : No [Yes] : Yes [Declined] : Declined [Right upper extremity] : Right upper extremity [24g] : 24g [Start Time: ___] : Medication Start Time: [unfilled] [End Time: ___] : Medication End Time: [unfilled] [Medication Name: ___] : Medication Name: [unfilled] [Total Amount Administered: ___] : Total Amount Administered: [unfilled] [IV discontinued. Intact. No signs or symptoms of IV complications noted. Time: ___] : IV discontinued. Intact. No signs or symptoms of IV complications noted. Time: [unfilled] [Patient  instructed to seek medical attention with signs and symptoms of adverse effects] : Patient  instructed to seek medical attention with signs and symptoms of adverse effects [Patient left unit in no acute distress] : Patient left unit in no acute distress [Medications administered as ordered and tolerated well.] : Medications administered as ordered and tolerated well. [Drug wasted: _____] : Drug wasted: [unfilled] [de-identified] : right arm cephalic vein  [de-identified] : Patient presents for Nulojix infusion, doing well overall. Patient denies any recent infection, antibiotic use or hospitalizations. Patient denies any difficulty urinating or swelling to the lower extremities.  Patient reports having good urine output. No other symptoms or concerns noted at this time. Patient tolerated infusion well.

## 2024-08-01 ENCOUNTER — OUTPATIENT (OUTPATIENT)
Dept: OUTPATIENT SERVICES | Facility: HOSPITAL | Age: 64
LOS: 1 days | End: 2024-08-01
Payer: MEDICARE

## 2024-08-01 ENCOUNTER — APPOINTMENT (OUTPATIENT)
Dept: ULTRASOUND IMAGING | Facility: CLINIC | Age: 64
End: 2024-08-01
Payer: MEDICARE

## 2024-08-01 DIAGNOSIS — Z98.891 HISTORY OF UTERINE SCAR FROM PREVIOUS SURGERY: Chronic | ICD-10-CM

## 2024-08-01 DIAGNOSIS — Z94.0 KIDNEY TRANSPLANT STATUS: Chronic | ICD-10-CM

## 2024-08-01 DIAGNOSIS — N95.0 POSTMENOPAUSAL BLEEDING: ICD-10-CM

## 2024-08-01 PROCEDURE — 76856 US EXAM PELVIC COMPLETE: CPT | Mod: 26

## 2024-08-01 PROCEDURE — 76856 US EXAM PELVIC COMPLETE: CPT

## 2024-08-01 PROCEDURE — 93970 EXTREMITY STUDY: CPT

## 2024-08-01 PROCEDURE — 93970 EXTREMITY STUDY: CPT | Mod: 26

## 2024-08-05 ENCOUNTER — NON-APPOINTMENT (OUTPATIENT)
Age: 64
End: 2024-08-05

## 2024-08-07 ENCOUNTER — NON-APPOINTMENT (OUTPATIENT)
Age: 64
End: 2024-08-07

## 2024-08-07 ENCOUNTER — TRANSCRIPTION ENCOUNTER (OUTPATIENT)
Age: 64
End: 2024-08-07

## 2024-08-15 ENCOUNTER — APPOINTMENT (OUTPATIENT)
Dept: OBGYN | Facility: CLINIC | Age: 64
End: 2024-08-15
Payer: MEDICARE

## 2024-08-15 VITALS — SYSTOLIC BLOOD PRESSURE: 153 MMHG | DIASTOLIC BLOOD PRESSURE: 85 MMHG | HEIGHT: 64 IN

## 2024-08-15 DIAGNOSIS — N95.0 POSTMENOPAUSAL BLEEDING: ICD-10-CM

## 2024-08-15 PROCEDURE — 58100 BIOPSY OF UTERUS LINING: CPT

## 2024-08-15 NOTE — PROCEDURE
[Endometrial Biopsy] : Endometrial biopsy [Time out performed] : Pre-procedure time out performed.  Patient's name, date of birth and procedure confirmed. [Consent Obtained] : Consent obtained [Thickened Endometrium] : thickened endometrium [Post-Menop. Bleeding] : post-menopausal bleeding [Risks] : risks [Benefits] : benefits [Alternatives] : alternatives [Patient] : patient [Infection] : infection [Bleeding] : bleeding [Allergic Reaction] : allergic reaction [Uterine Perforation] : uterine perforation [Pain] : pain [Negative] : negative pregnancy test [Betadine] : Betadine [___ mL Injected] : [unfilled] ~UmL of lidocaine [Tenaculum] : Tenaculum [Easy Passage] : Easy passage [Anteverted] : anteverted [Scant] : scant [Specimen Collected] : collected [Sent to Pathology] : placed in buffered formalin and sent for pathology [Tolerated Well] : Patient tolerated the procedure well [No Complications] : No complications

## 2024-08-19 LAB — CORE LAB BIOPSY: NORMAL

## 2024-08-30 ENCOUNTER — APPOINTMENT (OUTPATIENT)
Dept: RHEUMATOLOGY | Facility: CLINIC | Age: 64
End: 2024-08-30
Payer: MEDICARE

## 2024-08-30 ENCOUNTER — APPOINTMENT (OUTPATIENT)
Dept: TRANSPLANT | Facility: CLINIC | Age: 64
End: 2024-08-30

## 2024-08-30 VITALS
DIASTOLIC BLOOD PRESSURE: 76 MMHG | OXYGEN SATURATION: 96 % | HEART RATE: 77 BPM | RESPIRATION RATE: 16 BRPM | SYSTOLIC BLOOD PRESSURE: 131 MMHG

## 2024-08-30 VITALS
TEMPERATURE: 98.1 F | HEART RATE: 84 BPM | OXYGEN SATURATION: 98 % | SYSTOLIC BLOOD PRESSURE: 102 MMHG | RESPIRATION RATE: 16 BRPM | DIASTOLIC BLOOD PRESSURE: 66 MMHG

## 2024-08-30 PROCEDURE — 96365 THER/PROPH/DIAG IV INF INIT: CPT

## 2024-08-30 RX ORDER — BELATACEPT 250 MG/1
250 INJECTION, POWDER, LYOPHILIZED, FOR SOLUTION INTRAVENOUS
Qty: 0 | Refills: 0 | Status: COMPLETED
Start: 2024-02-05

## 2024-08-30 NOTE — HISTORY OF PRESENT ILLNESS
[N/A] : N/A [Denies] : Denies [No] : No [Yes] : Yes [Declined] : Declined [Right upper extremity] : Right upper extremity [24g] : 24g [Start Time: ___] : Medication Start Time: [unfilled] [End Time: ___] : Medication End Time: [unfilled] [Medication Name: ___] : Medication Name: [unfilled] [Total Amount Administered: ___] : Total Amount Administered: [unfilled] [IV discontinued. Intact. No signs or symptoms of IV complications noted. Time: ___] : IV discontinued. Intact. No signs or symptoms of IV complications noted. Time: [unfilled] [Patient  instructed to seek medical attention with signs and symptoms of adverse effects] : Patient  instructed to seek medical attention with signs and symptoms of adverse effects [Medications administered as ordered and tolerated well.] : Medications administered as ordered and tolerated well. [Patient left unit in no acute distress] : Patient left unit in no acute distress [Drug wasted: _____] : Drug wasted: [unfilled] [de-identified] : right arm cephalic vein  [de-identified] : Patient presents for Nulojix infusion, doing well overall. Patient denies any recent infection, ABX use or hospitalizations. Patient denies any difficulty urinating or swelling to the lower extremities.  Patient reports having good urine output. Patient admits to some hair loss, but states this has happened in the past with other medications, RN advised patient follow up with provider regarding this. No other symptoms or concerns noted at this time. Patient tolerated infusion well.

## 2024-09-04 ENCOUNTER — APPOINTMENT (OUTPATIENT)
Dept: NEPHROLOGY | Facility: CLINIC | Age: 64
End: 2024-09-04
Payer: MEDICARE

## 2024-09-04 VITALS
HEART RATE: 74 BPM | HEIGHT: 64 IN | TEMPERATURE: 97.4 F | OXYGEN SATURATION: 99 % | DIASTOLIC BLOOD PRESSURE: 71 MMHG | SYSTOLIC BLOOD PRESSURE: 126 MMHG | WEIGHT: 250 LBS | BODY MASS INDEX: 42.68 KG/M2

## 2024-09-04 DIAGNOSIS — I10 ESSENTIAL (PRIMARY) HYPERTENSION: ICD-10-CM

## 2024-09-04 DIAGNOSIS — Z79.899 OTHER LONG TERM (CURRENT) DRUG THERAPY: ICD-10-CM

## 2024-09-04 DIAGNOSIS — M19.90 UNSPECIFIED OSTEOARTHRITIS, UNSPECIFIED SITE: ICD-10-CM

## 2024-09-04 DIAGNOSIS — Z94.0 KIDNEY TRANSPLANT STATUS: ICD-10-CM

## 2024-09-04 LAB
ALBUMIN SERPL ELPH-MCNC: 4.4 G/DL
ALP BLD-CCNC: 72 U/L
ALT SERPL-CCNC: 11 U/L
ANION GAP SERPL CALC-SCNC: 17 MMOL/L
APPEARANCE: CLEAR
AST SERPL-CCNC: 16 U/L
BACTERIA: NEGATIVE /HPF
BASOPHILS # BLD AUTO: 0.03 K/UL
BASOPHILS NFR BLD AUTO: 0.7 %
BILIRUB SERPL-MCNC: 0.6 MG/DL
BILIRUBIN URINE: NEGATIVE
BKV DNA SPEC QL NAA+PROBE: NOT DETECTED IU/ML
BLOOD URINE: NEGATIVE
BUN SERPL-MCNC: 25 MG/DL
CALCIUM SERPL-MCNC: 9.6 MG/DL
CALCIUM SERPL-MCNC: 9.6 MG/DL
CAST: 0 /LPF
CHLORIDE SERPL-SCNC: 106 MMOL/L
CMV DNA SPEC QL NAA+PROBE: NOT DETECTED IU/ML
CMVPCR LOG: NOT DETECTED LOG10IU/ML
CO2 SERPL-SCNC: 24 MMOL/L
COLOR: YELLOW
CREAT SERPL-MCNC: 1.34 MG/DL
CREAT SPEC-SCNC: 31 MG/DL
CREAT/PROT UR: 0.2 RATIO
EGFR: 44 ML/MIN/1.73M2
EOSINOPHIL # BLD AUTO: 0.09 K/UL
EOSINOPHIL NFR BLD AUTO: 2.1 %
EPITHELIAL CELLS: 2 /HPF
GLUCOSE QUALITATIVE U: NEGATIVE MG/DL
GLUCOSE SERPL-MCNC: 117 MG/DL
HCT VFR BLD CALC: 44.6 %
HGB BLD-MCNC: 13.6 G/DL
IMM GRANULOCYTES NFR BLD AUTO: 0 %
KETONES URINE: NEGATIVE MG/DL
LEUKOCYTE ESTERASE URINE: ABNORMAL
LYMPHOCYTES # BLD AUTO: 0.93 K/UL
LYMPHOCYTES NFR BLD AUTO: 21.8 %
MAGNESIUM SERPL-MCNC: 2 MG/DL
MAN DIFF?: NORMAL
MCHC RBC-ENTMCNC: 28.7 PG
MCHC RBC-ENTMCNC: 30.5 GM/DL
MCV RBC AUTO: 94.1 FL
MICROSCOPIC-UA: NORMAL
MONOCYTES # BLD AUTO: 0.33 K/UL
MONOCYTES NFR BLD AUTO: 7.7 %
NEUTROPHILS # BLD AUTO: 2.88 K/UL
NEUTROPHILS NFR BLD AUTO: 67.7 %
NITRITE URINE: NEGATIVE
PARATHYROID HORMONE INTACT: 67 PG/ML
PH URINE: 6
PHOSPHATE SERPL-MCNC: 3.8 MG/DL
PLATELET # BLD AUTO: 233 K/UL
POTASSIUM SERPL-SCNC: 4.7 MMOL/L
PROT SERPL-MCNC: 6.6 G/DL
PROT UR-MCNC: 5 MG/DL
PROTEIN URINE: NEGATIVE MG/DL
RBC # BLD: 4.74 M/UL
RBC # FLD: 13.6 %
RED BLOOD CELLS URINE: 0 /HPF
SODIUM SERPL-SCNC: 146 MMOL/L
SPECIFIC GRAVITY URINE: 1.01
TACROLIMUS SERPL-MCNC: <2 NG/ML
UROBILINOGEN URINE: 0.2 MG/DL
WBC # FLD AUTO: 4.26 K/UL
WHITE BLOOD CELLS URINE: 13 /HPF

## 2024-09-04 PROCEDURE — 99214 OFFICE O/P EST MOD 30 MIN: CPT

## 2024-09-04 RX ORDER — MYCOPHENILIC ACID 360 MG/1
360 TABLET, DELAYED RELEASE ORAL
Qty: 60 | Refills: 3 | Status: ACTIVE | COMMUNITY
Start: 2024-09-04 | End: 1900-01-01

## 2024-09-04 NOTE — PLAN
[FreeTextEntry1] : 1. Renal transplant - Baseline creatinine about 1.5 now down to 1.3 on belatacept.   Pt with PRA of 49%. Renal sonograms have been normal.   2. Immunosuppression - now on belatacept, MMF 500mgs BID and cont pred 2.5. Change MMF to myfortic.  3. HTN - controlled  4. Wt gain - likely from prednisone. Now only on 2.5mgs. Pts A1c also high, may benefit from Ozempic.  Will not initiate until belatacept switch completed and now once she tolerates myfortic.  5. DVT history - Pt following with Dr. Maza.  Back on Xarelto.  Pt will f/u in 8 weeks during belatacept switch, then once completed, return to Dr. Hoff.

## 2024-09-04 NOTE — HISTORY OF PRESENT ILLNESS
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  \par  \par    [de-identified] : Reviewed labs. Creatinine 1.3 on belatacept.  she has been feeling better.  Now back on Xarelto.  DEXA scan - normal bone density.  Had an endometrial biopsy which was normal. Still struggling with weight gain.  Also gets numbness of mouth and brain fog she believes after taking MMF.

## 2024-09-23 ENCOUNTER — APPOINTMENT (OUTPATIENT)
Dept: RHEUMATOLOGY | Facility: CLINIC | Age: 64
End: 2024-09-23
Payer: MEDICARE

## 2024-09-23 VITALS — SYSTOLIC BLOOD PRESSURE: 128 MMHG | OXYGEN SATURATION: 98 % | DIASTOLIC BLOOD PRESSURE: 72 MMHG | HEART RATE: 65 BPM

## 2024-09-23 VITALS
RESPIRATION RATE: 16 BRPM | DIASTOLIC BLOOD PRESSURE: 80 MMHG | SYSTOLIC BLOOD PRESSURE: 137 MMHG | OXYGEN SATURATION: 97 % | TEMPERATURE: 97.5 F | HEART RATE: 65 BPM

## 2024-09-23 PROCEDURE — 96365 THER/PROPH/DIAG IV INF INIT: CPT

## 2024-09-23 RX ORDER — BELATACEPT 250 MG/1
250 INJECTION, POWDER, LYOPHILIZED, FOR SOLUTION INTRAVENOUS
Qty: 0 | Refills: 0 | Status: COMPLETED
Start: 2024-02-05

## 2024-09-23 NOTE — HISTORY OF PRESENT ILLNESS
[N/A] : N/A [Denies] : Denies [No] : No [Yes] : Yes [Declined] : Declined [Right upper extremity] : Right upper extremity [24g] : 24g [Start Time: ___] : Medication Start Time: [unfilled] [End Time: ___] : Medication End Time: [unfilled] [Medication Name: ___] : Medication Name: [unfilled] [Total Amount Administered: ___] : Total Amount Administered: [unfilled] [IV discontinued. Intact. No signs or symptoms of IV complications noted. Time: ___] : IV discontinued. Intact. No signs or symptoms of IV complications noted. Time: [unfilled] [Patient  instructed to seek medical attention with signs and symptoms of adverse effects] : Patient  instructed to seek medical attention with signs and symptoms of adverse effects [Patient left unit in no acute distress] : Patient left unit in no acute distress [Medications administered as ordered and tolerated well.] : Medications administered as ordered and tolerated well. [Drug wasted: _____] : Drug wasted: [unfilled] [de-identified] : right arm cephalic vein [de-identified] : Patient presents for Nulojix infusion, doing well overall. Patient denies any recent infection, ABX use or hospitalizations. Patient denies any difficulty urinating or swelling to the lower extremities.  Patient reports having good urine output. Patient continues to report hair loss, advised patient to follow up with provider if symptoms persist or worsen. No other symptoms or concerns noted at this time. Patient tolerated infusion well.

## 2024-09-27 DIAGNOSIS — Z94.0 KIDNEY TRANSPLANT STATUS: ICD-10-CM

## 2024-10-22 ENCOUNTER — APPOINTMENT (OUTPATIENT)
Dept: RHEUMATOLOGY | Facility: CLINIC | Age: 64
End: 2024-10-22
Payer: MEDICARE

## 2024-10-22 VITALS
RESPIRATION RATE: 16 BRPM | OXYGEN SATURATION: 98 % | SYSTOLIC BLOOD PRESSURE: 110 MMHG | HEART RATE: 72 BPM | DIASTOLIC BLOOD PRESSURE: 73 MMHG

## 2024-10-22 VITALS
SYSTOLIC BLOOD PRESSURE: 106 MMHG | HEART RATE: 88 BPM | RESPIRATION RATE: 16 BRPM | TEMPERATURE: 98.1 F | DIASTOLIC BLOOD PRESSURE: 71 MMHG | OXYGEN SATURATION: 98 %

## 2024-10-22 PROCEDURE — 96365 THER/PROPH/DIAG IV INF INIT: CPT

## 2024-10-22 RX ORDER — BELATACEPT 250 MG/1
250 INJECTION, POWDER, LYOPHILIZED, FOR SOLUTION INTRAVENOUS
Qty: 0 | Refills: 0 | Status: COMPLETED
Start: 2024-02-05

## 2024-11-12 ENCOUNTER — APPOINTMENT (OUTPATIENT)
Dept: TRANSPLANT | Facility: CLINIC | Age: 64
End: 2024-11-12

## 2024-11-14 ENCOUNTER — APPOINTMENT (OUTPATIENT)
Dept: NEPHROLOGY | Facility: CLINIC | Age: 64
End: 2024-11-14
Payer: MEDICARE

## 2024-11-14 VITALS
DIASTOLIC BLOOD PRESSURE: 78 MMHG | OXYGEN SATURATION: 98 % | HEART RATE: 78 BPM | HEIGHT: 64 IN | TEMPERATURE: 97.8 F | SYSTOLIC BLOOD PRESSURE: 132 MMHG

## 2024-11-14 DIAGNOSIS — Z94.0 KIDNEY TRANSPLANT STATUS: ICD-10-CM

## 2024-11-14 DIAGNOSIS — Z79.899 OTHER LONG TERM (CURRENT) DRUG THERAPY: ICD-10-CM

## 2024-11-14 DIAGNOSIS — I10 ESSENTIAL (PRIMARY) HYPERTENSION: ICD-10-CM

## 2024-11-14 DIAGNOSIS — R11.0 NAUSEA: ICD-10-CM

## 2024-11-14 LAB
ALBUMIN SERPL ELPH-MCNC: 4.2 G/DL
ALP BLD-CCNC: 63 U/L
ALT SERPL-CCNC: 13 U/L
ANION GAP SERPL CALC-SCNC: 15 MMOL/L
APPEARANCE: CLEAR
AST SERPL-CCNC: 17 U/L
BACTERIA: NEGATIVE /HPF
BASOPHILS # BLD AUTO: 0.03 K/UL
BASOPHILS NFR BLD AUTO: 0.7 %
BILIRUB SERPL-MCNC: 0.5 MG/DL
BILIRUBIN URINE: NEGATIVE
BKV DNA SPEC QL NAA+PROBE: NOT DETECTED IU/ML
BLOOD URINE: NEGATIVE
BUN SERPL-MCNC: 23 MG/DL
CALCIUM SERPL-MCNC: 9.5 MG/DL
CALCIUM SERPL-MCNC: 9.5 MG/DL
CAST: 0 /LPF
CHLORIDE SERPL-SCNC: 102 MMOL/L
CMV DNA SPEC QL NAA+PROBE: NOT DETECTED IU/ML
CMVPCR LOG: NOT DETECTED LOG10IU/ML
CO2 SERPL-SCNC: 25 MMOL/L
COLOR: YELLOW
CREAT SERPL-MCNC: 1.58 MG/DL
CREAT SPEC-SCNC: 42 MG/DL
CREAT/PROT UR: 0.1 RATIO
EGFR: 36 ML/MIN/1.73M2
EOSINOPHIL # BLD AUTO: 0.04 K/UL
EOSINOPHIL NFR BLD AUTO: 0.9 %
EPITHELIAL CELLS: 1 /HPF
GLUCOSE QUALITATIVE U: NEGATIVE MG/DL
GLUCOSE SERPL-MCNC: 112 MG/DL
HCT VFR BLD CALC: 44.2 %
HGB BLD-MCNC: 13.5 G/DL
IMM GRANULOCYTES NFR BLD AUTO: 0.2 %
KETONES URINE: NEGATIVE MG/DL
LEUKOCYTE ESTERASE URINE: ABNORMAL
LYMPHOCYTES # BLD AUTO: 0.65 K/UL
LYMPHOCYTES NFR BLD AUTO: 15.1 %
MAGNESIUM SERPL-MCNC: 1.9 MG/DL
MAN DIFF?: NORMAL
MCHC RBC-ENTMCNC: 28.7 PG
MCHC RBC-ENTMCNC: 30.5 G/DL
MCV RBC AUTO: 94 FL
MICROSCOPIC-UA: NORMAL
MONOCYTES # BLD AUTO: 0.42 K/UL
MONOCYTES NFR BLD AUTO: 9.7 %
NEUTROPHILS # BLD AUTO: 3.16 K/UL
NEUTROPHILS NFR BLD AUTO: 73.4 %
NITRITE URINE: NEGATIVE
PARATHYROID HORMONE INTACT: 66 PG/ML
PH URINE: 6.5
PHOSPHATE SERPL-MCNC: 3.5 MG/DL
PLATELET # BLD AUTO: 203 K/UL
POTASSIUM SERPL-SCNC: 4.6 MMOL/L
PROT SERPL-MCNC: 6.6 G/DL
PROT UR-MCNC: 4 MG/DL
PROTEIN URINE: NEGATIVE MG/DL
RBC # BLD: 4.7 M/UL
RBC # FLD: 13.6 %
RED BLOOD CELLS URINE: 1 /HPF
SODIUM SERPL-SCNC: 142 MMOL/L
SPECIFIC GRAVITY URINE: 1.01
UROBILINOGEN URINE: 0.2 MG/DL
WBC # FLD AUTO: 4.31 K/UL
WHITE BLOOD CELLS URINE: 1 /HPF

## 2024-11-14 PROCEDURE — 99214 OFFICE O/P EST MOD 30 MIN: CPT

## 2024-11-14 RX ORDER — ONDANSETRON 4 MG/1
4 TABLET ORAL
Qty: 30 | Refills: 3 | Status: ACTIVE | COMMUNITY
Start: 2024-11-14 | End: 1900-01-01

## 2024-11-20 ENCOUNTER — APPOINTMENT (OUTPATIENT)
Dept: RHEUMATOLOGY | Facility: CLINIC | Age: 64
End: 2024-11-20
Payer: MEDICARE

## 2024-11-20 VITALS
RESPIRATION RATE: 16 BRPM | DIASTOLIC BLOOD PRESSURE: 73 MMHG | SYSTOLIC BLOOD PRESSURE: 137 MMHG | OXYGEN SATURATION: 97 % | TEMPERATURE: 97.9 F | HEART RATE: 85 BPM

## 2024-11-20 VITALS — DIASTOLIC BLOOD PRESSURE: 73 MMHG | SYSTOLIC BLOOD PRESSURE: 147 MMHG | HEART RATE: 68 BPM

## 2024-11-20 PROCEDURE — 96365 THER/PROPH/DIAG IV INF INIT: CPT

## 2024-11-20 RX ORDER — BELATACEPT 250 MG/1
250 INJECTION, POWDER, LYOPHILIZED, FOR SOLUTION INTRAVENOUS
Qty: 0 | Refills: 0 | Status: COMPLETED
Start: 2024-09-03

## 2024-12-11 ENCOUNTER — APPOINTMENT (OUTPATIENT)
Dept: CARDIOLOGY | Facility: CLINIC | Age: 64
End: 2024-12-11

## 2024-12-16 ENCOUNTER — NON-APPOINTMENT (OUTPATIENT)
Age: 64
End: 2024-12-16

## 2024-12-17 ENCOUNTER — APPOINTMENT (OUTPATIENT)
Dept: RHEUMATOLOGY | Facility: CLINIC | Age: 64
End: 2024-12-17
Payer: MEDICARE

## 2024-12-17 VITALS
HEART RATE: 74 BPM | SYSTOLIC BLOOD PRESSURE: 118 MMHG | DIASTOLIC BLOOD PRESSURE: 69 MMHG | RESPIRATION RATE: 16 BRPM | OXYGEN SATURATION: 98 %

## 2024-12-17 VITALS
SYSTOLIC BLOOD PRESSURE: 125 MMHG | HEART RATE: 86 BPM | DIASTOLIC BLOOD PRESSURE: 62 MMHG | RESPIRATION RATE: 16 BRPM | OXYGEN SATURATION: 97 %

## 2024-12-17 PROCEDURE — 96365 THER/PROPH/DIAG IV INF INIT: CPT

## 2024-12-17 RX ORDER — BELATACEPT 250 MG/1
250 INJECTION, POWDER, LYOPHILIZED, FOR SOLUTION INTRAVENOUS
Qty: 0 | Refills: 0 | Status: COMPLETED
Start: 2024-09-03

## 2025-01-14 ENCOUNTER — APPOINTMENT (OUTPATIENT)
Dept: RHEUMATOLOGY | Facility: CLINIC | Age: 65
End: 2025-01-14
Payer: MEDICARE

## 2025-01-14 VITALS
RESPIRATION RATE: 16 BRPM | HEART RATE: 79 BPM | DIASTOLIC BLOOD PRESSURE: 70 MMHG | SYSTOLIC BLOOD PRESSURE: 123 MMHG | OXYGEN SATURATION: 98 %

## 2025-01-14 VITALS
RESPIRATION RATE: 16 BRPM | DIASTOLIC BLOOD PRESSURE: 76 MMHG | OXYGEN SATURATION: 97 % | SYSTOLIC BLOOD PRESSURE: 124 MMHG | HEART RATE: 83 BPM | TEMPERATURE: 98 F

## 2025-01-14 PROCEDURE — 96365 THER/PROPH/DIAG IV INF INIT: CPT

## 2025-01-14 RX ORDER — BELATACEPT 250 MG/1
250 INJECTION, POWDER, LYOPHILIZED, FOR SOLUTION INTRAVENOUS
Qty: 0 | Refills: 0 | Status: COMPLETED
Start: 2024-09-03

## 2025-01-17 LAB
ALBUMIN SERPL ELPH-MCNC: 4.3 G/DL
ANION GAP SERPL CALC-SCNC: 16 MMOL/L
APPEARANCE: CLEAR
BACTERIA: NEGATIVE /HPF
BILIRUBIN URINE: NEGATIVE
BLOOD URINE: NEGATIVE
BUN SERPL-MCNC: 21 MG/DL
CALCIUM SERPL-MCNC: 10 MG/DL
CAST: 0 /LPF
CHLORIDE SERPL-SCNC: 104 MMOL/L
CHOLEST SERPL-MCNC: 184 MG/DL
CO2 SERPL-SCNC: 26 MMOL/L
COLOR: YELLOW
CREAT SERPL-MCNC: 1.4 MG/DL
EGFR: 42 ML/MIN/1.73M2
EPITHELIAL CELLS: 4 /HPF
ESTIMATED AVERAGE GLUCOSE: 123 MG/DL
GLUCOSE QUALITATIVE U: NEGATIVE MG/DL
GLUCOSE SERPL-MCNC: 118 MG/DL
HBA1C MFR BLD HPLC: 5.9 %
HCT VFR BLD CALC: 44.9 %
HDLC SERPL-MCNC: 52 MG/DL
HGB BLD-MCNC: 13.8 G/DL
KETONES URINE: NEGATIVE MG/DL
LDLC SERPL CALC-MCNC: 104 MG/DL
LEUKOCYTE ESTERASE URINE: ABNORMAL
MAGNESIUM SERPL-MCNC: 1.8 MG/DL
MCHC RBC-ENTMCNC: 29.1 PG
MCHC RBC-ENTMCNC: 30.7 G/DL
MCV RBC AUTO: 94.7 FL
MICROSCOPIC-UA: NORMAL
NITRITE URINE: NEGATIVE
NONHDLC SERPL-MCNC: 132 MG/DL
PH URINE: 6.5
PHOSPHATE SERPL-MCNC: 3.8 MG/DL
PLATELET # BLD AUTO: 218 K/UL
POTASSIUM SERPL-SCNC: 4.7 MMOL/L
PROTEIN URINE: NEGATIVE MG/DL
RBC # BLD: 4.74 M/UL
RBC # FLD: 13.5 %
RED BLOOD CELLS URINE: 0 /HPF
SODIUM SERPL-SCNC: 145 MMOL/L
SPECIFIC GRAVITY URINE: 1.01
TACROLIMUS SERPL-MCNC: <2 NG/ML
TRIGL SERPL-MCNC: 164 MG/DL
UROBILINOGEN URINE: 0.2 MG/DL
WBC # FLD AUTO: 4.78 K/UL
WHITE BLOOD CELLS URINE: 2 /HPF

## 2025-01-22 ENCOUNTER — APPOINTMENT (OUTPATIENT)
Dept: NEPHROLOGY | Facility: CLINIC | Age: 65
End: 2025-01-22
Payer: MEDICARE

## 2025-01-22 ENCOUNTER — NON-APPOINTMENT (OUTPATIENT)
Age: 65
End: 2025-01-22

## 2025-01-22 VITALS
SYSTOLIC BLOOD PRESSURE: 126 MMHG | HEIGHT: 64 IN | OXYGEN SATURATION: 99 % | TEMPERATURE: 97.3 F | DIASTOLIC BLOOD PRESSURE: 66 MMHG | WEIGHT: 250 LBS | BODY MASS INDEX: 42.68 KG/M2 | HEART RATE: 77 BPM

## 2025-01-22 DIAGNOSIS — Z94.0 KIDNEY TRANSPLANT STATUS: ICD-10-CM

## 2025-01-22 DIAGNOSIS — I10 ESSENTIAL (PRIMARY) HYPERTENSION: ICD-10-CM

## 2025-01-22 PROCEDURE — 99214 OFFICE O/P EST MOD 30 MIN: CPT

## 2025-01-22 PROCEDURE — G2211 COMPLEX E/M VISIT ADD ON: CPT

## 2025-02-10 ENCOUNTER — APPOINTMENT (OUTPATIENT)
Dept: RHEUMATOLOGY | Facility: CLINIC | Age: 65
End: 2025-02-10
Payer: MEDICARE

## 2025-02-10 VITALS
SYSTOLIC BLOOD PRESSURE: 122 MMHG | OXYGEN SATURATION: 98 % | HEART RATE: 64 BPM | DIASTOLIC BLOOD PRESSURE: 79 MMHG | RESPIRATION RATE: 61 BRPM

## 2025-02-10 VITALS
TEMPERATURE: 97.9 F | DIASTOLIC BLOOD PRESSURE: 72 MMHG | SYSTOLIC BLOOD PRESSURE: 122 MMHG | OXYGEN SATURATION: 97 % | RESPIRATION RATE: 16 BRPM | HEART RATE: 80 BPM

## 2025-02-10 PROCEDURE — 96365 THER/PROPH/DIAG IV INF INIT: CPT

## 2025-02-10 RX ORDER — BELATACEPT 250 MG/1
250 INJECTION, POWDER, LYOPHILIZED, FOR SOLUTION INTRAVENOUS
Qty: 0 | Refills: 0 | Status: COMPLETED
Start: 2024-09-03

## 2025-02-21 ENCOUNTER — APPOINTMENT (OUTPATIENT)
Dept: ORTHOPEDIC SURGERY | Facility: CLINIC | Age: 65
End: 2025-02-21
Payer: MEDICARE

## 2025-02-21 DIAGNOSIS — M76.31 ILIOTIBIAL BAND SYNDROME, RIGHT LEG: ICD-10-CM

## 2025-02-21 DIAGNOSIS — M17.12 UNILATERAL PRIMARY OSTEOARTHRITIS, LEFT KNEE: ICD-10-CM

## 2025-02-21 DIAGNOSIS — M17.11 UNILATERAL PRIMARY OSTEOARTHRITIS, RIGHT KNEE: ICD-10-CM

## 2025-02-21 DIAGNOSIS — M70.62 TROCHANTERIC BURSITIS, LEFT HIP: ICD-10-CM

## 2025-02-21 PROCEDURE — 73502 X-RAY EXAM HIP UNI 2-3 VIEWS: CPT | Mod: LT

## 2025-02-21 PROCEDURE — 73562 X-RAY EXAM OF KNEE 3: CPT | Mod: RT

## 2025-02-21 PROCEDURE — 99214 OFFICE O/P EST MOD 30 MIN: CPT

## 2025-02-24 PROBLEM — M70.62 TROCHANTERIC BURSITIS OF LEFT HIP: Status: ACTIVE | Noted: 2025-02-24

## 2025-02-24 PROBLEM — M17.11 PRIMARY OSTEOARTHRITIS OF RIGHT KNEE: Status: ACTIVE | Noted: 2025-02-24

## 2025-03-10 ENCOUNTER — APPOINTMENT (OUTPATIENT)
Dept: RHEUMATOLOGY | Facility: CLINIC | Age: 65
End: 2025-03-10
Payer: MEDICARE

## 2025-03-10 VITALS
HEART RATE: 91 BPM | RESPIRATION RATE: 16 BRPM | TEMPERATURE: 98 F | SYSTOLIC BLOOD PRESSURE: 138 MMHG | DIASTOLIC BLOOD PRESSURE: 79 MMHG | OXYGEN SATURATION: 97 %

## 2025-03-10 VITALS
OXYGEN SATURATION: 96 % | SYSTOLIC BLOOD PRESSURE: 120 MMHG | DIASTOLIC BLOOD PRESSURE: 77 MMHG | RESPIRATION RATE: 16 BRPM | HEART RATE: 90 BPM

## 2025-03-10 PROCEDURE — 96365 THER/PROPH/DIAG IV INF INIT: CPT

## 2025-03-10 RX ORDER — BELATACEPT 250 MG/1
250 INJECTION, POWDER, LYOPHILIZED, FOR SOLUTION INTRAVENOUS
Qty: 0 | Refills: 0 | Status: COMPLETED
Start: 2024-09-03

## 2025-03-10 RX ORDER — BELATACEPT 250 MG/1
250 INJECTION, POWDER, LYOPHILIZED, FOR SOLUTION INTRAVENOUS
Refills: 0 | Status: ACTIVE | OUTPATIENT
Start: 2025-03-10

## 2025-03-13 ENCOUNTER — NON-APPOINTMENT (OUTPATIENT)
Age: 65
End: 2025-03-13

## 2025-03-13 LAB
APPEARANCE: CLEAR
BACTERIA UR CULT: NORMAL
BACTERIA: NEGATIVE /HPF
BILIRUBIN URINE: NEGATIVE
BLOOD URINE: NEGATIVE
CAST: 0 /LPF
COLOR: YELLOW
EPITHELIAL CELLS: 3 /HPF
GLUCOSE QUALITATIVE U: NEGATIVE MG/DL
KETONES URINE: NEGATIVE MG/DL
LEUKOCYTE ESTERASE URINE: ABNORMAL
MICROSCOPIC-UA: NORMAL
NITRITE URINE: NEGATIVE
PH URINE: 6.5
PROTEIN URINE: NEGATIVE MG/DL
RED BLOOD CELLS URINE: 0 /HPF
SPECIFIC GRAVITY URINE: 1.01
UROBILINOGEN URINE: 0.2 MG/DL
WHITE BLOOD CELLS URINE: 7 /HPF

## 2025-03-16 LAB
M TB IFN-G BLD-IMP: ABNORMAL
QUANTIFERON TB PLUS MITOGEN MINUS NIL: 0.24 IU/ML
QUANTIFERON TB PLUS NIL: 0.02 IU/ML
QUANTIFERON TB PLUS TB1 MINUS NIL: 0 IU/ML
QUANTIFERON TB PLUS TB2 MINUS NIL: 0 IU/ML

## 2025-03-25 ENCOUNTER — APPOINTMENT (OUTPATIENT)
Dept: TRANSPLANT | Facility: CLINIC | Age: 65
End: 2025-03-25

## 2025-03-27 ENCOUNTER — APPOINTMENT (OUTPATIENT)
Dept: NEPHROLOGY | Facility: CLINIC | Age: 65
End: 2025-03-27
Payer: MEDICARE

## 2025-03-27 DIAGNOSIS — Z79.60 LONG TERM (CURRENT) USE OF UNSPECIFIED IMMUNOMODULATORS AND IMMUNOSUPPRESSANTS: ICD-10-CM

## 2025-03-27 DIAGNOSIS — N39.0 URINARY TRACT INFECTION, SITE NOT SPECIFIED: ICD-10-CM

## 2025-03-27 DIAGNOSIS — Z94.0 KIDNEY TRANSPLANT STATUS: ICD-10-CM

## 2025-03-27 LAB
ALBUMIN SERPL ELPH-MCNC: 4.3 G/DL
ALP BLD-CCNC: 68 U/L
ALT SERPL-CCNC: 13 U/L
ANION GAP SERPL CALC-SCNC: 16 MMOL/L
APPEARANCE: CLEAR
AST SERPL-CCNC: 15 U/L
BASOPHILS # BLD AUTO: 0.04 K/UL
BASOPHILS NFR BLD AUTO: 1 %
BILIRUB SERPL-MCNC: 0.7 MG/DL
BILIRUBIN URINE: NEGATIVE
BKV DNA SPEC QL NAA+PROBE: NOT DETECTED IU/ML
BLOOD URINE: NEGATIVE
BUN SERPL-MCNC: 28 MG/DL
CALCIUM SERPL-MCNC: 9.4 MG/DL
CALCIUM SERPL-MCNC: 9.4 MG/DL
CHLORIDE SERPL-SCNC: 106 MMOL/L
CMV DNA SPEC QL NAA+PROBE: NOT DETECTED IU/ML
CMVPCR LOG: NOT DETECTED LOG10IU/ML
CO2 SERPL-SCNC: 23 MMOL/L
COLOR: YELLOW
CREAT SERPL-MCNC: 1.41 MG/DL
CREAT SPEC-SCNC: 37 MG/DL
CREAT/PROT UR: NORMAL RATIO
EGFRCR SERPLBLD CKD-EPI 2021: 41 ML/MIN/1.73M2
EOSINOPHIL # BLD AUTO: 0.06 K/UL
EOSINOPHIL NFR BLD AUTO: 1.5 %
GLUCOSE QUALITATIVE U: NEGATIVE MG/DL
GLUCOSE SERPL-MCNC: 98 MG/DL
HCT VFR BLD CALC: 42.7 %
HGB BLD-MCNC: 14 G/DL
IMM GRANULOCYTES NFR BLD AUTO: 0.2 %
KETONES URINE: NEGATIVE MG/DL
LEUKOCYTE ESTERASE URINE: NEGATIVE
LYMPHOCYTES # BLD AUTO: 1.07 K/UL
LYMPHOCYTES NFR BLD AUTO: 26.4 %
MAGNESIUM SERPL-MCNC: 1.9 MG/DL
MAN DIFF?: NORMAL
MCHC RBC-ENTMCNC: 29.5 PG
MCHC RBC-ENTMCNC: 32.8 G/DL
MCV RBC AUTO: 90.1 FL
MONOCYTES # BLD AUTO: 0.34 K/UL
MONOCYTES NFR BLD AUTO: 8.4 %
NEUTROPHILS # BLD AUTO: 2.54 K/UL
NEUTROPHILS NFR BLD AUTO: 62.5 %
NITRITE URINE: NEGATIVE
PARATHYROID HORMONE INTACT: 58 PG/ML
PH URINE: 6
PHOSPHATE SERPL-MCNC: 4 MG/DL
PLATELET # BLD AUTO: 210 K/UL
POTASSIUM SERPL-SCNC: 4.3 MMOL/L
PROT SERPL-MCNC: 6.8 G/DL
PROT UR-MCNC: <4 MG/DL
PROTEIN URINE: NEGATIVE MG/DL
RBC # BLD: 4.74 M/UL
RBC # FLD: 13.3 %
SODIUM SERPL-SCNC: 145 MMOL/L
SPECIFIC GRAVITY URINE: 1.01
UROBILINOGEN URINE: 0.2 MG/DL
WBC # FLD AUTO: 4.06 K/UL

## 2025-03-27 PROCEDURE — 99214 OFFICE O/P EST MOD 30 MIN: CPT

## 2025-03-27 RX ORDER — AMOXICILLIN AND CLAVULANATE POTASSIUM 875; 125 MG/1; MG/1
875-125 TABLET, COATED ORAL
Qty: 14 | Refills: 3 | Status: ACTIVE | COMMUNITY
Start: 2025-03-27 | End: 1900-01-01

## 2025-04-01 ENCOUNTER — APPOINTMENT (OUTPATIENT)
Dept: ORTHOPEDIC SURGERY | Facility: CLINIC | Age: 65
End: 2025-04-01
Payer: MEDICARE

## 2025-04-01 VITALS — WEIGHT: 250 LBS | BODY MASS INDEX: 42.68 KG/M2 | HEIGHT: 64 IN

## 2025-04-01 DIAGNOSIS — M70.62 TROCHANTERIC BURSITIS, LEFT HIP: ICD-10-CM

## 2025-04-01 DIAGNOSIS — M17.11 UNILATERAL PRIMARY OSTEOARTHRITIS, RIGHT KNEE: ICD-10-CM

## 2025-04-01 PROCEDURE — 99214 OFFICE O/P EST MOD 30 MIN: CPT

## 2025-04-07 ENCOUNTER — APPOINTMENT (OUTPATIENT)
Dept: RHEUMATOLOGY | Facility: CLINIC | Age: 65
End: 2025-04-07
Payer: MEDICARE

## 2025-04-07 VITALS
DIASTOLIC BLOOD PRESSURE: 62 MMHG | RESPIRATION RATE: 16 BRPM | HEART RATE: 71 BPM | SYSTOLIC BLOOD PRESSURE: 90 MMHG | OXYGEN SATURATION: 98 %

## 2025-04-07 VITALS
RESPIRATION RATE: 16 BRPM | OXYGEN SATURATION: 98 % | DIASTOLIC BLOOD PRESSURE: 65 MMHG | HEART RATE: 74 BPM | SYSTOLIC BLOOD PRESSURE: 94 MMHG

## 2025-04-07 PROCEDURE — 36415 COLL VENOUS BLD VENIPUNCTURE: CPT

## 2025-04-07 PROCEDURE — 96365 THER/PROPH/DIAG IV INF INIT: CPT

## 2025-04-07 RX ORDER — BELATACEPT 250 MG/1
250 INJECTION, POWDER, LYOPHILIZED, FOR SOLUTION INTRAVENOUS
Qty: 0 | Refills: 0 | Status: COMPLETED
Start: 2025-03-10

## 2025-04-10 LAB
M TB IFN-G BLD-IMP: ABNORMAL
QUANTIFERON TB PLUS MITOGEN MINUS NIL: 0.34 IU/ML
QUANTIFERON TB PLUS NIL: 0.05 IU/ML
QUANTIFERON TB PLUS TB1 MINUS NIL: 0 IU/ML
QUANTIFERON TB PLUS TB2 MINUS NIL: 0 IU/ML

## 2025-04-29 ENCOUNTER — APPOINTMENT (OUTPATIENT)
Dept: ORTHOPEDIC SURGERY | Facility: CLINIC | Age: 65
End: 2025-04-29
Payer: MEDICARE

## 2025-04-29 VITALS — BODY MASS INDEX: 42.68 KG/M2 | WEIGHT: 250 LBS | HEIGHT: 64 IN

## 2025-04-29 DIAGNOSIS — M70.62 TROCHANTERIC BURSITIS, LEFT HIP: ICD-10-CM

## 2025-04-29 DIAGNOSIS — M76.32 ILIOTIBIAL BAND SYNDROME, LEFT LEG: ICD-10-CM

## 2025-04-29 DIAGNOSIS — M17.11 UNILATERAL PRIMARY OSTEOARTHRITIS, RIGHT KNEE: ICD-10-CM

## 2025-04-29 PROCEDURE — 99204 OFFICE O/P NEW MOD 45 MIN: CPT

## 2025-04-29 PROCEDURE — 73562 X-RAY EXAM OF KNEE 3: CPT | Mod: RT

## 2025-04-29 PROCEDURE — 73503 X-RAY EXAM HIP UNI 4/> VIEWS: CPT | Mod: LT

## 2025-04-30 ENCOUNTER — APPOINTMENT (OUTPATIENT)
Dept: MRI IMAGING | Facility: CLINIC | Age: 65
End: 2025-04-30
Payer: MEDICARE

## 2025-04-30 PROCEDURE — 73721 MRI JNT OF LWR EXTRE W/O DYE: CPT | Mod: RT

## 2025-05-05 ENCOUNTER — APPOINTMENT (OUTPATIENT)
Dept: RHEUMATOLOGY | Facility: CLINIC | Age: 65
End: 2025-05-05
Payer: MEDICARE

## 2025-05-05 VITALS — SYSTOLIC BLOOD PRESSURE: 93 MMHG | HEART RATE: 80 BPM | DIASTOLIC BLOOD PRESSURE: 61 MMHG

## 2025-05-05 VITALS
HEART RATE: 87 BPM | DIASTOLIC BLOOD PRESSURE: 79 MMHG | RESPIRATION RATE: 16 BRPM | OXYGEN SATURATION: 97 % | TEMPERATURE: 97.9 F | SYSTOLIC BLOOD PRESSURE: 116 MMHG

## 2025-05-05 PROCEDURE — 36415 COLL VENOUS BLD VENIPUNCTURE: CPT

## 2025-05-05 PROCEDURE — 96365 THER/PROPH/DIAG IV INF INIT: CPT

## 2025-05-05 RX ORDER — BELATACEPT 250 MG/1
250 INJECTION, POWDER, LYOPHILIZED, FOR SOLUTION INTRAVENOUS
Qty: 0 | Refills: 0 | Status: COMPLETED
Start: 2025-03-10

## 2025-05-13 ENCOUNTER — APPOINTMENT (OUTPATIENT)
Dept: ORTHOPEDIC SURGERY | Facility: CLINIC | Age: 65
End: 2025-05-13

## 2025-05-13 DIAGNOSIS — M17.11 UNILATERAL PRIMARY OSTEOARTHRITIS, RIGHT KNEE: ICD-10-CM

## 2025-05-13 PROCEDURE — 99214 OFFICE O/P EST MOD 30 MIN: CPT

## 2025-06-02 ENCOUNTER — APPOINTMENT (OUTPATIENT)
Dept: RHEUMATOLOGY | Facility: CLINIC | Age: 65
End: 2025-06-02
Payer: MEDICARE

## 2025-06-02 VITALS
SYSTOLIC BLOOD PRESSURE: 113 MMHG | HEART RATE: 71 BPM | RESPIRATION RATE: 16 BRPM | DIASTOLIC BLOOD PRESSURE: 73 MMHG | OXYGEN SATURATION: 95 %

## 2025-06-02 VITALS
RESPIRATION RATE: 16 BRPM | SYSTOLIC BLOOD PRESSURE: 101 MMHG | OXYGEN SATURATION: 98 % | TEMPERATURE: 97.7 F | DIASTOLIC BLOOD PRESSURE: 66 MMHG | HEART RATE: 83 BPM

## 2025-06-02 PROCEDURE — 96365 THER/PROPH/DIAG IV INF INIT: CPT

## 2025-06-02 PROCEDURE — 36415 COLL VENOUS BLD VENIPUNCTURE: CPT

## 2025-06-02 RX ORDER — BELATACEPT 250 MG/1
250 INJECTION, POWDER, LYOPHILIZED, FOR SOLUTION INTRAVENOUS
Qty: 0 | Refills: 0 | Status: COMPLETED
Start: 2025-03-10

## 2025-06-24 ENCOUNTER — APPOINTMENT (OUTPATIENT)
Dept: ORTHOPEDIC SURGERY | Facility: CLINIC | Age: 65
End: 2025-06-24
Payer: MEDICARE

## 2025-06-24 VITALS — WEIGHT: 250 LBS | BODY MASS INDEX: 42.68 KG/M2 | HEIGHT: 64 IN

## 2025-06-24 PROBLEM — S83.241A ACUTE MEDIAL MENISCUS TEAR OF RIGHT KNEE, INITIAL ENCOUNTER: Status: ACTIVE | Noted: 2025-06-24

## 2025-06-24 PROCEDURE — 99213 OFFICE O/P EST LOW 20 MIN: CPT

## 2025-06-26 ENCOUNTER — APPOINTMENT (OUTPATIENT)
Dept: CARDIOLOGY | Facility: CLINIC | Age: 65
End: 2025-06-26

## 2025-06-26 PROCEDURE — G2211 COMPLEX E/M VISIT ADD ON: CPT

## 2025-06-26 PROCEDURE — 99214 OFFICE O/P EST MOD 30 MIN: CPT

## 2025-06-30 ENCOUNTER — APPOINTMENT (OUTPATIENT)
Dept: RHEUMATOLOGY | Facility: CLINIC | Age: 65
End: 2025-06-30
Payer: MEDICARE

## 2025-06-30 VITALS — DIASTOLIC BLOOD PRESSURE: 76 MMHG | SYSTOLIC BLOOD PRESSURE: 120 MMHG | HEART RATE: 73 BPM

## 2025-06-30 VITALS
HEART RATE: 85 BPM | SYSTOLIC BLOOD PRESSURE: 100 MMHG | DIASTOLIC BLOOD PRESSURE: 59 MMHG | TEMPERATURE: 98 F | RESPIRATION RATE: 16 BRPM | OXYGEN SATURATION: 96 %

## 2025-06-30 PROCEDURE — 96365 THER/PROPH/DIAG IV INF INIT: CPT

## 2025-06-30 RX ORDER — BELATACEPT 250 MG/1
250 INJECTION, POWDER, LYOPHILIZED, FOR SOLUTION INTRAVENOUS
Qty: 0 | Refills: 0 | Status: COMPLETED
Start: 2025-03-10

## 2025-07-01 LAB
ALBUMIN SERPL ELPH-MCNC: 4.2 G/DL
ALP BLD-CCNC: 70 U/L
ALT SERPL-CCNC: 16 U/L
ANION GAP SERPL CALC-SCNC: 17 MMOL/L
APPEARANCE: CLEAR
AST SERPL-CCNC: 17 U/L
BACTERIA: NEGATIVE /HPF
BILIRUB SERPL-MCNC: 0.6 MG/DL
BILIRUBIN URINE: NEGATIVE
BLOOD URINE: NEGATIVE
BUN SERPL-MCNC: 26 MG/DL
CALCIUM SERPL-MCNC: 9.5 MG/DL
CALCIUM SERPL-MCNC: 9.5 MG/DL
CAST: 0 /LPF
CHLORIDE SERPL-SCNC: 103 MMOL/L
CO2 SERPL-SCNC: 22 MMOL/L
COLOR: YELLOW
CREAT SERPL-MCNC: 1.45 MG/DL
EGFRCR SERPLBLD CKD-EPI 2021: 40 ML/MIN/1.73M2
EPITHELIAL CELLS: 1 /HPF
ESTIMATED AVERAGE GLUCOSE: 126 MG/DL
GLUCOSE QUALITATIVE U: NEGATIVE MG/DL
GLUCOSE SERPL-MCNC: 109 MG/DL
HBA1C MFR BLD HPLC: 6 %
HCT VFR BLD CALC: 47.7 %
HGB BLD-MCNC: 14.7 G/DL
KETONES URINE: NEGATIVE MG/DL
LEUKOCYTE ESTERASE URINE: ABNORMAL
MAGNESIUM SERPL-MCNC: 2 MG/DL
MCHC RBC-ENTMCNC: 29.7 PG
MCHC RBC-ENTMCNC: 30.8 G/DL
MCV RBC AUTO: 96.4 FL
MICROSCOPIC-UA: NORMAL
NITRITE URINE: NEGATIVE
PARATHYROID HORMONE INTACT: 70 PG/ML
PH URINE: 6.5
PHOSPHATE SERPL-MCNC: 3.2 MG/DL
PLATELET # BLD AUTO: 218 K/UL
POTASSIUM SERPL-SCNC: 4.3 MMOL/L
PROT SERPL-MCNC: 6.6 G/DL
PROTEIN URINE: NEGATIVE MG/DL
RBC # BLD: 4.95 M/UL
RBC # FLD: 13.5 %
RED BLOOD CELLS URINE: 2 /HPF
SODIUM SERPL-SCNC: 143 MMOL/L
SPECIFIC GRAVITY URINE: 1.01
UROBILINOGEN URINE: 0.2 MG/DL
WBC # FLD AUTO: 4.6 K/UL
WHITE BLOOD CELLS URINE: 1 /HPF

## 2025-07-02 ENCOUNTER — APPOINTMENT (OUTPATIENT)
Dept: NEPHROLOGY | Facility: CLINIC | Age: 65
End: 2025-07-02
Payer: MEDICARE

## 2025-07-02 VITALS — OXYGEN SATURATION: 98 % | SYSTOLIC BLOOD PRESSURE: 158 MMHG | DIASTOLIC BLOOD PRESSURE: 63 MMHG | HEART RATE: 61 BPM

## 2025-07-02 PROBLEM — R73.03 PREDIABETES: Status: ACTIVE | Noted: 2025-07-02

## 2025-07-02 LAB
CHOLEST SERPL-MCNC: 201 MG/DL
HDLC SERPL-MCNC: 61 MG/DL
LDLC SERPL-MCNC: 118 MG/DL
NONHDLC SERPL-MCNC: 140 MG/DL
TRIGL SERPL-MCNC: 128 MG/DL

## 2025-07-02 PROCEDURE — 99214 OFFICE O/P EST MOD 30 MIN: CPT

## 2025-07-02 PROCEDURE — G2211 COMPLEX E/M VISIT ADD ON: CPT

## 2025-07-17 ENCOUNTER — NON-APPOINTMENT (OUTPATIENT)
Age: 65
End: 2025-07-17

## 2025-07-23 ENCOUNTER — APPOINTMENT (OUTPATIENT)
Dept: ORTHOPEDIC SURGERY | Facility: CLINIC | Age: 65
End: 2025-07-23
Payer: MEDICARE

## 2025-07-23 VITALS — DIASTOLIC BLOOD PRESSURE: 60 MMHG | HEART RATE: 80 BPM | SYSTOLIC BLOOD PRESSURE: 139 MMHG

## 2025-07-23 DIAGNOSIS — M21.41 FLAT FOOT [PES PLANUS] (ACQUIRED), RIGHT FOOT: ICD-10-CM

## 2025-07-23 DIAGNOSIS — D84.821 IMMUNODEFICIENCY DUE TO DRUGS: ICD-10-CM

## 2025-07-23 DIAGNOSIS — M62.89 OTHER SPECIFIED DISORDERS OF MUSCLE: ICD-10-CM

## 2025-07-23 DIAGNOSIS — M20.11 HALLUX VALGUS (ACQUIRED), RIGHT FOOT: ICD-10-CM

## 2025-07-23 DIAGNOSIS — M79.671 PAIN IN RIGHT FOOT: ICD-10-CM

## 2025-07-23 PROCEDURE — 73630 X-RAY EXAM OF FOOT: CPT | Mod: RT

## 2025-07-23 PROCEDURE — 99204 OFFICE O/P NEW MOD 45 MIN: CPT

## 2025-07-28 ENCOUNTER — APPOINTMENT (OUTPATIENT)
Dept: RHEUMATOLOGY | Facility: CLINIC | Age: 65
End: 2025-07-28
Payer: MEDICARE

## 2025-07-28 VITALS
DIASTOLIC BLOOD PRESSURE: 71 MMHG | SYSTOLIC BLOOD PRESSURE: 112 MMHG | TEMPERATURE: 98 F | OXYGEN SATURATION: 97 % | HEART RATE: 87 BPM | RESPIRATION RATE: 16 BRPM

## 2025-07-28 VITALS — HEART RATE: 72 BPM | OXYGEN SATURATION: 98 % | DIASTOLIC BLOOD PRESSURE: 72 MMHG | SYSTOLIC BLOOD PRESSURE: 106 MMHG

## 2025-07-28 PROCEDURE — 96365 THER/PROPH/DIAG IV INF INIT: CPT

## 2025-07-28 RX ORDER — BELATACEPT 250 MG/1
250 INJECTION, POWDER, LYOPHILIZED, FOR SOLUTION INTRAVENOUS
Qty: 0 | Refills: 0 | Status: COMPLETED
Start: 2025-03-10

## 2025-07-30 PROBLEM — M62.89 TIGHTNESS OF BOTH GASTROCNEMIUS MUSCLES: Status: ACTIVE | Noted: 2025-07-30

## 2025-07-30 PROBLEM — M62.89 TIGHTNESS OF BOTH GASTROCNEMIUS MUSCLES: Status: RESOLVED | Noted: 2021-11-05 | Resolved: 2025-07-30

## 2025-07-30 PROBLEM — M21.41 ACQUIRED PES PLANUS OF RIGHT FOOT: Status: ACTIVE | Noted: 2025-07-30

## 2025-07-30 PROBLEM — M20.11 HALLUX VALGUS OF RIGHT FOOT: Status: ACTIVE | Noted: 2025-07-30

## 2025-07-30 PROBLEM — D84.821 IMMUNOSUPPRESSION DUE TO DRUG THERAPY: Status: ACTIVE | Noted: 2025-07-30

## 2025-08-25 ENCOUNTER — APPOINTMENT (OUTPATIENT)
Dept: RHEUMATOLOGY | Facility: CLINIC | Age: 65
End: 2025-08-25
Payer: MEDICARE

## 2025-08-25 VITALS
HEART RATE: 65 BPM | TEMPERATURE: 97.7 F | RESPIRATION RATE: 16 BRPM | DIASTOLIC BLOOD PRESSURE: 73 MMHG | OXYGEN SATURATION: 98 % | SYSTOLIC BLOOD PRESSURE: 112 MMHG

## 2025-08-25 VITALS — DIASTOLIC BLOOD PRESSURE: 79 MMHG | HEART RATE: 64 BPM | SYSTOLIC BLOOD PRESSURE: 123 MMHG | OXYGEN SATURATION: 98 %

## 2025-08-25 PROCEDURE — 96365 THER/PROPH/DIAG IV INF INIT: CPT

## 2025-08-25 RX ORDER — BELATACEPT 250 MG/1
250 INJECTION, POWDER, LYOPHILIZED, FOR SOLUTION INTRAVENOUS
Qty: 0 | Refills: 0 | Status: COMPLETED
Start: 2025-03-10